# Patient Record
Sex: FEMALE | Race: WHITE | NOT HISPANIC OR LATINO | Employment: OTHER | URBAN - METROPOLITAN AREA
[De-identification: names, ages, dates, MRNs, and addresses within clinical notes are randomized per-mention and may not be internally consistent; named-entity substitution may affect disease eponyms.]

---

## 2017-01-09 ENCOUNTER — ALLSCRIPTS OFFICE VISIT (OUTPATIENT)
Dept: OTHER | Facility: OTHER | Age: 77
End: 2017-01-09

## 2017-01-13 ENCOUNTER — GENERIC CONVERSION - ENCOUNTER (OUTPATIENT)
Dept: OTHER | Facility: OTHER | Age: 77
End: 2017-01-13

## 2017-04-27 ENCOUNTER — ALLSCRIPTS OFFICE VISIT (OUTPATIENT)
Dept: OTHER | Facility: OTHER | Age: 77
End: 2017-04-27

## 2017-05-09 ENCOUNTER — ALLSCRIPTS OFFICE VISIT (OUTPATIENT)
Dept: OTHER | Facility: OTHER | Age: 77
End: 2017-05-09

## 2017-05-09 DIAGNOSIS — R53.83 OTHER FATIGUE: ICD-10-CM

## 2017-05-09 DIAGNOSIS — R60.9 EDEMA: ICD-10-CM

## 2017-05-09 DIAGNOSIS — E78.00 PURE HYPERCHOLESTEROLEMIA: ICD-10-CM

## 2017-05-10 ENCOUNTER — APPOINTMENT (OUTPATIENT)
Dept: LAB | Facility: CLINIC | Age: 77
End: 2017-05-10
Payer: COMMERCIAL

## 2017-05-10 DIAGNOSIS — R60.9 EDEMA: ICD-10-CM

## 2017-05-10 DIAGNOSIS — R53.83 OTHER FATIGUE: ICD-10-CM

## 2017-05-10 DIAGNOSIS — E78.00 PURE HYPERCHOLESTEROLEMIA: ICD-10-CM

## 2017-05-10 LAB
ALBUMIN SERPL BCP-MCNC: 3.4 G/DL (ref 3.5–5)
ALP SERPL-CCNC: 101 U/L (ref 46–116)
ALT SERPL W P-5'-P-CCNC: 20 U/L (ref 12–78)
ANION GAP SERPL CALCULATED.3IONS-SCNC: 7 MMOL/L (ref 4–13)
AST SERPL W P-5'-P-CCNC: 22 U/L (ref 5–45)
BILIRUB SERPL-MCNC: 0.31 MG/DL (ref 0.2–1)
BUN SERPL-MCNC: 10 MG/DL (ref 5–25)
CALCIUM SERPL-MCNC: 8.9 MG/DL (ref 8.3–10.1)
CHLORIDE SERPL-SCNC: 106 MMOL/L (ref 100–108)
CHOLEST SERPL-MCNC: 251 MG/DL (ref 50–200)
CO2 SERPL-SCNC: 27 MMOL/L (ref 21–32)
CREAT SERPL-MCNC: 0.55 MG/DL (ref 0.6–1.3)
GFR SERPL CREATININE-BSD FRML MDRD: >60 ML/MIN/1.73SQ M
GLUCOSE P FAST SERPL-MCNC: 93 MG/DL (ref 65–99)
HDLC SERPL-MCNC: 56 MG/DL (ref 40–60)
LDLC SERPL CALC-MCNC: 168 MG/DL (ref 0–100)
POTASSIUM SERPL-SCNC: 3.9 MMOL/L (ref 3.5–5.3)
PROT SERPL-MCNC: 7 G/DL (ref 6.4–8.2)
SODIUM SERPL-SCNC: 140 MMOL/L (ref 136–145)
TRIGL SERPL-MCNC: 134 MG/DL
TSH SERPL DL<=0.05 MIU/L-ACNC: 0.41 UIU/ML (ref 0.36–3.74)

## 2017-05-10 PROCEDURE — 36415 COLL VENOUS BLD VENIPUNCTURE: CPT

## 2017-05-10 PROCEDURE — 80061 LIPID PANEL: CPT

## 2017-05-10 PROCEDURE — 84443 ASSAY THYROID STIM HORMONE: CPT

## 2017-05-10 PROCEDURE — 80053 COMPREHEN METABOLIC PANEL: CPT

## 2017-09-21 ENCOUNTER — GENERIC CONVERSION - ENCOUNTER (OUTPATIENT)
Dept: OTHER | Facility: OTHER | Age: 77
End: 2017-09-21

## 2017-09-21 DIAGNOSIS — E78.00 PURE HYPERCHOLESTEROLEMIA: ICD-10-CM

## 2017-09-21 DIAGNOSIS — R53.83 OTHER FATIGUE: ICD-10-CM

## 2017-09-21 DIAGNOSIS — R60.9 EDEMA: ICD-10-CM

## 2017-10-25 ENCOUNTER — APPOINTMENT (OUTPATIENT)
Dept: LAB | Facility: CLINIC | Age: 77
End: 2017-10-25
Payer: COMMERCIAL

## 2017-10-25 DIAGNOSIS — R53.83 OTHER FATIGUE: ICD-10-CM

## 2017-10-25 DIAGNOSIS — R60.9 EDEMA: ICD-10-CM

## 2017-10-25 DIAGNOSIS — E78.00 PURE HYPERCHOLESTEROLEMIA: ICD-10-CM

## 2017-10-25 LAB
ALBUMIN SERPL BCP-MCNC: 3.4 G/DL (ref 3.5–5)
ALP SERPL-CCNC: 89 U/L (ref 46–116)
ALT SERPL W P-5'-P-CCNC: 18 U/L (ref 12–78)
ANION GAP SERPL CALCULATED.3IONS-SCNC: 8 MMOL/L (ref 4–13)
AST SERPL W P-5'-P-CCNC: 21 U/L (ref 5–45)
BILIRUB SERPL-MCNC: 0.4 MG/DL (ref 0.2–1)
BUN SERPL-MCNC: 11 MG/DL (ref 5–25)
CALCIUM SERPL-MCNC: 8.7 MG/DL (ref 8.3–10.1)
CHLORIDE SERPL-SCNC: 105 MMOL/L (ref 100–108)
CHOLEST SERPL-MCNC: 224 MG/DL (ref 50–200)
CO2 SERPL-SCNC: 28 MMOL/L (ref 21–32)
CREAT SERPL-MCNC: 0.6 MG/DL (ref 0.6–1.3)
ERYTHROCYTE [DISTWIDTH] IN BLOOD BY AUTOMATED COUNT: 12.6 % (ref 11.6–15.1)
GFR SERPL CREATININE-BSD FRML MDRD: 89 ML/MIN/1.73SQ M
GLUCOSE P FAST SERPL-MCNC: 100 MG/DL (ref 65–99)
HCT VFR BLD AUTO: 41.2 % (ref 34.8–46.1)
HDLC SERPL-MCNC: 47 MG/DL (ref 40–60)
HGB BLD-MCNC: 13.5 G/DL (ref 11.5–15.4)
LDLC SERPL CALC-MCNC: 144 MG/DL (ref 0–100)
MCH RBC QN AUTO: 31.4 PG (ref 26.8–34.3)
MCHC RBC AUTO-ENTMCNC: 32.8 G/DL (ref 31.4–37.4)
MCV RBC AUTO: 96 FL (ref 82–98)
PLATELET # BLD AUTO: 178 THOUSANDS/UL (ref 149–390)
PMV BLD AUTO: 10.1 FL (ref 8.9–12.7)
POTASSIUM SERPL-SCNC: 4.2 MMOL/L (ref 3.5–5.3)
PROT SERPL-MCNC: 7.2 G/DL (ref 6.4–8.2)
RBC # BLD AUTO: 4.3 MILLION/UL (ref 3.81–5.12)
SODIUM SERPL-SCNC: 141 MMOL/L (ref 136–145)
TRIGL SERPL-MCNC: 163 MG/DL
TSH SERPL DL<=0.05 MIU/L-ACNC: 0.32 UIU/ML (ref 0.36–3.74)
WBC # BLD AUTO: 3.89 THOUSAND/UL (ref 4.31–10.16)

## 2017-10-25 PROCEDURE — 80053 COMPREHEN METABOLIC PANEL: CPT

## 2017-10-25 PROCEDURE — 80061 LIPID PANEL: CPT

## 2017-10-25 PROCEDURE — 85027 COMPLETE CBC AUTOMATED: CPT

## 2017-10-25 PROCEDURE — 84443 ASSAY THYROID STIM HORMONE: CPT

## 2017-10-25 PROCEDURE — 36415 COLL VENOUS BLD VENIPUNCTURE: CPT

## 2017-10-27 ENCOUNTER — GENERIC CONVERSION - ENCOUNTER (OUTPATIENT)
Dept: OTHER | Facility: OTHER | Age: 77
End: 2017-10-27

## 2017-11-27 ENCOUNTER — ALLSCRIPTS OFFICE VISIT (OUTPATIENT)
Dept: OTHER | Facility: OTHER | Age: 77
End: 2017-11-27

## 2017-12-05 NOTE — PROGRESS NOTES
Assessment    1  Acute URI (465 9) (J06 9)    Plan  Acute upper respiratory infection    · Promethazine-DM 6 25-15 MG/5ML Oral Syrup; Take 1 teaspoonful every 6 hoursas needed  Acute URI    · Azithromycin 250 MG Oral Tablet; TAKE 2 TABLETS ON DAY 1 THEN TAKE 1TABLET A DAY FOR 4 DAYS    Discussion/Summary    The patient is an feeling better in  Chief Complaint  coughing, sore throat, congestion x 1 day      History of Present Illness  HPI: coughing, sore throat, and headache,      Review of Systems   Constitutional: No fever, no chills, feels well, no tiredness, no recent weight gain or loss  ENT: sore throat, but-- no ear ache, no loss of hearing, no nosebleeds or nasal discharge, no sore throat or hoarseness  Cardiovascular: no complaints of slow or fast heart rate, no chest pain, no palpitations, no leg claudication or lower extremity edema  Respiratory: cough, but-- no complaints of shortness of breath, no wheezing, no dyspnea on exertion, no orthopnea or PND  Breasts: no complaints of breast pain, breast lump or nipple discharge  Gastrointestinal: no complaints of abdominal pain, no constipation, no nausea or diarrhea, no vomiting, no bloody stools  Genitourinary: no complaints of dysuria, no incontinence, no pelvic pain, no dysmenorrhea, no vaginal discharge or abnormal vaginal bleeding  Musculoskeletal: no complaints of arthralgia, no myalgia, no joint swelling or stiffness, no limb pain or swelling  Integumentary: no complaints of skin rash or lesion, no itching or dry skin, no skin wounds  Neurological: headache, but-- no complaints of headache, no confusion, no numbness or tingling, no dizziness or fainting  Active Problems    1  Actinic keratoses (702 0) (L57 0)   2  Acute bronchopneumonia (485) (J18 0)   3  Acute sinusitis (461 9) (J01 90)   4  Acute upper respiratory infection (465 9) (J06 9)   5  Acute URI (465 9) (J06 9)   6   Acute venous embolism and thrombosis of deep vessels of distal lower extremity (453 42) (I82 4Z9)   7  Allergic rhinitis (477 9) (J30 9)   8  Anxiety (300 00) (F41 9)   9  Arthralgia (719 40) (M25 50)   10  Bilateral hip pain (719 45) (M25 551,M25 552)   11  Bulging of lumbar intervertebral disc (722 10) (M51 26)   12  Chronic osteoarthritis (715 90) (M19 90)   13  Edema (782 3) (R60 9)   14  Elevated blood pressure reading without diagnosis of hypertension (796 2) (R03 0)   15  Encounter for screening colonoscopy (V76 51) (Z12 11)   16  Encounter for screening mammogram for malignant neoplasm of breast (V76 12)  (Z12 31)   17  Epilepsy And Recurrent Seizures (345 90)   18  Eustachian tube disorder (381 9) (H69 90)   19  Fatigue (780 79) (R53 83)   20  Flu vaccine need (V04 81) (Z23)   21  Fractures (829 0) (T14 8XXA)   22  Greater trochanteric bursitis of both hips (726 5) (M70 61,M70 62)   23  Hypercholesterolemia (272 0) (E78 00)   24  Ingrowing toenail (703 0) (L60 0)   25  Insect bites (919 4,E906 4) (W57 XXXA)   26  Insomnia (780 52) (G47 00)   27  Lumbar back pain (724 2) (M54 5)   28  Lumbar radiculopathy (724 4) (M54 16)   29  Lumbar spondylosis (721 3) (M47 816)   30  Medicare annual wellness visit, initial (V70 0) (Z00 00)   31  Medicare annual wellness visit, subsequent (V70 0) (Z00 00)   32  Obesity (278 00) (E66 9)   33  Oral ulcer (528 9) (K12 1)   34  Pinched nerve (355 9) (G58 9)   35  Pneumonia (486) (J18 9)   36  Pre-operative cardiovascular examination (V72 81) (Z01 810)   37  Primary osteoarthritis of both hips (715 15) (M16 0)   38  Screening for genitourinary condition (V81 6) (Z13 89)   39  Screening for neurological condition (V80 09) (Z13 89)   40  Sea sickness (994 6) (T75 3XXA)   41  Visit for pre-operative examination (V72 84) (U11 263)    Past Medical History  1  History of Blood Type A+   2  History of Concussion (V15 52)   3  History of Convulsions (780 39) (R56 9)   4  History of hyperlipidemia (V12 29) (Z86 39)   5   History of Osteoarthritis, localized, knee (715 36) (M17 10)   6  History of Spondylosis (721 90)  Active Problems And Past Medical History Reviewed: The active problems and past medical history were reviewed and updated today  Family History  Father    1  Family history of Prostate Cancer (V16 42)  Family History    2  Family history of Back problem  Family History Reviewed: The family history was reviewed and updated today  Social History   · Denied: History of Alcohol Use (History)   · Daily Coffee Consumption (___ Cups/Day)   · Denied: History of Daily Cola Consumption (___ Cans/Day)   · Daily Tea Consumption (___ Cups/Day)   · Denied: History of Drug Use   · Former smoker (V15 82) (Z87 601)   · Marital History - Currently    · Never A Smoker  The social history was reviewed and updated today  The social history was reviewed and is unchanged  Surgical History    1  History of Cataract Surgery   2  History of Diagnostic Esophagogastroduodenoscopy   3  History of Dilation And Curettage   4  History of Hand Surgery   5  History of Hernia Repair   6  History of Hysterectomy   7  History of Nasal Septal Deviation Repair   8  History of Revision Of Total Knee Arthroplasty   9  History of Total Knee Arthroplasty   10  History of Tubal Ligation  Surgical History Reviewed: The surgical history was reviewed and updated today  Current Meds   1  Aspirin 81 MG Oral Tablet Delayed Release; 1 qod Recorded   2  Belsomra 20 MG Oral Tablet; TAKE 1 TABLET Bedtime; Therapy: 18XQZ8154 to (Evaluate:01Oct2017); Last Rx:21Sep2017 Ordered   3  Calcium + D TABS; 2 tabs qd Recorded   4  [facility]-3 CAPS; 2 tabs qd Recorded   5  Dilantin 100 MG Oral Capsule; TAKE 1 CAPSULE 3 times daily; Therapy: 05FYY5772 to (Eliecer Mckenzie)  Requested for: 29CQC2470; Last Rx:09Jan2017 Ordered   6  Glucosamine Chondr Complex 500-400 MG Oral Capsule; 2 tabs qd Recorded   7   HydroCHLOROthiazide 25 MG Oral Tablet; TAKE 1 TABLET BY MOUTH DAILY AS NEEDED FOR SWELLING; Therapy: 60BJC5639 to (Yoni Humble)  Requested for: 84WXG5202; Last Rx:48Vmz8692 Ordered   8  Multivitamins Oral Capsule; TAKE 1 CAPSULE Daily Recorded   9  Simvastatin 20 MG Oral Tablet; Take 1 tablet daily; Therapy: 20FJZ1593 to (Evaluate:76Ffl1111)  Requested for: 41Vjj6331; Last Rx:93Ymv3593 Ordered    The medication list was reviewed and updated today  Allergies  1  Penicillins    Vitals   Recorded: 48XBZ3944 02:01PM   Temperature 97 2 F   Heart Rate 72   Systolic 275   Diastolic 80   Height 5 ft 4 in   Weight 235 lb 2 oz   BMI Calculated 40 36   BSA Calculated 2 1       Physical Exam   Constitutional  General appearance: No acute distress, well appearing and well nourished  Eyes  Conjunctiva and lids: No swelling, erythema or discharge  Pupils and irises: Equal, round and reactive to light  Ears, Nose, Mouth, and Throat  External inspection of ears and nose: Normal    Otoscopic examination: Abnormal  -- Left TM is erythematous and dull with an effusion  Nasal mucosa, septum, and turbinates: Abnormal  -- Positive turbinates injection  Oropharynx: Abnormal  -- Positive erythema the posterior pharynx with injection and postnasal drip and an ulcer along the lower denture line on the left which is about 0 5 cm diameter and white in color  Pulmonary  Respiratory effort: No increased work of breathing or signs of respiratory distress  Auscultation of lungs: Clear to auscultation  Cardiovascular  Auscultation of heart: Normal rate and rhythm, normal S1 and S2, without murmurs  Examination of extremities for edema and/or varicosities: Normal    Abdomen  Abdomen: Non-tender, no masses  Liver and spleen: No hepatomegaly or splenomegaly  Lymphatic  Palpation of lymph nodes in neck: Abnormal  -- Positive anterior cervical lymphadenopathy left greater than right  Musculoskeletal  Digits and nails: Normal without clubbing or cyanosis     Skin  Skin and subcutaneous tissue: Normal without rashes or lesions  Neurologic  Reflexes: 2+ and symmetric     Psychiatric  Orientation to person, place, and time: Normal    Mood and affect: Normal          Future Appointments    Date/Time Provider Specialty Site   01/18/2018 09:45 AM Amaury Escobedo, [address] [place] [address]       Signatures   Electronically signed by : Segundo Vasquez DO; Dec  3 2017  7:36PM EST                       (Author)

## 2017-12-28 ENCOUNTER — ALLSCRIPTS OFFICE VISIT (OUTPATIENT)
Dept: OTHER | Facility: OTHER | Age: 77
End: 2017-12-28

## 2017-12-29 NOTE — PROGRESS NOTES
Assessment   1  Acute upper respiratory infection (465 9) (J06 9)    Plan   Acute upper respiratory infection    · Azithromycin 250 MG Oral Tablet; TAKE 2 TABLETS ON DAY 1 THEN TAKE 1    TABLET A DAY FOR 4 DAYS   · Promethazine-DM 6 25-15 MG/5ML Oral Syrup; Take 1 teaspoonful every 6 hours    as needed    Discussion/Summary      If patient is not feeling better in 72 hours, they are to call  Chief Complaint   dry cough, congestion x 2 days      History of Present Illness   HPI: The patient complains of dry cough with nasal congestion for the past 48 hours  She denies any fever chills or body aches at this time  Review of Systems        Constitutional: No fever, no chills, feels well, no tiredness, no recent weight gain or loss  ENT: Positive nasal congestion, but-- no ear ache, no loss of hearing, no nosebleeds or nasal discharge, no sore throat or hoarseness  Cardiovascular: no complaints of slow or fast heart rate, no chest pain, no palpitations, no leg claudication or lower extremity edema  Respiratory: cough-- and-- Dry nonproductive cough, but-- no complaints of shortness of breath, no wheezing, no dyspnea on exertion, no orthopnea or PND  Breasts: no complaints of breast pain, breast lump or nipple discharge  Gastrointestinal: no complaints of abdominal pain, no constipation, no nausea or diarrhea, no vomiting, no bloody stools  Genitourinary: no complaints of dysuria, no incontinence, no pelvic pain, no dysmenorrhea, no vaginal discharge or abnormal vaginal bleeding  Musculoskeletal: no complaints of arthralgia, no myalgia, no joint swelling or stiffness, no limb pain or swelling  Integumentary: no complaints of skin rash or lesion, no itching or dry skin, no skin wounds  Neurological: no complaints of headache, no confusion, no numbness or tingling, no dizziness or fainting  Active Problems   1  Actinic keratoses (702 0) (L57 0)   2   Acute bronchopneumonia (485) (J18 0)   3  Acute sinusitis (461 9) (J01 90)   4  Acute upper respiratory infection (465 9) (J06 9)   5  Acute URI (465 9) (J06 9)   6  Acute venous embolism and thrombosis of deep vessels of distal lower extremity     (453 42) (I82 4Z9)   7  Allergic rhinitis (477 9) (J30 9)   8  Anxiety (300 00) (F41 9)   9  Arthralgia (719 40) (M25 50)   10  Bilateral hip pain (719 45) (M25 551,M25 552)   11  Bulging of lumbar intervertebral disc (722 10) (M51 26)   12  Chronic osteoarthritis (715 90) (M19 90)   13  Edema (782 3) (R60 9)   14  Elevated blood pressure reading without diagnosis of hypertension (796 2) (R03 0)   15  Encounter for screening colonoscopy (V76 51) (Z12 11)   16  Encounter for screening mammogram for malignant neoplasm of breast (V76 12)      (Z12 31)   17  Epilepsy And Recurrent Seizures (345 90)   18  Eustachian tube disorder (381 9) (H69 90)   19  Fatigue (780 79) (R53 83)   20  Flu vaccine need (V04 81) (Z23)   21  Fractures (829 0) (T14 8XXA)   22  Greater trochanteric bursitis of both hips (726 5) (M70 61,M70 62)   23  Hypercholesterolemia (272 0) (E78 00)   24  Ingrowing toenail (703 0) (L60 0)   25  Insect bites (919 4,E906 4) (W57 XXXA)   26  Insomnia (780 52) (G47 00)   27  Lumbar back pain (724 2) (M54 5)   28  Lumbar radiculopathy (724 4) (M54 16)   29  Lumbar spondylosis (721 3) (M47 816)   30  Medicare annual wellness visit, initial (V70 0) (Z00 00)   31  Medicare annual wellness visit, subsequent (V70 0) (Z00 00)   32  Obesity (278 00) (E66 9)   33  Oral ulcer (528 9) (K12 1)   34  Pinched nerve (355 9) (G58 9)   35  Pneumonia (486) (J18 9)   36  Pre-operative cardiovascular examination (V72 81) (Z01 810)   37  Primary osteoarthritis of both hips (715 15) (M16 0)   38  Screening for genitourinary condition (V81 6) (Z13 89)   39  Screening for neurological condition (V80 09) (Z13 89)   40  Sea sickness (994 6) (T75 3XXA)   41   Visit for pre-operative examination (V72 84) (J79 738)    Past Medical History   1  History of Blood Type A+   2  History of Concussion (V15 52)   3  History of Convulsions (780 39) (R56 9)   4  History of hyperlipidemia (V12 29) (Z86 39)   5  History of Osteoarthritis, localized, knee (715 36) (M17 10)   6  History of Spondylosis (721 90)  Active Problems And Past Medical History Reviewed: The active problems and past medical history were reviewed and updated today  Family History   Father    1  Family history of Prostate Cancer (V16 42)  Family History    2  Family history of Back problem  Family History Reviewed: The family history was reviewed and updated today  Social History    · Denied: History of Alcohol Use (History)   · Daily Coffee Consumption (___ Cups/Day)   · Denied: History of Daily Cola Consumption (___ Cans/Day)   · Daily Tea Consumption (___ Cups/Day)   · Denied: History of Drug Use   · Former smoker (V15 82) (Z87 891)   · Marital History - Currently    · Never A Smoker  The social history was reviewed and updated today  The social history was reviewed and is unchanged  Surgical History   1  History of Cataract Surgery   2  History of Diagnostic Esophagogastroduodenoscopy   3  History of Dilation And Curettage   4  History of Hand Surgery   5  History of Hernia Repair   6  History of Hysterectomy   7  History of Nasal Septal Deviation Repair   8  History of Revision Of Total Knee Arthroplasty   9  History of Total Knee Arthroplasty   10  History of Tubal Ligation  Surgical History Reviewed: The surgical history was reviewed and updated today  Current Meds    1  Aspirin 81 MG Oral Tablet Delayed Release; 1 qod Recorded   2  Belsomra 20 MG Oral Tablet; TAKE 1 TABLET Bedtime; Therapy: 09WLE0009 to (Evaluate:01Oct2017); Last Rx:[id number] Ordered   3  Calcium + D TABS; 2 tabs qd Recorded   4  [facility] CAPS; 2 tabs qd Recorded   5   Dilantin 100 MG Oral Capsule; TAKE 1 CAPSULE 3 times daily; Therapy: 12BGU8917 to (Evaluate:18Jun2018)  Requested for: 56Ouc3206; Last     Rx:76Fcg8152 Ordered   6  Glucosamine Chondr Complex 500-400 MG Oral Capsule; 2 tabs qd Recorded   7  HydroCHLOROthiazide 25 MG Oral Tablet; TAKE 1 TABLET BY MOUTH DAILY AS     NEEDED FOR SWELLING; Therapy: 02KCM7801 to (Chidi Trinidad)  Requested for: 47DVD8700; Last     Rx:26Bnj3250 Ordered   8  Multivitamins Oral Capsule; TAKE 1 CAPSULE Daily Recorded   9  Simvastatin 20 MG Oral Tablet; Take 1 tablet daily; Therapy: 43RET1700 to (Evaluate:41Tzx8915)  Requested for: 12Eub7809; Last     Rx:46Qxo2269 Ordered     The medication list was reviewed and updated today  Allergies   1  Penicillins    Vitals    Recorded: 28Dec2017 02:44PM   Temperature 99 F   Heart Rate 70   Systolic 753   Diastolic 78   Height 5 ft 4 in   Weight 234 lb 4 oz   BMI Calculated 40 21   BSA Calculated 2 09     Physical Exam        Constitutional      General appearance: No acute distress, well appearing and well nourished  Eyes      Conjunctiva and lids: No swelling, erythema or discharge  Pupils and irises: Equal, round and reactive to light  Ears, Nose, Mouth, and Throat      External inspection of ears and nose: Normal        Otoscopic examination: Abnormal  -- TMs are dull bilaterally  Nasal mucosa, septum, and turbinates: Abnormal  -- Positive turbinates injection  Oropharynx: Abnormal  -- Positive erythema the posterior pharynx with injection and postnasal drip and an ulcer along the lower denture line on the left which is about 0 5 cm diameter and white in color  Pulmonary      Respiratory effort: No increased work of breathing or signs of respiratory distress  Auscultation of lungs: Clear to auscultation  Cardiovascular      Auscultation of heart: Normal rate and rhythm, normal S1 and S2, without murmurs         Examination of extremities for edema and/or varicosities: Normal        Abdomen Abdomen: Non-tender, no masses  Liver and spleen: No hepatomegaly or splenomegaly  Lymphatic      Palpation of lymph nodes in neck: Abnormal  -- Positive anterior cervical lymphadenopathy left greater than right  Musculoskeletal      Digits and nails: Normal without clubbing or cyanosis  Skin      Skin and subcutaneous tissue: Normal without rashes or lesions  Neurologic      Reflexes: 2+ and symmetric         Psychiatric      Orientation to person, place, and time: Normal        Mood and affect: Normal           Future Appointments      Date/Time Provider Specialty Site   01/18/2018 09:45 AM Selma Melgar, [address] [place] [address]     Signatures    Electronically signed by : Hunter Gifford DO; Dec 28 2017  4:26PM EST                       (Author)

## 2018-01-02 ENCOUNTER — GENERIC CONVERSION - ENCOUNTER (OUTPATIENT)
Dept: OTHER | Facility: OTHER | Age: 78
End: 2018-01-02

## 2018-01-09 ENCOUNTER — GENERIC CONVERSION - ENCOUNTER (OUTPATIENT)
Dept: OTHER | Facility: OTHER | Age: 78
End: 2018-01-09

## 2018-01-13 VITALS
RESPIRATION RATE: 16 BRPM | HEIGHT: 64 IN | WEIGHT: 233 LBS | TEMPERATURE: 97.8 F | BODY MASS INDEX: 39.78 KG/M2 | SYSTOLIC BLOOD PRESSURE: 134 MMHG | DIASTOLIC BLOOD PRESSURE: 78 MMHG | HEART RATE: 68 BPM

## 2018-01-13 VITALS
RESPIRATION RATE: 18 BRPM | HEART RATE: 60 BPM | TEMPERATURE: 96.7 F | WEIGHT: 232.38 LBS | HEIGHT: 64 IN | DIASTOLIC BLOOD PRESSURE: 74 MMHG | SYSTOLIC BLOOD PRESSURE: 126 MMHG | BODY MASS INDEX: 39.67 KG/M2

## 2018-01-14 VITALS
HEART RATE: 82 BPM | BODY MASS INDEX: 39.99 KG/M2 | TEMPERATURE: 97.1 F | RESPIRATION RATE: 18 BRPM | SYSTOLIC BLOOD PRESSURE: 130 MMHG | DIASTOLIC BLOOD PRESSURE: 74 MMHG | WEIGHT: 233 LBS

## 2018-01-14 VITALS
HEIGHT: 64 IN | BODY MASS INDEX: 40.14 KG/M2 | SYSTOLIC BLOOD PRESSURE: 128 MMHG | TEMPERATURE: 97.2 F | DIASTOLIC BLOOD PRESSURE: 80 MMHG | WEIGHT: 235.13 LBS | HEART RATE: 72 BPM

## 2018-01-14 NOTE — RESULT NOTES
Verified Results  * DXA BONE DENSITY SPINE HIP AND PELVIS [id number] 04:21PM Tisha Hylton     Test Name Result Flag Reference   DXA BONE DENSITY SPINE HIP AND PELVIS (Report)     CENTRAL DXA SCAN     CLINICAL HISTORY:  70year old post-menopausal  female with personal history of degenerative arthritis and status post bilateral total knee replacements  The patient does not exercise regularly and takes calcium and vitamin D supplements  TECHNIQUE: Bone densitometry was performed using a [employer] Horizon A bone densitometer  Regions of interest appear properly placed  There are degenerative changes of the lumbar spine, which artificially elevate bone mineral density results  COMPARISON: None  RESULTS:    LUMBAR SPINE: L1-L4:   BMD 1 033 gm/cm2   T-score -0 point   Z-score 2 2     LEFT TOTAL HIP:   BMD 1 095 gm/cm2   T-score 1 3   Z-score 2 9     LEFT FEMORAL NECK:   BMD 0 929 gm/cm2   T-score 0 7   Z-score 2 7           ASSESSMENT:   1  Based on the WHO classification, this study is normal and the patient is considered at low risk for fracture  2  A daily intake of calcium of at least 1200 mg and vitamin D, 800-1000 IU, as well as weight bearing and muscle strengthening exercise, fall prevention and avoidance of tobacco and excessive alcohol intake as basic preventive measures are recommended  3  Repeat DXA scan in 18-24 months as clinically indicated        WHO CLASSIFICATION:   Normal (a T-score of -1 0 or higher)   Low bone mineral density (a T-score of less than -1 0 but higher than -2 5)   Osteoporosis (a T-score of -2 5 or less)   Severe osteoporosis (a T-score of -2 5 or less with a fragility fracture)             Workstation performed: [id number]

## 2018-01-22 VITALS
SYSTOLIC BLOOD PRESSURE: 120 MMHG | WEIGHT: 234.25 LBS | TEMPERATURE: 99 F | BODY MASS INDEX: 39.99 KG/M2 | DIASTOLIC BLOOD PRESSURE: 78 MMHG | HEIGHT: 64 IN | HEART RATE: 70 BPM

## 2018-01-22 VITALS
HEIGHT: 64 IN | SYSTOLIC BLOOD PRESSURE: 130 MMHG | BODY MASS INDEX: 39.85 KG/M2 | WEIGHT: 233.4 LBS | DIASTOLIC BLOOD PRESSURE: 80 MMHG | RESPIRATION RATE: 18 BRPM | TEMPERATURE: 98.4 F | HEART RATE: 74 BPM

## 2018-01-24 VITALS
DIASTOLIC BLOOD PRESSURE: 78 MMHG | HEART RATE: 70 BPM | HEIGHT: 64 IN | BODY MASS INDEX: 38.84 KG/M2 | SYSTOLIC BLOOD PRESSURE: 122 MMHG | WEIGHT: 227.5 LBS | TEMPERATURE: 98.4 F

## 2018-01-24 VITALS
HEIGHT: 64 IN | WEIGHT: 228.5 LBS | TEMPERATURE: 98.4 F | HEART RATE: 64 BPM | SYSTOLIC BLOOD PRESSURE: 132 MMHG | DIASTOLIC BLOOD PRESSURE: 74 MMHG | BODY MASS INDEX: 39.01 KG/M2

## 2018-01-24 VITALS — OXYGEN SATURATION: 96 %

## 2018-04-19 ENCOUNTER — TELEPHONE (OUTPATIENT)
Dept: FAMILY MEDICINE CLINIC | Facility: CLINIC | Age: 78
End: 2018-04-19

## 2018-04-19 DIAGNOSIS — R53.83 OTHER FATIGUE: ICD-10-CM

## 2018-04-19 DIAGNOSIS — E78.00 HYPERCHOLESTEROLEMIA: Primary | ICD-10-CM

## 2018-04-19 NOTE — TELEPHONE ENCOUNTER
Does she need labs prior to visit with you 4/25 ? If so please put in system, she will go to [facility] but please call to let her know, leave message

## 2018-04-25 ENCOUNTER — OFFICE VISIT (OUTPATIENT)
Dept: FAMILY MEDICINE CLINIC | Facility: CLINIC | Age: 78
End: 2018-04-25
Payer: COMMERCIAL

## 2018-04-25 VITALS
BODY MASS INDEX: 38.89 KG/M2 | TEMPERATURE: 98.4 F | HEIGHT: 65 IN | DIASTOLIC BLOOD PRESSURE: 80 MMHG | HEART RATE: 80 BPM | WEIGHT: 233.4 LBS | SYSTOLIC BLOOD PRESSURE: 124 MMHG

## 2018-04-25 DIAGNOSIS — R56.9 SEIZURE (HCC): Primary | ICD-10-CM

## 2018-04-25 DIAGNOSIS — R60.9 EDEMA, UNSPECIFIED TYPE: ICD-10-CM

## 2018-04-25 DIAGNOSIS — E78.00 HYPERCHOLESTEROLEMIA: ICD-10-CM

## 2018-04-25 PROCEDURE — 1101F PT FALLS ASSESS-DOCD LE1/YR: CPT | Performed by: FAMILY MEDICINE

## 2018-04-25 PROCEDURE — 99214 OFFICE O/P EST MOD 30 MIN: CPT | Performed by: FAMILY MEDICINE

## 2018-04-25 PROCEDURE — 3725F SCREEN DEPRESSION PERFORMED: CPT | Performed by: FAMILY MEDICINE

## 2018-04-25 PROCEDURE — 3008F BODY MASS INDEX DOCD: CPT | Performed by: FAMILY MEDICINE

## 2018-04-25 RX ORDER — PHENYTOIN SODIUM 100 MG/1
1 CAPSULE, EXTENDED RELEASE ORAL 3 TIMES DAILY
COMMUNITY
Start: 2011-03-26 | End: 2018-04-25 | Stop reason: SDUPTHER

## 2018-04-25 RX ORDER — CHOLECALCIFEROL (VITAMIN D3) 125 MCG
2 CAPSULE ORAL DAILY
COMMUNITY
End: 2018-10-08 | Stop reason: ALTCHOICE

## 2018-04-25 RX ORDER — LANOLIN ALCOHOL/MO/W.PET/CERES
2 CREAM (GRAM) TOPICAL DAILY
COMMUNITY
End: 2018-10-08 | Stop reason: ALTCHOICE

## 2018-04-25 RX ORDER — HYDROCHLOROTHIAZIDE 25 MG/1
TABLET ORAL
COMMUNITY
Start: 2015-10-07 | End: 2018-10-08 | Stop reason: ALTCHOICE

## 2018-04-25 RX ORDER — SODIUM PHOSPHATE,MONO-DIBASIC 19G-7G/118
2 ENEMA (ML) RECTAL DAILY
COMMUNITY
End: 2018-10-08 | Stop reason: ALTCHOICE

## 2018-04-25 RX ORDER — SIMVASTATIN 20 MG
1 TABLET ORAL DAILY
COMMUNITY
Start: 2016-03-09 | End: 2018-10-08 | Stop reason: ALTCHOICE

## 2018-04-25 RX ORDER — PHENYTOIN SODIUM 100 MG/1
100 CAPSULE, EXTENDED RELEASE ORAL 3 TIMES DAILY
Qty: 180 CAPSULE | Refills: 0 | Status: SHIPPED | OUTPATIENT
Start: 2018-04-25 | End: 2018-09-21 | Stop reason: SDUPTHER

## 2018-04-25 RX ORDER — ASPIRIN 81 MG/1
TABLET ORAL DAILY
COMMUNITY

## 2018-04-25 NOTE — PROGRESS NOTES
Patient ID: Herrera Denny is a 78 y o  female  HPI: 78 y  o female presents for follow up hypercholesterolemia seizure disorder and chronic edema of lower legs   She had a screening venous study showing slowed blood flow in her left leg  She denies any calf pain at this time  She is on hctz at this time  SUBJECTIVE    Family History   Problem Relation Age of Onset    Prostate cancer Father     Other Family      back problem     Social History     Social History    Marital status: /Civil Union     Spouse name: N/A    Number of children: N/A    Years of education: N/A     Occupational History    Not on file       Social History Main Topics    Smoking status: Former Smoker    Smokeless tobacco: Not on file    Alcohol use No    Drug use: Unknown    Sexual activity: Not on file     Other Topics Concern    Not on file     Social History Narrative    Daily coffee consumption (_cups/day)    Denied hx of daily cola consumption    Daily tea consumption (_cups/day)         Past Medical History:   Diagnosis Date    Blood type A+     Convulsions (HCC)     Hyperlipidemia     Osteoarthritis, localized, knee     Spondylosis      Past Surgical History:   Procedure Laterality Date    CATARACT EXTRACTION      COMBINED HYSTEROSCOPY DIAGNOSTIC / D&C      ESOPHAGOGASTRODUODENOSCOPY      diagnostic    HAND SURGERY      HERNIA REPAIR      HYSTERECTOMY      KNEE ARTHROPLASTY      Revision;total    NASAL SEPTUM SURGERY      Deviation repair    TUBAL LIGATION       Allergies   Allergen Reactions    Adhesive  [Medical Tape]      Other reaction(s): Unknown Reaction    Latex      Other reaction(s): Unknown Reaction    Midazolam     Penicillins     Thiopental        Current Outpatient Prescriptions:     aspirin (ECOTRIN LOW STRENGTH) 81 mg EC tablet, Take by mouth, Disp: , Rfl:     calcium citrate-vitamin D (CITRACAL+D) 315-200 MG-UNIT per tablet, Take 2 tablets by mouth daily, Disp: , Rfl:   [employer] OMEGA-3 KRILL  MG CAPS, Take 2 tablets by mouth daily, Disp: , Rfl:     Glucosamine-Chondroitin 500-400 MG CAPS, Take 2 tablets by mouth daily, Disp: , Rfl:     hydrochlorothiazide (HYDRODIURIL) 25 mg tablet, Take by mouth, Disp: , Rfl:     Multiple Vitamin (MULTIVITAMINS PO), Take 1 capsule by mouth daily, Disp: , Rfl:     Omega-3 Fatty Acids (FISH OIL PO), Take 2 g by mouth, Disp: , Rfl:     phenytoin (DILANTIN) 100 mg ER capsule, Take 1 capsule (100 mg total) by mouth 3 (three) times a day for 90 days, Disp: 180 capsule, Rfl: 0    simvastatin (ZOCOR) 20 mg tablet, Take 1 tablet by mouth daily, Disp: , Rfl:     Review of Systems  Constitutional:     Denies fever, chills ,fatigue ,weakness ,weight loss, weight gain     ENT: Denies earache ,loss of hearing ,nosebleed, nasal discharge,nasal congestion ,sore throat ,hoarseness  Pulmonary: Denies shortness of breath ,cough  ,dyspnea on exertion, orthopnea  ,PND   Cardiovascular:  Denies bradycardia , tachycardia  ,palpations, +lower extremity edema leg, claudication  Breast:  Denies new or changing breast lumps ,nipple discharge ,nipple changes  Abdomen:  Denies abdominal pain , anorexia , indigestion, nausea, vomiting, constipation, diarrhea  Musculoskeletal: Denies myalgias, arthralgias, joint swelling, joint stiffness , limb pain, limb swelling  Gu: denies dysuria, polyuria  Skin: Denies skin rash, skin lesion, skin wound, itching, dry skin  Neuro: Denies headache, numbness, tingling, confusion, loss of consciousness, dizziness, vertigo  Psychiatric: Denies feelings of depression, suicidal ideation, anxiety, sleep disturbances    OBJECTIVE    Constitutional:   NAD, well appearing and well nourished      ENT:   Conjunctiva and lids: no injection, edema, or discharge     Pupils and iris: STEFAN bilaterally    External inspection of ears and nose: normal without deformities or discharge  Otoscopic exam: Canals patent without erythema  Nasal mucosa, septum and turbinates: Normal or edema or discharge         Oropharynx:  Moist mucosa, normal tongue and tonsils without lesions  No erythema        Pulmonary:Respiratory effort normal rate and rhythm, no increased work of breathing  Auscultation of lungs:  Clear bilaterally with no adventitious breath sounds       Cardiovascular: regular rate and rhythm, S1 and S2, no murmur, no  varicosities of Le+1 pitting  edema of lower legs bilateraly left>right      Abdomen: Soft and non-distended     Positive bowel sounds      No heptomegaly or splenomegaly      Gu: no suprapubic tenderness or CVA tenderness, no urethral discharge  Lymphatic:  No anterior or posterior cervical lymphadenopathy      Musculoskeletal:  Gait and station: Normal gait      Digits and nails normal without clubbing or cyanosis       Inspection/palpation of joints, bones, and muscles:  No joint tenderness, swelling, full active and passive range of motion       Skin: Normal skin turgor and no rashes      Neuro:     Normal reflexes      Psych:   alert and oriented to person, place and time     normal mood and affect       Assessment/Plan:Diagnoses and all orders for this visit:    Seizure (HCC)  -     phenytoin (DILANTIN) 100 mg ER capsule; Take 1 capsule (100 mg total) by mouth 3 (three) times a day for 90 days    Edema, unspecified type  -     VAS lower limb venous duplex study, complete bilateral; Future    Hypercholesterolemia    Other orders  -     hydrochlorothiazide (HYDRODIURIL) 25 mg tablet; Take by mouth  -     simvastatin (ZOCOR) 20 mg tablet; Take 1 tablet by mouth daily  -     Glucosamine-Chondroitin 500-400 MG CAPS; Take 2 tablets by mouth daily  -     Discontinue: phenytoin (DILANTIN) 100 mg ER capsule; Take 1 capsule by mouth 3 (three) times a day  -     calcium citrate-vitamin D (CITRACAL+D) 315-200 MG-UNIT per tablet; Take 2 tablets by mouth daily  -     Omega-3 Fatty Acids (FISH OIL PO);  Take 2 g by mouth  - aspirin (ECOTRIN LOW STRENGTH) 81 mg EC tablet; Take by mouth  -     CVS OMEGA-3 KRILL  MG CAPS; Take 2 tablets by mouth daily  -     Multiple Vitamin (MULTIVITAMINS PO); Take 1 capsule by mouth daily        Pt has labs ordered in system  Will await those results as well as resutls of venous duplex  I'll see her back in 4 mos or sonner prn

## 2018-05-01 ENCOUNTER — TRANSCRIBE ORDERS (OUTPATIENT)
Dept: NON INVASIVE DIAGNOSTICS | Facility: CLINIC | Age: 78
End: 2018-05-01

## 2018-05-10 ENCOUNTER — HOSPITAL ENCOUNTER (OUTPATIENT)
Dept: NON INVASIVE DIAGNOSTICS | Facility: CLINIC | Age: 78
Discharge: HOME | End: 2018-05-10
Payer: COMMERCIAL

## 2018-05-10 DIAGNOSIS — R60.9 EDEMA, UNSPECIFIED TYPE: ICD-10-CM

## 2018-05-10 PROCEDURE — 93970 EXTREMITY STUDY: CPT

## 2018-05-11 PROCEDURE — 93970 EXTREMITY STUDY: CPT | Performed by: SURGERY

## 2018-06-19 ENCOUNTER — TELEPHONE (OUTPATIENT)
Dept: FAMILY MEDICINE CLINIC | Facility: CLINIC | Age: 78
End: 2018-06-19

## 2018-06-19 DIAGNOSIS — E78.00 HYPERCHOLESTEROLEMIA: Primary | ICD-10-CM

## 2018-06-19 RX ORDER — SIMVASTATIN 20 MG
20 TABLET ORAL
Qty: 90 TABLET | Refills: 1 | Status: SHIPPED | OUTPATIENT
Start: 2018-06-19 | End: 2019-03-19 | Stop reason: SDUPTHER

## 2018-06-19 NOTE — TELEPHONE ENCOUNTER
YEAST INFECTION   SHE TRIED OTC STUFF AND NOTHING HELPED     CAN YOU CALL IN SOME YELLOW CREAM YOU USED TO GIVE     [facility] [address]

## 2018-06-20 DIAGNOSIS — B37.3 VAGINAL YEAST INFECTION: Primary | ICD-10-CM

## 2018-06-20 DIAGNOSIS — B37.2 CUTANEOUS CANDIDIASIS: Primary | ICD-10-CM

## 2018-06-20 RX ORDER — CLOTRIMAZOLE AND BETAMETHASONE DIPROPIONATE 10; .64 MG/G; MG/G
CREAM TOPICAL 2 TIMES DAILY
Qty: 30 G | Refills: 3 | Status: SHIPPED | OUTPATIENT
Start: 2018-06-20 | End: 2018-10-08 | Stop reason: ALTCHOICE

## 2018-06-20 NOTE — TELEPHONE ENCOUNTER
Patient called stating we called in a topical cream and patient states she needs something for the inside because that is where the infection is coming from  Can we please call something over as soon as possible to help with the burning/itching on the inside?

## 2018-06-20 NOTE — TELEPHONE ENCOUNTER
PATIENT CALLED TODAY   SHES BEEN WAITING FOR A CREAM FOR HER YEAST INFECTION     AND IT LOOKS LIKE YOU CALLED IN SIMVASTATIN   INSTEAD??     PLEASE RE SEND THE CORRECT MED

## 2018-09-21 DIAGNOSIS — R56.9 SEIZURE (HCC): ICD-10-CM

## 2018-09-21 RX ORDER — PHENYTOIN SODIUM 100 MG/1
CAPSULE, EXTENDED RELEASE ORAL
Qty: 180 CAPSULE | Refills: 0 | Status: SHIPPED | OUTPATIENT
Start: 2018-09-21 | End: 2018-11-06 | Stop reason: SDUPTHER

## 2018-09-24 ENCOUNTER — TELEPHONE (OUTPATIENT)
Dept: FAMILY MEDICINE CLINIC | Facility: CLINIC | Age: 78
End: 2018-09-24

## 2018-09-24 DIAGNOSIS — I10 ESSENTIAL HYPERTENSION: Primary | ICD-10-CM

## 2018-09-24 DIAGNOSIS — R53.83 OTHER FATIGUE: ICD-10-CM

## 2018-09-24 DIAGNOSIS — E78.00 HYPERCHOLESTEROLEMIA: ICD-10-CM

## 2018-10-03 ENCOUNTER — APPOINTMENT (OUTPATIENT)
Dept: LAB | Facility: CLINIC | Age: 78
End: 2018-10-03
Payer: COMMERCIAL

## 2018-10-03 DIAGNOSIS — R53.83 OTHER FATIGUE: ICD-10-CM

## 2018-10-03 DIAGNOSIS — I10 ESSENTIAL HYPERTENSION: ICD-10-CM

## 2018-10-03 DIAGNOSIS — E78.00 HYPERCHOLESTEROLEMIA: ICD-10-CM

## 2018-10-03 LAB
ALBUMIN SERPL BCP-MCNC: 3.4 G/DL (ref 3.5–5)
ALP SERPL-CCNC: 105 U/L (ref 46–116)
ALT SERPL W P-5'-P-CCNC: 18 U/L (ref 12–78)
ANION GAP SERPL CALCULATED.3IONS-SCNC: 4 MMOL/L (ref 4–13)
AST SERPL W P-5'-P-CCNC: 17 U/L (ref 5–45)
BASOPHILS # BLD AUTO: 0.02 THOUSANDS/ΜL (ref 0–0.1)
BASOPHILS NFR BLD AUTO: 1 % (ref 0–1)
BILIRUB SERPL-MCNC: 0.59 MG/DL (ref 0.2–1)
BUN SERPL-MCNC: 14 MG/DL (ref 5–25)
CALCIUM SERPL-MCNC: 9.1 MG/DL (ref 8.3–10.1)
CHLORIDE SERPL-SCNC: 106 MMOL/L (ref 100–108)
CHOLEST SERPL-MCNC: 197 MG/DL (ref 50–200)
CO2 SERPL-SCNC: 28 MMOL/L (ref 21–32)
CREAT SERPL-MCNC: 0.49 MG/DL (ref 0.6–1.3)
EOSINOPHIL # BLD AUTO: 0.1 THOUSAND/ΜL (ref 0–0.61)
EOSINOPHIL NFR BLD AUTO: 3 % (ref 0–6)
ERYTHROCYTE [DISTWIDTH] IN BLOOD BY AUTOMATED COUNT: 12.2 % (ref 11.6–15.1)
GFR SERPL CREATININE-BSD FRML MDRD: 95 ML/MIN/1.73SQ M
GLUCOSE P FAST SERPL-MCNC: 97 MG/DL (ref 65–99)
HCT VFR BLD AUTO: 42.5 % (ref 34.8–46.1)
HDLC SERPL-MCNC: 57 MG/DL (ref 40–60)
HGB BLD-MCNC: 13.9 G/DL (ref 11.5–15.4)
IMM GRANULOCYTES # BLD AUTO: 0 THOUSAND/UL (ref 0–0.2)
IMM GRANULOCYTES NFR BLD AUTO: 0 % (ref 0–2)
LDLC SERPL CALC-MCNC: 115 MG/DL (ref 0–100)
LYMPHOCYTES # BLD AUTO: 1.34 THOUSANDS/ΜL (ref 0.6–4.47)
LYMPHOCYTES NFR BLD AUTO: 35 % (ref 14–44)
MCH RBC QN AUTO: 31.5 PG (ref 26.8–34.3)
MCHC RBC AUTO-ENTMCNC: 32.7 G/DL (ref 31.4–37.4)
MCV RBC AUTO: 96 FL (ref 82–98)
MONOCYTES # BLD AUTO: 0.29 THOUSAND/ΜL (ref 0.17–1.22)
MONOCYTES NFR BLD AUTO: 8 % (ref 4–12)
NEUTROPHILS # BLD AUTO: 2.06 THOUSANDS/ΜL (ref 1.85–7.62)
NEUTS SEG NFR BLD AUTO: 53 % (ref 43–75)
NRBC BLD AUTO-RTO: 0 /100 WBCS
PLATELET # BLD AUTO: 177 THOUSANDS/UL (ref 149–390)
PMV BLD AUTO: 9.8 FL (ref 8.9–12.7)
POTASSIUM SERPL-SCNC: 4 MMOL/L (ref 3.5–5.3)
PROT SERPL-MCNC: 7.4 G/DL (ref 6.4–8.2)
RBC # BLD AUTO: 4.41 MILLION/UL (ref 3.81–5.12)
SODIUM SERPL-SCNC: 138 MMOL/L (ref 136–145)
TRIGL SERPL-MCNC: 126 MG/DL
TSH SERPL DL<=0.05 MIU/L-ACNC: 0.39 UIU/ML (ref 0.36–3.74)
WBC # BLD AUTO: 3.81 THOUSAND/UL (ref 4.31–10.16)

## 2018-10-03 PROCEDURE — 80061 LIPID PANEL: CPT

## 2018-10-03 PROCEDURE — 36415 COLL VENOUS BLD VENIPUNCTURE: CPT

## 2018-10-03 PROCEDURE — 84443 ASSAY THYROID STIM HORMONE: CPT

## 2018-10-03 PROCEDURE — 85025 COMPLETE CBC W/AUTO DIFF WBC: CPT

## 2018-10-03 PROCEDURE — 80053 COMPREHEN METABOLIC PANEL: CPT

## 2018-10-05 ENCOUNTER — TELEPHONE (OUTPATIENT)
Dept: FAMILY MEDICINE CLINIC | Facility: CLINIC | Age: 78
End: 2018-10-05

## 2018-10-05 DIAGNOSIS — B37.3 VAGINAL YEAST INFECTION: Primary | ICD-10-CM

## 2018-10-05 RX ORDER — FLUCONAZOLE 150 MG/1
150 TABLET ORAL ONCE
Qty: 1 TABLET | Refills: 0 | Status: SHIPPED | OUTPATIENT
Start: 2018-10-05 | End: 2018-10-05

## 2018-10-05 NOTE — TELEPHONE ENCOUNTER
Thinks she has a yeast infect,  She is very itchy and keeps her up,  otc vagisal not helping, no discharge that she sees    She has had yeast infect yrs ago    enrrique leach  Will ck with pharm if no cb

## 2018-10-05 NOTE — TELEPHONE ENCOUNTER
You called in a diflucan for her today       She said she also needs a cream to help until the pill kicks in     [facility] in [place]

## 2018-10-08 ENCOUNTER — OFFICE VISIT (OUTPATIENT)
Dept: FAMILY MEDICINE CLINIC | Facility: CLINIC | Age: 78
End: 2018-10-08
Payer: COMMERCIAL

## 2018-10-08 VITALS
WEIGHT: 235.2 LBS | HEART RATE: 74 BPM | SYSTOLIC BLOOD PRESSURE: 130 MMHG | BODY MASS INDEX: 39.18 KG/M2 | TEMPERATURE: 98.1 F | DIASTOLIC BLOOD PRESSURE: 80 MMHG | HEIGHT: 65 IN

## 2018-10-08 DIAGNOSIS — Z23 NEED FOR VACCINATION: ICD-10-CM

## 2018-10-08 DIAGNOSIS — E78.00 HYPERCHOLESTEROLEMIA: ICD-10-CM

## 2018-10-08 DIAGNOSIS — Z12.39 BREAST CANCER SCREENING: Primary | ICD-10-CM

## 2018-10-08 DIAGNOSIS — G47.00 INSOMNIA, UNSPECIFIED TYPE: ICD-10-CM

## 2018-10-08 DIAGNOSIS — B37.3 VAGINAL YEAST INFECTION: ICD-10-CM

## 2018-10-08 PROCEDURE — 99214 OFFICE O/P EST MOD 30 MIN: CPT | Performed by: FAMILY MEDICINE

## 2018-10-08 PROCEDURE — 90662 IIV NO PRSV INCREASED AG IM: CPT | Performed by: FAMILY MEDICINE

## 2018-10-08 PROCEDURE — G0008 ADMIN INFLUENZA VIRUS VAC: HCPCS | Performed by: FAMILY MEDICINE

## 2018-10-08 RX ORDER — FLUCONAZOLE 150 MG/1
TABLET ORAL
Qty: 2 TABLET | Refills: 0 | Status: SHIPPED | OUTPATIENT
Start: 2018-10-08 | End: 2018-10-13

## 2018-10-08 RX ORDER — ZOLPIDEM TARTRATE 12.5 MG/1
12.5 TABLET, FILM COATED, EXTENDED RELEASE ORAL
Qty: 30 TABLET | Refills: 3 | Status: SHIPPED | OUTPATIENT
Start: 2018-10-08 | End: 2019-03-05

## 2018-10-09 ENCOUNTER — TELEPHONE (OUTPATIENT)
Dept: FAMILY MEDICINE CLINIC | Facility: CLINIC | Age: 78
End: 2018-10-09

## 2018-10-09 NOTE — TELEPHONE ENCOUNTER
She is insisting on talking to dr Hema Davenport   She was in yesterday   And Dr Hema Davenport told her to call her and she would call her back if she had any question   She has questions about her meds and would not tell me anything else she wants to speak to dr Hema Davenport     Please call on cell

## 2018-10-09 NOTE — PROGRESS NOTES
Patient ID: Roseann Sorensen is a 70 y o  female  HPI: 70 y  o female presents for follow up of hypercholesterolemia, but complains of insomnia after the passing of her   She also has a vaginal yeast infection at the present time  SUBJECTIVE    Family History   Problem Relation Age of Onset    Prostate cancer Father     Other Family         back problem     Social History     Social History    Marital status: /Civil Union     Spouse name: N/A    Number of children: N/A    Years of education: N/A     Occupational History    Not on file       Social History Main Topics    Smoking status: Former Smoker    Smokeless tobacco: Not on file    Alcohol use No    Drug use: Unknown    Sexual activity: Not on file     Other Topics Concern    Not on file     Social History Narrative    Daily coffee consumption (_cups/day)    Denied hx of daily cola consumption    Daily tea consumption (_cups/day)         Past Medical History:   Diagnosis Date    Blood type A+     Convulsions ([address] )     Hyperlipidemia     Osteoarthritis, localized, knee     Spondylosis      Past Surgical History:   Procedure Laterality Date    CATARACT EXTRACTION      COMBINED HYSTEROSCOPY DIAGNOSTIC / D&C      ESOPHAGOGASTRODUODENOSCOPY      diagnostic    HAND SURGERY      HERNIA REPAIR      HYSTERECTOMY      KNEE ARTHROPLASTY      Revision;total    NASAL SEPTUM SURGERY      Deviation repair    TUBAL LIGATION       Allergies   Allergen Reactions    Adhesive  [Medical Tape]      Other reaction(s): Unknown Reaction    Latex      Other reaction(s): Unknown Reaction    Midazolam     Penicillins     Thiopental        Current Outpatient Prescriptions:     aspirin (ECOTRIN LOW STRENGTH) 81 mg EC tablet, Take by mouth, Disp: , Rfl:     Multiple Vitamin (MULTIVITAMINS PO), Take 1 capsule by mouth daily, Disp: , Rfl:     phenytoin (DILANTIN) 100 mg ER capsule, TAKE 1 CAPSULE THREE TIMES A DAY, Disp: 180 capsule, Rfl: 0    simvastatin (ZOCOR) 20 mg tablet, Take 1 tablet (20 mg total) by mouth daily at bedtime, Disp: 90 tablet, Rfl: 1    fluconazole (DIFLUCAN) 150 mg tablet, 1 tab today and repeat in 3 days, Disp: 2 tablet, Rfl: 0    zolpidem (AMBIEN CR) 12 5 MG CR tablet, Take 1 tablet (12 5 mg total) by mouth daily at bedtime as needed for sleep, Disp: 30 tablet, Rfl: 3    Review of Systems  Constitutional:     Denies fever, chills ,fatigue ,weakness ,weight loss, weight gain     ENT: Denies earache ,loss of hearing ,nosebleed, nasal discharge,nasal congestion ,sore throat ,hoarseness  Pulmonary: Denies shortness of breath ,cough  ,dyspnea on exertion, orthopnea  ,PND   Cardiovascular:  Denies bradycardia , tachycardia  ,palpations, lower extremity edema leg, claudication  Breast:  Denies new or changing breast lumps ,nipple discharge ,nipple changes  Abdomen:  Denies abdominal pain , anorexia , indigestion, nausea, vomiting, constipation, diarrhea  Musculoskeletal: Denies myalgias, arthralgias, joint swelling, joint stiffness , limb pain, limb swelling  Gu: denies dysuria, polyuria  Skin: Denies skin rash, skin lesion, skin wound, itching, dry skin  Neuro: Denies headache, numbness, tingling, confusion, loss of consciousness, dizziness, vertigo  Psychiatric: Denies feelings of depression, suicidal ideation, anxiety, +sleep disturbances    OBJECTIVE  /80   Pulse 74   Temp 98 1 °F (36 7 °C)   Ht 5' 5" (1 651 m)   Wt 107 kg (235 lb 3 2 oz)   BMI 39 14 kg/m²   Constitutional:   NAD, well appearing and well nourished      ENT:   Conjunctiva and lids: no injection, edema, or discharge     Pupils and iris: STEFAN bilaterally    External inspection of ears and nose: normal without deformities or discharge  Otoscopic exam: Canals patent without erythema  Nasal mucosa, septum and turbinates: Normal or edema or discharge         Oropharynx:  Moist mucosa, normal tongue and tonsils without lesions   No erythema Pulmonary:Respiratory effort normal rate and rhythm, no increased work of breathing  Auscultation of lungs:  Clear bilaterally with no adventitious breath sounds       Cardiovascular: regular rate and rhythm, S1 and S2, no murmur, no edema and/or varicosities of LE      Abdomen: Soft and non-distended     Positive bowel sounds      No heptomegaly or splenomegaly      Gu: no suprapubic tenderness or CVA tenderness, no urethral discharge  Lymphatic:  No anterior or posterior cervical lymphadenopathy         Musculoskeletal:  Gait and station: Normal gait      Digits and nails normal without clubbing or cyanosis       Inspection/palpation of joints, bones, and muscles:  No joint tenderness, swelling, full active and passive range of motion       Skin: Normal skin turgor and no rashes      Neuro:    Normal reflexes     Psych:   alert and oriented to person, place and time     normal mood and affect       Assessment/Plan:Diagnoses and all orders for this visit:    Breast cancer screening  -     Mammo screening bilateral w cad; Future    Hypercholesterolemia    Insomnia, unspecified type  -     zolpidem (AMBIEN CR) 12 5 MG CR tablet; Take 1 tablet (12 5 mg total) by mouth daily at bedtime as needed for sleep    Vaginal yeast infection  -     fluconazole (DIFLUCAN) 150 mg tablet; 1 tab today and repeat in 3 days    Need for vaccination  -     influenza vaccine, [id number], high-dose, PF 0 5 mL, for patients 65 yr+ (FLUZONE HIGH-DOSE)        Reviewed with patient plan to treat with plan as above       Patient instructed to call in 72 hours if not feeling better or if symptoms worsen

## 2018-10-29 ENCOUNTER — TELEPHONE (OUTPATIENT)
Dept: FAMILY MEDICINE CLINIC | Facility: CLINIC | Age: 78
End: 2018-10-29

## 2018-10-29 DIAGNOSIS — F32.A DEPRESSION, UNSPECIFIED DEPRESSION TYPE: Primary | ICD-10-CM

## 2018-10-29 RX ORDER — ESCITALOPRAM OXALATE 10 MG/1
10 TABLET ORAL DAILY
Qty: 30 TABLET | Refills: 3
Start: 2018-10-29 | End: 2018-11-07 | Stop reason: SDUPTHER

## 2018-10-29 NOTE — TELEPHONE ENCOUNTER
She called here crying saying she had a very bad weekend    And you told her to call you and you would call her back if she needed anything      She would like you to please call her

## 2018-11-06 DIAGNOSIS — R56.9 SEIZURE (HCC): ICD-10-CM

## 2018-11-06 RX ORDER — PHENYTOIN SODIUM 100 MG/1
100 CAPSULE, EXTENDED RELEASE ORAL 3 TIMES DAILY
Qty: 270 CAPSULE | Refills: 0 | Status: SHIPPED | OUTPATIENT
Start: 2018-11-06 | End: 2019-03-19 | Stop reason: SDUPTHER

## 2018-11-07 DIAGNOSIS — F32.A DEPRESSION, UNSPECIFIED DEPRESSION TYPE: ICD-10-CM

## 2018-11-07 RX ORDER — ESCITALOPRAM OXALATE 10 MG/1
10 TABLET ORAL DAILY
Qty: 90 TABLET | Refills: 0 | Status: SHIPPED | OUTPATIENT
Start: 2018-11-07 | End: 2019-07-11 | Stop reason: ALTCHOICE

## 2018-11-20 ENCOUNTER — TELEPHONE (OUTPATIENT)
Dept: FAMILY MEDICINE CLINIC | Facility: CLINIC | Age: 78
End: 2018-11-20

## 2018-11-20 ENCOUNTER — OFFICE VISIT (OUTPATIENT)
Dept: FAMILY MEDICINE CLINIC | Facility: CLINIC | Age: 78
End: 2018-11-20
Payer: COMMERCIAL

## 2018-11-20 VITALS
WEIGHT: 236.2 LBS | DIASTOLIC BLOOD PRESSURE: 82 MMHG | SYSTOLIC BLOOD PRESSURE: 124 MMHG | TEMPERATURE: 97.7 F | BODY MASS INDEX: 39.35 KG/M2 | HEART RATE: 74 BPM | HEIGHT: 65 IN

## 2018-11-20 DIAGNOSIS — D49.2 SKIN NEOPLASM: Primary | ICD-10-CM

## 2018-11-20 DIAGNOSIS — M17.0 PRIMARY OSTEOARTHRITIS OF BOTH KNEES: ICD-10-CM

## 2018-11-20 DIAGNOSIS — R60.0 BILATERAL LEG EDEMA: ICD-10-CM

## 2018-11-20 PROCEDURE — 4040F PNEUMOC VAC/ADMIN/RCVD: CPT | Performed by: NURSE PRACTITIONER

## 2018-11-20 PROCEDURE — 1160F RVW MEDS BY RX/DR IN RCRD: CPT | Performed by: NURSE PRACTITIONER

## 2018-11-20 PROCEDURE — 3008F BODY MASS INDEX DOCD: CPT | Performed by: NURSE PRACTITIONER

## 2018-11-20 PROCEDURE — 1036F TOBACCO NON-USER: CPT | Performed by: NURSE PRACTITIONER

## 2018-11-20 PROCEDURE — 99213 OFFICE O/P EST LOW 20 MIN: CPT | Performed by: NURSE PRACTITIONER

## 2018-11-20 RX ORDER — FUROSEMIDE 20 MG/1
20 TABLET ORAL DAILY
Qty: 30 TABLET | Refills: 0 | Status: SHIPPED | OUTPATIENT
Start: 2018-11-20 | End: 2019-08-20 | Stop reason: SDUPTHER

## 2018-11-20 RX ORDER — MELOXICAM 15 MG/1
15 TABLET ORAL DAILY
Qty: 30 TABLET | Refills: 1 | Status: SHIPPED | OUTPATIENT
Start: 2018-11-20 | End: 2019-04-15 | Stop reason: SDUPTHER

## 2018-11-20 NOTE — PROGRESS NOTES
Assessment/Plan:     Diagnoses and all orders for this visit:    Skin neoplasm  -     Ambulatory referral to Dermatology; Future    Primary osteoarthritis of both knees  -     meloxicam (MOBIC) 15 mg tablet; Take 1 tablet (15 mg total) by mouth daily    Bilateral leg edema  -     furosemide (LASIX) 20 mg tablet; Take 1 tablet (20 mg total) by mouth daily    #1 Skin neoplasm  Discussed with patient that the lesions on her back or benign seborrheic keratosis  Discussed with patient the two lesions on her forehead potential precancerous solar keratosis with the one on the right-side most worrisome  Patient given referral to [facility] at patient's request   #2 Primary osteoarthritis of both knees  Discussed with patient plan to prescribe meloxicam 15 mg daily as needed for knee pains occurring after overuse  #3 Bilateral leg edema  Discussed with patient plan to give furosemide 20 mg daily as needed for increased edema due to inmobility  Discussed with patient conservative measures to use: support/compreession hoses, elevation and increased mobility  Patient instructed to call for problems or concerns in the interim    Subjective:      Patient ID: Preet Mcgraw is a 70 y o  female  70year old female presenting with request to see her dermatologist to evaluate skin spots on her forehead and back  The one spot on the right side of forehead was a small bump but recently it is growing and changing shape and color  Patient is also concerned about an upcoming trip to [place] she is taking with family  She will be doing a lot of walking up and down hills and ibuprofen usually helps with the arthritis pains but was wondering if there was something better to take to manage her knee/leg pains  She reports that usually she gets water retention in her legs during long car rides and she normally takes her 's Torsemide but thinks it is too strong         Family History   Problem Relation Age of Onset    Prostate cancer Father     Other Family         back problem     Social History     Social History    Marital status: /Civil Union     Spouse name: N/A    Number of children: N/A    Years of education: N/A     Occupational History    Not on file       Social History Main Topics    Smoking status: Former Smoker    Smokeless tobacco: Never Used    Alcohol use No    Drug use: No    Sexual activity: Not on file     Other Topics Concern    Not on file     Social History Narrative    Daily coffee consumption (_cups/day)    Denied hx of daily cola consumption    Daily tea consumption (_cups/day)         Past Medical History:   Diagnosis Date    Blood type A+     Convulsions (HCC)     Hyperlipidemia     Osteoarthritis, localized, knee     Spondylosis      Past Surgical History:   Procedure Laterality Date    CATARACT EXTRACTION      COMBINED HYSTEROSCOPY DIAGNOSTIC / D&C      ESOPHAGOGASTRODUODENOSCOPY      diagnostic    HAND SURGERY      HERNIA REPAIR      HYSTERECTOMY      KNEE ARTHROPLASTY      Revision;total    NASAL SEPTUM SURGERY      Deviation repair    TUBAL LIGATION       Allergies   Allergen Reactions    Adhesive  [Medical Tape]      Other reaction(s): Unknown Reaction    Latex      Other reaction(s): Unknown Reaction    Midazolam     Penicillins     Thiopental        Current Outpatient Prescriptions:     aspirin (ECOTRIN LOW STRENGTH) 81 mg EC tablet, Take by mouth, Disp: , Rfl:     escitalopram (LEXAPRO) 10 mg tablet, Take 1 tablet (10 mg total) by mouth daily for 30 days, Disp: 90 tablet, Rfl: 0    Multiple Vitamin (MULTIVITAMINS PO), Take 1 capsule by mouth daily, Disp: , Rfl:     phenytoin (DILANTIN) 100 mg ER capsule, Take 1 capsule (100 mg total) by mouth 3 (three) times a day, Disp: 270 capsule, Rfl: 0    simvastatin (ZOCOR) 20 mg tablet, Take 1 tablet (20 mg total) by mouth daily at bedtime, Disp: 90 tablet, Rfl: 1    zolpidem (AMBIEN CR) 12 5 MG CR tablet, Take 1 tablet (12 5 mg total) by mouth daily at bedtime as needed for sleep, Disp: 30 tablet, Rfl: 3    furosemide (LASIX) 20 mg tablet, Take 1 tablet (20 mg total) by mouth daily, Disp: 30 tablet, Rfl: 0    meloxicam (MOBIC) 15 mg tablet, Take 1 tablet (15 mg total) by mouth daily, Disp: 30 tablet, Rfl: 1      Review of Systems   Constitutional: Negative  HENT: Negative  Eyes: Negative  Respiratory: Negative  Cardiovascular: Negative  Gastrointestinal: Negative  Endocrine: Negative  Genitourinary: Negative  Musculoskeletal: Negative  Skin: Positive for color change  Allergic/Immunologic: Negative  Neurological: Negative  Hematological: Negative  Psychiatric/Behavioral: Negative  Objective:    /82   Pulse 74   Temp 97 7 °F (36 5 °C)   Ht 5' 5" (1 651 m)   Wt 107 kg (236 lb 3 2 oz)   BMI 39 31 kg/m² (Reviewed)     Physical Exam   Constitutional: She is oriented to person, place, and time  Vital signs are normal  She appears well-developed and well-nourished  HENT:   Head: Normocephalic and atraumatic  Eyes: Pupils are equal, round, and reactive to light  Conjunctivae, EOM and lids are normal    Neck: Trachea normal and normal range of motion  Neck supple  Cardiovascular: Normal rate, regular rhythm and normal heart sounds  Pulmonary/Chest: Effort normal and breath sounds normal    Neurological: She is alert and oriented to person, place, and time  No cranial nerve deficit  Skin: Skin is warm and dry  Lesion noted  No cyanosis  Nails show no clubbing  Psychiatric: She has a normal mood and affect   Her behavior is normal

## 2018-11-20 NOTE — TELEPHONE ENCOUNTER
You referred her to [facility], they cannot get her in until May for lump on right forehead  They said if you call and explain if needs to be seen before May, they will accommodate    [phone]

## 2018-11-21 ENCOUNTER — TELEPHONE (OUTPATIENT)
Dept: FAMILY MEDICINE CLINIC | Facility: CLINIC | Age: 78
End: 2018-11-21

## 2018-11-21 NOTE — TELEPHONE ENCOUNTER
Called [facility] and received appointment for December 31, 2018 at 0740 in the [facility]  Called patient to inform her of the appointment and was notified by the patient that she will be out of town from December 26, 2018 until February 1, 2019  Will try to get patient in with another physician if possible

## 2018-12-03 ENCOUNTER — TELEPHONE (OUTPATIENT)
Dept: FAMILY MEDICINE CLINIC | Facility: CLINIC | Age: 78
End: 2018-12-03

## 2018-12-03 NOTE — TELEPHONE ENCOUNTER
The one they did is fine  If the radiologist couldn't get a good enough read, they would have suggested an MRI of breast   They would be first ones to call if they felt that she needed additional testing

## 2018-12-03 NOTE — TELEPHONE ENCOUNTER
Pt rec'd results of mammo, which says dense tissue and may be hard to detect breast CA, does she need further testing?  Pl [facility]

## 2019-01-07 ENCOUNTER — TELEPHONE (OUTPATIENT)
Dept: FAMILY MEDICINE CLINIC | Facility: CLINIC | Age: 79
End: 2019-01-07

## 2019-01-07 DIAGNOSIS — B37.3 VAGINAL YEAST INFECTION: Primary | ICD-10-CM

## 2019-01-07 RX ORDER — FLUCONAZOLE 150 MG/1
150 TABLET ORAL ONCE
Qty: 1 TABLET | Refills: 0 | Status: SHIPPED | OUTPATIENT
Start: 2019-01-07 | End: 2019-01-07

## 2019-01-07 NOTE — TELEPHONE ENCOUNTER
Patient called stating she took an antibiotic and now has a yeast infection  She is currently in [place] and is wondering if something could be called in?      [facility] [phone] store# [id number]  Patient aware to check with pharmacy

## 2019-01-21 DIAGNOSIS — B37.3 VAGINAL YEAST INFECTION: Primary | ICD-10-CM

## 2019-01-21 NOTE — TELEPHONE ENCOUNTER
Patient called stating the medication she was given for her yeast infection is not working  She is asking if a cream could be called in please?      [facility] [phone] store# [id number]  Patient aware to check with pharmacy

## 2019-03-05 ENCOUNTER — APPOINTMENT (EMERGENCY)
Dept: RADIOLOGY | Facility: HOSPITAL | Age: 79
End: 2019-03-05
Payer: COMMERCIAL

## 2019-03-05 ENCOUNTER — HOSPITAL ENCOUNTER (EMERGENCY)
Facility: HOSPITAL | Age: 79
Discharge: HOME | End: 2019-03-05
Attending: EMERGENCY MEDICINE | Admitting: EMERGENCY MEDICINE
Payer: COMMERCIAL

## 2019-03-05 VITALS
RESPIRATION RATE: 18 BRPM | BODY MASS INDEX: 39.34 KG/M2 | SYSTOLIC BLOOD PRESSURE: 166 MMHG | OXYGEN SATURATION: 98 % | WEIGHT: 236.11 LBS | DIASTOLIC BLOOD PRESSURE: 78 MMHG | HEIGHT: 65 IN | HEART RATE: 75 BPM | TEMPERATURE: 98.2 F

## 2019-03-05 DIAGNOSIS — S62.109A WRIST FRACTURE: Primary | ICD-10-CM

## 2019-03-05 PROCEDURE — 73110 X-RAY EXAM OF WRIST: CPT

## 2019-03-05 PROCEDURE — 99283 EMERGENCY DEPT VISIT LOW MDM: CPT

## 2019-03-05 RX ORDER — TRAMADOL HYDROCHLORIDE 50 MG/1
50 TABLET ORAL EVERY 6 HOURS PRN
Qty: 12 TABLET | Refills: 0 | Status: SHIPPED | OUTPATIENT
Start: 2019-03-05 | End: 2019-03-17

## 2019-03-05 RX ORDER — TRAMADOL HYDROCHLORIDE 50 MG/1
50 TABLET ORAL ONCE
Status: COMPLETED | OUTPATIENT
Start: 2019-03-05 | End: 2019-03-05

## 2019-03-05 RX ADMIN — TRAMADOL HYDROCHLORIDE 50 MG: 50 TABLET, COATED ORAL at 13:29

## 2019-03-05 NOTE — ED PROVIDER NOTES
History  Chief Complaint   Patient presents with    Fall     pt states she tripped on the snow and fell onto her R side  pt denies loc or cervical tenderness  pt c o R wrist pain  pt states she takes 81mg of aspirin every other day     Patient presents emergency room after slipping on the ice in injuring her right wrist   She landed on her outstretched right hand  She complains of pain at the site  She denies any head or neck injury  She denies any chest pain or abdominal pain  She was able to ambulate after the injury  She has decreased range of motion of her right wrist secondary to pain and guarding  She denies any numbness, or  Tingling  There is associated weakness secondary to pain  Past medical history is positive for seizure disorders, hyperlipidemia, degenerative joint disease spondylosis  History provided by:  Patient  Fall   Mechanism of injury: fall    Injury location: right wrist   Incident location:  Outdoors  Time since incident:  3 hours  Fall:     Fall occurred:  Standing    Impact surface:  Snow    Entrapped after fall: no    Suspicion of alcohol use: no    Suspicion of drug use: no    Prior to arrival data:     Bystander interventions:  None    Patient ambulatory at scene: yes      Blood loss:  None    Responsiveness at scene:  Alert    Orientation at scene:  Person, place, situation and time  Associated symptoms: no abdominal pain, no back pain, no blindness, no chest pain, no difficulty breathing, no headaches, no hearing loss, no loss of consciousness, no nausea, no neck pain, no seizures and no vomiting    Risk factors: no AICD, no anticoagulation therapy, no asthma, no beta blocker therapy, no CABG, no CAD, no CHF, no COPD, no diabetes, no dialysis, no hemophilia, no kidney disease, no pacemaker, no past MI and no steroid use        Prior to Admission Medications   Prescriptions Last Dose Informant Patient Reported? Taking?    Multiple Vitamin (MULTIVITAMINS PO) 3/5/2019 at Unknown time Self Yes Yes   Sig: Take 1 capsule by mouth daily   aspirin (ECOTRIN LOW STRENGTH) 81 mg EC tablet 3/5/2019 at Unknown time Self Yes Yes   Sig: Take by mouth   escitalopram (LEXAPRO) 10 mg tablet  Self No Yes   Sig: Take 1 tablet (10 mg total) by mouth daily for 30 days   furosemide (LASIX) 20 mg tablet   No No   Sig: Take 1 tablet (20 mg total) by mouth daily   Patient taking differently: Take 20 mg by mouth as needed    meloxicam (MOBIC) 15 mg tablet   No Yes   Sig: Take 1 tablet (15 mg total) by mouth daily   Patient taking differently: Take 15 mg by mouth as needed    phenytoin (DILANTIN) 100 mg ER capsule 3/5/2019 at Unknown time Self No Yes   Sig: Take 1 capsule (100 mg total) by mouth 3 (three) times a day   simvastatin (ZOCOR) 20 mg tablet 3/4/2019 at Unknown time Self No Yes   Sig: Take 1 tablet (20 mg total) by mouth daily at bedtime      Facility-Administered Medications: None       Past Medical History:   Diagnosis Date    Blood type A+     Convulsions (HCC)     Hyperlipidemia     Osteoarthritis, localized, knee     Spondylosis        Past Surgical History:   Procedure Laterality Date    CATARACT EXTRACTION      COMBINED HYSTEROSCOPY DIAGNOSTIC / D&C      ESOPHAGOGASTRODUODENOSCOPY      diagnostic    HAND SURGERY      HERNIA REPAIR      HYSTERECTOMY      KNEE ARTHROPLASTY      Revision;total    NASAL SEPTUM SURGERY      Deviation repair    TUBAL LIGATION         Family History   Problem Relation Age of Onset    Prostate cancer Father     Other Family         back problem     I have reviewed and agree with the history as documented  Social History     Tobacco Use    Smoking status: Former Smoker    Smokeless tobacco: Never Used   Substance Use Topics    Alcohol use: No    Drug use: No        Review of Systems   Constitutional: Positive for activity change  HENT: Negative for hearing loss  Eyes: Negative for blindness  Cardiovascular: Negative for chest pain  Gastrointestinal: Negative for abdominal pain, nausea and vomiting  Musculoskeletal: Positive for arthralgias and joint swelling  Negative for back pain, gait problem and neck pain  Skin: Negative for color change, pallor, rash and wound  Neurological: Negative for seizures, loss of consciousness and headaches  Psychiatric/Behavioral: Negative for confusion  All other systems reviewed and are negative  Physical Exam  Physical Exam   Constitutional: She is oriented to person, place, and time  She appears well-developed and well-nourished  No distress  HENT:   Head: Normocephalic and atraumatic  Right Ear: External ear normal    Left Ear: External ear normal    Nose: Nose normal    Eyes: Conjunctivae are normal  Right eye exhibits no discharge  Left eye exhibits no discharge  Neck: Neck supple  No JVD present  No tracheal deviation present  Cardiovascular: Normal rate, regular rhythm and normal heart sounds  Pulmonary/Chest: Effort normal and breath sounds normal    Lymphadenopathy:     She has no cervical adenopathy  Neurological: She is alert and oriented to person, place, and time  Skin: Skin is warm and dry  Capillary refill takes less than 2 seconds  She is not diaphoretic  Psychiatric: She has a normal mood and affect  Her behavior is normal  Judgment and thought content normal    Nursing note and vitals reviewed        Vital Signs  ED Triage Vitals [03/05/19 1109]   Temperature Pulse Respirations Blood Pressure SpO2   98 2 °F (36 8 °C) 91 18 (!) 197/89 97 %      Temp Source Heart Rate Source Patient Position - Orthostatic VS BP Location FiO2 (%)   Oral Monitor -- -- --      Pain Score       4           Vitals:    03/05/19 1109 03/05/19 1332   BP: (!) 197/89 166/78   Pulse: 91 75       Visual Acuity      ED Medications  Medications   traMADol (ULTRAM) tablet 50 mg (50 mg Oral Given 3/5/19 1329)       Diagnostic Studies  Results Reviewed     None                 XR wrist 3+ views RIGHT   ED Interpretation by Nikhil Solano PA-C (03/05 5562)   Acute intra-articular fracture of the distal radius and ulna  Final Result by Giovany Nunez DO (03/05 3535)      Acute intra-articular fracture of the distal radius  There is an acute intra-articular oblique fracture of the distal ulna as well  Workstation performed: [id number]                    Procedures  Static Splint Application  Date/Time: 3/5/2019 1:58 PM  Performed by: Nikhil Solano PA-C  Authorized by: Nikhil Solano PA-C     Patient location:  Bedside  Procedure performed by emergency physician: No    Other Assisting Provider: Yes (comment)    Consent:     Consent obtained:  Verbal    Consent given by:  Patient    Risks discussed:  Discoloration, numbness, pain and swelling  Universal protocol:     Patient identity confirmed:  Verbally with patient  Indication:     Indications: fracture    Pre-procedure details:     Sensation:  Normal  Procedure details:     Laterality:  Right    Location:  Wrist    Wrist:  R wrist    Strapping: no      Splint type:  Volar short arm    Supplies:  Ortho-Glass  Post-procedure details:     Pain:  Improved    Sensation:  Normal    Neurovascular Exam: skin pink, capillary refill <2 sec, normal pulses and skin intact, warm, and dry      Patient tolerance of procedure: Tolerated well, no immediate complications  Comments: Independently evaluated by me  Neurovascular is intact as stated above             Phone Contacts  ED Phone Contact    ED Course                               MDM  Number of Diagnoses or Management Options  Wrist fracture: new and requires workup     Amount and/or Complexity of Data Reviewed  Tests in the radiology section of CPT®: ordered and reviewed  Tests in the medicine section of CPT®: ordered and reviewed  Independent visualization of images, tracings, or specimens: (Right wrist x-rays )    Risk of Complications, Morbidity, and/or Mortality  Presenting problems: high  Diagnostic procedures: moderate  Management options: moderate  General comments: Patient presents emergency room after slipping on the ice in in injuring her right wrist   She was seen and examined  X-rays were taken which demonstrated a distal radius and ulnar fracture  Patient was placed in a volar splint  She was given a prescription for tramadol, 1 pill every 6 hours as needed for pain  She was given an orthopedic referral for follow-up and treatment  Should her symptoms worsen, she will return to the emergency room for repeat exam     Patient Progress  Patient progress: stable      Disposition  Final diagnoses:   Wrist fracture - Acute nondisplaced intra-articular fracture of the distal radius and ulna     Time reflects when diagnosis was documented in both MDM as applicable and the Disposition within this note     Time User Action Codes Description Comment    3/5/2019  1:31 PM Berna Serna Add [S62 109A] Wrist fracture     3/5/2019  1:31 PM Berna Serna Modify [S62 109A] Wrist fracture Acute nondisplaced intra-articular fracture of the distal radius and ulna      ED Disposition     ED Disposition Condition Date/Time Comment    Discharge Stable Tue Mar 5, 2019  1:31 PM Anusha Booth discharge to home/self care  Follow-up Information     Follow up With Specialties Details Why Contact Info    Thierry Díaz MD Orthopedic Surgery Schedule an appointment as soon as possible for a visit in 1 day  [facility]    [address] [facility]            Discharge Medication List as of 3/5/2019  1:35 PM      START taking these medications    Details   traMADol (ULTRAM) 50 mg tablet Take 1 tablet (50 mg total) by mouth every 6 (six) hours as needed for moderate pain for up to 12 days, Starting Tue 3/5/2019, Until Sun 3/17/2019, Print         CONTINUE these medications which have NOT CHANGED    Details   aspirin (ECOTRIN LOW STRENGTH) 81 mg EC tablet Take by mouth, Historical Med      escitalopram (LEXAPRO) 10 mg tablet Take 1 tablet (10 mg total) by mouth daily for 30 days, Starting Wed 11/7/2018, Until Tue 3/5/2019, Normal      meloxicam (MOBIC) 15 mg tablet Take 1 tablet (15 mg total) by mouth daily, Starting Tue 11/20/2018, Normal      Multiple Vitamin (MULTIVITAMINS PO) Take 1 capsule by mouth daily, Historical Med      phenytoin (DILANTIN) 100 mg ER capsule Take 1 capsule (100 mg total) by mouth 3 (three) times a day, Starting Tue 11/6/2018, Normal      simvastatin (ZOCOR) 20 mg tablet Take 1 tablet (20 mg total) by mouth daily at bedtime, Starting Tue 6/19/2018, Normal      furosemide (LASIX) 20 mg tablet Take 1 tablet (20 mg total) by mouth daily, Starting Tue 11/20/2018, Normal           No discharge procedures on file      ED Provider  Electronically Signed by           Becky Chang PA-C  03/05/19 111 [address]  03/05/19 [address] [place] [address] Keya SHARMA  03/05/19 0595

## 2019-03-06 ENCOUNTER — OFFICE VISIT (OUTPATIENT)
Dept: OBGYN CLINIC | Facility: CLINIC | Age: 79
End: 2019-03-06
Payer: COMMERCIAL

## 2019-03-06 VITALS
SYSTOLIC BLOOD PRESSURE: 177 MMHG | BODY MASS INDEX: 38.32 KG/M2 | HEART RATE: 96 BPM | HEIGHT: 65 IN | DIASTOLIC BLOOD PRESSURE: 78 MMHG | WEIGHT: 230 LBS

## 2019-03-06 DIAGNOSIS — S52.501A CLOSED FRACTURE OF DISTAL ENDS OF RIGHT RADIUS AND ULNA, INITIAL ENCOUNTER: Primary | ICD-10-CM

## 2019-03-06 DIAGNOSIS — S52.601A CLOSED FRACTURE OF DISTAL ENDS OF RIGHT RADIUS AND ULNA, INITIAL ENCOUNTER: Primary | ICD-10-CM

## 2019-03-06 PROCEDURE — 25600 CLTX DST RDL FX/EPHYS SEP WO: CPT | Performed by: ORTHOPAEDIC SURGERY

## 2019-03-06 PROCEDURE — 99203 OFFICE O/P NEW LOW 30 MIN: CPT | Performed by: ORTHOPAEDIC SURGERY

## 2019-03-06 NOTE — PROGRESS NOTES
Patient Name:  Roseann Sorensen  MRN:  [id number]    Assessment & Plan     Right distal radius/ulna fracture 3/5/19  1  Continue nonweightbearing right upper extremity in splint at all times  2  Continue ice as needed  Continue tramadol  She may also take Tylenol and meloxicam which she has from the past   3  Sling provided today to be worn for comfort  4  Follow-up in one week for repeat evaluation with x-rays of the right wrist out of the splint  Discussed possibility interval displacement and angulation requiring surgical fixation  If fracture remains in stable alignment at that time consider cast immobilization  Chief Complaint     Right wrist injury      History of the Present Illness     Anusha Anton is a 70 y o  female, LHD, reports to the office today for evaluation of her right wrist   Patient states she sustained mechanical fall with outstretched right hand after slipping on snow yesterday  She denies hitting her head or loss of consciousness  After the fall she noted significant right wrist pain with swelling and bruising  She reported to the emergency department where x-rays revealed a distal radius and ulna fracture  She was placed in a splint  Today she notes persistent pain worse with movement  She denies numbness and tingling  She does note swelling  She was prescribed tramadol which helps minimally  No fevers or chills  Physical Exam     BP (!) 177/78   Pulse 96   Ht 5' 5" (1 651 m)   Wt 104 kg (230 lb)   BMI 38 27 kg/m²     Right wrist:  Skin intact  No gross deformity  Soft tissue swelling noted about the wrist and digits  Associated ecchymosis noted as well  Tenderness to palpation distal radius and distal ulna  Wrist range of motion is deferred  Digital range of motion intact  Sensation intact median ulnar and radial nerves  2+ radial pulse  Eyes:  Anicteric sclerae  Neck:  Supple  Lungs:  Unlabored breathing    Cardiovascular:  Capillary refill is less than 2 seconds  Skin:  Intact without erythema  Neurologic:  Sensation intact to light touch  Psychiatric:  Mood and affect are appropriate  Data Review     I have personally reviewed pertinent films in PACS, and my interpretation follows:    X-rays performed yesterday of the right wrist reveals a nondisplaced distal ulna fracture  There is also a comminuted nondisplaced minimally angulated intra-articular distal radius fracture      Fracture / Dislocation Treatment  Date/Time: 3/6/2019 1:05 PM  Performed by: Tangela Montelongo MD  Authorized by: Tangela Montelongo MD     Injury location:  Wrist  Location details:  Right wrist  Injury type:  Fracture  Fracture type: distal radius and ulnar styloid    Fracture type: distal radius and ulnar styloid    Immobilization:  Splint      Past Medical History:   Diagnosis Date    Blood type A+     Convulsions ([address] )     Hyperlipidemia     Osteoarthritis, localized, knee     Spondylosis        Past Surgical History:   Procedure Laterality Date    CATARACT EXTRACTION      COMBINED HYSTEROSCOPY DIAGNOSTIC / D&C      ESOPHAGOGASTRODUODENOSCOPY      diagnostic    HAND SURGERY      HERNIA REPAIR      HYSTERECTOMY      KNEE ARTHROPLASTY      Revision;total    NASAL SEPTUM SURGERY      Deviation repair    TUBAL LIGATION         Allergies   Allergen Reactions    Adhesive  [Medical Tape]      Other reaction(s): Unknown Reaction    Latex      Other reaction(s): Unknown Reaction    Midazolam     Penicillins     Thiopental        Current Outpatient Medications on File Prior to Visit   Medication Sig Dispense Refill    aspirin (ECOTRIN LOW STRENGTH) 81 mg EC tablet Take by mouth      escitalopram (LEXAPRO) 10 mg tablet Take 1 tablet (10 mg total) by mouth daily for 30 days 90 tablet 0    furosemide (LASIX) 20 mg tablet Take 1 tablet (20 mg total) by mouth daily (Patient taking differently: Take 20 mg by mouth as needed ) 30 tablet 0    meloxicam (MOBIC) 15 mg tablet Take 1 tablet (15 mg total) by mouth daily (Patient taking differently: Take 15 mg by mouth as needed ) 30 tablet 1    Multiple Vitamin (MULTIVITAMINS PO) Take 1 capsule by mouth daily      phenytoin (DILANTIN) 100 mg ER capsule Take 1 capsule (100 mg total) by mouth 3 (three) times a day 270 capsule 0    simvastatin (ZOCOR) 20 mg tablet Take 1 tablet (20 mg total) by mouth daily at bedtime 90 tablet 1    traMADol (ULTRAM) 50 mg tablet Take 1 tablet (50 mg total) by mouth every 6 (six) hours as needed for moderate pain for up to 12 days 12 tablet 0     Current Facility-Administered Medications on File Prior to Visit   Medication Dose Route Frequency Provider Last Rate Last Dose    [COMPLETED] traMADol (ULTRAM) tablet 50 mg  50 mg Oral Once Ritesh Goldberg PA-C   50 mg at 03/05/19 1329       Social History     Tobacco Use    Smoking status: Former Smoker    Smokeless tobacco: Never Used   Substance Use Topics    Alcohol use: No    Drug use: No       Family History   Problem Relation Age of Onset    Prostate cancer Father     Other Family         back problem       Review of Systems     As stated in the HPI  All other systems were reviewed and are negative        Scribe Attestation    I,:   Ramandeep Izaguirre PA-C am acting as a scribe while in the presence of the attending physician :        I,:   Stef George MD personally performed the services described in this documentation    as scribed in my presence :

## 2019-03-12 ENCOUNTER — APPOINTMENT (OUTPATIENT)
Dept: RADIOLOGY | Facility: CLINIC | Age: 79
End: 2019-03-12
Payer: COMMERCIAL

## 2019-03-12 ENCOUNTER — OFFICE VISIT (OUTPATIENT)
Dept: OBGYN CLINIC | Facility: CLINIC | Age: 79
End: 2019-03-12
Payer: COMMERCIAL

## 2019-03-12 VITALS
DIASTOLIC BLOOD PRESSURE: 82 MMHG | WEIGHT: 230 LBS | HEIGHT: 65 IN | SYSTOLIC BLOOD PRESSURE: 193 MMHG | HEART RATE: 88 BPM | BODY MASS INDEX: 38.32 KG/M2

## 2019-03-12 DIAGNOSIS — S52.501A CLOSED FRACTURE OF DISTAL ENDS OF RIGHT RADIUS AND ULNA, INITIAL ENCOUNTER: ICD-10-CM

## 2019-03-12 DIAGNOSIS — S52.601D CLOSED FRACTURE OF DISTAL ENDS OF RIGHT RADIUS AND ULNA WITH ROUTINE HEALING, SUBSEQUENT ENCOUNTER: Primary | ICD-10-CM

## 2019-03-12 DIAGNOSIS — S52.501D CLOSED FRACTURE OF DISTAL ENDS OF RIGHT RADIUS AND ULNA WITH ROUTINE HEALING, SUBSEQUENT ENCOUNTER: Primary | ICD-10-CM

## 2019-03-12 DIAGNOSIS — S52.601A CLOSED FRACTURE OF DISTAL ENDS OF RIGHT RADIUS AND ULNA, INITIAL ENCOUNTER: ICD-10-CM

## 2019-03-12 PROCEDURE — 99024 POSTOP FOLLOW-UP VISIT: CPT | Performed by: ORTHOPAEDIC SURGERY

## 2019-03-12 PROCEDURE — 73110 X-RAY EXAM OF WRIST: CPT

## 2019-03-12 PROCEDURE — 29075 APPL CST ELBW FNGR SHORT ARM: CPT | Performed by: ORTHOPAEDIC SURGERY

## 2019-03-19 DIAGNOSIS — E78.00 HYPERCHOLESTEROLEMIA: ICD-10-CM

## 2019-03-19 DIAGNOSIS — R56.9 SEIZURE (HCC): ICD-10-CM

## 2019-03-19 RX ORDER — SIMVASTATIN 20 MG
20 TABLET ORAL
Qty: 90 TABLET | Refills: 1 | Status: SHIPPED | OUTPATIENT
Start: 2019-03-19 | End: 2019-08-09 | Stop reason: HOSPADM

## 2019-03-19 RX ORDER — PHENYTOIN SODIUM 100 MG/1
100 CAPSULE, EXTENDED RELEASE ORAL 3 TIMES DAILY
Qty: 270 CAPSULE | Refills: 1 | Status: SHIPPED | OUTPATIENT
Start: 2019-03-19 | End: 2019-12-10 | Stop reason: SDUPTHER

## 2019-04-10 ENCOUNTER — APPOINTMENT (OUTPATIENT)
Dept: RADIOLOGY | Facility: CLINIC | Age: 79
End: 2019-04-10
Payer: COMMERCIAL

## 2019-04-10 ENCOUNTER — OFFICE VISIT (OUTPATIENT)
Dept: OBGYN CLINIC | Facility: CLINIC | Age: 79
End: 2019-04-10

## 2019-04-10 VITALS
WEIGHT: 230 LBS | SYSTOLIC BLOOD PRESSURE: 175 MMHG | DIASTOLIC BLOOD PRESSURE: 76 MMHG | HEART RATE: 87 BPM | HEIGHT: 65 IN | BODY MASS INDEX: 38.32 KG/M2

## 2019-04-10 DIAGNOSIS — S52.601D CLOSED FRACTURE OF DISTAL ENDS OF RIGHT RADIUS AND ULNA WITH ROUTINE HEALING, SUBSEQUENT ENCOUNTER: ICD-10-CM

## 2019-04-10 DIAGNOSIS — S52.501D CLOSED FRACTURE OF DISTAL ENDS OF RIGHT RADIUS AND ULNA WITH ROUTINE HEALING, SUBSEQUENT ENCOUNTER: ICD-10-CM

## 2019-04-10 DIAGNOSIS — M25.531 PAIN IN RIGHT WRIST: Primary | ICD-10-CM

## 2019-04-10 DIAGNOSIS — M25.531 PAIN IN RIGHT WRIST: ICD-10-CM

## 2019-04-10 PROCEDURE — 73110 X-RAY EXAM OF WRIST: CPT

## 2019-04-10 PROCEDURE — 99024 POSTOP FOLLOW-UP VISIT: CPT | Performed by: ORTHOPAEDIC SURGERY

## 2019-04-15 ENCOUNTER — TELEPHONE (OUTPATIENT)
Dept: OBGYN CLINIC | Facility: HOSPITAL | Age: 79
End: 2019-04-15

## 2019-04-15 DIAGNOSIS — M17.0 PRIMARY OSTEOARTHRITIS OF BOTH KNEES: ICD-10-CM

## 2019-04-15 RX ORDER — MELOXICAM 15 MG/1
15 TABLET ORAL AS NEEDED
Qty: 30 TABLET | Refills: 0 | Status: SHIPPED | OUTPATIENT
Start: 2019-04-15 | End: 2019-08-09 | Stop reason: HOSPADM

## 2019-05-22 ENCOUNTER — OFFICE VISIT (OUTPATIENT)
Dept: OBGYN CLINIC | Facility: CLINIC | Age: 79
End: 2019-05-22

## 2019-05-22 VITALS
HEIGHT: 65 IN | SYSTOLIC BLOOD PRESSURE: 154 MMHG | WEIGHT: 225 LBS | HEART RATE: 82 BPM | BODY MASS INDEX: 37.49 KG/M2 | DIASTOLIC BLOOD PRESSURE: 73 MMHG

## 2019-05-22 DIAGNOSIS — S52.601D CLOSED FRACTURE OF DISTAL ENDS OF RIGHT RADIUS AND ULNA WITH ROUTINE HEALING, SUBSEQUENT ENCOUNTER: Primary | ICD-10-CM

## 2019-05-22 DIAGNOSIS — S52.501D CLOSED FRACTURE OF DISTAL ENDS OF RIGHT RADIUS AND ULNA WITH ROUTINE HEALING, SUBSEQUENT ENCOUNTER: Primary | ICD-10-CM

## 2019-05-22 PROCEDURE — 99024 POSTOP FOLLOW-UP VISIT: CPT | Performed by: ORTHOPAEDIC SURGERY

## 2019-06-24 ENCOUNTER — TELEPHONE (OUTPATIENT)
Dept: FAMILY MEDICINE CLINIC | Facility: CLINIC | Age: 79
End: 2019-06-24

## 2019-06-24 DIAGNOSIS — R60.9 EDEMA, UNSPECIFIED TYPE: ICD-10-CM

## 2019-06-24 DIAGNOSIS — E78.00 HYPERCHOLESTEROLEMIA: ICD-10-CM

## 2019-06-24 DIAGNOSIS — R53.83 OTHER FATIGUE: Primary | ICD-10-CM

## 2019-06-24 DIAGNOSIS — E55.9 VITAMIN D DEFICIENCY: ICD-10-CM

## 2019-06-26 ENCOUNTER — TELEPHONE (OUTPATIENT)
Dept: FAMILY MEDICINE CLINIC | Facility: CLINIC | Age: 79
End: 2019-06-26

## 2019-06-26 ENCOUNTER — CLINICAL SUPPORT (OUTPATIENT)
Dept: FAMILY MEDICINE CLINIC | Facility: CLINIC | Age: 79
End: 2019-06-26
Payer: COMMERCIAL

## 2019-06-26 VITALS
DIASTOLIC BLOOD PRESSURE: 82 MMHG | HEIGHT: 65 IN | BODY MASS INDEX: 38.65 KG/M2 | WEIGHT: 232 LBS | SYSTOLIC BLOOD PRESSURE: 156 MMHG | TEMPERATURE: 98.5 F

## 2019-06-26 DIAGNOSIS — I10 HYPERTENSION, UNSPECIFIED TYPE: Primary | ICD-10-CM

## 2019-06-26 DIAGNOSIS — I10 ESSENTIAL HYPERTENSION: Primary | ICD-10-CM

## 2019-06-26 PROCEDURE — 99211 OFF/OP EST MAY X REQ PHY/QHP: CPT | Performed by: FAMILY MEDICINE

## 2019-06-26 RX ORDER — LISINOPRIL 10 MG/1
10 TABLET ORAL DAILY
Qty: 30 TABLET | Refills: 3 | Status: SHIPPED | OUTPATIENT
Start: 2019-06-26 | End: 2019-07-11 | Stop reason: SDUPTHER

## 2019-07-01 ENCOUNTER — APPOINTMENT (OUTPATIENT)
Dept: LAB | Facility: CLINIC | Age: 79
End: 2019-07-01
Payer: COMMERCIAL

## 2019-07-01 DIAGNOSIS — R53.83 OTHER FATIGUE: ICD-10-CM

## 2019-07-01 DIAGNOSIS — R60.9 EDEMA, UNSPECIFIED TYPE: ICD-10-CM

## 2019-07-01 DIAGNOSIS — E78.00 HYPERCHOLESTEROLEMIA: ICD-10-CM

## 2019-07-01 DIAGNOSIS — E55.9 VITAMIN D DEFICIENCY: ICD-10-CM

## 2019-07-01 LAB
25(OH)D3 SERPL-MCNC: 25 NG/ML (ref 30–100)
ALBUMIN SERPL BCP-MCNC: 3.7 G/DL (ref 3.5–5)
ALP SERPL-CCNC: 106 U/L (ref 46–116)
ALT SERPL W P-5'-P-CCNC: 18 U/L (ref 12–78)
ANION GAP SERPL CALCULATED.3IONS-SCNC: 6 MMOL/L (ref 4–13)
AST SERPL W P-5'-P-CCNC: 17 U/L (ref 5–45)
BILIRUB SERPL-MCNC: 0.4 MG/DL (ref 0.2–1)
BUN SERPL-MCNC: 9 MG/DL (ref 5–25)
CALCIUM SERPL-MCNC: 9.2 MG/DL (ref 8.3–10.1)
CHLORIDE SERPL-SCNC: 108 MMOL/L (ref 100–108)
CHOLEST SERPL-MCNC: 229 MG/DL (ref 50–200)
CO2 SERPL-SCNC: 28 MMOL/L (ref 21–32)
CREAT SERPL-MCNC: 0.66 MG/DL (ref 0.6–1.3)
GFR SERPL CREATININE-BSD FRML MDRD: 86 ML/MIN/1.73SQ M
GLUCOSE P FAST SERPL-MCNC: 110 MG/DL (ref 65–99)
HDLC SERPL-MCNC: 58 MG/DL (ref 40–60)
LDLC SERPL CALC-MCNC: 146 MG/DL (ref 0–100)
POTASSIUM SERPL-SCNC: 4.3 MMOL/L (ref 3.5–5.3)
PROT SERPL-MCNC: 7.2 G/DL (ref 6.4–8.2)
SODIUM SERPL-SCNC: 142 MMOL/L (ref 136–145)
TRIGL SERPL-MCNC: 125 MG/DL
TSH SERPL DL<=0.05 MIU/L-ACNC: 0.54 UIU/ML (ref 0.36–3.74)

## 2019-07-01 PROCEDURE — 84443 ASSAY THYROID STIM HORMONE: CPT

## 2019-07-01 PROCEDURE — 82306 VITAMIN D 25 HYDROXY: CPT

## 2019-07-01 PROCEDURE — 36415 COLL VENOUS BLD VENIPUNCTURE: CPT

## 2019-07-01 PROCEDURE — 80061 LIPID PANEL: CPT

## 2019-07-01 PROCEDURE — 80053 COMPREHEN METABOLIC PANEL: CPT

## 2019-07-02 ENCOUNTER — TELEPHONE (OUTPATIENT)
Dept: FAMILY MEDICINE CLINIC | Facility: CLINIC | Age: 79
End: 2019-07-02

## 2019-07-02 NOTE — TELEPHONE ENCOUNTER
----- Message from Hunter Cross DO sent at 7/1/2019  1:25 PM EDT -----  Vitamin D low at 20 ;she should take 1000 IU of vit D daily and if already on this increase it by 1000 IU of otc vit D and recheck vit D in 6 weeks

## 2019-07-11 ENCOUNTER — CLINICAL SUPPORT (OUTPATIENT)
Dept: FAMILY MEDICINE CLINIC | Facility: CLINIC | Age: 79
End: 2019-07-11
Payer: COMMERCIAL

## 2019-07-11 VITALS
BODY MASS INDEX: 39.15 KG/M2 | SYSTOLIC BLOOD PRESSURE: 124 MMHG | TEMPERATURE: 98.2 F | WEIGHT: 235 LBS | HEART RATE: 80 BPM | HEIGHT: 65 IN | DIASTOLIC BLOOD PRESSURE: 76 MMHG

## 2019-07-11 DIAGNOSIS — I10 HYPERTENSION, UNSPECIFIED TYPE: Primary | ICD-10-CM

## 2019-07-11 DIAGNOSIS — I10 ESSENTIAL HYPERTENSION: ICD-10-CM

## 2019-07-11 PROCEDURE — 3725F SCREEN DEPRESSION PERFORMED: CPT | Performed by: FAMILY MEDICINE

## 2019-07-11 PROCEDURE — 99211 OFF/OP EST MAY X REQ PHY/QHP: CPT | Performed by: FAMILY MEDICINE

## 2019-07-11 RX ORDER — LISINOPRIL 10 MG/1
10 TABLET ORAL DAILY
Qty: 90 TABLET | Refills: 3 | Status: SHIPPED | OUTPATIENT
Start: 2019-07-11 | End: 2019-08-09 | Stop reason: HOSPADM

## 2019-07-11 NOTE — PROGRESS NOTES
Pt is here for blood pressure check, she was just recently placed on lisinopril 10mg daily due to high blood pressure reading at last office visit  Pt denies any side effects at this time  Blood pressure in office today is 124/76  Reading is good and pt is to continue with the same dose and we will refill for 90 day [facility] to [facility] per patient request  Pt aware to call with any questions or concerns  BMI Counseling: Body mass index is 39 11 kg/m²  Discussed the patient's BMI with her  The BMI is above average  BMI counseling and education was provided to the patient  Exercise recommendations include exercising 3-5 times per week

## 2019-07-31 ENCOUNTER — OFFICE VISIT (OUTPATIENT)
Dept: FAMILY MEDICINE CLINIC | Facility: CLINIC | Age: 79
End: 2019-07-31
Payer: COMMERCIAL

## 2019-07-31 VITALS
WEIGHT: 233 LBS | BODY MASS INDEX: 38.82 KG/M2 | DIASTOLIC BLOOD PRESSURE: 80 MMHG | HEIGHT: 65 IN | RESPIRATION RATE: 16 BRPM | TEMPERATURE: 98.9 F | HEART RATE: 78 BPM | SYSTOLIC BLOOD PRESSURE: 142 MMHG

## 2019-07-31 DIAGNOSIS — N63.0 BREAST NODULE: Primary | ICD-10-CM

## 2019-07-31 PROCEDURE — 1160F RVW MEDS BY RX/DR IN RCRD: CPT | Performed by: FAMILY MEDICINE

## 2019-07-31 PROCEDURE — 99213 OFFICE O/P EST LOW 20 MIN: CPT | Performed by: FAMILY MEDICINE

## 2019-07-31 NOTE — PROGRESS NOTES
Patient ID: Derian Scott is a 70 y o  female  HPI: 70 y  o female presents for evaluation of right breast nodule at 11:00 position which is slightly tender  She had a mammo done in November  She denies any trauma to the breast       SUBJECTIVE    Family History   Problem Relation Age of Onset    Prostate cancer Father     Other Family         back problem     Social History     Socioeconomic History    Marital status:       Spouse name: Not on file    Number of children: Not on file    Years of education: Not on file    Highest education level: Not on file   Occupational History    Not on file   Social Needs    Financial resource strain: Not on file    Food insecurity:     Worry: Not on file     Inability: Not on file    Transportation needs:     Medical: Not on file     Non-medical: Not on file   Tobacco Use    Smoking status: Former Smoker    Smokeless tobacco: Never Used   Substance and Sexual Activity    Alcohol use: No    Drug use: No    Sexual activity: Not on file   Lifestyle    Physical activity:     Days per week: Not on file     Minutes per session: Not on file    Stress: Not on file   Relationships    Social connections:     Talks on phone: Not on file     Gets together: Not on file     Attends Spiritism service: Not on file     Active member of club or organization: Not on file     Attends meetings of clubs or organizations: Not on file     Relationship status: Not on file    Intimate partner violence:     Fear of current or ex partner: Not on file     Emotionally abused: Not on file     Physically abused: Not on file     Forced sexual activity: Not on file   Other Topics Concern    Not on file   Social History Narrative    Daily coffee consumption (_cups/day)    Denied hx of daily cola consumption    Daily tea consumption (_cups/day)     Past Medical History:   Diagnosis Date    Blood type A+     [facility]ulsions ([facility] [address] [facility]     Hyperlipidemia     Osteoarthritis, localized, knee     Spondylosis      Past Surgical History:   Procedure Laterality Date    CATARACT EXTRACTION      COMBINED HYSTEROSCOPY DIAGNOSTIC / D&C      ESOPHAGOGASTRODUODENOSCOPY      diagnostic    HAND SURGERY      HERNIA REPAIR      HYSTERECTOMY      KNEE ARTHROPLASTY      Revision;total    NASAL SEPTUM SURGERY      Deviation repair    TUBAL LIGATION       Allergies   Allergen Reactions    Adhesive  [Medical Tape]      Other reaction(s): Unknown Reaction    Latex      Other reaction(s): Unknown Reaction    Midazolam     Penicillins     Thiopental        Current Outpatient Medications:     aspirin (ECOTRIN LOW STRENGTH) 81 mg EC tablet, Take by mouth, Disp: , Rfl:     Calcium Carb-Cholecalciferol (CALCIUM 1000 + D PO), Take 1 tablet by mouth daily, Disp: , Rfl:     furosemide (LASIX) 20 mg tablet, Take 1 tablet (20 mg total) by mouth daily (Patient taking differently: Take 20 mg by mouth as needed ), Disp: 30 tablet, Rfl: 0    lisinopril (ZESTRIL) 10 mg tablet, Take 1 tablet (10 mg total) by mouth daily, Disp: 90 tablet, Rfl: 3    meloxicam (MOBIC) 15 mg tablet, Take 1 tablet (15 mg total) by mouth as needed for mild pain, Disp: 30 tablet, Rfl: 0    Multiple Vitamin (MULTIVITAMINS PO), Take 1 capsule by mouth daily, Disp: , Rfl:     phenytoin (DILANTIN) 100 mg ER capsule, Take 1 capsule (100 mg total) by mouth 3 (three) times a day, Disp: 270 capsule, Rfl: 1    simvastatin (ZOCOR) 20 mg tablet, Take 1 tablet (20 mg total) by mouth daily at bedtime, Disp: 90 tablet, Rfl: 1    Review of Systems  Constitutional:     Denies fever, chills ,fatigue ,weakness ,weight loss, weight gain     ENT: Denies earache ,loss of hearing ,nosebleed, nasal discharge,nasal congestion ,sore throat ,hoarseness  Pulmonary: Denies shortness of breath ,cough  ,dyspnea on exertion, orthopnea  ,PND   Cardiovascular:  Denies bradycardia , tachycardia  ,palpations, lower extremity edema leg, claudication  Breast:  Denies n ,nipple discharge nipple changes + right breast nodule  Abdomen:  Denies abdominal pain , anorexia , indigestion, nausea, vomiting, constipation, diarrhea  Musculoskeletal: Denies myalgias, arthralgias, joint swelling, joint stiffness , limb pain, limb swelling  Gu: denies dysuria, polyuria  Skin: Denies skin rash, skin lesion, skin wound, itching, dry skin  Neuro: Denies headache, numbness, tingling, confusion, loss of consciousness, dizziness, vertigo  Psychiatric: Denies feelings of depression, suicidal ideation, anxiety, sleep disturbances    OBJECTIVE  /80   Pulse 78   Temp 98 9 °F (37 2 °C)   Resp 16   Ht 5' 5" (1 651 m)   Wt 106 kg (233 lb)   BMI 38 77 kg/m²   Constitutional:   NAD, well appearing and well nourished      ENT:   Conjunctiva and lids: no injection, edema, or discharge     Pupils and iris: STEFAN bilaterally    External inspection of ears and nose: normal without deformities or discharge  Otoscopic exam: Canals patent without erythema  Nasal mucosa, septum and turbinates: Normal or edema or discharge         Oropharynx:  Moist mucosa, normal tongue and tonsils without lesions  No erythema        Pulmonary:Respiratory effort normal rate and rhythm, no increased work of breathing   Auscultation of lungs:  Clear bilaterally with no adventitious breath sounds       Cardiovascular: regular rate and rhythm, S1 and S2, no murmur, no edema and/or varicosities of LE      Abdomen: Soft and non-distended     Positive bowel sounds      No heptomegaly or splenomegaly      Gu: no suprapubic tenderness or CVA tenderness, no urethral discharge  Lymphatic:  No anterior or posterior cervical lymphadenopathy         Musculoskeletal:  Gait and station: Normal gait      Digits and nails normal without clubbing or cyanosis       Inspection/palpation of joints, bones, and muscles:  No joint tenderness, swelling, full active and passive range of motion       Skin: Normal skin turgor and no rashes+ right breast mass at 11:00 position slightly tender on palpation, no tissue tesxture changes noted  Neuro:     Normal reflexes     Psych:   alert and oriented to person, place and time     normal mood and affect       Assessment/Plan:Diagnoses and all orders for this visit:    Breast nodule  -     Cancel: Mammo diagnostic bilateral w cad; Future  -     Mammo diagnostic right w cad; Future    Other orders  -     Calcium Carb-Cholecalciferol (CALCIUM 1000 + D PO); Take 1 tablet by mouth daily        Reviewed with patient plan to treat with above plan      Patient instructed to call in 72 hours if not feeling better or if symptoms worsen

## 2019-08-07 ENCOUNTER — APPOINTMENT (EMERGENCY)
Dept: RADIOLOGY | Facility: HOSPITAL | Age: 79
End: 2019-08-07
Payer: COMMERCIAL

## 2019-08-07 ENCOUNTER — APPOINTMENT (OUTPATIENT)
Dept: MRI IMAGING | Facility: HOSPITAL | Age: 79
End: 2019-08-07
Payer: COMMERCIAL

## 2019-08-07 ENCOUNTER — TELEPHONE (OUTPATIENT)
Dept: FAMILY MEDICINE CLINIC | Facility: CLINIC | Age: 79
End: 2019-08-07

## 2019-08-07 ENCOUNTER — APPOINTMENT (OUTPATIENT)
Dept: ULTRASOUND IMAGING | Facility: HOSPITAL | Age: 79
End: 2019-08-07
Payer: COMMERCIAL

## 2019-08-07 ENCOUNTER — HOSPITAL ENCOUNTER (OUTPATIENT)
Facility: HOSPITAL | Age: 79
Setting detail: OBSERVATION
Discharge: HOME | End: 2019-08-09
Attending: EMERGENCY MEDICINE | Admitting: INTERNAL MEDICINE
Payer: COMMERCIAL

## 2019-08-07 ENCOUNTER — APPOINTMENT (EMERGENCY)
Dept: CT IMAGING | Facility: HOSPITAL | Age: 79
End: 2019-08-07
Payer: COMMERCIAL

## 2019-08-07 DIAGNOSIS — R42 VERTIGO: ICD-10-CM

## 2019-08-07 DIAGNOSIS — I10 HYPERTENSION, UNSPECIFIED TYPE: Primary | ICD-10-CM

## 2019-08-07 DIAGNOSIS — I10 HTN (HYPERTENSION): ICD-10-CM

## 2019-08-07 DIAGNOSIS — E78.5 HYPERLIPIDEMIA: ICD-10-CM

## 2019-08-07 PROBLEM — E66.9 OBESITY (BMI 30-39.9): Status: ACTIVE | Noted: 2019-08-07

## 2019-08-07 PROBLEM — R26.2 AMBULATORY DYSFUNCTION: Status: ACTIVE | Noted: 2019-08-07

## 2019-08-07 PROBLEM — D72.819 LEUKOPENIA: Status: ACTIVE | Noted: 2019-08-07

## 2019-08-07 PROBLEM — R11.0 NAUSEA: Status: ACTIVE | Noted: 2019-08-07

## 2019-08-07 PROBLEM — R56.9 SEIZURE (HCC): Status: ACTIVE | Noted: 2019-08-07

## 2019-08-07 LAB
ALBUMIN SERPL BCP-MCNC: 3.7 G/DL (ref 3.5–5)
ALP SERPL-CCNC: 109 U/L (ref 46–116)
ALT SERPL W P-5'-P-CCNC: 24 U/L (ref 12–78)
ANION GAP SERPL CALCULATED.3IONS-SCNC: 8 MMOL/L (ref 4–13)
AST SERPL W P-5'-P-CCNC: 21 U/L (ref 5–45)
BASOPHILS # BLD AUTO: 0.02 THOUSANDS/ΜL (ref 0–0.1)
BASOPHILS NFR BLD AUTO: 1 % (ref 0–1)
BILIRUB SERPL-MCNC: 0.3 MG/DL (ref 0.2–1)
BUN SERPL-MCNC: 9 MG/DL (ref 5–25)
CALCIUM SERPL-MCNC: 9.4 MG/DL (ref 8.3–10.1)
CHLORIDE SERPL-SCNC: 106 MMOL/L (ref 100–108)
CO2 SERPL-SCNC: 28 MMOL/L (ref 21–32)
CREAT SERPL-MCNC: 0.66 MG/DL (ref 0.6–1.3)
EOSINOPHIL # BLD AUTO: 0.05 THOUSAND/ΜL (ref 0–0.61)
EOSINOPHIL NFR BLD AUTO: 2 % (ref 0–6)
ERYTHROCYTE [DISTWIDTH] IN BLOOD BY AUTOMATED COUNT: 11.9 % (ref 11.6–15.1)
GFR SERPL CREATININE-BSD FRML MDRD: 86 ML/MIN/1.73SQ M
GLUCOSE SERPL-MCNC: 104 MG/DL (ref 65–140)
HCT VFR BLD AUTO: 42.1 % (ref 34.8–46.1)
HGB BLD-MCNC: 14.3 G/DL (ref 11.5–15.4)
IMM GRANULOCYTES # BLD AUTO: 0 THOUSAND/UL (ref 0–0.2)
IMM GRANULOCYTES NFR BLD AUTO: 0 % (ref 0–2)
LYMPHOCYTES # BLD AUTO: 1.03 THOUSANDS/ΜL (ref 0.6–4.47)
LYMPHOCYTES NFR BLD AUTO: 33 % (ref 14–44)
MCH RBC QN AUTO: 32.1 PG (ref 26.8–34.3)
MCHC RBC AUTO-ENTMCNC: 34 G/DL (ref 31.4–37.4)
MCV RBC AUTO: 95 FL (ref 82–98)
MONOCYTES # BLD AUTO: 0.31 THOUSAND/ΜL (ref 0.17–1.22)
MONOCYTES NFR BLD AUTO: 10 % (ref 4–12)
NEUTROPHILS # BLD AUTO: 1.75 THOUSANDS/ΜL (ref 1.85–7.62)
NEUTS SEG NFR BLD AUTO: 54 % (ref 43–75)
NRBC BLD AUTO-RTO: 0 /100 WBCS
PLATELET # BLD AUTO: 159 THOUSANDS/UL (ref 149–390)
PMV BLD AUTO: 9.1 FL (ref 8.9–12.7)
POTASSIUM SERPL-SCNC: 3.9 MMOL/L (ref 3.5–5.3)
PROT SERPL-MCNC: 7.2 G/DL (ref 6.4–8.2)
RBC # BLD AUTO: 4.45 MILLION/UL (ref 3.81–5.12)
SODIUM SERPL-SCNC: 142 MMOL/L (ref 136–145)
TROPONIN I SERPL-MCNC: <0.02 NG/ML
WBC # BLD AUTO: 3.16 THOUSAND/UL (ref 4.31–10.16)

## 2019-08-07 PROCEDURE — 99284 EMERGENCY DEPT VISIT MOD MDM: CPT | Performed by: EMERGENCY MEDICINE

## 2019-08-07 PROCEDURE — 84484 ASSAY OF TROPONIN QUANT: CPT | Performed by: EMERGENCY MEDICINE

## 2019-08-07 PROCEDURE — 70450 CT HEAD/BRAIN W/O DYE: CPT

## 2019-08-07 PROCEDURE — 71046 X-RAY EXAM CHEST 2 VIEWS: CPT

## 2019-08-07 PROCEDURE — 99285 EMERGENCY DEPT VISIT HI MDM: CPT

## 2019-08-07 PROCEDURE — 36415 COLL VENOUS BLD VENIPUNCTURE: CPT | Performed by: EMERGENCY MEDICINE

## 2019-08-07 PROCEDURE — 85025 COMPLETE CBC W/AUTO DIFF WBC: CPT | Performed by: EMERGENCY MEDICINE

## 2019-08-07 PROCEDURE — 99220 PR INITIAL OBSERVATION CARE/DAY 70 MINUTES: CPT | Performed by: INTERNAL MEDICINE

## 2019-08-07 PROCEDURE — 70551 MRI BRAIN STEM W/O DYE: CPT

## 2019-08-07 PROCEDURE — 93975 VASCULAR STUDY: CPT

## 2019-08-07 PROCEDURE — 80053 COMPREHEN METABOLIC PANEL: CPT | Performed by: EMERGENCY MEDICINE

## 2019-08-07 PROCEDURE — 93005 ELECTROCARDIOGRAM TRACING: CPT

## 2019-08-07 RX ORDER — LISINOPRIL 10 MG/1
10 TABLET ORAL ONCE
Status: COMPLETED | OUTPATIENT
Start: 2019-08-07 | End: 2019-08-07

## 2019-08-07 RX ORDER — ASPIRIN 81 MG/1
81 TABLET ORAL DAILY
Status: DISCONTINUED | OUTPATIENT
Start: 2019-08-08 | End: 2019-08-09 | Stop reason: HOSPADM

## 2019-08-07 RX ORDER — ATORVASTATIN CALCIUM 40 MG/1
40 TABLET, FILM COATED ORAL
Status: DISCONTINUED | OUTPATIENT
Start: 2019-08-08 | End: 2019-08-09 | Stop reason: HOSPADM

## 2019-08-07 RX ORDER — MECLIZINE HYDROCHLORIDE 25 MG/1
25 TABLET ORAL EVERY 8 HOURS PRN
Status: DISCONTINUED | OUTPATIENT
Start: 2019-08-07 | End: 2019-08-09 | Stop reason: HOSPADM

## 2019-08-07 RX ORDER — FUROSEMIDE 20 MG/1
20 TABLET ORAL AS NEEDED
Status: DISCONTINUED | OUTPATIENT
Start: 2019-08-07 | End: 2019-08-09 | Stop reason: HOSPADM

## 2019-08-07 RX ORDER — ASPIRIN 325 MG
325 TABLET ORAL ONCE
Status: DISCONTINUED | OUTPATIENT
Start: 2019-08-07 | End: 2019-08-07

## 2019-08-07 RX ORDER — METOPROLOL TARTRATE 5 MG/5ML
5 INJECTION INTRAVENOUS EVERY 6 HOURS PRN
Status: DISCONTINUED | OUTPATIENT
Start: 2019-08-07 | End: 2019-08-09 | Stop reason: HOSPADM

## 2019-08-07 RX ORDER — ONDANSETRON 2 MG/ML
4 INJECTION INTRAMUSCULAR; INTRAVENOUS EVERY 6 HOURS PRN
Status: DISCONTINUED | OUTPATIENT
Start: 2019-08-07 | End: 2019-08-09 | Stop reason: HOSPADM

## 2019-08-07 RX ORDER — LISINOPRIL 20 MG/1
20 TABLET ORAL DAILY
Status: DISCONTINUED | OUTPATIENT
Start: 2019-08-08 | End: 2019-08-09 | Stop reason: HOSPADM

## 2019-08-07 RX ORDER — ASPIRIN 325 MG
325 TABLET ORAL ONCE
Status: COMPLETED | OUTPATIENT
Start: 2019-08-07 | End: 2019-08-07

## 2019-08-07 RX ORDER — PRAVASTATIN SODIUM 40 MG
40 TABLET ORAL
Status: DISCONTINUED | OUTPATIENT
Start: 2019-08-07 | End: 2019-08-07

## 2019-08-07 RX ORDER — MELATONIN
1000 DAILY
COMMUNITY

## 2019-08-07 RX ORDER — MELATONIN
1000 DAILY
Status: DISCONTINUED | OUTPATIENT
Start: 2019-08-08 | End: 2019-08-09 | Stop reason: HOSPADM

## 2019-08-07 RX ORDER — B-COMPLEX WITH VITAMIN C
1 TABLET ORAL
Status: DISCONTINUED | OUTPATIENT
Start: 2019-08-08 | End: 2019-08-09 | Stop reason: HOSPADM

## 2019-08-07 RX ORDER — MAGNESIUM HYDROXIDE/ALUMINUM HYDROXICE/SIMETHICONE 120; 1200; 1200 MG/30ML; MG/30ML; MG/30ML
30 SUSPENSION ORAL EVERY 6 HOURS PRN
Status: DISCONTINUED | OUTPATIENT
Start: 2019-08-07 | End: 2019-08-09 | Stop reason: HOSPADM

## 2019-08-07 RX ORDER — ACETAMINOPHEN 325 MG/1
650 TABLET ORAL EVERY 6 HOURS PRN
Status: DISCONTINUED | OUTPATIENT
Start: 2019-08-07 | End: 2019-08-09 | Stop reason: HOSPADM

## 2019-08-07 RX ORDER — PHENYTOIN SODIUM 100 MG/1
100 CAPSULE, EXTENDED RELEASE ORAL 3 TIMES DAILY
Status: DISCONTINUED | OUTPATIENT
Start: 2019-08-07 | End: 2019-08-09 | Stop reason: HOSPADM

## 2019-08-07 RX ORDER — METOPROLOL TARTRATE 5 MG/5ML
5 INJECTION INTRAVENOUS EVERY 6 HOURS PRN
Status: DISCONTINUED | OUTPATIENT
Start: 2019-08-07 | End: 2019-08-07

## 2019-08-07 RX ADMIN — LISINOPRIL 10 MG: 10 TABLET ORAL at 18:56

## 2019-08-07 RX ADMIN — ASPIRIN 325 MG ORAL TABLET 325 MG: 325 PILL ORAL at 19:50

## 2019-08-07 RX ADMIN — PRAVASTATIN SODIUM 40 MG: 40 TABLET ORAL at 18:56

## 2019-08-07 RX ADMIN — PHENYTOIN SODIUM 100 MG: 100 CAPSULE, EXTENDED RELEASE ORAL at 22:16

## 2019-08-07 NOTE — TELEPHONE ENCOUNTER
Got up this morning she felt like she was drunk, eyes were flipping, walked sideway,  Lightheaded, up set stomach,  She still feels lightheaded and up set stomach  Pl adv

## 2019-08-07 NOTE — H&P
H&P- Anusha PAZ [place] 1940, 70 y o  female MRN: [id number]    Unit/Bed#: -01 Encounter: [id number]    Primary Care Provider: Hunter Cross DO   Date and time admitted to hospital: 8/7/2019  2:53 PM        * HTN (hypertension) emergency  Assessment & Plan  Patient systolic blood pressure has always been less than 150, but last month her blood pressure was in the 150s, patient was started lisinopril 10 mg per day  Patient was taking all her medications  This morning around 7:00 a m , she noticed that she was having blurring of vision things moving back and forth so it was difficult to see, dizziness, she noticed that she was also shaking, she described her walk as "drunk like"  Patient decided to sleep from 7-12pm, hoping that the symptoms will subside  Since it did not go away , she decided to go to the ER  Pertinent negative:  Denies headache, double vision, fever, nausea, difficulty with urination, denies constipation, paralysis on 1 side, lightheaded, numbness, denies neck pain, numbness, tingling, weakness, speech difficulty     Upon my assessment today, patient no longer have any symptoms, blood pressure in the 180s, denies any headache or visual changes, and no shakiness    Differential diagnosis:  Hypertensive emergency, TIA  -lisinopril 10 mg p o  Once  -lisinopril 20 p o   Starting tomorrow  -Lopressor IV p r n  for blood pressure more than 160  -MRI of the brain  -CMP  -CBC  -PT and OT  -TSH  -renal duplex  -neurology was consulted for possible TIA or posterior circulation abnormality: Dr Luz Ledesma talked to advanced practitioner, will hold stroke pathway for now, most likely hypertensive emergency, her MRI of the brain and reassess     Hyperlipidemia  Assessment & Plan  Patient has a history of hyperlipidemia   -will continue simvastatin 20 per day    Seizure [facility])  Assessment & Plan  Patient had a history of fall concussion and seizure on 03/01/1990  -will continue maintenance Dilantin 100 mg 3 times a day    Nausea  Assessment & Plan  Patient reported being nauseated this morning when she was having vision changes and ambulatory problem  -upon assessment patient no longer have any nausea  -Zofran p r n  Ambulatory dysfunction  Assessment & Plan  Having difficulty with walking this am, with swaying and unsteady  -PT/OT eval          VTE Prophylaxis: Enoxaparin (Lovenox)  / sequential compression device   Code Status:  Full code  POLST: POLST form is not discussed and not completed at this time  Discussion with family:  Talked to brother in law and patient regarding treatment plan    Anticipated Length of Stay:  Patient will be admitted on an Observation basis with an anticipated length of stay of   2 midnights  Justification for Hospital Stay:  Hypertensive emergency versus TIA stroke    Total Time for Visit, including Counseling / Coordination of Care: 1 hour  Greater than 50% of this total time spent on direct patient counseling and coordination of care  Chief Complaint:   Dizziness, vision changes, and difficulty ambulating    History of Present Illness:    Lucille Campos is a 70 y o  female who presents with his vision change, ambulatory problem, and dizziness  Patient newly diagnosed with hypertension was started on lisinopril  a month ago  Patient systolic blood pressure has always been less than 150, but last month her blood pressure was in the 150s, patient was started lisinopril 10 mg per day  Patient was compliant with her medications  This morning around 7:00 a m , she noticed that she was having blurring of vision things moving back and forth, eyes were flipping so it was difficult to see, also had dizziness with nausea, she noticed that she was also shaking, she described her walk as "drunk like, walking sideways"  Patient decided to sleep from 7-12pm, hoping that the symptoms will subside    Since it did not go away , she decided to go to the ER per her physician suggestion  Review of Systems:    Review of Systems   Constitutional: Positive for activity change and appetite change  Negative for chills, diaphoresis, fatigue and fever  HENT: Positive for congestion  Negative for sore throat  Eyes: Positive for visual disturbance  Respiratory: Negative for cough, chest tightness and shortness of breath  Cardiovascular: Negative for chest pain  Endocrine: Negative for cold intolerance and heat intolerance  Genitourinary: Negative for difficulty urinating and dysuria  Musculoskeletal: Positive for gait problem  Negative for neck pain and neck stiffness  Neurological: Positive for dizziness and light-headedness  Negative for seizures, speech difficulty, weakness, numbness and headaches  Past Medical and Surgical History:     Past Medical History:   Diagnosis Date    Blood type A+     Convulsions ([facility] [address] )     Fracture of c2     Hiatal hernia     1976    Hyperlipidemia     Osteoarthritis, localized, knee     Seizure ([facility] [address] )     1990    Spondylosis        Past Surgical History:   Procedure Laterality Date    CATARACT EXTRACTION      COMBINED HYSTEROSCOPY DIAGNOSTIC / D&C      ESOPHAGOGASTRODUODENOSCOPY      diagnostic    HAND SURGERY      HERNIA REPAIR      HYSTERECTOMY      KNEE ARTHROPLASTY      Revision;total    KNEE ARTHROSCOPY Right     1997    NASAL SEPTUM SURGERY      Deviation repair    REPLACEMENT TOTAL KNEE Right     2000    TONSILLECTOMY      1948    TUBAL LIGATION      TUMOR REMOVAL Left 1956    left index finger       Meds/Allergies:    Prior to Admission medications    Medication Sig Start Date End Date Taking?  Authorizing Provider   cholecalciferol (VITAMIN D3) 1,000 units tablet Take 1,000 Units by mouth daily   Yes Historical Provider, MD   aspirin (ECOTRIN LOW STRENGTH) 81 mg EC tablet Take by mouth    Historical Provider, MD   Calcium Carb-Cholecalciferol (CALCIUM 1000 + D PO) Take 1 tablet by mouth daily    Historical Provider, MD   furosemide (LASIX) 20 mg tablet Take 1 tablet (20 mg total) by mouth daily  Patient taking differently: Take 20 mg by mouth as needed  11/20/18   NESTOR Lay   lisinopril (ZESTRIL) 10 mg tablet Take 1 tablet (10 mg total) by mouth daily 7/11/19   Zenon Pichardot,    meloxicam (MOBIC) 15 mg tablet Take 1 tablet (15 mg total) by mouth as needed for mild pain 4/15/19   Gualberto Montaño PA-C   Multiple Vitamin (MULTIVITAMINS PO) Take 1 capsule by mouth daily    Historical Provider, MD   phenytoin (DILANTIN) 100 mg ER capsule Take 1 capsule (100 mg total) by mouth 3 (three) times a day 3/19/19   NESTOR Lay   simvastatin (ZOCOR) 20 mg tablet Take 1 tablet (20 mg total) by mouth daily at bedtime 3/19/19   NESTOR Lay     I have reviewed home medications with patient personally  Allergies: Allergies   Allergen Reactions    Adhesive  [Medical Tape]      Other reaction(s): Unknown Reaction    Latex      Other reaction(s): Unknown Reaction    Midazolam     Penicillins     Thiopental        Social History:     Marital Status:      Social History     Substance and Sexual Activity   Alcohol Use No     Social History     Tobacco Use   Smoking Status Former Smoker   Smokeless Tobacco Never Used     Social History     Substance and Sexual Activity   Drug Use No       Family History:    Family History   Problem Relation Age of Onset    Prostate cancer Father     Other Family         back problem       Physical Exam:     Vitals:   Blood Pressure: (!) 179/70 (08/07/19 1801)  Pulse: 62 (08/07/19 1800)  Temperature: 97 7 °F (36 5 °C) (08/07/19 1800)  Temp Source: Oral (08/07/19 1800)  Respirations: 18 (08/07/19 1800)  Height: 5' 5" (165 1 cm) (08/07/19 1800)  Weight - Scale: 106 kg (233 lb 0 4 oz) (08/07/19 1813)  SpO2: 95 % (08/07/19 1800)    Physical Exam   Constitutional: She is oriented to person, place, and time   She appears well-developed and well-nourished  HENT:   Head: Normocephalic and atraumatic  Eyes: Pupils are equal, round, and reactive to light  EOM are normal    Neck: Normal range of motion  Neck supple  Cardiovascular: Normal rate, regular rhythm, normal heart sounds and intact distal pulses  Pulmonary/Chest: Effort normal and breath sounds normal    Abdominal: Soft  Bowel sounds are normal  She exhibits no distension  There is no tenderness  Musculoskeletal: Normal range of motion  Motor strength 5/5 upper extremity  Motor strength 5/5 lower extremity  Slightly difficulty moving of the right knee due to right knee replacement  Able to do Finger-to-nose test  Able to do  heel to shin test   Neurological: She is alert and oriented to person, place, and time  She has normal strength  No cranial nerve deficit or sensory deficit  She displays no Babinski's sign on the right side  She displays no Babinski's sign on the left side  Psychiatric: She has a normal mood and affect  Vitals reviewed  Additional Data:     Lab Results: I have personally reviewed pertinent reports  Results from last 7 days   Lab Units 08/07/19  1601   WBC Thousand/uL 3 16*   HEMOGLOBIN g/dL 14 3   HEMATOCRIT % 42 1   PLATELETS Thousands/uL 159   NEUTROS PCT % 54   LYMPHS PCT % 33   MONOS PCT % 10   EOS PCT % 2     Results from last 7 days   Lab Units 08/07/19  1601   SODIUM mmol/L 142   POTASSIUM mmol/L 3 9   CHLORIDE mmol/L 106   CO2 mmol/L 28   BUN mg/dL 9   CREATININE mg/dL 0 66   ANION GAP mmol/L 8   CALCIUM mg/dL 9 4   ALBUMIN g/dL 3 7   TOTAL BILIRUBIN mg/dL 0 30   ALK PHOS U/L 109   ALT U/L 24   AST U/L 21   GLUCOSE RANDOM mg/dL 104                       Imaging: I have personally reviewed pertinent reports  XR chest 2 views   ED Interpretation by Renetta Aburto DO (08/07 1606)   No widened mediastinum  No cardiomegaly  No infiltrates        Final Result by Pam Zamora MD (08/07 1608)      No focal airspace consolidation identified  Workstation performed: [id number]         CT head without contrast   Final Result by Ambrocio Pedroza MD (08/07 1559)      No acute intracranial abnormality  Microangiopathic changes  Workstation performed: [id number]         MRI inpatient order    (Results Pending)   US retroperitoneal complete    (Results Pending)       EKG, Pathology, and Other Studies Reviewed on Admission:   EKG:  Normal sinus  Allscripts / Epic Records Reviewed: Yes     ** Please Note: This note has been constructed using a voice recognition system   **

## 2019-08-07 NOTE — ASSESSMENT & PLAN NOTE
Patient systolic blood pressure has always been less than 150, but last month her blood pressure was in the 150s, patient was started lisinopril 10 mg per day  Patient was taking all her medications  This morning around 7:00 a m , she noticed that she was having blurring of vision things moving back and forth so it was difficult to see, dizziness, she noticed that she was also shaking, she described her walk as "drunk like"  Patient decided to sleep from 7-12pm, hoping that the symptoms will subside  Since it did not go away , she decided to go to the ER  Pertinent negative:  Denies headache, double vision, fever, nausea, difficulty with urination, denies constipation, paralysis on 1 side, lightheaded, numbness, denies neck pain, numbness, tingling, weakness, speech difficulty     Upon my assessment today, patient no longer have any symptoms, blood pressure in the 180s, denies any headache or visual changes, and no shakiness    Differential diagnosis:  Hypertensive emergency, TIA  -lisinopril 10 mg p o  Once  -lisinopril 20 p o   Starting tomorrow  -Lopressor IV p r n  for blood pressure more than 160  -MRI of the brain  -CMP  -CBC  -PT and OT  -TSH  -renal duplex  -neurology was consulted for possible TIA or posterior circulation abnormality: Dr Ronel Baeza talked to advanced practitioner, will hold stroke pathway for now, most likely hypertensive emergency, her MRI of the brain and reassess

## 2019-08-07 NOTE — ED PROVIDER NOTES
History  Chief Complaint   Patient presents with    Dizziness     Pt states this morning around 0730 nell got out of bed and became very dizzy  Pt states "it was like my eyes were moving back and forth really fast "      Patient is a 72-year-old female with a history of hypertension and hyperlipidemia who presents with dizziness  Patient states she woke up this morning and felt dizzy after getting out of bed  She describes it as things moving back and forth  She went back to bed to rest   However her symptoms continued  She then called her PCP who recommended she come to the emergency department for evaluation  She denies headache, neck pain, numbness, tingling, weakness, speech difficulty or visual complaints  She states she was started on blood pressure medication about 1 month ago  She was previously not on any blood pressure medication  She is asymptomatic at this time  History provided by:  Patient  Dizziness   Quality:  Imbalance  Severity:  Moderate  Onset quality:  Sudden  Timing:  Constant  Progression:  Resolved  Chronicity:  New  Associated symptoms: no chest pain, no diarrhea, no headaches, no nausea, no palpitations, no shortness of breath, no syncope, no vomiting and no weakness        Prior to Admission Medications   Prescriptions Last Dose Informant Patient Reported? Taking?    Calcium Carb-Cholecalciferol (CALCIUM 1000 + D PO)   Yes No   Sig: Take 1 tablet by mouth daily   Multiple Vitamin (MULTIVITAMINS PO)  Self Yes No   Sig: Take 1 capsule by mouth daily   aspirin (ECOTRIN LOW STRENGTH) 81 mg EC tablet  Self Yes No   Sig: Take by mouth   furosemide (LASIX) 20 mg tablet  Self No No   Sig: Take 1 tablet (20 mg total) by mouth daily   Patient taking differently: Take 20 mg by mouth as needed    lisinopril (ZESTRIL) 10 mg tablet  Self No No   Sig: Take 1 tablet (10 mg total) by mouth daily   meloxicam (MOBIC) 15 mg tablet  Self No No   Sig: Take 1 tablet (15 mg total) by mouth as needed for mild pain   phenytoin (DILANTIN) 100 mg ER capsule  Self No No   Sig: Take 1 capsule (100 mg total) by mouth 3 (three) times a day   simvastatin (ZOCOR) 20 mg tablet  Self No No   Sig: Take 1 tablet (20 mg total) by mouth daily at bedtime      Facility-Administered Medications: None       Past Medical History:   Diagnosis Date    Blood type A+     Convulsions ([facility])     Hyperlipidemia     Osteoarthritis, localized, knee     Spondylosis        Past Surgical History:   Procedure Laterality Date    CATARACT EXTRACTION      COMBINED HYSTEROSCOPY DIAGNOSTIC / D&C      ESOPHAGOGASTRODUODENOSCOPY      diagnostic    HAND SURGERY      HERNIA REPAIR      HYSTERECTOMY      KNEE ARTHROPLASTY      Revision;total    NASAL SEPTUM SURGERY      Deviation repair    TUBAL LIGATION         Family History   Problem Relation Age of Onset    Prostate cancer Father     Other Family         back problem     I have reviewed and agree with the history as documented  Social History     Tobacco Use    Smoking status: Former Smoker    Smokeless tobacco: Never Used   Substance Use Topics    Alcohol use: No    Drug use: No        Review of Systems   Constitutional: Negative for chills, diaphoresis and fever  HENT: Negative for nosebleeds, sore throat and trouble swallowing  Eyes: Negative for photophobia, pain and visual disturbance  Respiratory: Negative for cough, chest tightness and shortness of breath  Cardiovascular: Negative for chest pain, palpitations, leg swelling and syncope  Gastrointestinal: Negative for abdominal pain, constipation, diarrhea, nausea and vomiting  Endocrine: Negative for polydipsia and polyuria  Genitourinary: Negative for difficulty urinating, dysuria, hematuria, pelvic pain, vaginal bleeding and vaginal discharge  Musculoskeletal: Negative for back pain, neck pain and neck stiffness  Skin: Negative for pallor and rash  Neurological: Positive for dizziness   Negative for seizures, weakness, light-headedness and headaches  All other systems reviewed and are negative  Physical Exam  Physical Exam   Constitutional: She is oriented to person, place, and time  She appears well-developed and well-nourished  No distress  HENT:   Head: Normocephalic and atraumatic  Mouth/Throat: Oropharynx is clear and moist and mucous membranes are normal    Eyes: Pupils are equal, round, and reactive to light  EOM are normal  Right eye exhibits no nystagmus  Left eye exhibits no nystagmus  Neck: Normal range of motion  Neck supple  Cardiovascular: Normal rate, regular rhythm, normal heart sounds, intact distal pulses and normal pulses  Pulmonary/Chest: Effort normal and breath sounds normal  No respiratory distress  Abdominal: Soft  She exhibits no distension  There is no tenderness  There is no rigidity, no rebound and no guarding  Musculoskeletal: Normal range of motion  She exhibits no edema or tenderness  Lymphadenopathy:     She has no cervical adenopathy  Neurological: She is alert and oriented to person, place, and time  She has normal strength  No cranial nerve deficit or sensory deficit  Cerebellar exam normal   Speech fluent  Visual fields intact  Skin: Skin is warm and dry  Capillary refill takes less than 2 seconds  Psychiatric: She has a normal mood and affect  Nursing note and vitals reviewed        Vital Signs  ED Triage Vitals [08/07/19 1453]   Temperature Pulse Respirations Blood Pressure SpO2   98 6 °F (37 °C) 75 16 (!) 224/88 96 %      Temp Source Heart Rate Source Patient Position - Orthostatic VS BP Location FiO2 (%)   Oral Monitor Sitting Right arm --      Pain Score       --           Vitals:    08/07/19 1453 08/07/19 1600 08/07/19 1643   BP: (!) 224/88 (!) 181/77 (!) 178/68   Pulse: 75 64    Patient Position - Orthostatic VS: Sitting           Visual Acuity      ED Medications  Medications   aspirin tablet 325 mg (has no administration in time range)       Diagnostic Studies  Results Reviewed     Procedure Component Value Units Date/Time    Comprehensive metabolic panel [[id number]] Collected:  08/07/19 1601    Lab Status:  Final result Specimen:  Blood from Arm, Right Updated:  08/07/19 1640     Sodium 142 mmol/L      Potassium 3 9 mmol/L      Chloride 106 mmol/L      CO2 28 mmol/L      ANION GAP 8 mmol/L      BUN 9 mg/dL      Creatinine 0 66 mg/dL      Glucose 104 mg/dL      Calcium 9 4 mg/dL      AST 21 U/L      ALT 24 U/L      Alkaline Phosphatase 109 U/L      Total Protein 7 2 g/dL      Albumin 3 7 g/dL      Total Bilirubin 0 30 mg/dL      eGFR 86 ml/min/1 73sq m     Narrative:       National Kidney Disease Foundation guidelines for Chronic Kidney Disease (CKD):     Stage 1 with normal or high GFR (GFR > 90 mL/min/1 73 square meters)    Stage 2 Mild CKD (GFR = 60-89 mL/min/1 73 square meters)    Stage 3A Moderate CKD (GFR = 45-59 mL/min/1 73 square meters)    Stage 3B Moderate CKD (GFR = 30-44 mL/min/1 73 square meters)    Stage 4 Severe CKD (GFR = 15-29 mL/min/1 73 square meters)    Stage 5 End Stage CKD (GFR <15 mL/min/1 73 square meters)  Note: GFR calculation is accurate only with a steady state creatinine    Troponin I [[id number]]  (Normal) Collected:  08/07/19 1601    Lab Status:  Final result Specimen:  Blood from Arm, Right Updated:  08/07/19 1626     Troponin I <0 02 ng/mL     CBC and differential [[id number]]  (Abnormal) Collected:  08/07/19 1601    Lab Status:  Final result Specimen:  Blood from Arm, Right Updated:  08/07/19 1607     WBC 3 16 Thousand/uL      RBC 4 45 Million/uL      Hemoglobin 14 3 g/dL      Hematocrit 42 1 %      MCV 95 fL      MCH 32 1 pg      MCHC 34 0 g/dL      RDW 11 9 %      MPV 9 1 fL      Platelets 878 Thousands/uL      nRBC 0 /100 WBCs      Neutrophils Relative 54 %      Immat GRANS % 0 %      Lymphocytes Relative 33 %      Monocytes Relative 10 %      Eosinophils Relative 2 %      Basophils Relative 1 % Neutrophils Absolute 1 75 Thousands/µL      Immature Grans Absolute 0 00 Thousand/uL      Lymphocytes Absolute 1 03 Thousands/µL      Monocytes Absolute 0 31 Thousand/µL      Eosinophils Absolute 0 05 Thousand/µL      Basophils Absolute 0 02 Thousands/µL                  XR chest 2 views   ED Interpretation by Angelica Chamberlain DO (08/07 1606)   No widened mediastinum  No cardiomegaly  No infiltrates  Final Result by Juan Sultana MD (08/07 1608)      No focal airspace consolidation identified  Workstation performed: [id number]         CT head without contrast   Final Result by Rocio Thompson MD (08/07 1559)      No acute intracranial abnormality  Microangiopathic changes  Workstation performed: [id number]                    Procedures  ECG 12 Lead Documentation Only  Date/Time: 8/7/2019 3:14 PM  Performed by: Angelica Chamberlain DO  Authorized by: Angelica Chamberlain DO     ECG reviewed by me, the ED Provider: yes    Patient location:  ED  Previous ECG:     Previous ECG:  Compared to current    Similarity:  Changes noted    Comparison to cardiac monitor: Yes    Quality:     Tracing quality:  Limited by artifact  Comments:      Sinus rhythm at a rate of 82 beats per minute  Leftward axis  Normal QRS  Nonspecific ST T wave abnormalities  Baseline artifact add which limits interpretation and comparison to old EKG from 03/31/1990  ECG 12 Lead Documentation Only  Date/Time: 8/7/2019 3:39 PM  Performed by: Angelica Chamberlain DO  Authorized by: Angelica Chamberlain DO     ECG reviewed by me, the ED Provider: yes    Patient location:  ED  Previous ECG:     Previous ECG:  Compared to current    Similarity:  No change    Comparison to cardiac monitor: Yes    Comments:      Normal sinus rhythm at a rate of 75 beats per minute  Normal intervals  Leftward axis  Normal QRS  No ST T wave abnormalities             ED Course  ED Course as of Aug 07 1727   Wed Aug 07, 2019   1607 Repeat blood pressure 181/77  Patient remains asymptomatic  MDM  Number of Diagnoses or Management Options  Hypertension, unspecified type: new and requires workup  Vertigo: new and requires workup  Diagnosis management comments: Patient with a history of hypertension presents with vertigo  Triage blood pressure significantly elevated  However blood pressure has trended down without intervention  Patient is asymptomatic at this time  Possible posterior circulation TIA versus hypertensive emergency  No evidence of end-organ damage at this time  Will hospitalize for continued observation and workup  No indication for tPA at this time  Will continue to monitor blood pressure  Amount and/or Complexity of Data Reviewed  Clinical lab tests: ordered and reviewed  Tests in the radiology section of CPT®: ordered and reviewed  Tests in the medicine section of CPT®: ordered and reviewed  Review and summarize past medical records: yes  Discuss the patient with other providers: yes  Independent visualization of images, tracings, or specimens: yes    Risk of Complications, Morbidity, and/or Mortality  Presenting problems: high  Diagnostic procedures: high  Management options: moderate    Patient Progress  Patient progress: stable      Disposition  Final diagnoses:   Hypertension, unspecified type   Vertigo     Time reflects when diagnosis was documented in both MDM as applicable and the Disposition within this note     Time User Action Codes Description Comment    8/7/2019  5:03 PM Judi WU Add [I10] Hypertension, unspecified type     8/7/2019  5:03 PM Karen Darnell Add [R42] Vertigo       ED Disposition     ED Disposition Condition Date/Time Comment    Admit Stable Wed Aug 7, 2019  5:03 PM Case was discussed with Dr Ronel Baeza and the patient's admission status was agreed to be Admission Status: observation status to the service of Dr Ronel Baeza           Follow-up Information    None Patient's Medications   Discharge Prescriptions    No medications on file     No discharge procedures on file      ED Provider  Electronically Signed by           Karson Garza DO  08/07/19 5156

## 2019-08-07 NOTE — PLAN OF CARE
Problem: Potential for Falls  Goal: Patient will remain free of falls  Description  INTERVENTIONS:  - Assess patient frequently for physical needs  -  Identify cognitive and physical deficits and behaviors that affect risk of falls  -  [place] fall precautions as indicated by assessment   - Educate patient/family on patient safety including physical limitations  - Instruct patient to call for assistance with activity based on assessment  - Modify environment to reduce risk of injury  - Consider OT/PT consult to assist with strengthening/mobility  Outcome: Progressing     Problem: Prexisting or High Potential for Compromised Skin Integrity  Goal: Skin integrity is maintained or improved  Description  INTERVENTIONS:  - Identify patients at risk for skin breakdown  - Assess and monitor skin integrity  - Assess and monitor nutrition and hydration status  - Monitor labs (i e  albumin)  - Assess for incontinence   - Turn and reposition patient  - Assist with mobility/ambulation  - Relieve pressure over bony prominences  - Avoid friction and shearing  - Provide appropriate hygiene as needed including keeping skin clean and dry  - Evaluate need for skin moisturizer/barrier cream  - Collaborate with interdisciplinary team (i e  Nutrition, Rehabilitation, etc )   - Patient/family teaching  Outcome: Progressing     Problem: CARDIOVASCULAR - ADULT  Goal: Maintains optimal cardiac output and hemodynamic stability  Description  INTERVENTIONS:  - Monitor I/O, vital signs and rhythm  - Monitor for S/S and trends of decreased cardiac output i e  bleeding, hypotension  - Administer and titrate ordered vasoactive medications to optimize hemodynamic stability  - Assess quality of pulses, skin color and temperature  - Assess for signs of decreased coronary artery perfusion - ex   Angina  - Instruct patient to report change in severity of symptoms  Outcome: Progressing  Goal: Absence of cardiac dysrhythmias or at baseline rhythm  Description  INTERVENTIONS:  - Continuous cardiac monitoring, monitor vital signs, obtain 12 lead EKG if indicated  - Administer antiarrhythmic and heart rate control medications as ordered  - Monitor electrolytes and administer replacement therapy as ordered  Outcome: Progressing

## 2019-08-07 NOTE — ASSESSMENT & PLAN NOTE
Patient reported being nauseated this morning when she was having vision changes and ambulatory problem  -upon assessment patient no longer have any nausea  -Zofran p r n

## 2019-08-07 NOTE — ASSESSMENT & PLAN NOTE
Patient had a history of fall concussion and seizure on 03/01/1990  -will continue maintenance Dilantin 100 mg 3 times a day

## 2019-08-08 ENCOUNTER — APPOINTMENT (OUTPATIENT)
Dept: CT IMAGING | Facility: HOSPITAL | Age: 79
End: 2019-08-08
Payer: COMMERCIAL

## 2019-08-08 PROBLEM — D69.6 THROMBOCYTOPENIA (HCC): Status: ACTIVE | Noted: 2019-08-08

## 2019-08-08 LAB
ALBUMIN SERPL BCP-MCNC: 3.2 G/DL (ref 3.5–5)
ALP SERPL-CCNC: 89 U/L (ref 46–116)
ALT SERPL W P-5'-P-CCNC: 17 U/L (ref 12–78)
ANION GAP SERPL CALCULATED.3IONS-SCNC: 9 MMOL/L (ref 4–13)
AST SERPL W P-5'-P-CCNC: 17 U/L (ref 5–45)
ATRIAL RATE: 85 BPM
BASOPHILS # BLD AUTO: 0.02 THOUSANDS/ΜL (ref 0–0.1)
BASOPHILS NFR BLD AUTO: 1 % (ref 0–1)
BILIRUB SERPL-MCNC: 0.4 MG/DL (ref 0.2–1)
BUN SERPL-MCNC: 11 MG/DL (ref 5–25)
CALCIUM SERPL-MCNC: 8.5 MG/DL (ref 8.3–10.1)
CHLORIDE SERPL-SCNC: 108 MMOL/L (ref 100–108)
CO2 SERPL-SCNC: 26 MMOL/L (ref 21–32)
CREAT SERPL-MCNC: 0.58 MG/DL (ref 0.6–1.3)
EOSINOPHIL # BLD AUTO: 0.11 THOUSAND/ΜL (ref 0–0.61)
EOSINOPHIL NFR BLD AUTO: 3 % (ref 0–6)
ERYTHROCYTE [DISTWIDTH] IN BLOOD BY AUTOMATED COUNT: 12.1 % (ref 11.6–15.1)
EST. AVERAGE GLUCOSE BLD GHB EST-MCNC: 100 MG/DL
GFR SERPL CREATININE-BSD FRML MDRD: 90 ML/MIN/1.73SQ M
GLUCOSE P FAST SERPL-MCNC: 99 MG/DL (ref 65–99)
GLUCOSE SERPL-MCNC: 99 MG/DL (ref 65–140)
HBA1C MFR BLD: 5.1 % (ref 4.2–6.3)
HCT VFR BLD AUTO: 40.3 % (ref 34.8–46.1)
HGB BLD-MCNC: 13.1 G/DL (ref 11.5–15.4)
IMM GRANULOCYTES # BLD AUTO: 0.01 THOUSAND/UL (ref 0–0.2)
IMM GRANULOCYTES NFR BLD AUTO: 0 % (ref 0–2)
LYMPHOCYTES # BLD AUTO: 1.42 THOUSANDS/ΜL (ref 0.6–4.47)
LYMPHOCYTES NFR BLD AUTO: 32 % (ref 14–44)
MCH RBC QN AUTO: 31.5 PG (ref 26.8–34.3)
MCHC RBC AUTO-ENTMCNC: 32.5 G/DL (ref 31.4–37.4)
MCV RBC AUTO: 97 FL (ref 82–98)
MONOCYTES # BLD AUTO: 0.4 THOUSAND/ΜL (ref 0.17–1.22)
MONOCYTES NFR BLD AUTO: 9 % (ref 4–12)
NEUTROPHILS # BLD AUTO: 2.42 THOUSANDS/ΜL (ref 1.85–7.62)
NEUTS SEG NFR BLD AUTO: 55 % (ref 43–75)
NRBC BLD AUTO-RTO: 0 /100 WBCS
P AXIS: 59 DEGREES
PLATELET # BLD AUTO: 144 THOUSANDS/UL (ref 149–390)
PMV BLD AUTO: 9.2 FL (ref 8.9–12.7)
POTASSIUM SERPL-SCNC: 3.6 MMOL/L (ref 3.5–5.3)
PR INTERVAL: 150 MS
PROT SERPL-MCNC: 6.5 G/DL (ref 6.4–8.2)
QRS AXIS: -27 DEGREES
QRSD INTERVAL: 90 MS
QT INTERVAL: 372 MS
QTC INTERVAL: 416 MS
RBC # BLD AUTO: 4.16 MILLION/UL (ref 3.81–5.12)
SODIUM SERPL-SCNC: 143 MMOL/L (ref 136–145)
T WAVE AXIS: 46 DEGREES
VENTRICULAR RATE: 75 BPM
WBC # BLD AUTO: 4.38 THOUSAND/UL (ref 4.31–10.16)

## 2019-08-08 PROCEDURE — 80053 COMPREHEN METABOLIC PANEL: CPT | Performed by: INTERNAL MEDICINE

## 2019-08-08 PROCEDURE — G8978 MOBILITY CURRENT STATUS: HCPCS

## 2019-08-08 PROCEDURE — 70496 CT ANGIOGRAPHY HEAD: CPT

## 2019-08-08 PROCEDURE — 83036 HEMOGLOBIN GLYCOSYLATED A1C: CPT | Performed by: NURSE PRACTITIONER

## 2019-08-08 PROCEDURE — G8979 MOBILITY GOAL STATUS: HCPCS

## 2019-08-08 PROCEDURE — 97163 PT EVAL HIGH COMPLEX 45 MIN: CPT

## 2019-08-08 PROCEDURE — 70498 CT ANGIOGRAPHY NECK: CPT

## 2019-08-08 PROCEDURE — 85025 COMPLETE CBC W/AUTO DIFF WBC: CPT | Performed by: INTERNAL MEDICINE

## 2019-08-08 PROCEDURE — 99225 PR SBSQ OBSERVATION CARE/DAY 25 MINUTES: CPT | Performed by: INTERNAL MEDICINE

## 2019-08-08 PROCEDURE — 99204 OFFICE O/P NEW MOD 45 MIN: CPT | Performed by: PSYCHIATRY & NEUROLOGY

## 2019-08-08 PROCEDURE — 93975 VASCULAR STUDY: CPT | Performed by: SURGERY

## 2019-08-08 PROCEDURE — 93010 ELECTROCARDIOGRAM REPORT: CPT | Performed by: INTERNAL MEDICINE

## 2019-08-08 RX ORDER — SODIUM CHLORIDE 9 MG/ML
100 INJECTION, SOLUTION INTRAVENOUS CONTINUOUS
Status: DISCONTINUED | OUTPATIENT
Start: 2019-08-08 | End: 2019-08-09

## 2019-08-08 RX ADMIN — PHENYTOIN SODIUM 100 MG: 100 CAPSULE, EXTENDED RELEASE ORAL at 09:03

## 2019-08-08 RX ADMIN — SODIUM CHLORIDE 100 ML/HR: 0.9 INJECTION, SOLUTION INTRAVENOUS at 13:52

## 2019-08-08 RX ADMIN — PHENYTOIN SODIUM 100 MG: 100 CAPSULE, EXTENDED RELEASE ORAL at 21:14

## 2019-08-08 RX ADMIN — OYSTER SHELL CALCIUM WITH VITAMIN D 1 TABLET: 500; 200 TABLET, FILM COATED ORAL at 09:03

## 2019-08-08 RX ADMIN — IOHEXOL 85 ML: 350 INJECTION, SOLUTION INTRAVENOUS at 18:44

## 2019-08-08 RX ADMIN — PHENYTOIN SODIUM 100 MG: 100 CAPSULE, EXTENDED RELEASE ORAL at 16:38

## 2019-08-08 RX ADMIN — ATORVASTATIN CALCIUM 40 MG: 40 TABLET, FILM COATED ORAL at 16:38

## 2019-08-08 RX ADMIN — VITAMIN D, TAB 1000IU (100/BT) 1000 UNITS: 25 TAB at 09:03

## 2019-08-08 RX ADMIN — LISINOPRIL 20 MG: 20 TABLET ORAL at 09:03

## 2019-08-08 RX ADMIN — ASPIRIN 81 MG: 81 TABLET, COATED ORAL at 09:03

## 2019-08-08 RX ADMIN — ENOXAPARIN SODIUM 40 MG: 40 INJECTION SUBCUTANEOUS at 09:03

## 2019-08-08 RX ADMIN — Medication 1 TABLET: at 09:03

## 2019-08-08 NOTE — ASSESSMENT & PLAN NOTE
Patient is a 72-year-old female who was recently diagnosed with hypertension and started on antihypertensive medication about a month prior to presentation  She now comes in with dizziness and unsteady gait  Blood pressure on presentation was elevated at 224/88  On assessment by the admitting doctor, the patient no longer had any of the symptoms  Differential diagnosis:  Hypertensive emergency  - CT scan the brain without contrast: Significant for chronic lacunar infarctions in the basal ganglia with no acute intracranial abnormality    - MRI of the brain:  Mild chronic ischemic changes  No acute ischemic disease  - Appreciate neurology input:  - Frequent neuro checks    - CTA head and neck pending  - Echo pending   - Telemetry monitoring for arrhythmia  - Patient is currently on 20 mg of lisinopril with a stable blood pressure of 144/70    - Lopressor IV p r n  for blood pressure more than 160  - CMP:  Normal except for decreased albumin at 3 2   - CBC:  Unremarkable  - Awaiting TSH   - Renal duplex:  Awaiting read

## 2019-08-08 NOTE — PLAN OF CARE
Problem: PHYSICAL THERAPY ADULT  Goal: Performs mobility at highest level of function for planned discharge setting  See evaluation for individualized goals  Description  Treatment/Interventions: Functional transfer training, Elevations, Therapeutic exercise, Endurance training, Spoke to nursing, Patient/family training  Equipment Recommended: (none at this time)       See flowsheet documentation for full assessment, interventions and recommendations  Note:   Prognosis: Good  Problem List: Decreased endurance, Impaired balance, Decreased mobility  Assessment: Pt is 70 y o  female seen for PT evaluation on 8/8/2019 s/p admit to [facility] on 8/7/2019 w/ HTN (hypertension); pt presented to ED c vision change, ambulatory dysfunction, and dizziness  PT consulted to assess pt's functional mobility and d/c needs  Order placed for PT eval and tx  Performed at least 2 patient identifiers during session: Name and wristband  Comorbidities affecting pt's physical performance at time of assessment include: HTN, HLD, seizure, nausea, ambulatory dysfunction  PTA, pt was independent w/ all functional mobility w/ no AD or DME, ambulates community distances and elevations, ambulates household distances, has stairglide/FF of DORY, lives alone in 2nd level apartment and retired  Personal factors affecting pt at time of IE include: stairs to enter home, limited home support, positive fall history and decreased initiation and engagement  Please find objective findings from PT assessment regarding body systems outlined above with impairments and limitations including impaired balance, decreased endurance, decreased activity tolerance, decreased functional mobility tolerance and fall risk, as well as mobility assessment (need for cueing for mobility technique)  The following objective measures performed on IE also reveal limitations: Barthel Index: 65/100   Pt's clinical presentation is currently unstable/unpredictable seen in pt's presentation of ongoing medical assessment  Pt to benefit from continued PT tx to address deficits as defined above and maximize level of functional independent mobility and consistency  From PT/mobility standpoint, recommendation at time of d/c would be anticipate no needs pending progress in order to facilitate return to PLOF  Barriers to Discharge: None     Recommendation: Home independently(anticipate no needs)     PT - OK to Discharge: Yes(when medically cleared)    See flowsheet documentation for full assessment

## 2019-08-08 NOTE — PLAN OF CARE
Problem: Potential for Falls  Goal: Patient will remain free of falls  Description  INTERVENTIONS:  - Assess patient frequently for physical needs  -  Identify cognitive and physical deficits and behaviors that affect risk of falls    -  [place] fall precautions as indicated by assessment   - Educate patient/family on patient safety including physical limitations  - Instruct patient to call for assistance with activity based on assessment  - Modify environment to reduce risk of injury  - Consider OT/PT consult to assist with strengthening/mobility  8/8/2019 1911 by Luz Maria Sanabria RN  Outcome: Progressing  8/8/2019 1910 by Luz Maria Sanabria RN  Outcome: Progressing     Problem: Prexisting or High Potential for Compromised Skin Integrity  Goal: Skin integrity is maintained or improved  Description  INTERVENTIONS:  - Identify patients at risk for skin breakdown  - Assess and monitor skin integrity  - Assess and monitor nutrition and hydration status  - Monitor labs (i e  albumin)  - Assess for incontinence   - Turn and reposition patient  - Assist with mobility/ambulation  - Relieve pressure over bony prominences  - Avoid friction and shearing  - Provide appropriate hygiene as needed including keeping skin clean and dry  - Evaluate need for skin moisturizer/barrier cream  - Collaborate with interdisciplinary team (i e  Nutrition, Rehabilitation, etc )   - Patient/family teaching  8/8/2019 1911 by Luz Maria Sanabria RN  Outcome: Progressing  8/8/2019 1910 by Luz Maria Sanabria RN  Outcome: Progressing     Problem: CARDIOVASCULAR - ADULT  Goal: Maintains optimal cardiac output and hemodynamic stability  Description  INTERVENTIONS:  - Monitor I/O, vital signs and rhythm  - Monitor for S/S and trends of decreased cardiac output i e  bleeding, hypotension  - Administer and titrate ordered vasoactive medications to optimize hemodynamic stability  - Assess quality of pulses, skin color and temperature  - Assess for signs of decreased coronary artery perfusion - ex  Angina  - Instruct patient to report change in severity of symptoms  8/8/2019 1911 by Mary Lou Sequeira RN  Outcome: Progressing  8/8/2019 1910 by Mary Lou Sequeira RN  Outcome: Progressing  Goal: Absence of cardiac dysrhythmias or at baseline rhythm  Description  INTERVENTIONS:  - Continuous cardiac monitoring, monitor vital signs, obtain 12 lead EKG if indicated  - Administer antiarrhythmic and heart rate control medications as ordered  - Monitor electrolytes and administer replacement therapy as ordered  8/8/2019 1911 by Mary Lou Sequeira RN  Outcome: Progressing  8/8/2019 1910 by Mary Lou Sequeira RN  Outcome: Progressing     Problem: NEUROSENSORY - ADULT  Goal: Achieves stable or improved neurological status  Description  INTERVENTIONS  - Monitor and report changes in neurological status  - Initiate measures to prevent increased intracranial pressure  - Maintain blood pressure and fluid volume within ordered parameters to optimize cerebral perfusion  - Monitor temperature, glucose, and sodium or any other associated labs   Initiate appropriate interventions as ordered  - Monitor for seizure activity   - Administer anti-seizure medications as ordered  8/8/2019 1911 by Mary Lou Sequeira RN  Outcome: Progressing  8/8/2019 1910 by Mary Lou Sequeira RN  Outcome: Progressing  Goal: Absence of seizures  Description  INTERVENTIONS  - Monitor for seizure activity  - Administer anti-seizure medications as ordered  - Monitor neurological status  8/8/2019 1911 by Mary Lou Sequeira RN  Outcome: Progressing  8/8/2019 1910 by Mary Lou Sequeira RN  Outcome: Progressing  Goal: Remains free of injury related to seizures activity  Description  INTERVENTIONS  - Maintain airway, patient safety  and administer oxygen as ordered  - Monitor patient for seizure activity, document and report duration and description of seizure to physician/advanced practitioner  - If seizure occurs,  ensure patient safety during seizure  - Reorient patient post seizure  - Seizure pads on all 4 side rails  - Instruct patient/family to notify RN of any seizure activity including if an aura is experienced  - Instruct patient/family to call for assistance with activity based on nursing assessment  - Administer anti-seizure medications as ordered  - Monitor fetal well being  8/8/2019 1911 by Matt Shepherd RN  Outcome: Progressing  8/8/2019 1910 by Matt Shepherd RN  Outcome: Progressing  Goal: Achieves maximal functionality and self care  Description  INTERVENTIONS  - Monitor swallowing and airway patency with patient fatigue and changes in neurological status  - Encourage and assist patient to increase activity and self care with guidance from rehab services  - Encourage visually impaired, hearing impaired and aphasic patients to use assistive/communication devices  8/8/2019 1911 by Matt Shepherd RN  Outcome: Progressing  8/8/2019 1910 by Matt Shepherd RN  Outcome: Progressing

## 2019-08-08 NOTE — ASSESSMENT & PLAN NOTE
Patient came in with a white blood cell count of 3 16     White blood cell count noted to be normal today at 4 38

## 2019-08-08 NOTE — PLAN OF CARE
Problem: Potential for Falls  Goal: Patient will remain free of falls  Description  INTERVENTIONS:  - Assess patient frequently for physical needs  -  Identify cognitive and physical deficits and behaviors that affect risk of falls  -  [place] fall precautions as indicated by assessment   - Educate patient/family on patient safety including physical limitations  - Instruct patient to call for assistance with activity based on assessment  - Modify environment to reduce risk of injury  - Consider OT/PT consult to assist with strengthening/mobility  Outcome: Progressing     Problem: Prexisting or High Potential for Compromised Skin Integrity  Goal: Skin integrity is maintained or improved  Description  INTERVENTIONS:  - Identify patients at risk for skin breakdown  - Assess and monitor skin integrity  - Assess and monitor nutrition and hydration status  - Monitor labs (i e  albumin)  - Assess for incontinence   - Turn and reposition patient  - Assist with mobility/ambulation  - Relieve pressure over bony prominences  - Avoid friction and shearing  - Provide appropriate hygiene as needed including keeping skin clean and dry  - Evaluate need for skin moisturizer/barrier cream  - Collaborate with interdisciplinary team (i e  Nutrition, Rehabilitation, etc )   - Patient/family teaching  Outcome: Progressing     Problem: CARDIOVASCULAR - ADULT  Goal: Maintains optimal cardiac output and hemodynamic stability  Description  INTERVENTIONS:  - Monitor I/O, vital signs and rhythm  - Monitor for S/S and trends of decreased cardiac output i e  bleeding, hypotension  - Administer and titrate ordered vasoactive medications to optimize hemodynamic stability  - Assess quality of pulses, skin color and temperature  - Assess for signs of decreased coronary artery perfusion - ex   Angina  - Instruct patient to report change in severity of symptoms  Outcome: Progressing  Goal: Absence of cardiac dysrhythmias or at baseline rhythm  Description  INTERVENTIONS:  - Continuous cardiac monitoring, monitor vital signs, obtain 12 lead EKG if indicated  - Administer antiarrhythmic and heart rate control medications as ordered  - Monitor electrolytes and administer replacement therapy as ordered  Outcome: Progressing     Problem: NEUROSENSORY - ADULT  Goal: Achieves stable or improved neurological status  Description  INTERVENTIONS  - Monitor and report changes in neurological status  - Initiate measures to prevent increased intracranial pressure  - Maintain blood pressure and fluid volume within ordered parameters to optimize cerebral perfusion  - Monitor temperature, glucose, and sodium or any other associated labs   Initiate appropriate interventions as ordered  - Monitor for seizure activity   - Administer anti-seizure medications as ordered  Outcome: Progressing  Goal: Absence of seizures  Description  INTERVENTIONS  - Monitor for seizure activity  - Administer anti-seizure medications as ordered  - Monitor neurological status  Outcome: Progressing  Goal: Remains free of injury related to seizures activity  Description  INTERVENTIONS  - Maintain airway, patient safety  and administer oxygen as ordered  - Monitor patient for seizure activity, document and report duration and description of seizure to physician/advanced practitioner  - If seizure occurs,  ensure patient safety during seizure  - Reorient patient post seizure  - Seizure pads on all 4 side rails  - Instruct patient/family to notify RN of any seizure activity including if an aura is experienced  - Instruct patient/family to call for assistance with activity based on nursing assessment  - Administer anti-seizure medications as ordered  - Monitor fetal well being  Outcome: Progressing  Goal: Achieves maximal functionality and self care  Description  INTERVENTIONS  - Monitor swallowing and airway patency with patient fatigue and changes in neurological status  - Encourage and assist patient to increase activity and self care with guidance from rehab services  - Encourage visually impaired, hearing impaired and aphasic patients to use assistive/communication devices  Outcome: Progressing

## 2019-08-08 NOTE — UTILIZATION REVIEW
Initial Clinical Review    Admission: Date/Time/Statement:     Admit OBS on  8/7  @  1704    Orders Placed This Encounter   Procedures    Place in Observation     Standing Status:   Standing     Number of Occurrences:   1     Order Specific Question:   Admitting Physician     Answer:   Michelle Marte     Order Specific Question:   Level of Care     Answer:   Med Surg [16]     ED Arrival Information     Expected Arrival Acuity Means of Arrival Escorted By Service Admission Type    - 8/7/2019 14:44 Emergent Walk-In Family Member Hospitalist Emergency    Arrival Complaint    Vision Change, Lightheaded        Chief Complaint   Patient presents with    Dizziness     Pt states this morning around 0730 ahe got out of bed and became very dizzy  Pt states "it was like my eyes were moving back and forth really fast "      Assessment/Plan:   Patient was recently diagnosed with hypertension, approximately a month ago  Patient was started on 10 mg of lisinopril once a day, by her primary care physician  Earlier today, upon waking up patient started experiencing dizziness/vertigo, gait problems as well as some blurring of vision  Thus patient went to the emergency room for further evaluation management  In the emergency room, patient was found to have a systolic blood pressure at 224  Thus, patient was given a blood pressure medication in the ER  Patient's blood pressures went down to 182/83  Patient's symptoms had subsided  Differential diagnosis:  Hypertensive emergency, TIA  -lisinopril 10 mg p o  Once  -lisinopril 20 p o   Starting tomorrow  -Lopressor IV p r n  for blood pressure more than 160  -MRI of the brain  -CMP  -CBC  -PT and OT eval  -TSH  -renal duplex  -neurology was consulted for possible TIA or posterior circulation abnormality: Dr Joanna Tavarez talked to advanced practitioner, will hold stroke pathway for now, most likely hypertensive emergency, her MRI of the brain and reassess       ED Triage Vitals   Temperature Pulse Respirations Blood Pressure SpO2   08/07/19 1453 08/07/19 1453 08/07/19 1453 08/07/19 1453 08/07/19 1453   98 6 °F (37 °C) 75 16 (!) 224/88 96 %      Temp Source Heart Rate Source Patient Position - Orthostatic VS BP Location FiO2 (%)   08/07/19 1453 08/07/19 1453 08/07/19 1453 08/07/19 1453 --   Oral Monitor Sitting Right arm       Pain Score       08/08/19 0847       1        Wt Readings from Last 1 Encounters:   08/08/19 106 kg (233 lb 14 5 oz)     Additional Vital Signs:   08/08/19 0700  97 8 °F   58  18  127/72   08/07/19 2203  97 9 °F   70  18  144/70   08/07/19 2012    73    178/90    08/07/19 1800  97 7 °F   62  18  182/83   08/07/19 1643        178/68   08/07/19 1600    64    181/77       Pertinent Labs/Diagnostic Test Results:   Results from last 7 days   Lab Units 08/08/19  0438 08/07/19  1601   WBC Thousand/uL 4 38 3 16*   HEMOGLOBIN g/dL 13 1 14 3   HEMATOCRIT % 40 3 42 1   PLATELETS Thousands/uL 144* 159   NEUTROS ABS Thousands/µL 2 42 1 75*         Results from last 7 days   Lab Units 08/08/19  0438 08/07/19  1601   SODIUM mmol/L 143 142   POTASSIUM mmol/L 3 6 3 9   CHLORIDE mmol/L 108 106   CO2 mmol/L 26 28   ANION GAP mmol/L 9 8   BUN mg/dL 11 9   CREATININE mg/dL 0 58* 0 66   EGFR ml/min/1 73sq m 90 86   CALCIUM mg/dL 8 5 9 4     Results from last 7 days   Lab Units 08/08/19  0438 08/07/19  1601   AST U/L 17 21   ALT U/L 17 24   ALK PHOS U/L 89 109   TOTAL PROTEIN g/dL 6 5 7 2   ALBUMIN g/dL 3 2* 3 7   TOTAL BILIRUBIN mg/dL 0 40 0 30         Results from last 7 days   Lab Units 08/08/19  0438 08/07/19  1601   GLUCOSE RANDOM mg/dL 99 104         Results from last 7 days   Lab Units 08/07/19  1601   TROPONIN I ng/mL <0 02         Past Medical History:   Diagnosis Date    Blood type A+     Convulsions ([facility] [address] )     Fracture of c2     Hiatal hernia     1976    Hyperlipidemia     Osteoarthritis, localized, knee     Seizure ([facility] [address] )     1990    Spondylosis      Present on Admission:   HTN (hypertension) emergency   [facility] [address] [facility]   Hyperlipidemia   Nausea   Obesity (BMI 30-39  9)   Ambulatory dysfunction   Leukopenia      Admitting Diagnosis: Vertigo [R42]  Vision changes [H53 9]  Hypertension, unspecified type [I10]  Age/Sex: 70 y o  female  Admission Orders:  Telemetry,  SCD's,  PT/OT,  Consult neurology, ECHO,  Neuro cks q 4 hr;  IVF NS @  100     Current Facility-Administered Medications:  acetaminophen 650 mg Oral Q6H PRN   aluminum-magnesium hydroxide-simethicone 30 mL Oral Q6H PRN   aspirin 81 mg Oral Daily   atorvastatin 40 mg Oral Daily With Dinner   calcium carbonate-vitamin D 1 tablet Oral Daily With Breakfast   cholecalciferol 1,000 Units Oral Daily   enoxaparin 40 mg Subcutaneous Daily   furosemide 20 mg Oral PRN   lisinopril 20 mg Oral Daily   magnesium hydroxide 30 mL Oral Daily PRN   meclizine 25 mg Oral Q8H PRN   metoprolol 5 mg Intravenous Q6H PRN   multivitamin-minerals 1 tablet Oral Daily   ondansetron 4 mg Intravenous Q6H PRN   phenytoin 100 mg Oral TID   sodium chloride 100 mL/hr Intravenous Continuous     Network Utilization Review Department  Phone: [phone]; Fax [phone]  [email or web address]  ATTENTION: Please call with any questions or concerns to [phone]  and carefully listen to the prompts so that you are directed to the right person  Send all requests for admission clinical reviews, approved or denied determinations and any other requests to fax [phone]   All voicemails are confidential

## 2019-08-08 NOTE — CONSULTS
Consultation - Neurology   Anusha Booth 70 y o  female MRN: [id number]  Unit/Bed#: -01 Encounter: [id number]      Assessment/Plan   Assessment:  69 yo with pmh of seizure disorder and vascular risk factors including HLD, and newly diagnosed HTN presents with an episode of visual disturbance described as "TV moving back and forth",  Dizziness, nausea, and gait imbalance  /88 on arrival  Symptoms improved but did not completely resolve until last night  Today she continues to feel  "off" and not herself  Results:  - reviewed labs: , HDL 58, Triglycerides 125, TSH 0 539  - 8/7/19 MRI Brain wo contrast: per report: Mild chronic microvascular ischemic disease  No acute ischemic disease  Age-related atrophy  Consistent with CT  - 8/7/19 CT head wo contrast: per report: No acute intracranial abnormality  Microangiopathic changes      Plan:  1  Stroke like symptoms: dizziness, gait imbalance  Etiology concern for possible TIA vs MRI negative stroke  - CTA head and neck pending  - Echo pending   - Telemetry monitoring for arrhythmia  - Secondary risk factor modification   - continue  mg daily   - continue Lipitor  40 mg   - normotensive BP goal; avoid hypotension   - maintain euglycemia and normothermia  -PT/OT  - monitor neuro exam and notify with changes  - requested follow up appointment outpatient with [facility]    2  Seizure disorder: Patient reports only 1 event in 29 years and has continued on long term Dilantin since then   She expresses interest in transitioning off medication or to another medication  - requested outpatient follow up appointment to [facility]          History of Present Illness     Reason for Consult / Principal Problem: dizziness, hypertension  Hx and PE limited by: none  HPI: Juan Salas is a 70 y o  right handed female with pmh HLD, newly diagnosed HTN, Seizure disorder on dilantin, osteoarthritis, and cataracts who presented with dizziness and gait instability  Per patient she awoke at 0730 am yesterday  She looked at the TV box to see what time it was and the display appeared to be moving left and right  She got up to ambulated to the bathroom and felt dizzy and off balance  At that time she did not notice any numbness/tingling/weakness of extremities  Her speech was not slurred and she did not have word finding difficulties because she spoke to a friend on the phone at that time  She went and sat down in a chair in her room and fell asleep until 1230pm  When she awoke, she continued with dizziness, some nausea and gait instability  She came to the hospital for further evaluation  They reported her blood pressure was elevated  She recalls taking her Lisinopril just prior to coming to the hospital     Today, she continues to feel unwell, and not herself but can not describe what is off  She has no headache, dizziness, visual disturbance, speech difficulties, numbness/tingling/weakness of face or extremities, chest pain, palpitations, shortness of breath, nausea, or dyspepsia  She is voiding without difficulties with no reports of constipation or diarrhea at this time  She has not been ill lately or with any sick contacts  12 point review of systems conducted and negative except as reported in HPI  Inpatient consult to Neurology  Consult performed by: NESTOR Scott  Consult ordered by:  Yasemin Cedeno MD          Review of Systems    Historical Information   Past Medical History:   Diagnosis Date    Blood type A+     [facility]ulsions ([facility]     Fracture of c2     Hiatal hernia     1976    Hyperlipidemia     Osteoarthritis, localized, knee     Seizure ([facility] [facility] )     1990    Spondylosis      Past Surgical History:   Procedure Laterality Date    CATARACT EXTRACTION      COMBINED HYSTEROSCOPY DIAGNOSTIC / D&C      ESOPHAGOGASTRODUODENOSCOPY      diagnostic    HAND SURGERY      HERNIA REPAIR      HYSTERECTOMY  KNEE ARTHROPLASTY      Revision;total    KNEE ARTHROSCOPY Right     1997    NASAL SEPTUM SURGERY      Deviation repair    REPLACEMENT TOTAL KNEE Right     2000    TONSILLECTOMY      1948    TUBAL LIGATION      TUMOR REMOVAL Left 1956    left index finger     Social History   Social History     Substance and Sexual Activity   Alcohol Use No     Social History     Substance and Sexual Activity   Drug Use No     Social History     Tobacco Use   Smoking Status Former Smoker   Smokeless Tobacco Never Used     Family History:   Family History   Problem Relation Age of Onset    Prostate cancer Father     Other Family         back problem       Review of previous medical records was  completed  Meds/Allergies   all current active meds have been reviewed and PTA meds:   Prior to Admission Medications   Prescriptions Last Dose Informant Patient Reported? Taking?    Calcium Carb-Cholecalciferol (CALCIUM 1000 + D PO)   Yes No   Sig: Take 1 tablet by mouth daily   Multiple Vitamin (MULTIVITAMINS PO)  Self Yes No   Sig: Take 1 capsule by mouth daily   aspirin (ECOTRIN LOW STRENGTH) 81 mg EC tablet  Self Yes No   Sig: Take by mouth   cholecalciferol (VITAMIN D3) 1,000 units tablet   Yes Yes   Sig: Take 1,000 Units by mouth daily   furosemide (LASIX) 20 mg tablet  Self No No   Sig: Take 1 tablet (20 mg total) by mouth daily   Patient taking differently: Take 20 mg by mouth as needed    lisinopril (ZESTRIL) 10 mg tablet  Self No No   Sig: Take 1 tablet (10 mg total) by mouth daily   meloxicam (MOBIC) 15 mg tablet  Self No No   Sig: Take 1 tablet (15 mg total) by mouth as needed for mild pain   phenytoin (DILANTIN) 100 mg ER capsule  Self No No   Sig: Take 1 capsule (100 mg total) by mouth 3 (three) times a day   simvastatin (ZOCOR) 20 mg tablet  Self No No   Sig: Take 1 tablet (20 mg total) by mouth daily at bedtime      Facility-Administered Medications: None       Allergies   Allergen Reactions    Adhesive [Medical Tape]      Other reaction(s): Unknown Reaction    Latex      Other reaction(s): Unknown Reaction    Midazolam     Penicillins     Thiopental        Objective   Vitals:Blood pressure 127/72, pulse 58, temperature 97 8 °F (36 6 °C), temperature source Oral, resp  rate 18, height 5' 5" (1 651 m), weight 106 kg (233 lb 14 5 oz), SpO2 98 %  ,Body mass index is 38 92 kg/m²  Intake/Output Summary (Last 24 hours) at 8/8/2019 1357  Last data filed at 8/8/2019 0929  Gross per 24 hour   Intake 640 ml   Output 1020 ml   Net -380 ml       Invasive Devices: Invasive Devices     Peripheral Intravenous Line            Peripheral IV 08/07/19 Right Antecubital less than 1 day                Physical Exam   Constitutional: She is oriented to person, place, and time  She appears well-developed and well-nourished  HENT:   Head: Normocephalic and atraumatic  Mouth/Throat: Oropharynx is clear and moist    Eyes: Pupils are equal, round, and reactive to light  Conjunctivae and EOM are normal  Right eye exhibits no discharge  Left eye exhibits no discharge  Neck: Normal range of motion  Cardiovascular: Normal rate, regular rhythm, normal heart sounds and intact distal pulses  Exam reveals no gallop and no friction rub  No murmur heard  Pulmonary/Chest: Effort normal and breath sounds normal  No stridor  No respiratory distress  She has no wheezes  She has no rales  Abdominal: Soft  Bowel sounds are normal  She exhibits no distension  Musculoskeletal: Normal range of motion  Neurological: She is alert and oriented to person, place, and time  She has normal strength  She has a normal Finger-Nose-Finger Test    Reflex Scores:       Tricep reflexes are 1+ on the right side and 1+ on the left side  Bicep reflexes are 1+ on the right side and 1+ on the left side  Brachioradialis reflexes are 1+ on the right side and 1+ on the left side         Patellar reflexes are 1+ on the right side and 1+ on the left side  Achilles reflexes are 0 on the right side and 0 on the left side  Skin: Skin is warm and dry  No erythema  Psychiatric: She has a normal mood and affect  Her speech is normal and behavior is normal  Judgment and thought content normal    Nursing note and vitals reviewed  Neurologic Exam     Mental Status   Oriented to person, place, and time  Oriented to city  Oriented to year, month, date and day  Registration: recalls 3 of 3 objects  Recall at 5 minutes: recalls 3 of 3 objects  Follows 3 step commands  Attention: normal  Concentration: normal    Speech: speech is normal   Level of consciousness: alert  Knowledge: good  Able to perform simple calculations  Able to name object  Able to read  Able to repeat  Normal comprehension  Cranial Nerves     CN II   Visual fields full to confrontation  CN III, IV, VI   Pupils are equal, round, and reactive to light  Extraocular motions are normal    Right pupil: Size: 2 mm  Shape: regular  Left pupil: Size: 2 mm  Shape: regular  Nystagmus: none   Diplopia: none  CN V-XII intact     Motor Exam   Muscle bulk: normal  Overall muscle tone: normal  Right arm pronator drift: absent  Left arm pronator drift: absent    Strength   Strength 5/5 throughout  Sensory Exam   Light touch normal    Vibration normal    Proprioception normal      Gait, Coordination, and Reflexes     Coordination   Finger to nose coordination: normal    Tremor   Resting tremor: absent    Reflexes   Right brachioradialis: 1+  Left brachioradialis: 1+  Right biceps: 1+  Left biceps: 1+  Right triceps: 1+  Left triceps: 1+  Right patellar: 1+  Left patellar: 1+  Right achilles: 0  Left achilles: 0  Right plantar: normal  Left plantar: normal      Lab Results:   I have personally reviewed pertinent reports      CBC:   Results from last 7 days   Lab Units 08/08/19  0438 08/07/19  1601   WBC Thousand/uL 4 38 3 16*   RBC Million/uL 4 16 4 45   HEMOGLOBIN g/dL 13 1 14 3 HEMATOCRIT % 40 3 42 1   MCV fL 97 95   PLATELETS Thousands/uL 144* 159   BMP/CMP:   Results from last 7 days   Lab Units 08/08/19  0438 08/07/19  1601   SODIUM mmol/L 143 142   POTASSIUM mmol/L 3 6 3 9   CHLORIDE mmol/L 108 106   CO2 mmol/L 26 28   BUN mg/dL 11 9   CREATININE mg/dL 0 58* 0 66   CALCIUM mg/dL 8 5 9 4   AST U/L 17 21   ALT U/L 17 24   ALK PHOS U/L 89 109   EGFR ml/min/1 73sq m 90 86     Imaging Studies: I have personally reviewed pertinent reports  and I have personally reviewed pertinent films in PACS     EKG, Pathology, and Other Studies: I have personally reviewed pertinent reports     and I have personally reviewed pertinent films in PACS     VTE Prophylaxis: Sequential compression device (Venodyne)  and Enoxaparin (Lovenox)    Code Status: Level 1 - Full Code

## 2019-08-08 NOTE — PHYSICAL THERAPY NOTE
Physical Therapy Evaluation     Patient's Name: Derian Scott    Admitting Diagnosis  Vertigo [R42]  Vision changes [H53 9]  Hypertension, unspecified type [I10]    Problem List  Patient Active Problem List   Diagnosis    Acute venous embolism and thrombosis of deep vessels of distal lower extremity (HCC)    Allergic rhinitis    Anxiety    Arthralgia    Bulging of lumbar intervertebral disc    Chronic osteoarthritis    Edema    Epilepsy ([facility] [facility]    Hyperlipidemia    Lumbar back pain    Closed fracture of right distal radius and ulna    HTN (hypertension) emergency    Seizure ([facility] [facility]    Nausea    Obesity (BMI 30-39  9)    Ambulatory dysfunction    Leukopenia    Thrombocytopenia (HCC)       Past Medical History  Past Medical History:   Diagnosis Date    Blood type A+     Convulsions ([facility] [facility]     Fracture of c2     Hiatal hernia     1976    Hyperlipidemia     Osteoarthritis, localized, knee     Seizure ([facility] [facility] )     1990    Spondylosis        Past Surgical History  Past Surgical History:   Procedure Laterality Date    CATARACT EXTRACTION      COMBINED HYSTEROSCOPY DIAGNOSTIC / D&C      ESOPHAGOGASTRODUODENOSCOPY      diagnostic    HAND SURGERY      HERNIA REPAIR      HYSTERECTOMY      KNEE ARTHROPLASTY      Revision;total    KNEE ARTHROSCOPY Right     1997    NASAL SEPTUM SURGERY      Deviation repair    REPLACEMENT TOTAL KNEE Right     2000    TONSILLECTOMY      1948    TUBAL LIGATION      TUMOR REMOVAL Left 1956    left index finger        08/08/19 0847   Note Type   Note type Eval/Treat   Pain Assessment   Pain Assessment 0-10   Pain Score 1   Pain Location Head   Pain Descriptors Headache  ("annoying")   Home Living   Type of Home Apartment   Home Layout One level;Performs ADLs on one level; Able to live on main level with bedroom/bathroom  (2ND FLOOR; STAIRGLIDE TO 2ND)   Bathroom Shower/Tub Tub/shower unit   Bathroom Toilet Standard   Bathroom Equipment Other (Comment)  (shower chair if needed)   [address] [place];Cane  (no AD at baseline)   Prior Function   Level of [place] Independent with ADLs and functional mobility   Lives With Alone   Receives Help From Neighbor;Family  (ELVER)   ADL Assistance Independent   IADLs Independent   Falls in the last 6 months 1 to 4  ((+) fall history in February - 1 mechanical fall on ice)   Vocational Retired  (worked in dry cleaning)   Comments pt drives   Restrictions/Precautions   Wells Deepa Bearing Precautions Per Order No   Other Precautions Telemetry; Fall Risk;Pain   General   Family/Caregiver Present No   Cognition   Overall Cognitive Status WFL   Arousal/Participation Alert   Orientation Level Oriented X4   Memory Within functional limits   Following Commands Follows all commands and directions without difficulty   Comments pt agreeable to PT session   RUE Assessment   RUE Assessment WFL   LUE Assessment   LUE Assessment WFL   RLE Assessment   RLE Assessment WFL  (grossly 4+/5 throughout)   LLE Assessment   LLE Assessment WFL  (grossly 4+/5 throughout)   Coordination   Sensation X  ((+) tingling in R wrist - premorbid)   Light Touch   RLE Light Touch Grossly intact   LLE Light Touch Grossly intact   Bed Mobility   Supine to Sit Unable to assess  (pt received OOB in recliner)   Transfers   Sit to Stand 5  Supervision   Additional items Assist x 1;Verbal cues   Stand to Sit 5  Supervision   Additional items Assist x 1;Verbal cues   Ambulation/Elevation   Gait pattern Improper Weight shift;Decreased foot clearance;Shuffling   Gait Assistance 5  Supervision   Additional items Assist x 1;Verbal cues   Assistive Device None   Distance 160 ft   Stair Management Assistance Not tested   Balance   Static Sitting Normal   Dynamic Sitting Good   Static Standing Good   Dynamic Standing Fair +   Ambulatory Fair +   Endurance Deficit   Endurance Deficit Yes   Activity Tolerance   Activity Tolerance Patient tolerated treatment well   Nurse Made Aware Yes, RN Lucille Hamilton verbalized pt appropriate for PT session, made aware of outcomes/recs   Assessment   Prognosis Good   Problem List Decreased endurance; Impaired balance;Decreased mobility   Assessment Pt is 70 y o  female seen for PT evaluation on 8/8/2019 s/p admit to [facility] on 8/7/2019 w/ HTN (hypertension); pt presented to ED c vision change, ambulatory dysfunction, and dizziness  PT consulted to assess pt's functional mobility and d/c needs  Order placed for PT eval and tx  Performed at least 2 patient identifiers during session: Name and wristband  Comorbidities affecting pt's physical performance at time of assessment include: HTN, HLD, seizure, nausea, ambulatory dysfunction  PTA, pt was independent w/ all functional mobility w/ no AD or DME, ambulates community distances and elevations, ambulates household distances, has stairglide/FF of DORY, lives alone in 2nd level apartment and retired  Personal factors affecting pt at time of IE include: stairs to enter home, limited home support, positive fall history and decreased initiation and engagement  Please find objective findings from PT assessment regarding body systems outlined above with impairments and limitations including impaired balance, decreased endurance, decreased activity tolerance, decreased functional mobility tolerance and fall risk, as well as mobility assessment (need for cueing for mobility technique)  The following objective measures performed on IE also reveal limitations: Barthel Index: 65/100  Pt's clinical presentation is currently unstable/unpredictable seen in pt's presentation of ongoing medical assessment  Pt to benefit from continued PT tx to address deficits as defined above and maximize level of functional independent mobility and consistency  From PT/mobility standpoint, recommendation at time of d/c would be anticipate no needs pending progress in order to facilitate return to PLOF     Barriers to Discharge None   Goals   Patient Goals "to return home"   [facility] Expiration Date 08/18/19   Short Term Goal #1 In 7-10 days: Increase bilateral LE strength 1/2 grade to facilitate independent mobility, Perform all bed mobility tasks modified independent to decrease caregiver burden, Perform all transfers modified independent to improve independence, Ambulate > 250 ft  with least restrictive assistive device modified independent w/o LOB and w/ normalized gait pattern 100% of the time, Navigate 10 stairs modified independent with unilateral handrail to facilitate return to previous living environment, Increase all balance 1/2 grade to decrease risk for falls, Complete exercise program independently and Tolerate 4 hr OOB to faciliate upright tolerance   Treatment Day 0   Plan   Treatment/Interventions Functional transfer training;Elevations; Therapeutic exercise; Endurance training;Spoke to nursing; Patient/family training   PT Frequency 2-3x/wk   Recommendation   Recommendation Home independently  (anticipate no needs)   Equipment Recommended   (none at this time)   PT - OK to Discharge Yes  (when medically cleared)   Barthel Index   Feeding 10   Bathing 0   Grooming Score 5   Dressing Score 5   Bladder Score 10   Bowels Score 10   Toilet Use Score 5   Transfers (Bed/Chair) Score 10   Mobility (Level Surface) Score 10   Stairs Score 0   Barthel Index Score 65           Margarita Farmer, PT

## 2019-08-08 NOTE — PROGRESS NOTES
Progress Note - Anusha Mccauleytop 1940, 70 y o  female MRN: [id number]    Unit/Bed#: -01 Encounter: [id number]    Primary Care Provider: Dore Angelucci, DO   Date and time admitted to hospital: 8/7/2019  2:53 PM        * HTN (hypertension) emergency  Assessment & Plan  Patient is a 43-year-old female who was recently diagnosed with hypertension and started on antihypertensive medication about a month prior to presentation  She now comes in with dizziness and unsteady gait  Blood pressure on presentation was elevated at 224/88  On assessment by the admitting doctor, the patient no longer had any of the symptoms  Differential diagnosis:  Hypertensive emergency  - CT scan the brain without contrast: Significant for chronic lacunar infarctions in the basal ganglia with no acute intracranial abnormality    - MRI of the brain:  Mild chronic ischemic changes  No acute ischemic disease  - Appreciate neurology input:  - Frequent neuro checks  - CTA head and neck pending  - Echo pending   - Telemetry monitoring for arrhythmia  - Patient is currently on 20 mg of lisinopril with a stable blood pressure of 144/70    - Lopressor IV p r n  for blood pressure more than 160  - CMP:  Normal except for decreased albumin at 3 2   - CBC:  Unremarkable  - Awaiting TSH   - Renal duplex:  Awaiting read    Thrombocytopenia [facility])  Assessment & Plan  Patient has a low platelet count of 419  Continue to monitor  Leukopenia  Assessment & Plan  Patient came in with a white blood cell count of 3 16  White blood cell count noted to be normal today at 4 38  Ambulatory dysfunction  Assessment & Plan  Having difficulty with walking this am, with swaying and unsteady  -PT/OT eval    Nausea  Assessment & Plan  Patient reported being nauseated this morning when she was having vision changes and ambulatory problem  -upon assessment patient no longer have any nausea  -Zofran p r n      Seizure [facility])  Assessment & Plan  Patient had a history of fall concussion and seizure on 1990  -will continue maintenance Dilantin 100 mg 3 times a day    Hyperlipidemia  Assessment & Plan  Patient has a history of hyperlipidemia   -currently on Lipitor 40 mg      VTE Pharmacologic Prophylaxis:   Pharmacologic: Enoxaparin (Lovenox)  Mechanical VTE Prophylaxis in Place: Yes    Patient Centered Rounds: I have performed bedside rounds with nursing staff today  Discussions with Specialists or Other Care Team Provider: Neurology    Education and Discussions with Family / Patient: Discussed with patient    Time Spent for Care: 30 minutes  More than 50% of total time spent on counseling and coordination of care as described above  Current Length of Stay: 0 day(s)    Current Patient Status: Observation   Certification Statement: The patient will continue to require additional inpatient hospital stay due to further evaluation and diagnosis    Discharge Plan: Likely tomorrow     Code Status: Level 1 - Full Code      Subjective:   Patient reports that she is not feeling like herself  She denies any headache, dizziness, shortness of breath, chest pain, palpitation and chest pain  She reports that she walked around the hallway with the physical therapist today and did not have any difficulty  Objective:     Vitals:   Temp (24hrs), Av 9 °F (36 6 °C), Min:97 7 °F (36 5 °C), Max:98 2 °F (36 8 °C)    Temp:  [97 7 °F (36 5 °C)-98 2 °F (36 8 °C)] 98 2 °F (36 8 °C)  HR:  [58-85] 60  Resp:  [18] 18  BP: (123-182)/(58-90) 123/58  SpO2:  [95 %-98 %] 98 %  Body mass index is 38 92 kg/m²  Input and Output Summary (last 24 hours): Intake/Output Summary (Last 24 hours) at 2019 1718  Last data filed at 2019 1640  Gross per 24 hour   Intake 820 ml   Output 1610 ml   Net -790 ml       Physical Exam:     Physical Exam   Constitutional: She is oriented to person, place, and time  No distress     HENT:   Head: Normocephalic and atraumatic  Cardiovascular: Normal rate, regular rhythm and normal heart sounds  Pulmonary/Chest: Effort normal and breath sounds normal    Abdominal: Soft  Bowel sounds are normal  There is no tenderness  Neurological: She is alert and oriented to person, place, and time  No cranial nerve deficit  She exhibits normal muscle tone  Coordination normal    Skin: Skin is warm  Psychiatric: She has a normal mood and affect  Additional Data:     Labs:    Results from last 7 days   Lab Units 08/08/19  0438   WBC Thousand/uL 4 38   HEMOGLOBIN g/dL 13 1   HEMATOCRIT % 40 3   PLATELETS Thousands/uL 144*   NEUTROS PCT % 55   LYMPHS PCT % 32   MONOS PCT % 9   EOS PCT % 3     Results from last 7 days   Lab Units 08/08/19  0438   SODIUM mmol/L 143   POTASSIUM mmol/L 3 6   CHLORIDE mmol/L 108   CO2 mmol/L 26   BUN mg/dL 11   CREATININE mg/dL 0 58*   ANION GAP mmol/L 9   CALCIUM mg/dL 8 5   ALBUMIN g/dL 3 2*   TOTAL BILIRUBIN mg/dL 0 40   ALK PHOS U/L 89   ALT U/L 17   AST U/L 17   GLUCOSE RANDOM mg/dL 99             Results from last 7 days   Lab Units 08/08/19  0438   HEMOGLOBIN A1C % 5 1               * I Have Reviewed All Lab Data Listed Above  * Additional Pertinent Lab Tests Reviewed: All Labs For Current Hospital Admission Reviewed    Imaging:    Imaging Reports Reviewed Today Include:   CT scan, MRI    Imaging Personally Reviewed by Myself Includes: None      Recent Cultures (last 7 days):           Last 24 Hours Medication List:     Current Facility-Administered Medications:  acetaminophen 650 mg Oral Q6H PRN Luba Garcia MD    aluminum-magnesium hydroxide-simethicone 30 mL Oral Q6H PRN Luba Garcia MD    aspirin 81 mg Oral Daily Luba Garcia MD    atorvastatin 40 mg Oral Daily With Dinner Franklyn Walker MD    calcium carbonate-vitamin D 1 tablet Oral Daily With Breakfast Luba Garcia MD    cholecalciferol 1,000 Units Oral Daily Luba Garcia MD enoxaparin 40 mg Subcutaneous Daily Aries Shrestha MD    furosemide 20 mg Oral PRN Aries Shrestha MD    lisinopril 20 mg Oral Daily Aries Shrestha MD    magnesium hydroxide 30 mL Oral Daily PRN Aries Shrestha MD    meclizine 25 mg Oral Q8H PRN Stevo Lou MD    metoprolol 5 mg Intravenous Q6H PRN Aries Shrestha MD    multivitamin-minerals 1 tablet Oral Daily Aries Shrestha MD    ondansetron 4 mg Intravenous Q6H PRN Aries Shrestha MD    phenytoin 100 mg Oral TID Aries Shrestha MD    sodium chloride 100 mL/hr Intravenous Continuous Stevo Lou MD Last Rate: 100 mL/hr (08/08/19 1352)        Today, Patient Was Seen By: Ok Noe MD    ** Please Note: Dictation voice to text software may have been used in the creation of this document   **

## 2019-08-09 ENCOUNTER — APPOINTMENT (OUTPATIENT)
Dept: NON INVASIVE DIAGNOSTICS | Facility: HOSPITAL | Age: 79
End: 2019-08-09
Payer: COMMERCIAL

## 2019-08-09 VITALS
HEIGHT: 65 IN | BODY MASS INDEX: 39.67 KG/M2 | SYSTOLIC BLOOD PRESSURE: 139 MMHG | OXYGEN SATURATION: 97 % | HEART RATE: 66 BPM | DIASTOLIC BLOOD PRESSURE: 65 MMHG | RESPIRATION RATE: 18 BRPM | WEIGHT: 238.1 LBS | TEMPERATURE: 98.3 F

## 2019-08-09 LAB
ANION GAP SERPL CALCULATED.3IONS-SCNC: 10 MMOL/L (ref 4–13)
ANION GAP SERPL CALCULATED.3IONS-SCNC: 10 MMOL/L (ref 4–13)
BACTERIA UR QL AUTO: ABNORMAL /HPF
BILIRUB UR QL STRIP: NEGATIVE
BUN SERPL-MCNC: 12 MG/DL (ref 5–25)
BUN SERPL-MCNC: 13 MG/DL (ref 5–25)
CALCIUM SERPL-MCNC: 8.3 MG/DL (ref 8.3–10.1)
CALCIUM SERPL-MCNC: 8.4 MG/DL (ref 8.3–10.1)
CHLORIDE SERPL-SCNC: 108 MMOL/L (ref 100–108)
CHLORIDE SERPL-SCNC: 110 MMOL/L (ref 100–108)
CLARITY UR: CLEAR
CO2 SERPL-SCNC: 25 MMOL/L (ref 21–32)
CO2 SERPL-SCNC: 25 MMOL/L (ref 21–32)
COLOR UR: YELLOW
CREAT SERPL-MCNC: 0.51 MG/DL (ref 0.6–1.3)
CREAT SERPL-MCNC: 0.68 MG/DL (ref 0.6–1.3)
ERYTHROCYTE [DISTWIDTH] IN BLOOD BY AUTOMATED COUNT: 12.2 % (ref 11.6–15.1)
FOLATE SERPL-MCNC: >20 NG/ML (ref 3.1–17.5)
GFR SERPL CREATININE-BSD FRML MDRD: 85 ML/MIN/1.73SQ M
GFR SERPL CREATININE-BSD FRML MDRD: 94 ML/MIN/1.73SQ M
GLUCOSE P FAST SERPL-MCNC: 94 MG/DL (ref 65–99)
GLUCOSE SERPL-MCNC: 135 MG/DL (ref 65–140)
GLUCOSE SERPL-MCNC: 94 MG/DL (ref 65–140)
GLUCOSE UR STRIP-MCNC: NEGATIVE MG/DL
HCT VFR BLD AUTO: 39.1 % (ref 34.8–46.1)
HCYS SERPL-SCNC: 9.3 UMOL/L (ref 3.7–11.2)
HGB BLD-MCNC: 12.6 G/DL (ref 11.5–15.4)
HGB UR QL STRIP.AUTO: ABNORMAL
KETONES UR STRIP-MCNC: NEGATIVE MG/DL
LEUKOCYTE ESTERASE UR QL STRIP: NEGATIVE
MCH RBC QN AUTO: 31.3 PG (ref 26.8–34.3)
MCHC RBC AUTO-ENTMCNC: 32.2 G/DL (ref 31.4–37.4)
MCV RBC AUTO: 97 FL (ref 82–98)
NITRITE UR QL STRIP: NEGATIVE
NON-SQ EPI CELLS URNS QL MICRO: ABNORMAL /HPF
PH UR STRIP.AUTO: 6.5 [PH]
PLATELET # BLD AUTO: 136 THOUSANDS/UL (ref 149–390)
PLATELET # BLD AUTO: 138 THOUSANDS/UL (ref 149–390)
PMV BLD AUTO: 9.1 FL (ref 8.9–12.7)
PMV BLD AUTO: 9.4 FL (ref 8.9–12.7)
POTASSIUM SERPL-SCNC: 3.6 MMOL/L (ref 3.5–5.3)
POTASSIUM SERPL-SCNC: 3.7 MMOL/L (ref 3.5–5.3)
PROT UR STRIP-MCNC: NEGATIVE MG/DL
RBC # BLD AUTO: 4.03 MILLION/UL (ref 3.81–5.12)
RBC #/AREA URNS AUTO: ABNORMAL /HPF
SODIUM SERPL-SCNC: 143 MMOL/L (ref 136–145)
SODIUM SERPL-SCNC: 145 MMOL/L (ref 136–145)
SP GR UR STRIP.AUTO: <=1.005 (ref 1–1.03)
TSH SERPL DL<=0.05 MIU/L-ACNC: 0.42 UIU/ML (ref 0.36–3.74)
UROBILINOGEN UR QL STRIP.AUTO: 0.2 E.U./DL
VIT B12 SERPL-MCNC: 306 PG/ML (ref 100–900)
WBC # BLD AUTO: 3.68 THOUSAND/UL (ref 4.31–10.16)
WBC #/AREA URNS AUTO: ABNORMAL /HPF

## 2019-08-09 PROCEDURE — 82607 VITAMIN B-12: CPT | Performed by: PSYCHIATRY & NEUROLOGY

## 2019-08-09 PROCEDURE — 83090 ASSAY OF HOMOCYSTEINE: CPT | Performed by: PSYCHIATRY & NEUROLOGY

## 2019-08-09 PROCEDURE — 84443 ASSAY THYROID STIM HORMONE: CPT | Performed by: INTERNAL MEDICINE

## 2019-08-09 PROCEDURE — 97165 OT EVAL LOW COMPLEX 30 MIN: CPT

## 2019-08-09 PROCEDURE — G8987 SELF CARE CURRENT STATUS: HCPCS

## 2019-08-09 PROCEDURE — 82746 ASSAY OF FOLIC ACID SERUM: CPT | Performed by: PSYCHIATRY & NEUROLOGY

## 2019-08-09 PROCEDURE — 93306 TTE W/DOPPLER COMPLETE: CPT

## 2019-08-09 PROCEDURE — 93306 TTE W/DOPPLER COMPLETE: CPT | Performed by: INTERNAL MEDICINE

## 2019-08-09 PROCEDURE — 85049 AUTOMATED PLATELET COUNT: CPT | Performed by: INTERNAL MEDICINE

## 2019-08-09 PROCEDURE — G8988 SELF CARE GOAL STATUS: HCPCS

## 2019-08-09 PROCEDURE — 99217 PR OBSERVATION CARE DISCHARGE MANAGEMENT: CPT | Performed by: INTERNAL MEDICINE

## 2019-08-09 PROCEDURE — 99225 PR SBSQ OBSERVATION CARE/DAY 25 MINUTES: CPT | Performed by: PSYCHIATRY & NEUROLOGY

## 2019-08-09 PROCEDURE — 85027 COMPLETE CBC AUTOMATED: CPT | Performed by: INTERNAL MEDICINE

## 2019-08-09 PROCEDURE — 81001 URINALYSIS AUTO W/SCOPE: CPT | Performed by: PSYCHIATRY & NEUROLOGY

## 2019-08-09 PROCEDURE — 80048 BASIC METABOLIC PNL TOTAL CA: CPT | Performed by: INTERNAL MEDICINE

## 2019-08-09 RX ORDER — LISINOPRIL 20 MG/1
20 TABLET ORAL DAILY
Qty: 30 TABLET | Refills: 0 | Status: SHIPPED | OUTPATIENT
Start: 2019-08-10 | End: 2019-08-20 | Stop reason: SDUPTHER

## 2019-08-09 RX ORDER — ATORVASTATIN CALCIUM 40 MG/1
40 TABLET, FILM COATED ORAL
Qty: 30 TABLET | Refills: 0 | Status: SHIPPED | OUTPATIENT
Start: 2019-08-10 | End: 2019-08-20 | Stop reason: SDUPTHER

## 2019-08-09 RX ADMIN — LISINOPRIL 20 MG: 20 TABLET ORAL at 08:57

## 2019-08-09 RX ADMIN — VITAMIN D, TAB 1000IU (100/BT) 1000 UNITS: 25 TAB at 09:03

## 2019-08-09 RX ADMIN — OYSTER SHELL CALCIUM WITH VITAMIN D 1 TABLET: 500; 200 TABLET, FILM COATED ORAL at 08:57

## 2019-08-09 RX ADMIN — Medication 1 TABLET: at 08:57

## 2019-08-09 RX ADMIN — PHENYTOIN SODIUM 100 MG: 100 CAPSULE, EXTENDED RELEASE ORAL at 16:15

## 2019-08-09 RX ADMIN — ASPIRIN 81 MG: 81 TABLET, COATED ORAL at 08:57

## 2019-08-09 RX ADMIN — SODIUM CHLORIDE 100 ML/HR: 0.9 INJECTION, SOLUTION INTRAVENOUS at 00:34

## 2019-08-09 RX ADMIN — ATORVASTATIN CALCIUM 40 MG: 40 TABLET, FILM COATED ORAL at 16:15

## 2019-08-09 RX ADMIN — ENOXAPARIN SODIUM 40 MG: 40 INJECTION SUBCUTANEOUS at 08:58

## 2019-08-09 RX ADMIN — PHENYTOIN SODIUM 100 MG: 100 CAPSULE, EXTENDED RELEASE ORAL at 08:57

## 2019-08-09 NOTE — PROGRESS NOTES
Patient resting comfortably, offers no complaints at this time  Bed low and locked  Call bell within reach   Will continue to monitor

## 2019-08-09 NOTE — DISCHARGE SUMMARY
Discharge- Domi Lan 1940, 70 y o  female MRN: [id number]    Unit/Bed#: -01 Encounter: [id number]    Primary Care Provider: Sinan Cee DO   Date and time admitted to hospital: 8/7/2019  2:53 PM        * HTN (hypertension) emergency  Assessment & Plan  Patient is a 61-year-old female who was recently diagnosed with hypertension and started on antihypertensive medication about a month prior to presentation  She now comes in with dizziness and unsteady gait  Blood pressure on presentation was elevated at 224/88  On assessment by the admitting doctor, the patient no longer had any of the symptoms     - CT scan the brain without contrast: Significant for chronic lacunar infarctions in the basal ganglia with no acute intracranial abnormality    - MRI of the brain:  Mild chronic ischemic changes  No acute ischemic disease   - CTA head and neck was unremarkable  - Appreciate neurology input: Suspect most likely hypertensive urgency in the setting of a  on arrival  Lower suspicion for TIA vs MRI negative stroke, however, will obtain echocardiogram to further evaluate  - Frequent neuro checks  - Echo pending   - Telemetry monitoring for arrhythmia  - Patient is currently on 20 mg of lisinopril with a stable blood pressure of 144/70    - Lopressor IV p r n  for blood pressure more than 160  - CMP:  Normal except for decreased albumin at 3 2   - CBC:  Unremarkable  - TSH normal    Thrombocytopenia ([address] )  Assessment & Plan  Patient has a low platelet count of 370  Continue to monitor  Leukopenia  Assessment & Plan  WBC count at 3 8  Ambulatory dysfunction  Assessment & Plan  Patient reports improved ambulation today  Cleared by PT and OT for home discharge  Nausea  Assessment & Plan  Patient reported being nauseated this morning when she was having vision changes and ambulatory problem  -upon assessment patient no longer have any nausea  -Zofran p r n      Seizure [facility])  Assessment & Plan  Patient had a history of fall concussion and seizure on 03/01/1990  -will continue maintenance Dilantin 100 mg 3 times a day    Hyperlipidemia  Assessment & Plan  Patient has a history of hyperlipidemia   -currently on Lipitor 40 mg        Discharging Physician / Practitioner: Ting Martinez MD  PCP: Onesimo Ferraro DO  Admission Date:   Admission Orders (From admission, onward)     Ordered        08/07/19 1704  Place in Observation  Once                   Discharge Date: 08/09/19    Resolved Problems  Date Reviewed: 8/9/2019    None          Consultations During Hospital Stay:  · Neurology    Procedures Performed:   · None    Significant Findings / Test Results:   · As noted above    Incidental Findings:   · None    Test Results Pending at Discharge (will require follow up): · None     Outpatient Tests Requested:  · None    Complications:  None    Reason for Admission:  Dizziness and unsteady gait  Hospital Course:     Karl Burger is a 70 y o  female patient who originally presented to the hospital on 8/7/2019 due to dizziness, unsteady gait and hypertension of 753 systolic  Due to the type and transient nature of the symptoms, neurology was consulted and a TIA work up done  CT scan and MRI head were negative for an acute intracranial abnormality  CT of the neck and echo were also negative  Secondary causes of hypertension were worked out a came back negative  Patient is currently on 20 mg of lisinopril with good blood pressure control  Patient reports no dizziness or ambulatory dysfunction  She has been cleared by PT and OT for home discharge  Please see above list of diagnoses and related plan for additional information  Condition at Discharge: stable     Discharge Day Visit / Exam:     Subjective:  Patient reports that she is feeling well today    She reports no episode of dizziness, chest pain, palpitation, shortness of, abdominal pain, weakness, numbness or tingling  Vitals: Blood Pressure: 139/65 (08/09/19 1513)  Pulse: 66 (08/09/19 1513)  Temperature: 98 3 °F (36 8 °C) (08/09/19 1513)  Temp Source: Oral (08/09/19 1513)  Respirations: 18 (08/09/19 1513)  Height: 5' 5" (165 1 cm) (08/07/19 1800)  Weight - Scale: 108 kg (238 lb 1 6 oz) (08/09/19 0600)  SpO2: 97 % (08/09/19 1513)  Exam:   Physical Exam   Constitutional: She is oriented to person, place, and time  No distress  HENT:   Head: Normocephalic and atraumatic  Cardiovascular: Normal rate, regular rhythm and normal heart sounds  Pulmonary/Chest: Effort normal and breath sounds normal    Abdominal: Soft  Bowel sounds are normal  There is no tenderness  Musculoskeletal: She exhibits no edema  Neurological: She is alert and oriented to person, place, and time  Coordination normal    Skin: Skin is warm  Psychiatric: She has a normal mood and affect  Nursing note and vitals reviewed  Discussion with Family:  Discussed with patient    Discharge instructions/Information to patient and family:   See after visit summary for information provided to patient and family  Provisions for Follow-Up Care:  See after visit summary for information related to follow-up care and any pertinent home health orders  Disposition:     Home    Planned Readmission:  No     Discharge Statement:  I spent 45 minutes discharging the patient  This time was spent on the day of discharge  I had direct contact with the patient on the day of discharge  Greater than 50% of the total time was spent examining patient, answering all patient questions, arranging and discussing plan of care with patient as well as directly providing post-discharge instructions  Additional time then spent on discharge activities  Discharge Medications:  See after visit summary for reconciled discharge medications provided to patient and family        ** Please Note: This note has been constructed using a voice recognition system **

## 2019-08-09 NOTE — PLAN OF CARE
Problem: OCCUPATIONAL THERAPY ADULT  Goal: Performs self-care activities at highest level of function for planned discharge setting  See evaluation for individualized goals  Description  Treatment Interventions: Functional transfer training, ADL retraining, Endurance training, Equipment evaluation/education, Compensatory technique education, Continued evaluation, Energy conservation, Activityengagement  Equipment Recommended: Tub seat with back       See flowsheet documentation for full assessment, interventions and recommendations  Note:   Limitation: Decreased endurance, Decreased high-level ADLs     Assessment: Pt is a 70 y o  female seen for OT evaluation ([id number]) s/p [facility] on 8/7/2019 w/ HTN (hypertension)  Comorbidities affecting pt's functional performance at time of assessment include: HLD, seizure hx, nausea, obesity, ambulatory dysfunction, leukopenia, thrombocytopenia, hx of closed fx of R distal radius and ulna  Personal factors affecting pt at time of IE include:steps to enter environment, limited home support, difficulty performing ADLS, difficulty performing IADLS , health management  and environment  Prior to admission, pt was independent with ADLs, IADLs and functional mobility  Upon evaluation: Pt requires supervision for UB/LB bathing and dressing, grooming, functional transfers, and functional mobility without AD 2* the following deficits impacting occupational performance: decreased balance and decreased tolerance  Cognition appears to be [facility]  Vision is [facility], no apparent deficits at this time  Pt reporting visual deficits have resolved  Educated pt on safe technique for showering upon d/c due to living alone  Recommended pt utilize shower chair initially upon return home to decrease fall risk  Pt agreeable   Evaluation required an expanded review of medical and/or therapy records and additional review of physical, cognitive, or psychosocial history related to current functional performance    Through skilled assessment, identified 1-3 performance deficits (i e , relating to physical cognitive or psychosocial skills) that result in activity limitations and/or participating restrictions   Development of pt POC requires clinical decision making of low complexity  Patient presents with no comorbidities that affect occupational performance  Modification of tasks or assistance (e g , physical or verbal) with assessment(s) is not necessary to enable completion of evaluation component    Pt to benefit from continued skilled OT tx while in the hospital to address deficits as defined above and maximize level of functional independence w ADL's and functional mobility  Occupational Performance areas to address include: grooming, bathing/shower, toilet hygiene, dressing, medication management, health maintenance, functional mobility, community mobility, clothing management, cleaning, meal prep and household maintenance  From OT standpoint, recommendation at time of d/c would be home independent       OT Discharge Recommendation: Home independent  OT - OK to Discharge: (home independently once medically cleared)

## 2019-08-09 NOTE — OCCUPATIONAL THERAPY NOTE
[address]  Evaluation     Patient Name: Best PAYNE Date: 8/9/2019  Problem List  Patient Active Problem List   Diagnosis    Acute venous embolism and thrombosis of deep vessels of distal lower extremity (HCC)    Allergic rhinitis    Anxiety    Arthralgia    Bulging of lumbar intervertebral disc    Chronic osteoarthritis    Edema    Epilepsy ([facility] [address] [facility]    Hyperlipidemia    Lumbar back pain    Closed fracture of right distal radius and ulna    HTN (hypertension) emergency    Seizure ([facility] [address] [facility]    Nausea    Obesity (BMI 30-39  9)    Ambulatory dysfunction    Leukopenia    Thrombocytopenia (HCC)     Past Medical History  Past Medical History:   Diagnosis Date    Blood type A+     Convulsions ([facility] [address] [facility]     Fracture of c2     Hiatal hernia     1976    Hyperlipidemia     Osteoarthritis, localized, knee     Seizure ([facility] [address] )     1990    Spondylosis      Past Surgical History  Past Surgical History:   Procedure Laterality Date    CATARACT EXTRACTION      COMBINED HYSTEROSCOPY DIAGNOSTIC / D&C      ESOPHAGOGASTRODUODENOSCOPY      diagnostic    HAND SURGERY      HERNIA REPAIR      HYSTERECTOMY      KNEE ARTHROPLASTY      Revision;total    KNEE ARTHROSCOPY Right     1997    NASAL SEPTUM SURGERY      Deviation repair    REPLACEMENT TOTAL KNEE Right     2000    TONSILLECTOMY      1948    TUBAL LIGATION      TUMOR REMOVAL Left 1956    left index finger         08/09/19 0827   Note Type   Note type Eval only   Restrictions/Precautions   Weight Bearing Precautions Per Order No   Other Precautions Telemetry; Fall Risk   Pain Assessment   Pain Assessment No/denies pain   Pain Score No Pain   Home Living   Type of Home Apartment   Home Layout One level;Performs ADLs on one level; Able to live on main level with bedroom/bathroom  (2nd floor apartment; stair glide)   Bathroom Shower/Tub Tub/shower unit   Bathroom Toilet Standard   Bathroom Equipment Shower chair;Commode;Grab bars in shower  (did not use DME PTA)   [address] Walker;Cane  (did not use AD at baseline)   Prior Function   Level of [place] Independent with ADLs and functional mobility   Lives With Alone   Receives Help From Neighbor;Family   ADL Assistance Independent   IADLs Independent   Falls in the last 6 months 1 to 4  (hx of mechanical falls)   Vocational Retired   Lifestyle   Autonomy Pt is I with ADLs, IADLs, and functional mobility  PTA pt managed her own medications, completed light meal prep, and all cleaning/laundry  Reciprocal Relationships Pt reports she drives her sister in law around sometimes; has supportive neighbor and  if needed   Service to Others Pt is retired from working as seamstress, nurses aid, and for a [employer]   Intrinsic Gratification Pt reports since  passed away 1 yr ago, she does not engage in things as she used to  Pt reports she plays on a coloring stefany on her tablet, completes errands, and watches TV in her daily routine   Psychosocial   Psychosocial (WDL) WDL   ADL   Eating Assistance 7  Independent   Grooming Assistance 5  Supervision/Setup   Grooming Deficit Setup   UB Bathing Assistance 5  Supervision/Setup   UB Bathing Deficit Setup;Supervision/safety   LB Bathing Assistance 5  Supervision/Setup   LB Bathing Deficit Setup;Supervision/safety   UB Dressing Assistance 5  Supervision/Setup   UB Dressing Deficit Setup   LB Dressing Assistance 5  Supervision/Setup   LB Dressing Deficit Setup   Toileting Assistance  5  Supervision/Setup   Toileting Deficit Setup   Additional Comments simulated based on functional assessment of performance skills and deficits   Bed Mobility   Additional Comments Pt seated in b/s chair at start of session and agreeable to OT  Pt seated in b/s chair with all needs met and call bell within reach at end of session  Transfers   Sit to Stand 5  Supervision   Additional items Assist x 1; Increased time required   Stand to Sit 5  Supervision   Additional items Assist x 1; Increased time required   Toilet transfer 5  Supervision   Additional items Assist x 1; Increased time required   Functional Mobility   Functional Mobility 5  Supervision   Additional Comments Ax1; increased time required; no overt LOB noted   Balance   Static Sitting Normal   Dynamic Sitting Good   Static Standing Good   Dynamic Standing Fair +   Ambulatory Fair +   Activity Tolerance   Activity Tolerance Patient tolerated treatment well   Nurse Made Aware FLACO Yanez Natalee   RUE Assessment   RUE Assessment X   RUE Overall AROM   R Shoulder Flexion WFL   R Elbow Flexion WFL   R Wrist Flexion WFL   R Mass Grasp WFL; unable to form complete fist due to hx of wrist fracture in Februrary 2019   RUE Strength   RUE Overall Strength Within Functional Limits - strength 5/5   LUE Assessment   LUE Assessment X   LUE Overall AROM   L Shoulder Flexion WFL   L Elbow Flexion WFL   L Wrist Flexion WFL   L Mass Grasp WFL   LUE Strength   LUE Overall Strength Within Functional Limits - strength 5/5   Hand Function   Gross Motor Coordination Functional   Fine Motor Coordination Functional   Sensation   Light Touch Partial deficits in the RUE  (due to hx of wrist fracture 2/2019)   Vision-Basic Assessment   Current Vision Wears glasses only for reading   Patient Visual Report Other (Comment)  (pt reports visual symptoms resolved)   Vision - Complex Assessment   Tracking Able to track stimulus in all quads without difficulty   Acuity Able to read clock/calendar on wall without difficulty   Cognition   Overall Cognitive Status [facility]   Arousal/Participation Alert; Responsive;Arousable; Cooperative   Attention Within functional limits   Orientation Level Oriented X4   Memory Within functional limits   Following Commands Follows all commands and directions without difficulty   Comments Pt identified by full name and birthdate via ID bracelet   Pt able to engage in social conversation appropriately during session  Assessment   Limitation Decreased endurance;Decreased high-level ADLs   Assessment Pt is a 70 y o  female seen for OT evaluation ([id number]) s/p [facility] 8/7/2019 w/ HTN (hypertension)  Comorbidities affecting pt's functional performance at time of assessment include: HLD, seizure hx, nausea, obesity, ambulatory dysfunction, leukopenia, thrombocytopenia, hx of closed fx of R distal radius and ulna  Personal factors affecting pt at time of IE include:steps to enter environment, limited home support, difficulty performing ADLS, difficulty performing IADLS , health management  and environment  Prior to admission, pt was independent with ADLs, IADLs and functional mobility  Upon evaluation: Pt requires supervision for UB/LB bathing and dressing, grooming, functional transfers, and functional mobility without AD 2* the following deficits impacting occupational performance: decreased balance and decreased tolerance  Cognition appears to be [facility]  Vision is [facility], no apparent deficits at this time  Pt reporting visual deficits have resolved  Educated pt on safe technique for showering upon d/c due to living alone  Recommended pt utilize shower chair initially upon return home to decrease fall risk  Pt agreeable  Evaluation required an expanded review of medical and/or therapy records and additional review of physical, cognitive, or psychosocial history related to current functional performance    Through skilled assessment, identified 1-3 performance deficits (i e , relating to physical cognitive or psychosocial skills) that result in activity limitations and/or participating restrictions   Development of pt POC requires clinical decision making of low complexity  Patient presents with no comorbidities that affect occupational performance  Modification of tasks or assistance (e g , physical or verbal) with assessment(s) is not necessary to enable completion of evaluation component  Fannie Flores  Pt to benefit from continued skilled OT tx while in the hospital to address deficits as defined above and maximize level of functional independence w ADL's and functional mobility  Occupational Performance areas to address include: grooming, bathing/shower, toilet hygiene, dressing, medication management, health maintenance, functional mobility, community mobility, clothing management, cleaning, meal prep and household maintenance  From OT standpoint, recommendation at time of d/c would be home independent  Goals   Patient Goals to return home   Short Term Goal #1 Pt will improve functional activity tolerance while standing at sink for estimated 8+ mins in order to ensure safe return to PLOF   Short Term Goal #2 Pt will complete tub/shower transfer with shower chair and GBs Mod I without LOB or VCs for safety awareness to decrease fall risk and ensure safe return to PLOF  Short Term Goal  Pt will demo 100% accuracy during medication management activity in order to ensure safe return to PLOF  Functional Transfer Goals   Pt Will Perform All Functional Transfers With mod indep   Pt Will Transfer To Toilet With mod indep   ADL Goals   Pt Will Perform Grooming With mod indep   Pt Will Perform Bathing With mod indep   Pt Will Perform UE Dressing With mod indep   Pt Will Perform LE Dressing With mod indep   Pt Will Perform Toileting With mod indep   Plan   Treatment Interventions Functional transfer training;ADL retraining; Endurance training;Equipment evaluation/education; Compensatory technique education;Continued evaluation; Energy conservation; Activityengagement   Goal Expiration Date 08/16/19   OT Frequency 1-2x/wk  (follow up)   Recommendation   OT Discharge Recommendation Home independent   Equipment Recommended Tub seat with back   OT - OK to Discharge   (home independently once medically cleared)   Barthel Index   Feeding 10   Bathing 0   Grooming Score 5   Dressing Score 10   Bladder Score 10   Bowels Score 10 Toilet Use Score 10   Transfers (Bed/Chair) Score 10   Mobility (Level Surface) Score 10   Stairs Score 0   Barthel Index Score 75   Modified Iron Scale   Modified Iron Scale 3     Roya Maddox, OTR/L

## 2019-08-09 NOTE — PROGRESS NOTES
Progress Note - Neurology   Anusha Booth 70 y o  female [id number]  Unit/Bed#: /-01    Assessment:  3 70year-old with a history of HTN, seizure disorder, and HLD who presented with dizziness, visual disturbance, imbalance, and nausea  Suspect most likely hypertensive urgency in the setting of a  on arrival  Lower suspicion for TIA vs MRI negative stroke, however, will obtain echocardiogram to further evaluate  CTA head and neck was unremarkable  If echocardiogram is negative, then no further inpatient neurological recommendations  Plan:  - Echocardiogram pending  - Continue ASA 81mg daily  - Continue Lipitor 40mg daily  - PT/OT  - Neuro checks  - Goal of normotension  - Notify Neurology with any changes in neuro examiantion  -Patient should follow up in outpatient stroke clinic  Communication has been previously sent to the office   -Patient has been on Dilantin for seizure disorder, but is requesting to be transitioned to another AED  Patient will need outpatient follow up with Epileptology  Communication has been previously sent to the office  Results:  1  MRI brain: Mild chronic microvascular ischemic disease  No acute ischemic disease  Age-related atrophy  Consistent with CT  2  TSH: 0 416  3  UA: negative  4  Homocysteine: 9 3  5  Folate: >20 0  6  Vitamin B12: 306  7  HgbA1c: 5 1  8  Lipid panel completed on 7/1/19 reviewed: LDL of 146    Subjective:   Patient reports that she is doing well  She states that her symptoms have completely resolved and she feels back to her baseline  Patient reports that she slept well last night and no longer feels mentally foggy  She denies chest pain, shortness of breath, nausea, changes in vision, weakness, and numbness      Past Medical History:   Diagnosis Date    Blood type A+     [facility]ulsions ([facility] [address] [facility]     Fracture of c2     Hiatal hernia     1976    Hyperlipidemia     Osteoarthritis, localized, knee     Seizure ([facility] [address] )     1990    Spondylosis      Past Surgical History:   Procedure Laterality Date    CATARACT EXTRACTION      COMBINED HYSTEROSCOPY DIAGNOSTIC / D&C      ESOPHAGOGASTRODUODENOSCOPY      diagnostic    HAND SURGERY      HERNIA REPAIR      HYSTERECTOMY      KNEE ARTHROPLASTY      Revision;total    KNEE ARTHROSCOPY Right     1997    NASAL SEPTUM SURGERY      Deviation repair    REPLACEMENT TOTAL KNEE Right     2000    TONSILLECTOMY      1948    TUBAL LIGATION      TUMOR REMOVAL Left 1956    left index finger     Family History   Problem Relation Age of Onset    Prostate cancer Father     Other Family         back problem     Social History     Socioeconomic History    Marital status:      Spouse name: None    Number of children: None    Years of education: None    Highest education level: None   Occupational History    None   Social Needs    Financial resource strain: None    Food insecurity:     Worry: None     Inability: None    Transportation needs:     Medical: None     Non-medical: None   Tobacco Use    Smoking status: Former Smoker    Smokeless tobacco: Never Used   Substance and Sexual Activity    Alcohol use: No    Drug use: No    Sexual activity: None   Lifestyle    Physical activity:     Days per week: None     Minutes per session: None    Stress: None   Relationships    Social connections:     Talks on phone: None     Gets together: None     Attends Caodaism service: None     Active member of club or organization: None     Attends meetings of clubs or organizations: None     Relationship status: None    Intimate partner violence:     Fear of current or ex partner: None     Emotionally abused: None     Physically abused: None     Forced sexual activity: None   Other Topics Concern    None   Social History Narrative    Daily coffee consumption (_cups/day)    Denied hx of daily cola consumption    Daily tea consumption (_cups/day)     Medications:   All current active meds have been reviewed and current meds:  Scheduled Meds:  Current Facility-Administered Medications:  acetaminophen 650 mg Oral Q6H PRN Sneha Carpenter MD    aluminum-magnesium hydroxide-simethicone 30 mL Oral Q6H PRN Sneha Prudent, MD    aspirin 81 mg Oral Daily Sneha Prquangnt, MD    atorvastatin 40 mg Oral Daily With Dinner Keon Haynes MD    calcium carbonate-vitamin D 1 tablet Oral Daily With Breakfast Sneha Carpenter MD    cholecalciferol 1,000 Units Oral Daily Sneha Prudent, MD    enoxaparin 40 mg Subcutaneous Daily Sneha Prudent, MD    furosemide 20 mg Oral PRN Sneha Prudent, MD    lisinopril 20 mg Oral Daily Sneha Prudent, MD    magnesium hydroxide 30 mL Oral Daily PRN Sneha Prudent, MD    meclizine 25 mg Oral Q8H PRN Stevo Lou MD    metoprolol 5 mg Intravenous Q6H PRN Sneha Prudent, MD    multivitamin-minerals 1 tablet Oral Daily Sneha PrudeMD don    ondansetron 4 mg Intravenous Q6H PRN Sneha Prudent, MD    phenytoin 100 mg Oral TID Sneha Carpenter MD    sodium chloride 100 mL/hr Intravenous Continuous Stevo Lou MD Last Rate: 100 mL/hr (08/09/19 0034)     Continuous Infusions:  sodium chloride 100 mL/hr Last Rate: 100 mL/hr (08/09/19 0034)     PRN Meds:   acetaminophen    aluminum-magnesium hydroxide-simethicone    furosemide    magnesium hydroxide    meclizine    metoprolol    ondansetron     ROS:   A 12 point ROS was completed  Other than the above mentioned complaints in the HPI, all remaining systems were negative  Vitals:   /70 (BP Location: Right arm)   Pulse 60   Temp 97 8 °F (36 6 °C) (Oral)   Resp 18   Ht 5' 5" (1 651 m)   Wt 108 kg (238 lb 1 6 oz)   SpO2 96%   BMI 39 62 kg/m²     Physical Exam:   Physical Exam   Constitutional: She is oriented to person, place, and time  No distress     Elderly female resting comfortably in bedside chair HENT:   Head: Normocephalic and atraumatic  Right Ear: External ear normal    Left Ear: External ear normal    Nose: Nose normal    Mouth/Throat: Oropharynx is clear and moist    Eyes: Pupils are equal, round, and reactive to light  EOM are normal    Neck: Neck supple  Cardiovascular: Normal rate and normal heart sounds  Pulmonary/Chest: Effort normal  No respiratory distress  Neurological: She is alert and oriented to person, place, and time  She has normal strength  She has a normal Finger-Nose-Finger Test    Skin: Skin is warm and dry  She is not diaphoretic  Psychiatric: She has a normal mood and affect  Her speech is normal and behavior is normal  Judgment and thought content normal      Neurologic Exam     Mental Status   Oriented to person, place, and time  Attention: normal  Concentration: normal    Speech: speech is normal   Patient is alert and oriented to person, place, and date  Patient follows all commands appropriately     Cranial Nerves     CN II   Visual fields full to confrontation  CN III, IV, VI   Pupils are equal, round, and reactive to light  Extraocular motions are normal    Nystagmus: none   Conjugate gaze: present    CN V   Facial sensation intact  CN VII   Facial expression full, symmetric  CN VIII   CN VIII normal      CN IX, X   CN IX normal      CN XII   CN XII normal      Motor Exam   Muscle bulk: normal  Overall muscle tone: normal  Right arm pronator drift: absent  Left arm pronator drift: absent    Strength   Strength 5/5 throughout  Sensory Exam   Light touch normal      Gait, Coordination, and Reflexes     Coordination   Finger to nose coordination: normal    Tremor   Resting tremor: absent    Labs: I have personally reviewed pertinent reports     Recent Results (from the past 24 hour(s))   CBC    Collection Time: 08/09/19  4:48 AM   Result Value Ref Range    WBC 3 68 (L) 4 31 - 10 16 Thousand/uL    RBC 4 03 3 81 - 5 12 Million/uL    Hemoglobin 12 6 11 5 - 15 4 g/dL    Hematocrit 39 1 34 8 - 46 1 %    MCV 97 82 - 98 fL    MCH 31 3 26 8 - 34 3 pg    MCHC 32 2 31 4 - 37 4 g/dL    RDW 12 2 11 6 - 15 1 %    Platelets 029 (L) 711 - 390 Thousands/uL    MPV 9 4 8 9 - 12 7 fL   Basic metabolic panel    Collection Time: 08/09/19  4:48 AM   Result Value Ref Range    Sodium 145 136 - 145 mmol/L    Potassium 3 7 3 5 - 5 3 mmol/L    Chloride 110 (H) 100 - 108 mmol/L    CO2 25 21 - 32 mmol/L    ANION GAP 10 4 - 13 mmol/L    BUN 13 5 - 25 mg/dL    Creatinine 0 51 (L) 0 60 - 1 30 mg/dL    Glucose 94 65 - 140 mg/dL    Glucose, Fasting 94 65 - 99 mg/dL    Calcium 8 4 8 3 - 10 1 mg/dL    eGFR 94 ml/min/1 73sq m   Urinalysis with reflex to microscopic    Collection Time: 08/09/19  6:30 AM   Result Value Ref Range    Color, UA Yellow     Clarity, UA Clear     Specific Gravity, UA <=1 005 1 003 - 1 030    pH, UA 6 5 4 5, 5 0, 5 5, 6 0, 6 5, 7 0, 7 5, 8 0    Leukocytes, UA Negative Negative    Nitrite, UA Negative Negative    Protein, UA Negative Negative mg/dl    Glucose, UA Negative Negative mg/dl    Ketones, UA Negative Negative mg/dl    Urobilinogen, UA 0 2 0 2, 1 0 E U /dl E U /dl    Bilirubin, UA Negative Negative    Blood, UA Trace-Intact (A) Negative   Urine Microscopic    Collection Time: 08/09/19  6:30 AM   Result Value Ref Range    RBC, UA 0-1 (A) None Seen, 0-5 /hpf    WBC, UA None Seen None Seen, 0-5, 5-55, 5-65 /hpf    Epithelial Cells Occasional None Seen, Occasional /hpf    Bacteria, UA Occasional None Seen, Occasional /hpf     Imaging: I have personally reviewed pertinent imaging and I have personally reviewed PACS reports  EKG, Pathology, and Other Studies: I have personally reviewed pertinent reports  VTE Prophylaxis: Sequential compression device (Venodyne)  and Enoxaparin (Lovenox)    Total time spent today 25 minutes  Greater than 50% of total time was spent with the patient and / or family counseling and / or coordination of care   A description of the counseling / coordination of care: Discussed with patient: medication regimen, medication side effects, CTA head and neck results, upcoming testing, and upcoming appointments

## 2019-08-09 NOTE — ASSESSMENT & PLAN NOTE
Patient is a 68-year-old female who was recently diagnosed with hypertension and started on antihypertensive medication about a month prior to presentation  She now comes in with dizziness and unsteady gait  Blood pressure on presentation was elevated at 224/88  On assessment by the admitting doctor, the patient no longer had any of the symptoms     - CT scan the brain without contrast: Significant for chronic lacunar infarctions in the basal ganglia with no acute intracranial abnormality    - MRI of the brain:  Mild chronic ischemic changes  No acute ischemic disease   - CTA head and neck was unremarkable  - Appreciate neurology input: Suspect most likely hypertensive urgency in the setting of a  on arrival  Lower suspicion for TIA vs MRI negative stroke, however, will obtain echocardiogram to further evaluate  - Frequent neuro checks    - Echo pending   - Telemetry monitoring for arrhythmia  - Patient is currently on 20 mg of lisinopril with a stable blood pressure of 144/70    - Lopressor IV p r n  for blood pressure more than 160  - CMP:  Normal except for decreased albumin at 3 2   - CBC:  Unremarkable  - TSH normal

## 2019-08-09 NOTE — PHYSICAL THERAPY NOTE
PHYSICAL THERAPY NOTE          Patient Name: Best Farmer  SRFEY'N Date: 8/9/2019     Attempt made to see patient for physical therapy session however patient unavailable receiving echo  Will continue to follow as appropriate and schedule allows       Alirio Flores PTA

## 2019-08-09 NOTE — PLAN OF CARE
Problem: Potential for Falls  Goal: Patient will remain free of falls  Description  INTERVENTIONS:  - Assess patient frequently for physical needs  -  Identify cognitive and physical deficits and behaviors that affect risk of falls  -  [place] fall precautions as indicated by assessment   - Educate patient/family on patient safety including physical limitations  - Instruct patient to call for assistance with activity based on assessment  - Modify environment to reduce risk of injury  - Consider OT/PT consult to assist with strengthening/mobility  Outcome: Progressing     Problem: Prexisting or High Potential for Compromised Skin Integrity  Goal: Skin integrity is maintained or improved  Description  INTERVENTIONS:  - Identify patients at risk for skin breakdown  - Assess and monitor skin integrity  - Assess and monitor nutrition and hydration status  - Monitor labs (i e  albumin)  - Assess for incontinence   - Turn and reposition patient  - Assist with mobility/ambulation  - Relieve pressure over bony prominences  - Avoid friction and shearing  - Provide appropriate hygiene as needed including keeping skin clean and dry  - Evaluate need for skin moisturizer/barrier cream  - Collaborate with interdisciplinary team (i e  Nutrition, Rehabilitation, etc )   - Patient/family teaching  Outcome: Progressing     Problem: CARDIOVASCULAR - ADULT  Goal: Maintains optimal cardiac output and hemodynamic stability  Description  INTERVENTIONS:  - Monitor I/O, vital signs and rhythm  - Monitor for S/S and trends of decreased cardiac output i e  bleeding, hypotension  - Administer and titrate ordered vasoactive medications to optimize hemodynamic stability  - Assess quality of pulses, skin color and temperature  - Assess for signs of decreased coronary artery perfusion - ex   Angina  - Instruct patient to report change in severity of symptoms  Outcome: Progressing  Goal: Absence of cardiac dysrhythmias or at baseline rhythm  Description  INTERVENTIONS:  - Continuous cardiac monitoring, monitor vital signs, obtain 12 lead EKG if indicated  - Administer antiarrhythmic and heart rate control medications as ordered  - Monitor electrolytes and administer replacement therapy as ordered  Outcome: Progressing     Problem: NEUROSENSORY - ADULT  Goal: Achieves stable or improved neurological status  Description  INTERVENTIONS  - Monitor and report changes in neurological status  - Initiate measures to prevent increased intracranial pressure  - Maintain blood pressure and fluid volume within ordered parameters to optimize cerebral perfusion  - Monitor temperature, glucose, and sodium or any other associated labs   Initiate appropriate interventions as ordered  - Monitor for seizure activity   - Administer anti-seizure medications as ordered  Outcome: Progressing  Goal: Absence of seizures  Description  INTERVENTIONS  - Monitor for seizure activity  - Administer anti-seizure medications as ordered  - Monitor neurological status  Outcome: Progressing  Goal: Remains free of injury related to seizures activity  Description  INTERVENTIONS  - Maintain airway, patient safety  and administer oxygen as ordered  - Monitor patient for seizure activity, document and report duration and description of seizure to physician/advanced practitioner  - If seizure occurs,  ensure patient safety during seizure  - Reorient patient post seizure  - Seizure pads on all 4 side rails  - Instruct patient/family to notify RN of any seizure activity including if an aura is experienced  - Instruct patient/family to call for assistance with activity based on nursing assessment  - Administer anti-seizure medications as ordered  - Monitor fetal well being  Outcome: Progressing  Goal: Achieves maximal functionality and self care  Description  INTERVENTIONS  - Monitor swallowing and airway patency with patient fatigue and changes in neurological status  - Encourage and assist patient to increase activity and self care with guidance from rehab services  - Encourage visually impaired, hearing impaired and aphasic patients to use assistive/communication devices  Outcome: Progressing

## 2019-08-09 NOTE — UTILIZATION REVIEW
Continued Stay Review    Date: 8/9/2019                         Current Patient Class: observation  Current Level of Care: med surg    HPI:78 y o  female initially admitted on 8/7/2019 Earlier today, upon waking up patient started experiencing dizziness/vertigo, gait problems as well as some blurring of vision   Thus patient went to the emergency room for further evaluation management   In the emergency room, patient was found to have a systolic blood pressure at 224  Thus, patient was given a blood pressure medication in the ER  Assessment/Plan: 8/8/2019 PM attending note:  Main assessments and plans:  1  Stroke-like symptoms with dizziness/vertigo with gait abnormality:  Differential diagnosis includes possible TIA, possibly due to hypertensive emergency:  Neurology on board  MRI did not reveal any ischemic abnormalities  Continue aspirin and Lipitor for now  Continue telemetry  Echo is still pending  Neurology order for a CTA of the head and neck which is pending at this point  Since patient is going for a CT scan with an IV dye, I placed patient on IV fluids to prevent contrast nephropathy  Neuro checks  PT OT evaluation done and no need for further physical therapy  2  History of seizure disorder:  Continue antiepileptic drug  Outpatient follow-up at [address] [place] [address]  3  Hypertensive emergency, resolved:  Presently, patient's blood pressures are normal   Continue with the increased dose of lisinopril at 20 mg once a day (from 10 mg once a day)  Renal artery duplex scan was negative for renal artery stenosis  IV blood pressure medication as needed basis  4  Thrombocytopenia:  Mild  Monitor  5  Leukopenia:  Now resolved  8/9 Neurology note:  Assessment:  3 70year-old with a history of HTN, seizure disorder, and HLD who presented with dizziness, visual disturbance, imbalance, and nausea   Suspect most likely hypertensive urgency in the setting of a  on arrival  Lower suspicion for TIA vs MRI negative stroke, however, will obtain echocardiogram to further evaluate  CTA head and neck was unremarkable  If echocardiogram is negative, then no further inpatient neurological recommendations  Plan:  - Echocardiogram pending  - Continue ASA 81mg daily  - Continue Lipitor 40mg daily  - PT/OT  - Neuro checks  - Goal of normotension  - Notify Neurology with any changes in neuro examiantion  -Patient should follow up in outpatient stroke clinic  Communication has been previously sent to the office   -Patient has been on Dilantin for seizure disorder, but is requesting to be transitioned to another AED  Patient will need outpatient follow up with Epileptology  Communication has been previously sent to the office  Results:  1  MRI brain: Mild chronic microvascular ischemic disease  No acute ischemic disease  Age-related atrophy  Consistent with CT  2  TSH: 0 416  3  UA: negative  4  Homocysteine: 9 3  5  Folate: >20 0  6  Vitamin B12: 306  7  HgbA1c: 5 1  8   Lipid panel completed on 7/1/19 reviewed: LDL of 146  Pertinent Labs/Diagnostic Results:   Results from last 7 days   Lab Units 08/09/19 0448 08/08/19  0438 08/07/19  1601   WBC Thousand/uL 3 68* 4 38 3 16*   HEMOGLOBIN g/dL 12 6 13 1 14 3   HEMATOCRIT % 39 1 40 3 42 1   PLATELETS Thousands/uL 136* 144* 159   NEUTROS ABS Thousands/µL  --  2 42 1 75*         Results from last 7 days   Lab Units 08/09/19 0448 08/08/19  0438 08/07/19  1601   SODIUM mmol/L 145 143 142   POTASSIUM mmol/L 3 7 3 6 3 9   CHLORIDE mmol/L 110* 108 106   CO2 mmol/L 25 26 28   ANION GAP mmol/L 10 9 8   BUN mg/dL 13 11 9   CREATININE mg/dL 0 51* 0 58* 0 66   EGFR ml/min/1 73sq m 94 90 86   CALCIUM mg/dL 8 4 8 5 9 4     Results from last 7 days   Lab Units 08/08/19  0438 08/07/19  1601   AST U/L 17 21   ALT U/L 17 24   ALK PHOS U/L 89 109   TOTAL PROTEIN g/dL 6 5 7 2   ALBUMIN g/dL 3 2* 3 7   TOTAL BILIRUBIN mg/dL 0 40 0 30         Results from last 7 days   Lab Units 08/09/19  0448 08/08/19  0438 08/07/19  1601   GLUCOSE RANDOM mg/dL 94 99 104         Results from last 7 days   Lab Units 08/08/19  0438   HEMOGLOBIN A1C % 5 1   EAG mg/dl 100       Results from last 7 days   Lab Units 08/07/19  1601   TROPONIN I ng/mL <0 02       Results from last 7 days   Lab Units 08/09/19  0630   CLARITY UA  Clear   COLOR UA  Yellow   SPEC GRAV UA  <=1 005   PH UA  6 5   GLUCOSE UA mg/dl Negative   KETONES UA mg/dl Negative   BLOOD UA  Trace-Intact*   PROTEIN UA mg/dl Negative   NITRITE UA  Negative   BILIRUBIN UA  Negative   UROBILINOGEN UA E U /dl 0 2   LEUKOCYTES UA  Negative   WBC UA /hpf None Seen   RBC UA /hpf 0-1*   BACTERIA UA /hpf Occasional   EPITHELIAL CELLS WET PREP /hpf Occasional     Vital Signs:   08/09/19 0710  97 8 °F (36 6 °C)  60  18  143/70    96 %  None      Medications:   Scheduled Meds:   Current Facility-Administered Medications:  acetaminophen 650 mg Oral Q6H PRN   aluminum-magnesium hydroxide-simethicone 30 mL Oral Q6H PRN   aspirin 81 mg Oral Daily   atorvastatin 40 mg Oral Daily With Dinner   calcium carbonate-vitamin D 1 tablet Oral Daily With Breakfast   cholecalciferol 1,000 Units Oral Daily   enoxaparin 40 mg Subcutaneous Daily   furosemide 20 mg Oral PRN   lisinopril 20 mg Oral Daily   magnesium hydroxide 30 mL Oral Daily PRN   meclizine 25 mg Oral Q8H PRN   metoprolol 5 mg Intravenous Q6H PRN   multivitamin-minerals 1 tablet Oral Daily   ondansetron 4 mg Intravenous Q6H PRN   phenytoin 100 mg Oral TID   sodium chloride 100 mL/hr Intravenous Continuous     Discharge Plan: tbd  Network Utilization Review Department  Phone: [phone]; Fax [phone]  [email or web address]  ATTENTION: Please call with any questions or concerns to [phone]  and carefully listen to the prompts so that you are directed to the right person     Send all requests for admission clinical reviews, approved or denied determinations and any other requests to fax [phone]   All voicemails are confidential

## 2019-08-09 NOTE — PLAN OF CARE
Problem: Potential for Falls  Goal: Patient will remain free of falls  Description  INTERVENTIONS:  - Assess patient frequently for physical needs  -  Identify cognitive and physical deficits and behaviors that affect risk of falls  -  [place] fall precautions as indicated by assessment   - Educate patient/family on patient safety including physical limitations  - Instruct patient to call for assistance with activity based on assessment  - Modify environment to reduce risk of injury  - Consider OT/PT consult to assist with strengthening/mobility  Outcome: Progressing     Problem: Prexisting or High Potential for Compromised Skin Integrity  Goal: Skin integrity is maintained or improved  Description  INTERVENTIONS:  - Identify patients at risk for skin breakdown  - Assess and monitor skin integrity  - Assess and monitor nutrition and hydration status  - Monitor labs (i e  albumin)  - Assess for incontinence   - Turn and reposition patient  - Assist with mobility/ambulation  - Relieve pressure over bony prominences  - Avoid friction and shearing  - Provide appropriate hygiene as needed including keeping skin clean and dry  - Evaluate need for skin moisturizer/barrier cream  - Collaborate with interdisciplinary team (i e  Nutrition, Rehabilitation, etc )   - Patient/family teaching  Outcome: Progressing     Problem: CARDIOVASCULAR - ADULT  Goal: Maintains optimal cardiac output and hemodynamic stability  Description  INTERVENTIONS:  - Monitor I/O, vital signs and rhythm  - Monitor for S/S and trends of decreased cardiac output i e  bleeding, hypotension  - Administer and titrate ordered vasoactive medications to optimize hemodynamic stability  - Assess quality of pulses, skin color and temperature  - Assess for signs of decreased coronary artery perfusion - ex   Angina  - Instruct patient to report change in severity of symptoms  Outcome: Progressing  Goal: Absence of cardiac dysrhythmias or at baseline rhythm  Description  INTERVENTIONS:  - Continuous cardiac monitoring, monitor vital signs, obtain 12 lead EKG if indicated  - Administer antiarrhythmic and heart rate control medications as ordered  - Monitor electrolytes and administer replacement therapy as ordered  Outcome: Progressing     Problem: NEUROSENSORY - ADULT  Goal: Achieves stable or improved neurological status  Description  INTERVENTIONS  - Monitor and report changes in neurological status  - Initiate measures to prevent increased intracranial pressure  - Maintain blood pressure and fluid volume within ordered parameters to optimize cerebral perfusion  - Monitor temperature, glucose, and sodium or any other associated labs   Initiate appropriate interventions as ordered  - Monitor for seizure activity   - Administer anti-seizure medications as ordered  Outcome: Progressing  Goal: Absence of seizures  Description  INTERVENTIONS  - Monitor for seizure activity  - Administer anti-seizure medications as ordered  - Monitor neurological status  Outcome: Progressing  Goal: Remains free of injury related to seizures activity  Description  INTERVENTIONS  - Maintain airway, patient safety  and administer oxygen as ordered  - Monitor patient for seizure activity, document and report duration and description of seizure to physician/advanced practitioner  - If seizure occurs,  ensure patient safety during seizure  - Reorient patient post seizure  - Seizure pads on all 4 side rails  - Instruct patient/family to notify RN of any seizure activity including if an aura is experienced  - Instruct patient/family to call for assistance with activity based on nursing assessment  - Administer anti-seizure medications as ordered  - Monitor fetal well being  Outcome: Progressing  Goal: Achieves maximal functionality and self care  Description  INTERVENTIONS  - Monitor swallowing and airway patency with patient fatigue and changes in neurological status  - Encourage and assist patient to increase activity and self care with guidance from rehab services  - Encourage visually impaired, hearing impaired and aphasic patients to use assistive/communication devices  Outcome: Progressing

## 2019-08-12 ENCOUNTER — TELEPHONE (OUTPATIENT)
Dept: NEUROLOGY | Facility: CLINIC | Age: 79
End: 2019-08-12

## 2019-08-12 NOTE — TELEPHONE ENCOUNTER
----- Message from Steve Mariee, [address] sent at 8/8/2019  2:07 PM EDT -----  Regarding: HFU  Diagnosis/Reason for follow-up: TIA or MRI negative stroke, hypertensive urgency  H/o Seizure disorder on long term dilantin x 29 years  Subspecialty for follow-up:  Stroke and Epilepsy  Existing neurologist: none  Recommended timing for follow-up: 4-6 weeks for stroke; first available for Epilepsy   Tests/Labs/Imaging ordered:   AP/Attending: Attending   Additional notes: Patient with seizure disorder with 1 time event 29 years ago  Maintained on Dilantin x 29 years and wants to be transitioned to alternative medication  Thank You so much!

## 2019-08-20 ENCOUNTER — OFFICE VISIT (OUTPATIENT)
Dept: FAMILY MEDICINE CLINIC | Facility: CLINIC | Age: 79
End: 2019-08-20
Payer: COMMERCIAL

## 2019-08-20 VITALS
DIASTOLIC BLOOD PRESSURE: 78 MMHG | HEART RATE: 74 BPM | HEIGHT: 65 IN | WEIGHT: 231.25 LBS | TEMPERATURE: 99.1 F | BODY MASS INDEX: 38.53 KG/M2 | SYSTOLIC BLOOD PRESSURE: 134 MMHG

## 2019-08-20 DIAGNOSIS — R14.0 ABDOMINAL DISTENTION: ICD-10-CM

## 2019-08-20 DIAGNOSIS — I10 HYPERTENSION, UNSPECIFIED TYPE: ICD-10-CM

## 2019-08-20 DIAGNOSIS — E78.5 HYPERLIPIDEMIA: ICD-10-CM

## 2019-08-20 DIAGNOSIS — R60.0 BILATERAL LEG EDEMA: ICD-10-CM

## 2019-08-20 DIAGNOSIS — M54.50 LUMBAR BACK PAIN: ICD-10-CM

## 2019-08-20 DIAGNOSIS — Z00.00 MEDICARE ANNUAL WELLNESS VISIT, SUBSEQUENT: Primary | ICD-10-CM

## 2019-08-20 PROCEDURE — G0439 PPPS, SUBSEQ VISIT: HCPCS | Performed by: FAMILY MEDICINE

## 2019-08-20 PROCEDURE — 1125F AMNT PAIN NOTED PAIN PRSNT: CPT | Performed by: FAMILY MEDICINE

## 2019-08-20 PROCEDURE — 3075F SYST BP GE 130 - 139MM HG: CPT | Performed by: FAMILY MEDICINE

## 2019-08-20 PROCEDURE — 99214 OFFICE O/P EST MOD 30 MIN: CPT | Performed by: FAMILY MEDICINE

## 2019-08-20 PROCEDURE — 1170F FXNL STATUS ASSESSED: CPT | Performed by: FAMILY MEDICINE

## 2019-08-20 PROCEDURE — 1111F DSCHRG MED/CURRENT MED MERGE: CPT | Performed by: FAMILY MEDICINE

## 2019-08-20 RX ORDER — ATORVASTATIN CALCIUM 40 MG/1
40 TABLET, FILM COATED ORAL
Qty: 90 TABLET | Refills: 3 | Status: SHIPPED | OUTPATIENT
Start: 2019-08-20 | End: 2019-10-15 | Stop reason: SDUPTHER

## 2019-08-20 RX ORDER — LISINOPRIL 20 MG/1
20 TABLET ORAL DAILY
Qty: 90 TABLET | Refills: 2 | Status: SHIPPED | OUTPATIENT
Start: 2019-08-20 | End: 2019-10-17 | Stop reason: DRUGHIGH

## 2019-08-20 RX ORDER — FUROSEMIDE 20 MG/1
20 TABLET ORAL DAILY
Qty: 90 TABLET | Refills: 3 | Status: SHIPPED | OUTPATIENT
Start: 2019-08-20 | End: 2020-02-25 | Stop reason: SDUPTHER

## 2019-08-20 NOTE — PATIENT INSTRUCTIONS
Obesity   AMBULATORY CARE:   Obesity  is when your body mass index (BMI) is greater than 30  Your healthcare provider will use your height and weight to measure your BMI  The risks of obesity include  many health problems, such as injuries or physical disability  You may need tests to check for the following:  · Diabetes     · High blood pressure or high cholesterol     · Heart disease     · Gallbladder or liver disease     · Cancer of the colon, breast, prostate, liver, or kidney     · Sleep apnea     · Arthritis or gout  Seek care immediately if:   · You have a severe headache, confusion, or difficulty speaking  · You have weakness on one side of your body  · You have chest pain, sweating, or shortness of breath  Contact your healthcare provider if:   · You have symptoms of gallbladder or liver disease, such as pain in your upper abdomen  · You have knee or hip pain and discomfort while walking  · You have symptoms of diabetes, such as intense hunger and thirst, and frequent urination  · You have symptoms of sleep apnea, such as snoring or daytime sleepiness  · You have questions or concerns about your condition or care  Treatment for obesity  focuses on helping you lose weight to improve your health  Even a small decrease in BMI can reduce the risk for many health problems  Your healthcare provider will help you set a weight-loss goal   · Lifestyle changes  are the first step in treating obesity  These include making healthy food choices and getting regular physical activity  Your healthcare provider may suggest a weight-loss program that involves coaching, education, and therapy  · Medicine  may help you lose weight when it is used with a healthy diet and physical activity  · Surgery  can help you lose weight if you are very obese and have other health problems  There are several types of weight-loss surgery  Ask your healthcare provider for more information    Be successful losing weight:   · Set small, realistic goals  An example of a small goal is to walk for 20 minutes 5 days a week  Anther goal is to lose 5% of your body weight  · Tell friends, family members, and coworkers about your goals  and ask for their support  Ask a friend to lose weight with you, or join a weight-loss support group  · Identify foods or triggers that may cause you to overeat , and find ways to avoid them  Remove tempting high-calorie foods from your home and workplace  Place a bowl of fresh fruit on your kitchen counter  If stress causes you to eat, then find other ways to cope with stress  · Keep a diary to track what you eat and drink  Also write down how many minutes of physical activity you do each day  Weigh yourself once a week and record it in your diary  Eating changes: You will need to eat 500 to 1,000 fewer calories each day than you currently eat to lose 1 to 2 pounds a week  The following changes will help you cut calories:  · Eat smaller portions  Use small plates, no larger than 9 inches in diameter  Fill your plate half full of fruits and vegetables  Measure your food using measuring cups until you know what a serving size looks like  · Eat 3 meals and 1 or 2 snacks each day  Plan your meals in advance  Edward Artis and eat at home most of the time  Eat slowly  · Eat fruits and vegetables at every meal   They are low in calories and high in fiber, which makes you feel full  Do not add butter, margarine, or cream sauce to vegetables  Use herbs to season steamed vegetables  · Eat less fat and fewer fried foods  Eat more baked or grilled chicken and fish  These protein sources are lower in calories and fat than red meat  Limit fast food  Dress your salads with olive oil and vinegar instead of bottled dressing  · Limit the amount of sugar you eat  Do not drink sugary beverages  Limit alcohol  Activity changes:  Physical activity is good for your body in many ways   It helps you burn calories and build strong muscles  It decreases stress and depression, and improves your mood  It can also help you sleep better  Talk to your healthcare provider before you begin an exercise program   · Exercise for at least 30 minutes 5 days a week  Start slowly  Set aside time each day for physical activity that you enjoy and that is convenient for you  It is best to do both weight training and an activity that increases your heart rate, such as walking, bicycling, or swimming  · Find ways to be more active  Do yard work and housecleaning  Walk up the stairs instead of using elevators  Spend your leisure time going to events that require walking, such as outdoor festivals or fairs  This extra physical activity can help you lose weight and keep it off  Follow up with your healthcare provider as directed: You may need to meet with a dietitian  Write down your questions so you remember to ask them during your visits  © 2017 [address] Information is for End User's use only and may not be sold, redistributed or otherwise used for commercial purposes  All illustrations and images included in CareNotes® are the copyrighted property of [employer]  or Awais Robert  The above information is an  only  It is not intended as medical advice for individual conditions or treatments  Talk to your doctor, nurse or pharmacist before following any medical regimen to see if it is safe and effective for you  Urinary Incontinence   WHAT YOU NEED TO KNOW:   What is urinary incontinence? Urinary incontinence (UI) is when you lose control of your bladder  What causes UI? UI occurs because your bladder cannot store or empty urine properly  The following are the most common types of UI:  · Stress incontinence  is when you leak urine due to increased bladder pressure  This may happen when you cough, sneeze, or exercise       · Urge incontinence  is when you feel the need to urinate right away and leak urine accidentally  · Mixed incontinence  is when you have both stress and urge UI  What are the signs and symptoms of UI?   · You feel like your bladder does not empty completely when you urinate  · You urinate often and need to urinate immediately  · You leak urine when you sleep, or you wake up with the urge to urinate  · You leak urine when you cough, sneeze, exercise, or laugh  How is UI diagnosed? Your healthcare provider will ask how often you leak urine and whether you have stress or urge symptoms  Tell him which medicines you take, how often you urinate, and how much liquid you drink each day  You may need any of the following tests:  · Urine tests  may show infection or kidney function  · A pelvic exam  may be done to check for blockages  A pelvic exam will also show if your bladder, uterus, or other organs have moved out of place  · An x-ray, ultrasound, or CT  may show problems with parts of your urinary system  You may be given contrast liquid to help your organs show up better in the pictures  Tell the healthcare provider if you have ever had an allergic reaction to contrast liquid  Do not enter the MRI room with anything metal  Metal can cause serious injury  Tell the healthcare provider if you have any metal in or on your body  · A bladder scan  will show how much urine is left in your bladder after you urinate  You will be asked to urinate and then healthcare providers will use a small ultrasound machine to check the urine left in your bladder  · Cystometry  is used to check the function of your urinary system  Your healthcare provider checks the pressure in your bladder while filling it with fluid  Your bladder pressure may also be tested when your bladder is full and while you urinate  How is UI treated? · Medicines  can help strengthen your bladder control      · Electrical stimulation  is used to send a small amount of electrical energy to your pelvic floor muscles  This helps control your bladder function  Electrodes may be placed outside your body or in your rectum  For women, the electrodes may be placed in the vagina  · A bulking agent  may be injected into the wall of your urethra to make it thicker  This helps keep your urethra closed and decreases urine leakage  · Devices  such as a clamp, pessary, or tampon may help stop urine leaks  Ask your healthcare provider for more information about these and other devices  · Surgery  may be needed if other treatments do not work  Several types of surgery can help improve your bladder control  Ask your healthcare provider for more information about the surgery you may need  How can I manage my symptoms? · Do pelvic muscle exercises often  Your pelvic muscles help you stop urinating  Squeeze these muscles tight for 5 seconds, then relax for 5 seconds  Gradually work up to squeezing for 10 seconds  Do 3 sets of 15 repetitions a day, or as directed  This will help strengthen your pelvic muscles and improve bladder control  · A catheter  may be used to help empty your bladder  A catheter is a tiny, plastic tube that is put into your bladder to drain your urine  Your healthcare provider may tell you to use a catheter to prevent your bladder from getting too full and leaking urine  · Keep a UI record  Write down how often you leak urine and how much you leak  Make a note of what you were doing when you leaked urine  · Train your bladder  Go to the bathroom at set times, such as every 2 hours, even if you do not feel the urge to go  You can also try to hold your urine when you feel the urge to go  For example, hold your urine for 5 minutes when you feel the urge to go  As that becomes easier, hold your urine for 10 minutes  · Drink liquids as directed  Ask your healthcare provider how much liquid to drink each day and which liquids are best for you   You may need to limit the amount of liquid you drink to help control your urine leakage  Limit or do not have drinks that contain caffeine or alcohol  Do not drink any liquid right before you go to bed  · Prevent constipation  Eat a variety of high-fiber foods  Good examples are high-fiber cereals, beans, vegetables, and whole-grain breads  Prune juice may help make your bowel movement softer  Walking is the best way to trigger your intestines to have a bowel movement  · Exercise regularly and maintain a healthy weight  Ask your healthcare provider how much you should weigh and about the best exercise plan for you  Weight loss and exercise will decrease pressure on your bladder and help you control your leakage  Ask him to help you create a weight loss plan if you are overweight  When should I seek immediate care? · You have severe pain  · You are confused or cannot think clearly  When should I contact my healthcare provider? · You have a fever  · You see blood in your urine  · You have pain when you urinate  · You have new or worse pain, even after treatment  · Your mouth feels dry or you have vision changes  · Your urine is cloudy or smells bad  · You have questions or concerns about your condition or care  CARE AGREEMENT:   You have the right to help plan your care  Learn about your health condition and how it may be treated  Discuss treatment options with your caregivers to decide what care you want to receive  You always have the right to refuse treatment  The above information is an  only  It is not intended as medical advice for individual conditions or treatments  Talk to your doctor, nurse or pharmacist before following any medical regimen to see if it is safe and effective for you  © 2017 [address] Information is for End User's use only and may not be sold, redistributed or otherwise used for commercial purposes   All illustrations and images included in CareNotes® are the copyrighted property of [employer]  or Awais Robert  Cigarette Smoking and Your Health   AMBULATORY CARE:   Risks to your health if you smoke:  Nicotine and other chemicals found in tobacco damage every cell in your body  Even if you are a light smoker, you have an increased risk for cancer, heart disease, and lung disease  If you are pregnant or have diabetes, smoking increases your risk for complications  Benefits to your health if you stop smoking:   · You decrease respiratory symptoms such as coughing, wheezing, and shortness of breath  · You reduce your risk for cancers of the lung, mouth, throat, kidney, bladder, pancreas, stomach, and cervix  If you already have cancer, you increase the benefits of chemotherapy  You also reduce your risk for cancer returning or a second cancer from developing  · You reduce your risk for heart disease, blood clots, heart attack, and stroke  · You reduce your risk for lung infections, and diseases such as pneumonia, asthma, chronic bronchitis, and emphysema  · Your circulation improves  More oxygen can be delivered to your body  If you have diabetes, you lower your risk for complications, such as kidney, artery, and eye diseases  You also lower your risk for nerve damage  Nerve damage can lead to amputations, poor vision, and blindness  · You improve your body's ability to heal and to fight infections  Benefits to the health of others if you stop smoking:  Tobacco is harmful to nonsmokers who breathe in your secondhand smoke  The following are ways the health of others around you may improve when you stop smoking:  · You lower the risks for lung cancer and heart disease in nonsmoking adults  · If you are pregnant, you lower the risk for miscarriage, early delivery, low birth weight, and stillbirth  You also lower your baby's risk for SIDS, obesity, developmental delay, and neurobehavioral problems, such as ADHD  · If you have children, you lower their risk for ear infections, colds, pneumonia, bronchitis, and asthma  For more information and support to stop smoking:   · Smokefree  gov  Phone: 4- 253 - 314-7615  Web Address: www smokefree  gov  Follow up with your healthcare provider as directed:  Write down your questions so you remember to ask them during your visits  © 2017 [address] Information is for End User's use only and may not be sold, redistributed or otherwise used for commercial purposes  All illustrations and images included in CareNotes® are the copyrighted property of [employer]  or Awais Robert  The above information is an  only  It is not intended as medical advice for individual conditions or treatments  Talk to your doctor, nurse or pharmacist before following any medical regimen to see if it is safe and effective for you  Fall Prevention   AMBULATORY CARE:   Fall prevention  includes ways to make your home and other areas safer  It also includes ways you can move more carefully to prevent a fall  Health conditions that cause changes in your blood pressure, vision, or muscle strength and coordination may increase your risk for falls  Medicines may also increase your risk for falls if they make you dizzy, weak, or sleepy  Call 911 or have someone else call if:   · You have fallen and are unconscious  · You have fallen and cannot move part of your body  Contact your healthcare provider if:   · You have fallen and have pain or a headache  · You have questions or concerns about your condition or care  Fall prevention tips:   · Stand or sit up slowly  This may help you keep your balance and prevent falls  · Use assistive devices as directed  Your healthcare provider may suggest that you use a cane or walker to help you keep your balance  You may need to have grab bars put in your bathroom near the toilet or in the shower      · Wear shoes that fit well and have soles that   Wear shoes both inside and outside  Use slippers with good   Do not wear shoes with high heels  · Wear a personal alarm  This is a device that allows you to call 911 if you fall and need help  Ask your healthcare provider for more information  · Stay active  Exercise can help strengthen your muscles and improve your balance  Your healthcare provider may recommend water aerobics or walking  He or she may also recommend physical therapy to improve your coordination  Never start an exercise program without talking to your healthcare provider first      · Manage your medical conditions  Keep all appointments with your healthcare providers  Visit your eye doctor as directed  Home safety tips:   · Add items to prevent falls in the bathroom  Put nonslip strips on your bath or shower floor to prevent you from slipping  Use a bath mat if you do not have carpet in the bathroom  This will prevent you from falling when you step out of the bath or shower  Use a shower seat so you do not need to stand while you shower  Sit on the toilet or a chair in your bathroom to dry yourself and put on clothing  This will prevent you from losing your balance from drying or dressing yourself while you are standing  · Keep paths clear  Remove books, shoes, and other objects from walkways and stairs  Place cords for telephones and lamps out of the way so that you do not need to walk over them  Tape them down if you cannot move them  Remove small rugs  If you cannot remove a rug, secure it with double-sided tape  This will prevent you from tripping  · Install bright lights in your home  Use night lights to help light paths to the bathroom or kitchen  Always turn on the light before you start walking  · Keep items you use often on shelves within reach  Do not use a step stool to help you reach an item  · Paint or place reflective tape on the edges of your stairs    This will help you see the stairs better  Follow up with your healthcare provider as directed:  Write down your questions so you remember to ask them during your visits  © 2017 [address] Information is for End User's use only and may not be sold, redistributed or otherwise used for commercial purposes  All illustrations and images included in CareNotes® are the copyrighted property of [employer]  or Awais Robert  The above information is an  only  It is not intended as medical advice for individual conditions or treatments  Talk to your doctor, nurse or pharmacist before following any medical regimen to see if it is safe and effective for you  Advance Directives   WHAT YOU NEED TO KNOW:   What are advance directives? Advance directives are legal documents that state your wishes and plans for medical care  These plans are made ahead of time in case you lose your ability to make decisions for yourself  Advance directives can apply to any medical decision, such as the treatments you want, and if you want to donate organs  What are the types of advance directives? There are many types of advance directives, and each state has rules about how to use them  You may choose a combination of any of the following:  · Living will: This is a written record of the treatment you want  You can also choose which treatments you do not want, which to limit, and which to stop at a certain time  This includes surgery, medicine, IV fluid, and tube feedings  · Durable power of  for healthcare [facility]): This is a written record that states who you want to make healthcare choices for you when you are unable to make them for yourself  This person, called a proxy, is usually a family member or a friend  You may choose more than 1 proxy  · Do not resuscitate (DNR) order:  A DNR order is used in case your heart stops beating or you stop breathing   It is a request not to have certain forms of treatment, such as CPR  A DNR order may be included in other types of advance directives  · Medical directive: This covers the care that you want if you are in a coma, near death, or unable to make decisions for yourself  You can list the treatments you want for each condition  Treatment may include pain medicine, surgery, blood transfusions, dialysis, IV or tube feedings, and a ventilator (breathing machine)  · Values history: This document has questions about your views, beliefs, and how you feel and think about life  This information can help others choose the care that you would choose  Why are advance directives important? An advance directive helps you control your care  Although spoken wishes may be used, it is better to have your wishes written down  Spoken wishes can be misunderstood, or not followed  Treatments may be given even if you do not want them  An advance directive may make it easier for your family to make difficult choices about your care  How do I decide what to put in my advance directives? · Make informed decisions:  Make sure you fully understand treatments or care you may receive  Think about the benefits and problems your decisions could cause for you or your family  Talk to healthcare providers if you have concerns or questions before you write down your wishes  You may also want to talk with your Mandaen or , or a   Check your state laws to make sure that what you put in your advance directive is legal      · Sign all forms:  Sign and date your advance directive when you have finished  You may also need 2 witnesses to sign the forms  Witnesses cannot be your doctor or his staff, your spouse, heirs or beneficiaries, people you owe money to, or your chosen proxy  Talk to your family, proxy, and healthcare providers about your advance directive  Give each person a copy, and keep one for yourself in a place you can get to easily   Do not keep it hidden or locked away  · Review and revise your plans: You can revise your advance directive at any time, as long as you are able to make decisions  Review your plan every year, and when there are changes in your life, or your health  When you make changes, let your family, proxy, and healthcare providers know  Give each a new copy  Where can I find more information? · [employer]  [address] [place] , [address]  Phone: 4- 118 - [phone]  Phone: 5- [phone] - [phone]  Web Address: http://www  aafp org  · [address] [facility])  [address] [address] [facility], [address] [facility] , [address] [place] [address]  Phone: [phone]  Phone: [phone]  Web Address: Clif bourgeois  CARE AGREEMENT:   You have the right to help plan your care  To help with this plan, you must learn about your health condition and treatment options  You must also learn about advance directives and how they are used  Work with your healthcare providers to decide what care will be used to treat you  You always have the right to refuse treatment  The above information is an  only  It is not intended as medical advice for individual conditions or treatments  Talk to your doctor, nurse or pharmacist before following any medical regimen to see if it is safe and effective for you  © 2017 [address] Information is for End User's use only and may not be sold, redistributed or otherwise used for commercial purposes  All illustrations and images included in CareNotes® are the copyrighted property of [employer]  or Awais Robert

## 2019-08-20 NOTE — PROGRESS NOTES
Assessment and Plan:     Problem List Items Addressed This Visit     None         History of Present Illness:     Patient presents for Medicare Annual Wellness visit  Pt is up to date with all preventative care  Patient Care Team:  Karin Mcgee DO as PCP - General  Karin Mcgee DO as PCP - General  Danyell Estrada MD     Problem List:     Patient Active Problem List   Diagnosis    Acute venous embolism and thrombosis of deep vessels of distal lower extremity (HCC)    Allergic rhinitis    Anxiety    Arthralgia    Bulging of lumbar intervertebral disc    Chronic osteoarthritis    Edema    Epile[facility] [address] [facility]    Hyperlipidemia    Lumbar back pain    Closed fracture of right distal radius and ulna    HTN (hypertension) emergency    Seizure ([facility] [address] [facility]    Nausea    Obesity (BMI 30-39  9)    Ambulatory dysfunction    Leukopenia    Thrombocytopenia (HCC)      Past Medical and Surgical History:     Past Medical History:   Diagnosis Date    Blood type A+     Convulsions ([facility] [address] [facility]     Fracture of c2     Hiatal hernia     1976    Hyperlipidemia     Osteoarthritis, localized, knee     Seizure ([facility] [address] [facility]     1990    Spondylosis      Past Surgical History:   Procedure Laterality Date    CATARACT EXTRACTION      COMBINED HYSTEROSCOPY DIAGNOSTIC / D&C      ESOPHAGOGASTRODUODENOSCOPY      diagnostic    HAND SURGERY      HERNIA REPAIR      HYSTERECTOMY      KNEE ARTHROPLASTY      Revision;total    KNEE ARTHROSCOPY Right     1997    NASAL SEPTUM SURGERY      Deviation repair    REPLACEMENT TOTAL KNEE Right     2000    TONSILLECTOMY      1948    TUBAL LIGATION      TUMOR REMOVAL Left 1956    left index finger      Family History:     Family History   Problem Relation Age of Onset    Prostate cancer Father     Other Family         back problem      Social History:     Social History     Tobacco Use   Smoking Status Former Smoker   Smokeless Tobacco Never Used     Social History Substance and Sexual Activity   Alcohol Use No     Social History     Substance and Sexual Activity   Drug Use No      Medications and Allergies:     Current Outpatient Medications   Medication Sig Dispense Refill    aspirin (ECOTRIN LOW STRENGTH) 81 mg EC tablet Take by mouth      atorvastatin (LIPITOR) 40 mg tablet Take 1 tablet (40 mg total) by mouth daily with dinner 30 tablet 0    Blood Pressure Monitoring (SPHYGMOMANOMETER) MISC by Does not apply route 2 (two) times a day 1 each 0    Calcium Carb-Cholecalciferol (CALCIUM 1000 + D PO) Take 1 tablet by mouth daily      cholecalciferol (VITAMIN D3) 1,000 units tablet Take 1,000 Units by mouth daily      furosemide (LASIX) 20 mg tablet Take 1 tablet (20 mg total) by mouth daily (Patient taking differently: Take 20 mg by mouth as needed ) 30 tablet 0    lisinopril (ZESTRIL) 20 mg tablet Take 1 tablet (20 mg total) by mouth daily for 30 days 30 tablet 0    Multiple Vitamin (MULTIVITAMINS PO) Take 1 capsule by mouth daily      phenytoin (DILANTIN) 100 mg ER capsule Take 1 capsule (100 mg total) by mouth 3 (three) times a day 270 capsule 1     No current facility-administered medications for this visit  Allergies   Allergen Reactions    Adhesive  [Medical Tape]      Other reaction(s): Unknown Reaction    Latex      Other reaction(s): Unknown Reaction    Midazolam     Penicillins     Thiopental       Immunizations:     Immunization History   Administered Date(s) Administered    Influenza Split High Dose Preservative Free IM 01/08/2013, 10/21/2014, 10/07/2015, 01/09/2017, 09/21/2017    Influenza, high dose seasonal 0 5 mL 10/08/2018    Pneumococcal Conjugate 13-Valent 10/07/2015, 12/16/2015    Pneumococcal Polysaccharide PPV23 09/21/2017    Td (adult), adsorbed 12/16/2015    Zoster 04/16/2013      Medicare Screening Tests and Risk Assessments:     Syl Abdalla is here for her Subsequent Wellness visit    Last Medicare Wellness visit information reviewed, patient interviewed, no change since last AWV  Health Risk Assessment:  Patient rates overall health as good  Patient feels that their physical health rating is Same  Eyesight was rated as Same  Hearing was rated as Same  Patient feels that their emotional and mental health rating is Slightly worse  Pain experienced by patient in the last 7 days has been None  Patient states that she has experienced no weight loss or gain in last 6 months  Emotional/Mental Health:  Patient has been feeling nervous/anxious  PHQ-9 Depression Screening:    Frequency of the following problems over the past two weeks:      1  Little interest or pleasure in doing things: 1 - several days      2  Feeling down, depressed, or hopeless: 1 - several days  PHQ-2 Score: 2          Broken Bones/Falls: Fall Risk Assessment:    In the past year, patient has experienced: History of falling in past year    Number of falls: 1    Injured during fall: Yes     Patient feels unsteady when standing or walking     Patient is worried about falling     Bladder/Bowel:  Patient has leaked urine accidently in the last six months  Patient reports no loss of bowel control  Immunizations:  Patient has had a flu vaccination within the last year  Patient has received a pneumonia shot  Patient has received a shingles shot  Patient has received tetanus/diphtheria shot  Date of tetanus/diphtheria shot: 12/16/2015    Home Safety:  Patient has trouble with stairs inside or outside of their home  Patient currently reports that there are safety hazards present in home , working smoke alarms, no working carbon monoxide detectors        Preventative Screenings:   Breast cancer screening performed, 11/23/2018  colon cancer screen completed, 9/19/2016  cholesterol screen completed, 7/1/2019  glaucoma eye exam completed,     Nutrition:  Current diet: Regular, No Added Salt and Limited junk food with servings of the following:    Medications:  Patient is currently taking over-the-counter supplements  Patient is able to manage medications  Lifestyle Choices:  Patient reports no tobacco use  Patient has smoked or used tobacco in the past   Patient has stopped her tobacco use  Tobacco use quit date: 1989  Patient reports alcohol use  Alcohol use per week: 1  Patient drives a vehicle  Patient wears seat belt  Current level of exercise of physical activity described by patient as: moderate  Activities of Daily Living:  Can get out of bed by his or her self, able to dress self, able to make own meals, able to do own shopping, able to bathe self, can do own laundry/housekeeping, can manage own money, pay bills and track expenses    Previous Hospitalizations:  No hospitalization or ED visit in past 12 months        Advanced Directives:  Patient has decided on a power of   Patient has spoken to designated power of   Patient has completed advanced directive  Preventative Screening/Counseling:      Cardiovascular:      General: Screening Current          Diabetes:      General: Screening Current          Colorectal Cancer:      General: Screening Current          Breast Cancer:      General: Screening Current          Cervical Cancer:      General: Screening Current          Osteoporosis:      General: Screening Current          AAA:      General: Risks and Benefits Discussed          Glaucoma:      General: Screening Current          HIV:      General: Risks and Benefits Discussed          Hepatitis C:      General: Screening Current        Advanced Directives:   Patient has living will for healthcare, has durable POA for healthcare, patient has an advanced directive  Information on ACP and/or AD provided  5 wishes given  End of life assessment reviewed with patient  Provider agrees with end of life decisions   The patient has a history of falls   I did complete a risk assessment for falls  A plan of care for falls was documented

## 2019-08-21 ENCOUNTER — HOSPITAL ENCOUNTER (OUTPATIENT)
Dept: MAMMOGRAPHY | Facility: CLINIC | Age: 79
Discharge: HOME | End: 2019-08-21
Payer: COMMERCIAL

## 2019-08-21 VITALS — HEIGHT: 65 IN | BODY MASS INDEX: 38.49 KG/M2 | WEIGHT: 231 LBS

## 2019-08-21 DIAGNOSIS — N63.0 BREAST NODULE: ICD-10-CM

## 2019-08-21 PROCEDURE — G0279 TOMOSYNTHESIS, MAMMO: HCPCS

## 2019-08-21 PROCEDURE — 77065 DX MAMMO INCL CAD UNI: CPT

## 2019-08-21 NOTE — PROGRESS NOTES
Patient ID: Cornelia Dickens is a 70 y o  female  HPI: 70 y  o female presents for follow up of hypertension (meds were recently adjusted and bp is stable)  Pt denies any headache, dizziness or palpitations  She complains of lumbar back pain straight across her back when she stands or walks for any period of time  She denies radiation down legs or weakness of legs  SUBJECTIVE    Family History   Problem Relation Age of Onset    Prostate cancer Father     Other Family         back problem     Social History     Socioeconomic History    Marital status:       Spouse name: Not on file    Number of children: Not on file    Years of education: Not on file    Highest education level: Not on file   Occupational History    Not on file   Social Needs    Financial resource strain: Not on file    Food insecurity:     Worry: Not on file     Inability: Not on file    Transportation needs:     Medical: Not on file     Non-medical: Not on file   Tobacco Use    Smoking status: Former Smoker    Smokeless tobacco: Never Used   Substance and Sexual Activity    Alcohol use: No    Drug use: No    Sexual activity: Not on file   Lifestyle    Physical activity:     Days per week: Not on file     Minutes per session: Not on file    Stress: Not on file   Relationships    Social connections:     Talks on phone: Not on file     Gets together: Not on file     Attends Buddhism service: Not on file     Active member of club or organization: Not on file     Attends meetings of clubs or organizations: Not on file     Relationship status: Not on file    Intimate partner violence:     Fear of current or ex partner: Not on file     Emotionally abused: Not on file     Physically abused: Not on file     Forced sexual activity: Not on file   Other Topics Concern    Not on file   Social History Narrative    Daily coffee consumption (_cups/day)    Denied hx of daily cola consumption    Daily tea consumption (_cups/day) Past Medical History:   Diagnosis Date    Blood type A+     [facility]ulsions [facility] [address] [facility]     Fracture of c2     Hiatal hernia     1976    Hyperlipidemia     Osteoarthritis, localized, knee     Seizure ([facility] [address] [facility]     1990    Spondylosis      Past Surgical History:   Procedure Laterality Date    CATARACT EXTRACTION      COMBINED HYSTEROSCOPY DIAGNOSTIC / D&C      ESOPHAGOGASTRODUODENOSCOPY      diagnostic    HAND SURGERY      HERNIA REPAIR      HYSTERECTOMY      KNEE ARTHROPLASTY      Revision;total    KNEE ARTHROSCOPY Right     1997    NASAL SEPTUM SURGERY      Deviation repair    REPLACEMENT TOTAL KNEE Right     2000    TONSILLECTOMY      1948    TUBAL LIGATION      TUMOR REMOVAL Left 1956    left index finger     Allergies   Allergen Reactions    Adhesive  [Medical Tape]      Other reaction(s): Unknown Reaction    Latex      Other reaction(s): Unknown Reaction    Midazolam     Penicillins     Thiopental        Current Outpatient Medications:     aspirin (ECOTRIN LOW STRENGTH) 81 mg EC tablet, Take by mouth, Disp: , Rfl:     atorvastatin (LIPITOR) 40 mg tablet, Take 1 tablet (40 mg total) by mouth daily with dinner for 90 days, Disp: 90 tablet, Rfl: 3    Blood Pressure Monitoring (SPHYGMOMANOMETER) MISC, by Does not apply route 2 (two) times a day, Disp: 1 each, Rfl: 0    Calcium Carb-Cholecalciferol (CALCIUM 1000 + D PO), Take 1 tablet by mouth daily, Disp: , Rfl:     cholecalciferol (VITAMIN D3) 1,000 units tablet, Take 1,000 Units by mouth daily, Disp: , Rfl:     furosemide (LASIX) 20 mg tablet, Take 1 tablet (20 mg total) by mouth daily for 90 days, Disp: 90 tablet, Rfl: 3    lisinopril (ZESTRIL) 20 mg tablet, Take 1 tablet (20 mg total) by mouth daily for 90 days, Disp: 90 tablet, Rfl: 2    Multiple Vitamin (MULTIVITAMINS PO), Take 1 capsule by mouth daily, Disp: , Rfl:     phenytoin (DILANTIN) 100 mg ER capsule, Take 1 capsule (100 mg total) by mouth 3 (three) times a day, Disp: 270 capsule, Rfl: 1    Review of Systems  Constitutional:     Denies fever, chills ,fatigue ,weakness ,weight loss, weight gain     ENT: Denies earache ,loss of hearing ,nosebleed, nasal discharge,nasal congestion ,sore throat ,hoarseness  Pulmonary: Denies shortness of breath ,cough  ,dyspnea on exertion, orthopnea  ,PND   Cardiovascular:  Denies bradycardia , tachycardia  ,palpations, lower extremity edema leg, claudication  Breast:  Denies new or changing breast lumps ,nipple discharge ,nipple changes  Abdomen:  Denies abdominal pain , anorexia , indigestion, nausea, vomiting, constipation, diarrhea+ abdominal distention  Musculoskeletal: Denies myalgias, joint swelling, joint stiffness ,  limb swelling+ lumbar back pain straight across back   Gu: denies dysuria, polyuria  Skin: Denies skin rash, skin lesion, skin wound, itching, dry skin  Neuro: Denies headache, numbness, tingling, confusion, loss of consciousness, dizziness, vertigo  Psychiatric: Denies feelings of depression, suicidal ideation, anxiety, sleep disturbances    OBJECTIVE  /78   Pulse 74   Temp 99 1 °F (37 3 °C)   Ht 5' 5" (1 651 m)   Wt 105 kg (231 lb 4 oz)   BMI 38 48 kg/m²   Constitutional:   NAD, well appearing and well nourished      ENT:   Conjunctiva and lids: no injection, edema, or discharge     Pupils and iris: STEFAN bilaterally    External inspection of ears and nose: normal without deformities or discharge  Otoscopic exam: Canals patent without erythema  Nasal mucosa, septum and turbinates: Normal or edema or discharge         Oropharynx:  Moist mucosa, normal tongue and tonsils without lesions  No erythema        Pulmonary:Respiratory effort normal rate and rhythm, no increased work of breathing   Auscultation of lungs:  Clear bilaterally with no adventitious breath sounds       Cardiovascular: regular rate and rhythm, S1 and S2, no murmur, no edema and/or varicosities of LE      Abdomen: Soft and distended with no fluid shift and     Positive bowel sounds    No heptomegaly or splenomegaly      Gu: no suprapubic tenderness or CVA tenderness, no urethral discharge  Lymphatic:  No anterior or posterior cervical lymphadenopathy         Musculoskeletal:  Gait and station: Normal gait      Digits and nails normal without clubbing or cyanosis       Inspection/palpation of joints, bones, and muscles:  No joint tenderness, swelling, full active and passive range of motion       Skin: Normal skin turgor and no rashes      Neuro:    Normal reflexes     Psych:   alert and oriented to person, place and time     normal mood and affect       Assessment/Plan:Diagnoses and all orders for this visit:    Abdominal distention  -     US abdomen complete; Future    Medicare annual wellness visit, subsequent    Lumbar back pain  -     XR spine lumbar minimum 4 views non injury; Future    Hypertension, unspecified type  -     lisinopril (ZESTRIL) 20 mg tablet; Take 1 tablet (20 mg total) by mouth daily for 90 days    Bilateral leg edema  -     furosemide (LASIX) 20 mg tablet; Take 1 tablet (20 mg total) by mouth daily for 90 days    Hyperlipidemia  -     atorvastatin (LIPITOR) 40 mg tablet; Take 1 tablet (40 mg total) by mouth daily with dinner for 90 days        Reviewed with patient plan to treat with above plan      Patient instructed to call in 72 hours if not feeling better or if symptoms worsen

## 2019-08-22 ENCOUNTER — HOSPITAL ENCOUNTER (OUTPATIENT)
Dept: RADIOLOGY | Facility: HOSPITAL | Age: 79
Discharge: HOME | End: 2019-08-22
Payer: COMMERCIAL

## 2019-08-22 ENCOUNTER — HOSPITAL ENCOUNTER (OUTPATIENT)
Dept: ULTRASOUND IMAGING | Facility: HOSPITAL | Age: 79
Discharge: HOME | End: 2019-08-22
Payer: COMMERCIAL

## 2019-08-22 DIAGNOSIS — R14.0 ABDOMINAL DISTENTION: ICD-10-CM

## 2019-08-22 DIAGNOSIS — M54.50 LUMBAR BACK PAIN: ICD-10-CM

## 2019-08-22 PROCEDURE — 76700 US EXAM ABDOM COMPLETE: CPT

## 2019-08-22 PROCEDURE — 72110 X-RAY EXAM L-2 SPINE 4/>VWS: CPT

## 2019-08-26 ENCOUNTER — TELEPHONE (OUTPATIENT)
Dept: FAMILY MEDICINE CLINIC | Facility: CLINIC | Age: 79
End: 2019-08-26

## 2019-08-26 ENCOUNTER — CLINICAL SUPPORT (OUTPATIENT)
Dept: FAMILY MEDICINE CLINIC | Facility: CLINIC | Age: 79
End: 2019-08-26
Payer: COMMERCIAL

## 2019-08-26 VITALS
RESPIRATION RATE: 16 BRPM | SYSTOLIC BLOOD PRESSURE: 136 MMHG | BODY MASS INDEX: 38.62 KG/M2 | DIASTOLIC BLOOD PRESSURE: 88 MMHG | TEMPERATURE: 99.1 F | WEIGHT: 231.8 LBS | HEIGHT: 65 IN | HEART RATE: 74 BPM

## 2019-08-26 DIAGNOSIS — I10 ESSENTIAL HYPERTENSION: Primary | ICD-10-CM

## 2019-08-26 PROCEDURE — 99211 OFF/OP EST MAY X REQ PHY/QHP: CPT | Performed by: FAMILY MEDICINE

## 2019-08-26 NOTE — TELEPHONE ENCOUNTER
Patient called stating she was looking at monitors and is asking if the wrist monitor would be good since her upper arm is getting all black and blue

## 2019-08-26 NOTE — PROGRESS NOTES
Pt came in today for a BP check after purchasing a new BP machine that was giving her extremely high readings and she was getting worried , I took her BP in both arms and it was the same in both arms Rt arm 138/88 and LT arm 136/88 ,Dr Yolanda Duarte is ok with Pt's readings  She is to continue  same medicationsand would like Pt to return BP machine because of inaccurate readings and return in 1 week for a nurse visit for a BP check again in the AM

## 2019-08-29 ENCOUNTER — TELEPHONE (OUTPATIENT)
Dept: FAMILY MEDICINE CLINIC | Facility: CLINIC | Age: 79
End: 2019-08-29

## 2019-08-29 ENCOUNTER — CLINICAL SUPPORT (OUTPATIENT)
Dept: FAMILY MEDICINE CLINIC | Facility: CLINIC | Age: 79
End: 2019-08-29
Payer: COMMERCIAL

## 2019-08-29 VITALS
WEIGHT: 231 LBS | TEMPERATURE: 98.8 F | HEIGHT: 65 IN | SYSTOLIC BLOOD PRESSURE: 150 MMHG | BODY MASS INDEX: 38.49 KG/M2 | DIASTOLIC BLOOD PRESSURE: 80 MMHG

## 2019-08-29 DIAGNOSIS — M95.5: Primary | ICD-10-CM

## 2019-08-29 DIAGNOSIS — I10 ESSENTIAL HYPERTENSION: ICD-10-CM

## 2019-08-29 PROCEDURE — 99211 OFF/OP EST MAY X REQ PHY/QHP: CPT | Performed by: FAMILY MEDICINE

## 2019-08-29 NOTE — PROGRESS NOTES
Pt was here today for a BP check after getting new BP machine which was giving her high reading and she was getting very upset ,my reading was 164/84 and her machine was 174/103 and second reading of mine was 150/80 and Pt's machine 157/79 ,As Per DR Carl Mcbride Pt is to increase lisinopril 20 mg to Lisinopril 30 mg qd  And return in 2 weeks for a BP check

## 2019-08-29 NOTE — TELEPHONE ENCOUNTER
Patient called stating she got a new BP machine (an arm one) and this morning her BP @ 9am was 134/58  She just took her BP 5 minutes ago and it was 195/98  What should she do?

## 2019-09-05 ENCOUNTER — HOSPITAL ENCOUNTER (OUTPATIENT)
Dept: ULTRASOUND IMAGING | Facility: HOSPITAL | Age: 79
Discharge: HOME | End: 2019-09-05
Payer: COMMERCIAL

## 2019-09-05 DIAGNOSIS — M95.5: ICD-10-CM

## 2019-09-05 PROCEDURE — 76856 US EXAM PELVIC COMPLETE: CPT

## 2019-09-05 PROCEDURE — 76830 TRANSVAGINAL US NON-OB: CPT

## 2019-09-12 ENCOUNTER — CLINICAL SUPPORT (OUTPATIENT)
Dept: FAMILY MEDICINE CLINIC | Facility: CLINIC | Age: 79
End: 2019-09-12
Payer: COMMERCIAL

## 2019-09-12 VITALS
WEIGHT: 231.8 LBS | TEMPERATURE: 98.7 F | HEIGHT: 65 IN | BODY MASS INDEX: 38.62 KG/M2 | DIASTOLIC BLOOD PRESSURE: 86 MMHG | SYSTOLIC BLOOD PRESSURE: 148 MMHG | RESPIRATION RATE: 16 BRPM | HEART RATE: 78 BPM

## 2019-09-12 DIAGNOSIS — I10 ESSENTIAL HYPERTENSION: Primary | ICD-10-CM

## 2019-09-12 PROCEDURE — 99211 OFF/OP EST MAY X REQ PHY/QHP: CPT | Performed by: FAMILY MEDICINE

## 2019-09-12 NOTE — PROGRESS NOTES
Pt here today for BP check after an increase in medication dose Lisinopril from 20 mg to 30 mg , Pt's  BP came down ,will talk with Dr Isabelle Velasco and see if she wants to leave Pt on current dose ,after speaking with Dr Isabelle Velasco she wants to increase Pt's Lisinipril to 40 mg ,Pt aware and will be going out of town for 1 month and will call Pt for appoint when she returns

## 2019-09-15 PROBLEM — K80.20 GALLSTONES: Status: ACTIVE | Noted: 2019-09-15

## 2019-09-18 ENCOUNTER — IMMUNIZATIONS (OUTPATIENT)
Dept: FAMILY MEDICINE CLINIC | Facility: CLINIC | Age: 79
End: 2019-09-18
Payer: COMMERCIAL

## 2019-09-18 DIAGNOSIS — Z23 ENCOUNTER FOR IMMUNIZATION: ICD-10-CM

## 2019-09-18 PROCEDURE — 90662 IIV NO PRSV INCREASED AG IM: CPT | Performed by: FAMILY MEDICINE

## 2019-09-18 PROCEDURE — G0008 ADMIN INFLUENZA VIRUS VAC: HCPCS | Performed by: FAMILY MEDICINE

## 2019-10-15 DIAGNOSIS — E78.5 HYPERLIPIDEMIA: ICD-10-CM

## 2019-10-15 RX ORDER — ATORVASTATIN CALCIUM 40 MG/1
40 TABLET, FILM COATED ORAL
Qty: 15 TABLET | Refills: 0 | Status: SHIPPED | OUTPATIENT
Start: 2019-10-15 | End: 2020-04-14 | Stop reason: SDUPTHER

## 2019-10-17 DIAGNOSIS — I10 ESSENTIAL HYPERTENSION: Primary | ICD-10-CM

## 2019-10-17 RX ORDER — LISINOPRIL 40 MG/1
40 TABLET ORAL DAILY
Qty: 30 TABLET | Refills: 3 | Status: SHIPPED | OUTPATIENT
Start: 2019-10-17 | End: 2020-02-25 | Stop reason: SDUPTHER

## 2019-11-07 ENCOUNTER — TELEPHONE (OUTPATIENT)
Dept: NEUROLOGY | Facility: CLINIC | Age: 79
End: 2019-11-07

## 2019-11-07 NOTE — TELEPHONE ENCOUNTER
Have you ever been seen by outpatient Neurology? No    Do you have a location preferenceNo    Do you prefer morning or afternoon? n/2    Was your hospital stay due to a work or MVA related injuryNo  (if yes, fill out)    If yes name of Insurance company:    Date of Injury:                   Open Claim  No     If yes Claim #     Name     [address] phone #    Type of Insurance?  [address] [facility] [facility]       Appointment Scheduled for: 1/21/20 With Dr Maria Del Carmen Luis

## 2019-11-13 ENCOUNTER — ANESTHESIA EVENT (OUTPATIENT)
Dept: PERIOP | Facility: AMBULARY SURGERY CENTER | Age: 79
End: 2019-11-13

## 2019-11-13 ENCOUNTER — OFFICE VISIT (OUTPATIENT)
Dept: OBGYN CLINIC | Facility: CLINIC | Age: 79
End: 2019-11-13
Payer: COMMERCIAL

## 2019-11-13 VITALS
HEIGHT: 65 IN | SYSTOLIC BLOOD PRESSURE: 166 MMHG | DIASTOLIC BLOOD PRESSURE: 81 MMHG | BODY MASS INDEX: 38.65 KG/M2 | WEIGHT: 232 LBS | HEART RATE: 94 BPM

## 2019-11-13 DIAGNOSIS — M65.341 TRIGGER FINGER, RIGHT RING FINGER: Primary | ICD-10-CM

## 2019-11-13 PROCEDURE — 99214 OFFICE O/P EST MOD 30 MIN: CPT | Performed by: ORTHOPAEDIC SURGERY

## 2019-11-13 NOTE — PROGRESS NOTES
Patient Name:  Evelina Lacy  MRN:  [id number]     Assessment & Plan     Right ring finger trigger finger  1  Patient like to pursue right ring finger trigger finger release  Explained procedure in detail as well as risks and benefits  She has elected to proceed  2  Follow up 10-14 days postoperatively  Subjective     51-year-old female reports to the office today for evaluation of her right ring finger  She denies pain clicking and catching in the digit which has been ongoing for a few months  She denies any injury or trauma  She notes pain at the base of the digit  She notes clicking and catching as well as triggering  She currently takes nothing for pain  She notes mild tingling in all her digits  She denies numbness  No fevers or chills  General ROS:  Negative for fever or chills  Neurological ROS:  Negative for numbness or tingling  Objective     /81   Pulse 94   Ht 5' 5" (1 651 m)   Wt 105 kg (232 lb)   BMI 38 61 kg/m²     Right ring finger:  No gross deformity  No erythema ecchymosis or swelling  Palpable nodule about the A1 pulley with tenderness to palpation  Range of motion of the digit is intact with triggering  Constitutional: Well-developed and well-nourished  Eyes: Anicteric sclerae  Lungs: Unlabored breathing  Cardiovascular: Capillary refill is less than 2 seconds  Skin: Intact without erythema  Neurologic: Sensation intact to light touch  Psychiatric: Mood and affect are appropriate        Social History     Tobacco Use    Smoking status: Former Smoker    Smokeless tobacco: Never Used   Substance Use Topics    Alcohol use: No    Drug use: No       Scribe Attestation    I,:   Yue Lee PA-C am acting as a scribe while in the presence of the attending physician :        I,:   Julieta Nettles MD personally performed the services described in this documentation    as scribed in my presence :

## 2019-11-13 NOTE — H&P (VIEW-ONLY)
Patient Name:  Lawyer Barnett  MRN:  [id number]     Assessment & Plan     Right ring finger trigger finger  1  Patient like to pursue right ring finger trigger finger release  Explained procedure in detail as well as risks and benefits  She has elected to proceed  2  Follow up 10-14 days postoperatively  Subjective     70-year-old female reports to the office today for evaluation of her right ring finger  She denies pain clicking and catching in the digit which has been ongoing for a few months  She denies any injury or trauma  She notes pain at the base of the digit  She notes clicking and catching as well as triggering  She currently takes nothing for pain  She notes mild tingling in all her digits  She denies numbness  No fevers or chills  General ROS:  Negative for fever or chills  Neurological ROS:  Negative for numbness or tingling  Objective     /81   Pulse 94   Ht 5' 5" (1 651 m)   Wt 105 kg (232 lb)   BMI 38 61 kg/m²     Right ring finger:  No gross deformity  No erythema ecchymosis or swelling  Palpable nodule about the A1 pulley with tenderness to palpation  Range of motion of the digit is intact with triggering  Constitutional: Well-developed and well-nourished  Eyes: Anicteric sclerae  Lungs: Unlabored breathing  Cardiovascular: Capillary refill is less than 2 seconds  Skin: Intact without erythema  Neurologic: Sensation intact to light touch  Psychiatric: Mood and affect are appropriate        Social History     Tobacco Use    Smoking status: Former Smoker    Smokeless tobacco: Never Used   Substance Use Topics    Alcohol use: No    Drug use: No       Scribe Attestation    I,:   Marvin Villanueva PA-C am acting as a scribe while in the presence of the attending physician :        I,:   Kennedy Franklin MD personally performed the services described in this documentation    as scribed in my presence :

## 2019-11-20 ENCOUNTER — OFFICE VISIT (OUTPATIENT)
Dept: FAMILY MEDICINE CLINIC | Facility: CLINIC | Age: 79
End: 2019-11-20
Payer: COMMERCIAL

## 2019-11-20 VITALS
HEART RATE: 72 BPM | WEIGHT: 233.8 LBS | HEIGHT: 65 IN | TEMPERATURE: 98.4 F | DIASTOLIC BLOOD PRESSURE: 84 MMHG | BODY MASS INDEX: 38.95 KG/M2 | SYSTOLIC BLOOD PRESSURE: 132 MMHG

## 2019-11-20 DIAGNOSIS — H60.331 ACUTE SWIMMER'S EAR OF RIGHT SIDE: Primary | ICD-10-CM

## 2019-11-20 PROCEDURE — 99213 OFFICE O/P EST LOW 20 MIN: CPT | Performed by: FAMILY MEDICINE

## 2019-11-20 RX ORDER — CIPROFLOXACIN AND DEXAMETHASONE 3; 1 MG/ML; MG/ML
4 SUSPENSION/ DROPS AURICULAR (OTIC) 2 TIMES DAILY
Qty: 7.5 ML | Refills: 0 | Status: SHIPPED | OUTPATIENT
Start: 2019-11-20 | End: 2020-02-25 | Stop reason: ALTCHOICE

## 2019-11-20 NOTE — PROGRESS NOTES
Anusha [facility] 1940 female MRN: [id number]    Acute Visit    Assessment/Plan   Jose Reyes was seen today for earache  Diagnoses and all orders for this visit:    Acute swimmer's ear of right side  -     ciprofloxacin-dexamethasone (CIPRODEX) otic suspension; Administer 4 drops to the right ear 2 (two) times a day for 7 days      Vincent Stein MD  [address]  11/20/2019      Please be aware that this note contains text that was dictated and there may be errors pertaining to "sound-alike "words during the dictation process  SUBJECTIVE    CC: Earache (right ear clicking since yesterday )    HPI:  Betty Spann is a 70 y o  female who presented for an acute visit complaining of right ear soreness x1 day  She has a clicking that happens when she breaths in and out  No other symptoms  Hasn't taken anything for it  Has trigger finger release tomorrow in office  Review of Systems   Constitutional: Negative for chills and fatigue  HENT: Positive for ear pain  Negative for congestion, rhinorrhea and sore throat  Respiratory: Negative for cough  Cardiovascular: Negative for chest pain  Gastrointestinal: Negative for diarrhea  All other systems reviewed and are negative      Medications:   Meds/Allergies   Current Outpatient Medications   Medication Sig Dispense Refill    aspirin (ECOTRIN LOW STRENGTH) 81 mg EC tablet Take by mouth      atorvastatin (LIPITOR) 40 mg tablet Take 1 tablet (40 mg total) by mouth daily with dinner 15 tablet 0    Blood Pressure Monitoring (SPHYGMOMANOMETER) MISC by Does not apply route 2 (two) times a day 1 each 0    Calcium Carb-Cholecalciferol (CALCIUM 1000 + D PO) Take 1 tablet by mouth daily      cholecalciferol (VITAMIN D3) 1,000 units tablet Take 1,000 Units by mouth daily      lisinopril (ZESTRIL) 40 mg tablet Take 1 tablet (40 mg total) by mouth daily 30 tablet 3    Multiple Vitamin (MULTIVITAMINS PO) Take 1 capsule by mouth daily  phenytoin (DILANTIN) 100 mg ER capsule Take 1 capsule (100 mg total) by mouth 3 (three) times a day 270 capsule 1    ciprofloxacin-dexamethasone (CIPRODEX) otic suspension Administer 4 drops to the right ear 2 (two) times a day for 7 days 7 5 mL 0    furosemide (LASIX) 20 mg tablet Take 1 tablet (20 mg total) by mouth daily for 90 days 90 tablet 3     No current facility-administered medications for this visit  Allergies   Allergen Reactions    Adhesive  [Medical Tape]      Other reaction(s): Unknown Reaction    Latex      Other reaction(s): Unknown Reaction    Midazolam     Penicillins     Thiopental        OBJECTIVE    Vitals:   Vitals:    11/20/19 1354   BP: 132/84   Pulse: 72   Temp: 98 4 °F (36 9 °C)   Weight: 106 kg (233 lb 12 8 oz)   Height: 5' 5" (1 651 m)     Physical Exam   Constitutional: She appears well-developed and well-nourished  HENT:   Head: Normocephalic and atraumatic  Right Ear: Tympanic membrane normal    Left Ear: Tympanic membrane and ear canal normal    Mouth/Throat: Oropharynx is clear and moist  No oropharyngeal exudate  Dry, flaking, inflamed external canal, right   Eyes: Conjunctivae are normal    Cardiovascular: Normal rate, regular rhythm, normal heart sounds and intact distal pulses  Pulmonary/Chest: Effort normal  No respiratory distress  Abdominal: Soft  Lymphadenopathy:     She has no cervical adenopathy  Neurological: She is alert  Skin: Capillary refill takes less than 2 seconds  Nursing note and vitals reviewed

## 2019-11-21 ENCOUNTER — ANESTHESIA (OUTPATIENT)
Dept: PERIOP | Facility: AMBULARY SURGERY CENTER | Age: 79
End: 2019-11-21

## 2019-11-21 ENCOUNTER — HOSPITAL ENCOUNTER (OUTPATIENT)
Facility: AMBULARY SURGERY CENTER | Age: 79
Setting detail: OUTPATIENT SURGERY
Discharge: HOME | End: 2019-11-21
Attending: ORTHOPAEDIC SURGERY | Admitting: ORTHOPAEDIC SURGERY
Payer: COMMERCIAL

## 2019-11-21 VITALS
SYSTOLIC BLOOD PRESSURE: 166 MMHG | HEART RATE: 80 BPM | BODY MASS INDEX: 38.61 KG/M2 | RESPIRATION RATE: 18 BRPM | TEMPERATURE: 96.9 F | DIASTOLIC BLOOD PRESSURE: 86 MMHG | OXYGEN SATURATION: 96 % | WEIGHT: 232 LBS

## 2019-11-21 PROCEDURE — 26055 INCISE FINGER TENDON SHEATH: CPT | Performed by: ORTHOPAEDIC SURGERY

## 2019-11-21 RX ORDER — LIDOCAINE HYDROCHLORIDE 10 MG/ML
INJECTION, SOLUTION EPIDURAL; INFILTRATION; INTRACAUDAL; PERINEURAL AS NEEDED
Status: DISCONTINUED | OUTPATIENT
Start: 2019-11-21 | End: 2019-11-21 | Stop reason: HOSPADM

## 2019-11-21 NOTE — OP NOTE
OPERATIVE REPORT  PATIENT NAME: Kirill Gooden    :  1940  MRN: [id number]  Pt Location: [facility]  OR ROOM 06    SURGERY DATE: 2019    Surgeon(s) and Role:     * Sommer Davey MD - Primary    Pre-Op Diagnosis Codes:     * Trigger finger, right ring finger [M65 341]    Post-Op Diagnosis Codes:     * Trigger finger, right ring finger [M65 341]    Procedure(s) (LRB):  RING TRIGGER FINGER RELEASE (Right)    Specimen(s):  * No specimens in log *    Estimated Blood Loss:   Minimal    Drains:  * No LDAs found *    Anesthesia Type:   Local    Complications:   None    Procedure and Technique:  The patient was identified in the pre-operative holding area, and the surgical site was marked by the surgeon  The patient was transported to the operating room and remained in the supine position on the stretcher  A surgical timeout was performed, and after sterile prep with alcohol, a digital block was administered with 7 mL of 1% lidocaine to the right right finger  The right hand was prepped and draped in the usual sterile fashion  Following another surgical timeout, a 1 cm longitudinal incision was made over the A1 pulley of the affected finger with the 15-blade scalpel  The A1 pulley was identified and incised using the 15-blade scalpel  The release was completed proximally and distally with the dissecting scissors  Triggering was confirmed to be absent following release of the A1 pulley  The wound was irrigated with sterile saline solution  The incision was closed with 3-0 nylon suture in a horizontal mattress fashion  The wound was dressed with non-adherent mesh, 4x4 gauze, cotton padding, and an elastic bandage  The patient was transported to the recovery area in stable condition      Patient Disposition:  PACU  and extubated and stable    SIGNATURE: Sommer Davey MD  DATE: 2019  TIME: 7:59 AM

## 2019-11-21 NOTE — INTERVAL H&P NOTE
H&P reviewed  After examining the patient I find no changes in the patients condition since the H&P had been written      Vitals:    11/21/19 0653   BP: 156/98   Pulse: 88   Resp: 18   Temp: (!) 96 9 °F (36 1 °C)   SpO2: 97%

## 2019-11-27 DIAGNOSIS — Z12.39 BREAST CANCER SCREENING: ICD-10-CM

## 2019-12-04 ENCOUNTER — OFFICE VISIT (OUTPATIENT)
Dept: OBGYN CLINIC | Facility: CLINIC | Age: 79
End: 2019-12-04

## 2019-12-04 VITALS
HEIGHT: 65 IN | WEIGHT: 232 LBS | BODY MASS INDEX: 38.65 KG/M2 | DIASTOLIC BLOOD PRESSURE: 50 MMHG | HEART RATE: 120 BPM | SYSTOLIC BLOOD PRESSURE: 94 MMHG

## 2019-12-04 DIAGNOSIS — M65.341 TRIGGER FINGER, RIGHT RING FINGER: Primary | ICD-10-CM

## 2019-12-04 PROCEDURE — 99024 POSTOP FOLLOW-UP VISIT: CPT | Performed by: ORTHOPAEDIC SURGERY

## 2019-12-04 NOTE — PROGRESS NOTES
Patient Name:  Kalin Biggs  MRN:  [id number]    Assessment     1  Trigger finger, right ring finger         Plan     Right ring trigger finger release 11/21/19  1  Gradually return to normal activities as tolerated  No restrictions  2  Follow-up as needed  Subjective     73-year-old female returns to the office today status post Right ring trigger finger release 11/21/19  Today she denies any significant pain  She notes mild discomfort and swelling  She denies any incisional erythema or drainage  She states the locking and triggering has fully resolved  No numbness or tingling  No fevers or chills  She is happy with her result  Objective     BP 94/50   Pulse (!) 120   Ht 5' 5" (1 651 m)   Wt 105 kg (232 lb)   BMI 38 61 kg/m²     Right ring finger:  No gross deformity  Skin intact  Incision is well healed without erythema or drainage  Sutures removed  Steri-Strips applied  Near full digital range of motion without locking or triggering noted  Sensation intact distally  Brisk capillary refill      Scribe Attestation    I,:   Curtis Burt PA-C am acting as a scribe while in the presence of the attending physician :        I,:   Edmundo Wing MD personally performed the services described in this documentation    as scribed in my presence :

## 2019-12-09 DIAGNOSIS — R60.0 BILATERAL LEG EDEMA: ICD-10-CM

## 2019-12-09 RX ORDER — FUROSEMIDE 20 MG/1
TABLET ORAL
Qty: 30 TABLET | Refills: 0 | Status: SHIPPED | OUTPATIENT
Start: 2019-12-09 | End: 2020-01-10 | Stop reason: SDUPTHER

## 2019-12-10 ENCOUNTER — TELEPHONE (OUTPATIENT)
Dept: FAMILY MEDICINE CLINIC | Facility: CLINIC | Age: 79
End: 2019-12-10

## 2019-12-10 DIAGNOSIS — R56.9 SEIZURE (HCC): ICD-10-CM

## 2019-12-10 RX ORDER — PHENYTOIN SODIUM 100 MG/1
100 CAPSULE, EXTENDED RELEASE ORAL 3 TIMES DAILY
Qty: 90 CAPSULE | Refills: 1 | Status: SHIPPED | OUTPATIENT
Start: 2019-12-10 | End: 2020-02-25 | Stop reason: SDUPTHER

## 2019-12-10 NOTE — TELEPHONE ENCOUNTER
Medication Refill:     Dilantin 100mg  Patient only wants a 1 month supply       [facility]  Aware to check with pharmacy

## 2020-01-10 ENCOUNTER — OFFICE VISIT (OUTPATIENT)
Dept: CARDIOLOGY CLINIC | Facility: CLINIC | Age: 80
End: 2020-01-10
Payer: COMMERCIAL

## 2020-01-10 VITALS
BODY MASS INDEX: 39.32 KG/M2 | SYSTOLIC BLOOD PRESSURE: 162 MMHG | WEIGHT: 236 LBS | OXYGEN SATURATION: 96 % | HEART RATE: 86 BPM | HEIGHT: 65 IN | DIASTOLIC BLOOD PRESSURE: 78 MMHG

## 2020-01-10 DIAGNOSIS — E78.5 HYPERLIPIDEMIA, UNSPECIFIED HYPERLIPIDEMIA TYPE: ICD-10-CM

## 2020-01-10 DIAGNOSIS — I10 ESSENTIAL HYPERTENSION: Primary | ICD-10-CM

## 2020-01-10 DIAGNOSIS — R60.0 LOCALIZED EDEMA: ICD-10-CM

## 2020-01-10 DIAGNOSIS — R00.2 PALPITATIONS: ICD-10-CM

## 2020-01-10 PROCEDURE — 1160F RVW MEDS BY RX/DR IN RCRD: CPT | Performed by: INTERNAL MEDICINE

## 2020-01-10 PROCEDURE — 99204 OFFICE O/P NEW MOD 45 MIN: CPT | Performed by: INTERNAL MEDICINE

## 2020-01-10 PROCEDURE — 93000 ELECTROCARDIOGRAM COMPLETE: CPT | Performed by: INTERNAL MEDICINE

## 2020-01-10 RX ORDER — CARVEDILOL 6.25 MG/1
6.25 TABLET ORAL 2 TIMES DAILY WITH MEALS
Qty: 60 TABLET | Refills: 3 | Status: SHIPPED | OUTPATIENT
Start: 2020-01-10 | End: 2020-04-14 | Stop reason: SDUPTHER

## 2020-01-10 NOTE — PROGRESS NOTES
Cardiology Consultation     Jaydon Simon  [id number]  1940  [facility] [place] [address]      1  Essential hypertension  carvedilol (COREG) 6 25 mg tablet   2  Localized edema     3  Hyperlipidemia, unspecified hyperlipidemia type     4  Palpitations  POCT ECG    carvedilol (COREG) 6 25 mg tablet    Holter monitor - 48 hour       Discussion/Summary:    Palpitations, hypertension  Recently with normal echo  Typically with intermittent palpitations but recently a more sustained episode  Normal EKG in the office  BP has been difficult to manage as of late, she's gone up on lisinopril dose  Recommend 48 hour Holter monitor although with only one episode and no recurrence of late, low liklihood that we will capture another similar episode  However, we can exclude other significant arrhythmias  Will start on Coreg for BP and palpitations  Echo done recently, no need to repeat  No other cardiac symptoms, so will avoid other testing  Thyroid panel done within the year, normal     History of Present Illness:    Anusha comes to the office today for a recent episode of palpitations  She has history of intermittent palpitations which are rare, likely PACs or PVCs but about 2 weeks ago, she was at home and had a severe episode of sustained palpitations lasted a few minutes  Per her, she felt as though she was going to die  She was near syncopal, had the phone in her hand to call 911, but then symptoms gradually improved  She has not had recurrence since, and no similar previous episodes like this  Other than difficult to manage BP as of late only, she denies other changes  No changes in diet, salt, caffiene, etc     No chest pain, shortness of breath, edema, PND, or other cardiac symptoms    She was in the hospital in August 2019 for stroke like symptoms and accelerated hypertension, had an echo at that time which didn't have significant abnormalities  Patient Active Problem List   Diagnosis    Acute venous embolism and thrombosis of deep vessels of distal lower extremity (HCC)    Allergic rhinitis    Anxiety    Arthralgia    Bulging of lumbar intervertebral disc    Chronic osteoarthritis    Edema    Epilepsy ([facility]    Hyperlipidemia    Lumbar back pain    Closed fracture of right distal radius and ulna    Essential hypertension    Seizure ([facility]    Nausea    Obesity (BMI 30-39  9)    Ambulatory dysfunction    Leukopenia    Thrombocytopenia (HCC)    Gallstones    Trigger finger, right ring finger     Past Medical History:   Diagnosis Date    Blood type A+     Convulsions ([facility]     Fracture of c2     Hiatal hernia     1976    Hyperlipidemia     Osteoarthritis, localized, knee     Seizure ([facility] )     1990    Spondylosis      Social History     Tobacco Use    Smoking status: Former Smoker    Smokeless tobacco: Never Used   Substance Use Topics    Alcohol use: No    Drug use: No      Family History   Problem Relation Age of Onset    Prostate cancer Father     Other Family         back problem    No Known Problems Mother     No Known Problems Sister     No Known Problems Daughter     No Known Problems Sister     No Known Problems Paternal Aunt     No Known Problems Paternal Aunt     No Known Problems Paternal Aunt     No Known Problems Paternal Aunt      Past Surgical History:   Procedure Laterality Date    BREAST BIOPSY Right over 10 years    benign    CATARACT EXTRACTION      COMBINED HYSTEROSCOPY DIAGNOSTIC / D&C      ESOPHAGOGASTRODUODENOSCOPY      diagnostic    HAND SURGERY      HERNIA REPAIR      HYSTERECTOMY      KNEE ARTHROPLASTY      Revision;total    KNEE ARTHROSCOPY Right     1997    NASAL SEPTUM SURGERY      Deviation repair    MA INCISE FINGER TENDON SHEATH Right 11/21/2019    Procedure: RING TRIGGER FINGER RELEASE;  Surgeon: Markus Hernandez MD;  Location: [facility]  MAIN OR;  Service: Orthopedics    REPLACEMENT TOTAL KNEE Right     2000    TONSILLECTOMY      1948    TUBAL LIGATION      TUMOR REMOVAL Left 1956    left index finger       Current Outpatient Medications:     aspirin (ECOTRIN LOW STRENGTH) 81 mg EC tablet, Take by mouth daily , Disp: , Rfl:     atorvastatin (LIPITOR) 40 mg tablet, Take 1 tablet (40 mg total) by mouth daily with dinner, Disp: 15 tablet, Rfl: 0    Blood Pressure Monitoring (SPHYGMOMANOMETER) MISC, by Does not apply route 2 (two) times a day, Disp: 1 each, Rfl: 0    Calcium Carb-Cholecalciferol (CALCIUM 1000 + D PO), Take 1 tablet by mouth daily, Disp: , Rfl:     cholecalciferol (VITAMIN D3) 1,000 units tablet, Take 1,000 Units by mouth daily, Disp: , Rfl:     furosemide (LASIX) 20 mg tablet, Take 1 tablet (20 mg total) by mouth daily for 90 days, Disp: 90 tablet, Rfl: 3    lisinopril (ZESTRIL) 40 mg tablet, Take 1 tablet (40 mg total) by mouth daily, Disp: 30 tablet, Rfl: 3    Multiple Vitamin (MULTIVITAMINS PO), Take 1 capsule by mouth daily, Disp: , Rfl:     phenytoin (DILANTIN) 100 mg ER capsule, Take 1 capsule (100 mg total) by mouth 3 (three) times a day, Disp: 90 capsule, Rfl: 1    carvedilol (COREG) 6 25 mg tablet, Take 1 tablet (6 25 mg total) by mouth 2 (two) times a day with meals, Disp: 60 tablet, Rfl: 3    ciprofloxacin-dexamethasone (CIPRODEX) otic suspension, Administer 4 drops to the right ear 2 (two) times a day for 7 days, Disp: 7 5 mL, Rfl: 0  Allergies   Allergen Reactions    Adhesive  [Medical Tape]      Other reaction(s): Unknown Reaction    Latex      Other reaction(s): Unknown Reaction    Midazolam     Penicillins     Thiopental        Vitals:    01/10/20 1005   BP: 162/78   BP Location: Right arm   Patient Position: Sitting   Cuff Size: Large   Pulse: 86   SpO2: 96%   Weight: 107 kg (236 lb)   Height: 5' 5" (1 651 m)     Vitals:    01/10/20 1005   Weight: 107 kg (236 lb)      Height: 5' 5" (165 1 cm)   Body mass index is 39 27 kg/m²  Physical Exam:  GENERAL: Alert, well appearing, and in no distress  HEENT:  PERRL, EOMI, no scleral icterus, no conjunctival pallor  NECK:  Supple, No elevated JVP, no thyromegaly, no carotid bruits  HEART:  Regular rate and rhythm, normal S1/S2, no S3/S4, no murmur or rub  LUNGS:  Clear to auscultation bilaterally  ABDOMEN:  Soft, non-tender, positive bowel sounds, no rebound or guarding  EXTREMITIES:  No edema  VASCULAR:  Normal pedal pulses   NEURO: No focal deficits,  SKIN: Normal without suspicious lesions on exposed skin      ROS:  Positive for joint pains, depression, palpitations  Except as noted in HPI, is otherwise reviewed in detail and a 12 point review of systems is negative  Labs:  Lab Results   Component Value Date     11/05/2014    K 3 6 08/09/2019     08/09/2019    CREATININE 0 68 08/09/2019    BUN 12 08/09/2019    CO2 25 08/09/2019    ALT 17 08/08/2019    AST 17 08/08/2019    GLUF 94 08/09/2019    HGBA1C 5 1 08/08/2019    WBC 3 68 (L) 08/09/2019    HGB 12 6 08/09/2019    HCT 39 1 08/09/2019     (L) 08/09/2019       Lab Results   Component Value Date    CHOL 243 (H) 01/09/2017    CHOL 190 11/05/2014     Lab Results   Component Value Date    HDL 58 07/01/2019    HDL 57 10/03/2018    HDL 47 10/25/2017     Lab Results   Component Value Date    LDLCALC 146 (H) 07/01/2019    LDLCALC 115 (H) 10/03/2018    LDLCALC 144 (H) 10/25/2017     Lab Results   Component Value Date    TRIG 125 07/01/2019    TRIG 126 10/03/2018    TRIG 163 (H) 10/25/2017       Testing:  Echo 8/2019:  LEFT VENTRICLE:  Systolic function was normal  Ejection fraction was estimated to be 60 %  There were no regional wall motion abnormalities      RIGHT VENTRICLE:  The size was normal   Systolic function was normal      MITRAL VALVE:  There was trace regurgitation      TRICUSPID VALVE:  There was trace regurgitation    Estimated peak PA pressure was 30 mmHg      PULMONIC VALVE:  There was trace regurgitation      EKG:  Sinus rhythm, 86 beats per minute   Abnormal R wave progression

## 2020-01-17 ENCOUNTER — TELEPHONE (OUTPATIENT)
Dept: NEUROLOGY | Facility: CLINIC | Age: 80
End: 2020-01-17

## 2020-01-21 ENCOUNTER — OFFICE VISIT (OUTPATIENT)
Dept: NEUROLOGY | Facility: CLINIC | Age: 80
End: 2020-01-21
Payer: COMMERCIAL

## 2020-01-21 VITALS
BODY MASS INDEX: 39.47 KG/M2 | HEIGHT: 65 IN | HEART RATE: 69 BPM | WEIGHT: 236.9 LBS | SYSTOLIC BLOOD PRESSURE: 136 MMHG | DIASTOLIC BLOOD PRESSURE: 84 MMHG

## 2020-01-21 DIAGNOSIS — G40.909 NONINTRACTABLE EPILEPSY WITHOUT STATUS EPILEPTICUS, UNSPECIFIED EPILEPSY TYPE (HCC): Primary | ICD-10-CM

## 2020-01-21 PROCEDURE — 99215 OFFICE O/P EST HI 40 MIN: CPT | Performed by: PSYCHIATRY & NEUROLOGY

## 2020-01-21 PROCEDURE — 4040F PNEUMOC VAC/ADMIN/RCVD: CPT | Performed by: PSYCHIATRY & NEUROLOGY

## 2020-01-21 PROCEDURE — 1160F RVW MEDS BY RX/DR IN RCRD: CPT | Performed by: PSYCHIATRY & NEUROLOGY

## 2020-01-21 PROCEDURE — 1036F TOBACCO NON-USER: CPT | Performed by: PSYCHIATRY & NEUROLOGY

## 2020-01-21 PROCEDURE — 3079F DIAST BP 80-89 MM HG: CPT | Performed by: PSYCHIATRY & NEUROLOGY

## 2020-01-21 PROCEDURE — 3075F SYST BP GE 130 - 139MM HG: CPT | Performed by: PSYCHIATRY & NEUROLOGY

## 2020-01-21 NOTE — PATIENT INSTRUCTIONS
1  Schedule EEG  2  Continue phenytoin unchanged  3  Have lab work done  4  Return in about 3 months

## 2020-01-21 NOTE — PROGRESS NOTES
Review of Systems   Constitutional: Negative  Negative for appetite change and fever  HENT: Positive for tinnitus (Left Ear)  Negative for hearing loss, trouble swallowing and voice change  Eyes: Negative  Negative for photophobia and pain  Respiratory: Negative  Negative for shortness of breath  Cardiovascular: Negative  Negative for palpitations  Gastrointestinal: Negative  Negative for nausea and vomiting  Endocrine: Negative  Negative for cold intolerance and heat intolerance  Genitourinary: Negative  Negative for dysuria, frequency and urgency  Musculoskeletal: Positive for arthralgias  Negative for myalgias and neck pain  Skin: Negative  Negative for rash  Neurological: Negative  Negative for dizziness, tremors, seizures, syncope, facial asymmetry, speech difficulty, weakness, light-headedness, numbness and headaches  Hematological: Negative  Does not bruise/bleed easily  Psychiatric/Behavioral: Negative  Negative for confusion, hallucinations and sleep disturbance

## 2020-01-21 NOTE — PROGRESS NOTES
[place] [address] [facility]  Initial Consultation    Impression/Plan    Ms Melquiades Gooden is a 70 y o  female with distant single seizure on phenytoin since  EEG abnormal in the past according to patient report  Wean off AED versus transition to lower risk AED is reasonable  Will get EEG to classify and estimate risk and see her back to plan transition  Discussed risk/benefit of either option  She is at risk for bone health problems and neuropath related to chronic phenytoin as well as the risk of toxicity if unintentionally dosed incorrectly  Seizure risk absent an abnormal EEG is likely low given 30 years of seizure freedom  We discussed the pathophysiology of epilepsy/seizure and seizure safety/precautions  We discussed factors that can lower seizure threshold and the side effects of antiepileptic medications  Her episode of unsteadiness and vision problem in 8/2019 was very likely related to hypertensive urgency  Patient Instructions   1  Schedule EEG  2  Continue phenytoin unchanged  3  Have lab work done  4  Return in about 3 months  Diagnoses and all orders for this visit:    Nonintractable epilepsy without status epilepticus, unspecified epilepsy type ([facility] )  -     EEG Awake and asleep; Future  -     Phenytoin level, free; Future  -     Phenytoin level, total; Future  -     Comprehensive metabolic panel; Future        Subjective    Anusha BRANDI Gooden is a 70 y o  female presenting to the [place] [address] [facility] for evaluation of epilepsy  She was seen by the neurohospitalist team at [facility] in 8/2019      30 years ago had a seizure  She had gone to the mall  Recalls going into the house  Next memory is waking up tin the ER   heard her fall  Was told she fell down the stairs, but doesn't think she did because there weren't enough injuries for falling down 17 steps  Started phenytoin in the hospital and has been on it since   Saw Dr Maciel Sequeira maybe 15 years ago and an EEG was done which had some abnormality and he recommended staying on the phenytoin  Malina passed away over a rear ago  Daughter from is living with her now  She had been interested in considering transition off of phenytoin  She presented to [facility] on 8/7/2019 with dizziness and gait instability  She looked at the TV box to see what time it was after waking up at 07:30 in the morning and it appeared to be moving left and right  She got up to use the bathroom felt dizzy and off balance  Cognition seem to be normal and she was able to speak normally to a friend on the phone at the time  She went and sat down on a chair in her room fell sleep to fall 30 p m     When she woke up she would continued to be dizzy there was nausea and she was unsteady on her feet  She then went to the emergency department for evaluation  Blood pressure on arrival was 224/88  She did not have a prior diagnosis of hypertension  Her symptoms improved overnight, but did not resolve  No acute findings on brain MRI  The next day when evaluated by Neurology she described a nonspecif unwell feeling and that she felt off  Her symptoms were thought due to hypertensive urgency with lower suspicion for TIA versus MRI negative stroke  CTA head and neck were unremarkable  Echocardiogram was negative  She was discharged on daily aspirin and a statin  It does not appear that a phenytoin level was drawn during the admission  Current AEDs:  Phenytoin  mg AM / 200 mg PM  Medication side effects: None  Medication adherence: yes    Event/Seizure semiology:  Single seizure about 30 years ago  Some abnormality discovered on EEG  On phenytoin since  Special Features  Status epilepticus: no  Self Injury Seizures: yes - fell down stairs  Precipitating Factors: none    Epilepsy Risk Factors:  Normal birth and early development  No history of febrile seizures  No history of CNS infection   No family history of epilepsy  Concussion in grade school when she fell on concrete, likely passed out  Prior AEDs:  None    Prior Evaluation:  Some abnormality discovered on EEG near time of diagnosis and then also 15 years ago (per patient report)  Brain MRI without contrast 8/7/2019:  Mild microvascular disease  Age related atrophy  Otherwise normal     History Reviewed: The following were reviewed and updated as appropriate: allergies, current medications, past family history, past medical history, past social history, past surgical history and problem list     Psychiatric History:  None    Social History:   Lives Alone: No  Occupation: retired    ROS:  Constitutional: Negative  Negative for appetite change and fever  HENT: Positive for tinnitus (Left Ear)  Negative for hearing loss, trouble swallowing and voice change  Eyes: Negative  Negative for photophobia and pain  Respiratory: Negative  Negative for shortness of breath  Cardiovascular: Negative  Negative for palpitations  Gastrointestinal: Negative  Negative for nausea and vomiting  Endocrine: Negative  Negative for cold intolerance and heat intolerance  Genitourinary: Negative  Negative for dysuria, frequency and urgency  Musculoskeletal: Positive for arthralgias  Negative for myalgias and neck pain  Skin: Negative  Negative for rash  Neurological: Negative  Negative for dizziness, tremors, seizures, syncope, facial asymmetry, speech difficulty, weakness, light-headedness, numbness and headaches  Hematological: Negative  Does not bruise/bleed easily  Psychiatric/Behavioral: Negative  Negative for confusion, hallucinations and sleep disturbance  ROS reviewed and updated as appropriate          Objective    /84 (BP Location: Right arm, Patient Position: Sitting, Cuff Size: Adult)   Pulse 69   Ht 5' 5" (1 651 m)   Wt 107 kg (236 lb 14 4 oz)   BMI 39 42 kg/m²      General Exam  General: well developed, no acute distress  HEENT: mucous membranes moist, anicteric sclera  Neck:  good ROM  Extremities: no clubbing, cyanosis  Skin: no rash on visible skin  Neurological Exam  Mental Status: awake, alert, and fully oriented to person, place, time, and situation  Attention and memory intact  Fund of knowledge is appropriate for age and education  There is no neglect  Language: fluency, naming, comprehension, and repetition normal        Cranial Nerves: Pupils equal and reactive to light  Visual fields full to confrontation  Extraocular motions intact with full versions, normal pursuits and saccades  Facial strength full and symmetric  Facial sensation intact in V1-V3  Tongue protrudes to midline  Palate elevates symmetrically  Speech clear without notable dysarthria  Shoulder shrug activation full and symmetric  Motor: Normal bulk and tone  No pronator drift  Strength is 5/5 proximally and distally in all 4 extremities  No involuntary movements  Sensory: Sensation intact to light touch distally in all extremities  Coordination: Normal finger-to-nose  Station and gait: Casual gait normal  Normal Romberg  Reflexes: Reflexes normal throughout and symmetric  Ivania Ramos MD   [facility]  [facility] [address] [facility]        Total time with patient today: 50 minutes  Greater than 50% of total time was spent with the patient and / or family counseling and / or coordination of care  A description of the counseling / coordination of care: discussed impression/plan in detail  See above

## 2020-01-22 ENCOUNTER — TELEPHONE (OUTPATIENT)
Dept: FAMILY MEDICINE CLINIC | Facility: CLINIC | Age: 80
End: 2020-01-22

## 2020-01-22 DIAGNOSIS — E78.00 HYPERCHOLESTEROLEMIA: Primary | ICD-10-CM

## 2020-01-22 NOTE — TELEPHONE ENCOUNTER
Neuro ordered bl wk, can you look at the order and if you want to order anything additional she can go at the same time  Put in chart if you do, she will go next week

## 2020-01-27 ENCOUNTER — HOSPITAL ENCOUNTER (OUTPATIENT)
Dept: NEUROLOGY | Facility: CLINIC | Age: 80
Discharge: HOME | End: 2020-01-27
Payer: COMMERCIAL

## 2020-01-27 DIAGNOSIS — G40.909 NONINTRACTABLE EPILEPSY WITHOUT STATUS EPILEPTICUS, UNSPECIFIED EPILEPSY TYPE (HCC): ICD-10-CM

## 2020-01-27 PROCEDURE — 95819 EEG AWAKE AND ASLEEP: CPT

## 2020-01-27 PROCEDURE — 95816 EEG AWAKE AND DROWSY: CPT | Performed by: PSYCHIATRY & NEUROLOGY

## 2020-01-28 ENCOUNTER — HOSPITAL ENCOUNTER (OUTPATIENT)
Dept: NON INVASIVE DIAGNOSTICS | Facility: CLINIC | Age: 80
Discharge: HOME | End: 2020-01-28
Payer: COMMERCIAL

## 2020-01-28 DIAGNOSIS — R00.2 PALPITATIONS: ICD-10-CM

## 2020-01-28 PROCEDURE — 93225 XTRNL ECG REC<48 HRS REC: CPT

## 2020-01-28 PROCEDURE — 93226 XTRNL ECG REC<48 HR SCAN A/R: CPT

## 2020-01-29 ENCOUNTER — LAB (OUTPATIENT)
Dept: LAB | Facility: CLINIC | Age: 80
End: 2020-01-29
Payer: COMMERCIAL

## 2020-01-29 DIAGNOSIS — G40.909 NONINTRACTABLE EPILEPSY WITHOUT STATUS EPILEPTICUS, UNSPECIFIED EPILEPSY TYPE (HCC): ICD-10-CM

## 2020-01-29 DIAGNOSIS — E78.00 HYPERCHOLESTEROLEMIA: ICD-10-CM

## 2020-01-29 LAB
ALBUMIN SERPL BCP-MCNC: 3.6 G/DL (ref 3.5–5)
ALP SERPL-CCNC: 96 U/L (ref 46–116)
ALT SERPL W P-5'-P-CCNC: 23 U/L (ref 12–78)
ANION GAP SERPL CALCULATED.3IONS-SCNC: 5 MMOL/L (ref 4–13)
AST SERPL W P-5'-P-CCNC: 15 U/L (ref 5–45)
BILIRUB SERPL-MCNC: 0.3 MG/DL (ref 0.2–1)
BUN SERPL-MCNC: 15 MG/DL (ref 5–25)
CALCIUM SERPL-MCNC: 9.2 MG/DL (ref 8.3–10.1)
CHLORIDE SERPL-SCNC: 108 MMOL/L (ref 100–108)
CHOLEST SERPL-MCNC: 213 MG/DL (ref 50–200)
CO2 SERPL-SCNC: 29 MMOL/L (ref 21–32)
CREAT SERPL-MCNC: 0.56 MG/DL (ref 0.6–1.3)
GFR SERPL CREATININE-BSD FRML MDRD: 90 ML/MIN/1.73SQ M
GLUCOSE P FAST SERPL-MCNC: 98 MG/DL (ref 65–99)
HDLC SERPL-MCNC: 54 MG/DL
LDLC SERPL CALC-MCNC: 131 MG/DL (ref 0–100)
PHENYTOIN SERPL-MCNC: 7.4 UG/ML (ref 10–20)
POTASSIUM SERPL-SCNC: 4.2 MMOL/L (ref 3.5–5.3)
PROT SERPL-MCNC: 7.3 G/DL (ref 6.4–8.2)
SODIUM SERPL-SCNC: 142 MMOL/L (ref 136–145)
TRIGL SERPL-MCNC: 138 MG/DL

## 2020-01-29 PROCEDURE — 36415 COLL VENOUS BLD VENIPUNCTURE: CPT

## 2020-01-29 PROCEDURE — 80061 LIPID PANEL: CPT

## 2020-01-29 PROCEDURE — 80185 ASSAY OF PHENYTOIN TOTAL: CPT

## 2020-01-29 PROCEDURE — 80186 ASSAY OF PHENYTOIN FREE: CPT

## 2020-01-29 PROCEDURE — 80053 COMPREHEN METABOLIC PANEL: CPT

## 2020-01-31 LAB — PHENYTOIN FREE SERPL-MCNC: NORMAL UG/ML (ref 1–2)

## 2020-02-04 PROCEDURE — 93227 XTRNL ECG REC<48 HR R&I: CPT | Performed by: INTERNAL MEDICINE

## 2020-02-07 ENCOUNTER — TELEPHONE (OUTPATIENT)
Dept: CARDIOLOGY CLINIC | Facility: CLINIC | Age: 80
End: 2020-02-07

## 2020-02-07 NOTE — TELEPHONE ENCOUNTER
Spoke with patient regarding Holter results  She stated her BP has been better in the 130s/ 50s  No palpitations with carvedilol  Advised no changes  ----- Message from Alfa Murphy MD sent at 2/6/2020  9:09 AM EST -----  Please let patient know her monitor did not show any abnormal heart rhythms  See how her blood pressure and palpitations are doing on the carvedilol  If she is feeling well and BP controlled, no changes

## 2020-02-25 ENCOUNTER — OFFICE VISIT (OUTPATIENT)
Dept: FAMILY MEDICINE CLINIC | Facility: CLINIC | Age: 80
End: 2020-02-25
Payer: COMMERCIAL

## 2020-02-25 VITALS
SYSTOLIC BLOOD PRESSURE: 132 MMHG | BODY MASS INDEX: 39.15 KG/M2 | HEART RATE: 72 BPM | TEMPERATURE: 98.2 F | WEIGHT: 235 LBS | HEIGHT: 65 IN | DIASTOLIC BLOOD PRESSURE: 80 MMHG

## 2020-02-25 DIAGNOSIS — R56.9 SEIZURE (HCC): ICD-10-CM

## 2020-02-25 DIAGNOSIS — R60.0 BILATERAL LEG EDEMA: ICD-10-CM

## 2020-02-25 DIAGNOSIS — I10 ESSENTIAL HYPERTENSION: Primary | ICD-10-CM

## 2020-02-25 PROBLEM — S52.601A CLOSED FRACTURE OF RIGHT DISTAL RADIUS AND ULNA: Status: RESOLVED | Noted: 2019-03-06 | Resolved: 2020-02-25

## 2020-02-25 PROBLEM — S52.501A CLOSED FRACTURE OF RIGHT DISTAL RADIUS AND ULNA: Status: RESOLVED | Noted: 2019-03-06 | Resolved: 2020-02-25

## 2020-02-25 PROBLEM — K80.20 GALLSTONES: Status: RESOLVED | Noted: 2019-09-15 | Resolved: 2020-02-25

## 2020-02-25 PROCEDURE — 1160F RVW MEDS BY RX/DR IN RCRD: CPT | Performed by: NURSE PRACTITIONER

## 2020-02-25 PROCEDURE — 3008F BODY MASS INDEX DOCD: CPT | Performed by: NURSE PRACTITIONER

## 2020-02-25 PROCEDURE — 1036F TOBACCO NON-USER: CPT | Performed by: NURSE PRACTITIONER

## 2020-02-25 PROCEDURE — 3079F DIAST BP 80-89 MM HG: CPT | Performed by: NURSE PRACTITIONER

## 2020-02-25 PROCEDURE — 3075F SYST BP GE 130 - 139MM HG: CPT | Performed by: NURSE PRACTITIONER

## 2020-02-25 PROCEDURE — 4040F PNEUMOC VAC/ADMIN/RCVD: CPT | Performed by: NURSE PRACTITIONER

## 2020-02-25 PROCEDURE — 99213 OFFICE O/P EST LOW 20 MIN: CPT | Performed by: NURSE PRACTITIONER

## 2020-02-25 RX ORDER — FUROSEMIDE 20 MG/1
20 TABLET ORAL DAILY
Qty: 60 TABLET | Refills: 0 | Status: SHIPPED | OUTPATIENT
Start: 2020-02-25 | End: 2020-04-14 | Stop reason: SDUPTHER

## 2020-02-25 RX ORDER — LISINOPRIL 40 MG/1
40 TABLET ORAL DAILY
Qty: 60 TABLET | Refills: 0 | Status: SHIPPED | OUTPATIENT
Start: 2020-02-25 | End: 2020-04-14 | Stop reason: SDUPTHER

## 2020-02-25 RX ORDER — PHENYTOIN SODIUM 100 MG/1
CAPSULE, EXTENDED RELEASE ORAL
Qty: 180 CAPSULE | Refills: 0 | Status: SHIPPED | OUTPATIENT
Start: 2020-02-25 | End: 2020-04-14 | Stop reason: SDUPTHER

## 2020-02-25 NOTE — PROGRESS NOTES
Assessment/Plan:     Diagnoses and all orders for this visit:    Essential hypertension  -     lisinopril (ZESTRIL) 40 mg tablet; Take 1 tablet (40 mg total) by mouth daily    Seizure (HCC)  -     phenytoin (DILANTIN) 100 mg ER capsule; TAKE 1 TABLET IN THE AM AND 2 TABLETS IN THE PM    Bilateral leg edema  -     furosemide (LASIX) 20 mg tablet; Take 1 tablet (20 mg total) by mouth daily    #1 Essential hypertension  Discussed with patient plan to renew lisinopril 40 mg daily to cover her for the next 60 days while she is in [place] visiting family  #2 Seizure  Discussed with patient plan to renew phenytoin 100 mg 1 tab in morning and 2 tabs in evening to cover her for the next 60 days while she is in [place] visiting family  #3 Bilateral leg edema  Discussed with patient plan to renew her furosemide 20 mg daily to cover her for the next 60 days while she is in [place] visiting family  Patient reports that she does not need renewal of her other medications at this time  Patient instructed to return in 6 months for routine follow up and AWV  Patient instructed to call for problems or concern sin the interim    Subjective:      Patient ID: Trinh Bauer is a 70 y o  female  70 y  o female presenting with need for new prescription that will hold her for the next 60 days until she is back in the area  She is going to [place] to visit family and requests a 60 day supply of three medications so she does not have to obtain refills while there  She states that her neurologist would like to wean her off the dilantin because a gait disturbance  She as not had a seizure in over 30 years but does not want to go off medication to find out she needs it  She recently had a holter monitor preformed and her palpitations are controlled well on the carvedilol  She as appointment already scheduled with cardiology and neurology upon her return to the area in April 20202       Family History   Problem Relation Age of Onset    Prostate cancer Father     Other Family         back problem    Arthritis Family     Arthritis Mother     No Known Problems Sister     No Known Problems Daughter     No Known Problems Sister     No Known Problems Paternal Aunt     No Known Problems Paternal Aunt     No Known Problems Paternal Aunt     No Known Problems Paternal Aunt     Prostate cancer Brother      Social History     Socioeconomic History    Marital status:       Spouse name: Not on file    Number of children: Not on file    Years of education: Not on file    Highest education level: Not on file   Occupational History    Not on file   Social Needs    Financial resource strain: Not on file    Food insecurity:     Worry: Not on file     Inability: Not on file    Transportation needs:     Medical: Not on file     Non-medical: Not on file   Tobacco Use    Smoking status: Former Smoker    Smokeless tobacco: Never Used   Substance and Sexual Activity    Alcohol use: Yes     Comment: social use    Drug use: No    Sexual activity: Not on file   Lifestyle    Physical activity:     Days per week: Not on file     Minutes per session: Not on file    Stress: Not on file   Relationships    Social connections:     Talks on phone: Not on file     Gets together: Not on file     Attends Baptism service: Not on file     Active member of club or organization: Not on file     Attends meetings of clubs or organizations: Not on file     Relationship status: Not on file    Intimate partner violence:     Fear of current or ex partner: Not on file     Emotionally abused: Not on file     Physically abused: Not on file     Forced sexual activity: Not on file   Other Topics Concern    Not on file   Social History Narrative    Daily coffee consumption (_cups/day)    Denied hx of daily cola consumption    Daily tea consumption (_cups/day)     E-Cigarette/Vaping    E-Cigarette Use Never User      E-Cigarette/Vaping Substances     Past Medical History:   Diagnosis Date    Acute venous embolism and thrombosis of deep vessels of distal lower extremity ([facility] [address] ) 1/27/2014    Blood type A+     Closed fracture of right distal radius and ulna 3/6/2019    Convulsions ([address] )     Deviated septum 05/2000    Fracture of c2     Gallstones 9/15/2019    Hiatal hernia     1976    Hyperlipidemia     Osteoarthritis, localized, knee     Seizure ([facility] [address] )     1990    Spondylosis      Past Surgical History:   Procedure Laterality Date    BREAST BIOPSY Right over 10 years    benign    CATARACT EXTRACTION      COMBINED HYSTEROSCOPY DIAGNOSTIC / D&C      ESOPHAGOGASTRODUODENOSCOPY      diagnostic    HAND SURGERY      HERNIA REPAIR      HYSTERECTOMY      KNEE ARTHROPLASTY      Revision;total    KNEE ARTHROSCOPY Right     1997    NASAL SEPTUM SURGERY      Deviation repair    SC INCISE FINGER TENDON SHEATH Right 11/21/2019    Procedure: RING TRIGGER FINGER RELEASE;  Surgeon: Krystle Ordonez MD;  [facility];  Service: Orthopedics    REPLACEMENT TOTAL KNEE Right     2000    TONSILLECTOMY      1948    TUBAL LIGATION      TUMOR REMOVAL Left 1956    left index finger     Allergies   Allergen Reactions    Adhesive  [Medical Tape]      Other reaction(s): Unknown Reaction    Latex      Other reaction(s): Unknown Reaction    Midazolam     Penicillins     Thiopental        Current Outpatient Medications:     aspirin (ECOTRIN LOW STRENGTH) 81 mg EC tablet, Take by mouth daily , Disp: , Rfl:     atorvastatin (LIPITOR) 40 mg tablet, Take 1 tablet (40 mg total) by mouth daily with dinner, Disp: 15 tablet, Rfl: 0    Calcium Carb-Cholecalciferol (CALCIUM 1000 + D PO), Take 1 tablet by mouth daily, Disp: , Rfl:     carvedilol (COREG) 6 25 mg tablet, Take 1 tablet (6 25 mg total) by mouth 2 (two) times a day with meals, Disp: 60 tablet, Rfl: 3    cholecalciferol (VITAMIN D3) 1,000 units tablet, Take 1,000 Units by mouth daily, Disp: , Rfl:     furosemide (LASIX) 20 mg tablet, Take 1 tablet (20 mg total) by mouth daily, Disp: 60 tablet, Rfl: 0    lisinopril (ZESTRIL) 40 mg tablet, Take 1 tablet (40 mg total) by mouth daily, Disp: 60 tablet, Rfl: 0    Multiple Vitamin (MULTIVITAMINS PO), Take 1 capsule by mouth daily, Disp: , Rfl:     phenytoin (DILANTIN) 100 mg ER capsule, TAKE 1 TABLET IN THE AM AND 2 TABLETS IN THE PM, Disp: 180 capsule, Rfl: 0    Blood Pressure Monitoring (SPHYGMOMANOMETER) MISC, by Does not apply route 2 (two) times a day (Patient not taking: Reported on 1/21/2020), Disp: 1 each, Rfl: 0    Review of Systems   Constitutional: Negative for activity change, appetite change, chills and fever  HENT: Positive for congestion ( intermittent -mostly at dusk and dmitry) and tinnitus  Negative for dental problem, ear pain, postnasal drip, sinus pressure, sore throat and trouble swallowing  Eyes: Negative  Respiratory: Negative for cough, chest tightness, shortness of breath and wheezing  Cardiovascular: Negative for chest pain, palpitations and leg swelling  Gastrointestinal: Negative for abdominal distention, abdominal pain, diarrhea and nausea  Endocrine: Negative for polydipsia and polyuria  Genitourinary: Negative  Musculoskeletal: Negative for arthralgias, gait problem and myalgias  Skin: Negative  Allergic/Immunologic: Negative  Neurological: Negative for dizziness, light-headedness and headaches  Hematological: Negative  Psychiatric/Behavioral: Negative  Objective:    /80   Pulse 72   Temp 98 2 °F (36 8 °C)   Ht 5' 5" (1 651 m)   Wt 107 kg (235 lb)   BMI 39 11 kg/m² (Reviewed)     Physical Exam   Constitutional: She is oriented to person, place, and time  Vital signs are normal  She appears well-developed and well-nourished  No distress  HENT:   Head: Normocephalic and atraumatic     Right Ear: External ear normal    Left Ear: External ear normal    Mouth/Throat: Uvula is midline, oropharynx is clear and moist and mucous membranes are normal    Patient as mild eczema in bilateral ear canals with mild cerumen build-up   Eyes: Pupils are equal, round, and reactive to light  Conjunctivae, EOM and lids are normal    Neck: Trachea normal and phonation normal  Neck supple  No muscular tenderness present  No neck rigidity  Normal range of motion present  Cardiovascular: Normal rate, regular rhythm, normal heart sounds and intact distal pulses  Pulses:       Radial pulses are 2+ on the right side, and 2+ on the left side  Posterior tibial pulses are 2+ on the right side, and 2+ on the left side  Mild dependent edema in B/L LE   Pulmonary/Chest: Effort normal and breath sounds normal    Abdominal: Soft  Bowel sounds are normal  She exhibits no distension  There is no tenderness  Lymphadenopathy:     She has no cervical adenopathy  Neurological: She is alert and oriented to person, place, and time  She has normal strength  No cranial nerve deficit or sensory deficit  Skin: Skin is warm and dry  Capillary refill takes less than 2 seconds  Psychiatric: She has a normal mood and affect  Her speech is normal and behavior is normal    Vitals reviewed  BMI Counseling: There is no height or weight on file to calculate BMI  The BMI is above normal  Nutrition recommendations include decreasing portion sizes, encouraging healthy choices of fruits and vegetables, consuming healthier snacks, moderation in carbohydrate intake and increasing intake of lean protein  Exercise recommendations include exercising 3-5 times per week and strength training exercises  Falls Plan of Care: balance, strength, and gait training instructions were provided  Recommended assistive device to help with gait and balance  Home safety education provided

## 2020-04-14 DIAGNOSIS — R60.0 BILATERAL LEG EDEMA: ICD-10-CM

## 2020-04-14 DIAGNOSIS — R00.2 PALPITATIONS: ICD-10-CM

## 2020-04-14 DIAGNOSIS — E78.5 HYPERLIPIDEMIA: ICD-10-CM

## 2020-04-14 DIAGNOSIS — R56.9 SEIZURE (HCC): ICD-10-CM

## 2020-04-14 DIAGNOSIS — I10 ESSENTIAL HYPERTENSION: ICD-10-CM

## 2020-04-14 RX ORDER — LISINOPRIL 40 MG/1
40 TABLET ORAL DAILY
Qty: 90 TABLET | Refills: 3 | Status: SHIPPED | OUTPATIENT
Start: 2020-04-14 | End: 2021-03-19 | Stop reason: SDUPTHER

## 2020-04-14 RX ORDER — CARVEDILOL 6.25 MG/1
6.25 TABLET ORAL 2 TIMES DAILY WITH MEALS
Qty: 180 TABLET | Refills: 3 | Status: SHIPPED | OUTPATIENT
Start: 2020-04-14 | End: 2021-03-19 | Stop reason: SDUPTHER

## 2020-04-14 RX ORDER — FUROSEMIDE 20 MG/1
20 TABLET ORAL DAILY
Qty: 90 TABLET | Refills: 3 | Status: SHIPPED | OUTPATIENT
Start: 2020-04-14 | End: 2021-09-13

## 2020-04-14 RX ORDER — PHENYTOIN SODIUM 100 MG/1
CAPSULE, EXTENDED RELEASE ORAL
Qty: 180 CAPSULE | Refills: 0 | Status: SHIPPED | OUTPATIENT
Start: 2020-04-14 | End: 2020-07-08 | Stop reason: SDUPTHER

## 2020-04-14 RX ORDER — ATORVASTATIN CALCIUM 40 MG/1
40 TABLET, FILM COATED ORAL
Qty: 90 TABLET | Refills: 3 | Status: SHIPPED | OUTPATIENT
Start: 2020-04-14 | End: 2021-03-19 | Stop reason: SDUPTHER

## 2020-04-21 ENCOUNTER — TELEMEDICINE (OUTPATIENT)
Dept: NEUROLOGY | Facility: CLINIC | Age: 80
End: 2020-04-21
Payer: COMMERCIAL

## 2020-04-21 VITALS — HEART RATE: 68 BPM | WEIGHT: 228 LBS | HEIGHT: 65 IN | BODY MASS INDEX: 37.99 KG/M2

## 2020-04-21 DIAGNOSIS — G40.909 NONINTRACTABLE EPILEPSY WITHOUT STATUS EPILEPTICUS, UNSPECIFIED EPILEPSY TYPE (HCC): Primary | ICD-10-CM

## 2020-04-21 PROCEDURE — 99441 PR PHYS/QHP TELEPHONE EVALUATION 5-10 MIN: CPT | Performed by: PSYCHIATRY & NEUROLOGY

## 2020-07-08 DIAGNOSIS — R56.9 SEIZURE (HCC): ICD-10-CM

## 2020-07-08 RX ORDER — PHENYTOIN SODIUM 100 MG/1
CAPSULE, EXTENDED RELEASE ORAL
Qty: 90 CAPSULE | Refills: 0 | Status: SHIPPED | OUTPATIENT
Start: 2020-07-08 | End: 2020-07-09 | Stop reason: SDUPTHER

## 2020-07-09 RX ORDER — PHENYTOIN SODIUM 100 MG/1
CAPSULE, EXTENDED RELEASE ORAL
Qty: 270 CAPSULE | Refills: 1 | Status: SHIPPED | OUTPATIENT
Start: 2020-07-09 | End: 2021-03-29 | Stop reason: ALTCHOICE

## 2020-08-10 ENCOUNTER — TELEPHONE (OUTPATIENT)
Dept: FAMILY MEDICINE CLINIC | Facility: CLINIC | Age: 80
End: 2020-08-10

## 2020-08-10 DIAGNOSIS — G40.909 SEIZURE DISORDER (HCC): ICD-10-CM

## 2020-08-10 DIAGNOSIS — E55.9 VITAMIN D DEFICIENCY: Primary | ICD-10-CM

## 2020-08-10 DIAGNOSIS — E78.00 HYPERCHOLESTEROLEMIA: ICD-10-CM

## 2020-08-10 DIAGNOSIS — I10 ESSENTIAL HYPERTENSION: ICD-10-CM

## 2020-08-10 NOTE — TELEPHONE ENCOUNTER
Patient called  She has an upcoming appt  She wants to know if you want labs prior  If so please order, she uses [facility]   Please let her know

## 2020-08-18 ENCOUNTER — APPOINTMENT (OUTPATIENT)
Dept: LAB | Facility: CLINIC | Age: 80
End: 2020-08-18
Payer: COMMERCIAL

## 2020-08-18 DIAGNOSIS — E78.00 HYPERCHOLESTEROLEMIA: ICD-10-CM

## 2020-08-18 DIAGNOSIS — I10 ESSENTIAL HYPERTENSION: ICD-10-CM

## 2020-08-18 DIAGNOSIS — E55.9 VITAMIN D DEFICIENCY: ICD-10-CM

## 2020-08-18 DIAGNOSIS — G40.909 SEIZURE DISORDER (HCC): ICD-10-CM

## 2020-08-18 LAB
25(OH)D3 SERPL-MCNC: 33.6 NG/ML (ref 30–100)
ALBUMIN SERPL BCP-MCNC: 3.6 G/DL (ref 3.5–5)
ALP SERPL-CCNC: 101 U/L (ref 46–116)
ALT SERPL W P-5'-P-CCNC: 28 U/L (ref 12–78)
ANION GAP SERPL CALCULATED.3IONS-SCNC: 3 MMOL/L (ref 4–13)
AST SERPL W P-5'-P-CCNC: 26 U/L (ref 5–45)
BILIRUB SERPL-MCNC: 0.36 MG/DL (ref 0.2–1)
BUN SERPL-MCNC: 9 MG/DL (ref 5–25)
CALCIUM SERPL-MCNC: 9.2 MG/DL (ref 8.3–10.1)
CHLORIDE SERPL-SCNC: 109 MMOL/L (ref 100–108)
CHOLEST SERPL-MCNC: 201 MG/DL (ref 50–200)
CO2 SERPL-SCNC: 30 MMOL/L (ref 21–32)
CREAT SERPL-MCNC: 0.6 MG/DL (ref 0.6–1.3)
GFR SERPL CREATININE-BSD FRML MDRD: 88 ML/MIN/1.73SQ M
GLUCOSE P FAST SERPL-MCNC: 105 MG/DL (ref 65–99)
HDLC SERPL-MCNC: 60 MG/DL
LDLC SERPL CALC-MCNC: 118 MG/DL (ref 0–100)
POTASSIUM SERPL-SCNC: 4.3 MMOL/L (ref 3.5–5.3)
PROT SERPL-MCNC: 7.4 G/DL (ref 6.4–8.2)
SODIUM SERPL-SCNC: 142 MMOL/L (ref 136–145)
TRIGL SERPL-MCNC: 117 MG/DL

## 2020-08-18 PROCEDURE — 82306 VITAMIN D 25 HYDROXY: CPT

## 2020-08-18 PROCEDURE — 80053 COMPREHEN METABOLIC PANEL: CPT

## 2020-08-18 PROCEDURE — 80186 ASSAY OF PHENYTOIN FREE: CPT

## 2020-08-18 PROCEDURE — 36415 COLL VENOUS BLD VENIPUNCTURE: CPT

## 2020-08-18 PROCEDURE — 80061 LIPID PANEL: CPT

## 2020-08-20 LAB — PHENYTOIN FREE SERPL-MCNC: NORMAL UG/ML (ref 1–2)

## 2020-08-25 ENCOUNTER — OFFICE VISIT (OUTPATIENT)
Dept: FAMILY MEDICINE CLINIC | Facility: CLINIC | Age: 80
End: 2020-08-25
Payer: COMMERCIAL

## 2020-08-25 VITALS
BODY MASS INDEX: 39.15 KG/M2 | DIASTOLIC BLOOD PRESSURE: 82 MMHG | HEIGHT: 65 IN | SYSTOLIC BLOOD PRESSURE: 132 MMHG | HEART RATE: 74 BPM | WEIGHT: 235 LBS | TEMPERATURE: 98.7 F

## 2020-08-25 DIAGNOSIS — R56.9 SEIZURE (HCC): ICD-10-CM

## 2020-08-25 DIAGNOSIS — E78.5 HYPERLIPIDEMIA, UNSPECIFIED HYPERLIPIDEMIA TYPE: ICD-10-CM

## 2020-08-25 DIAGNOSIS — I10 ESSENTIAL HYPERTENSION: ICD-10-CM

## 2020-08-25 DIAGNOSIS — Z00.00 MEDICARE ANNUAL WELLNESS VISIT, SUBSEQUENT: Primary | ICD-10-CM

## 2020-08-25 DIAGNOSIS — M16.0 PRIMARY OSTEOARTHRITIS OF BOTH HIPS: ICD-10-CM

## 2020-08-25 PROCEDURE — 1125F AMNT PAIN NOTED PAIN PRSNT: CPT | Performed by: FAMILY MEDICINE

## 2020-08-25 PROCEDURE — 3288F FALL RISK ASSESSMENT DOCD: CPT | Performed by: FAMILY MEDICINE

## 2020-08-25 PROCEDURE — 4040F PNEUMOC VAC/ADMIN/RCVD: CPT | Performed by: FAMILY MEDICINE

## 2020-08-25 PROCEDURE — 3725F SCREEN DEPRESSION PERFORMED: CPT | Performed by: FAMILY MEDICINE

## 2020-08-25 PROCEDURE — 3075F SYST BP GE 130 - 139MM HG: CPT | Performed by: FAMILY MEDICINE

## 2020-08-25 PROCEDURE — 1170F FXNL STATUS ASSESSED: CPT | Performed by: FAMILY MEDICINE

## 2020-08-25 PROCEDURE — 99213 OFFICE O/P EST LOW 20 MIN: CPT | Performed by: FAMILY MEDICINE

## 2020-08-25 PROCEDURE — 3079F DIAST BP 80-89 MM HG: CPT | Performed by: FAMILY MEDICINE

## 2020-08-25 PROCEDURE — 1036F TOBACCO NON-USER: CPT | Performed by: FAMILY MEDICINE

## 2020-08-25 PROCEDURE — 1160F RVW MEDS BY RX/DR IN RCRD: CPT | Performed by: FAMILY MEDICINE

## 2020-08-25 PROCEDURE — G0439 PPPS, SUBSEQ VISIT: HCPCS | Performed by: FAMILY MEDICINE

## 2020-08-25 PROCEDURE — 1101F PT FALLS ASSESS-DOCD LE1/YR: CPT | Performed by: FAMILY MEDICINE

## 2020-08-25 NOTE — PATIENT INSTRUCTIONS
Medicare Preventive Visit Patient Instructions  Thank you for completing your Welcome to Medicare Visit or Medicare Annual Wellness Visit today  Your next wellness visit will be due in one year (8/25/2021)  The screening/preventive services that you may require over the next 5-10 years are detailed below  Some tests may not apply to you based off risk factors and/or age  Screening tests ordered at today's visit but not completed yet may show as past due  Also, please note that scanned in results may not display below  Preventive Screenings:  Service Recommendations Previous Testing/Comments   Colorectal Cancer Screening  * Colonoscopy    * Fecal Occult Blood Test (FOBT)/Fecal Immunochemical Test (FIT)  * Fecal DNA/Cologuard Test  * Flexible Sigmoidoscopy Age: 54-65 years old   Colonoscopy: every 10 years (may be performed more frequently if at higher risk)  OR  FOBT/FIT: every 1 year  OR  Cologuard: every 3 years  OR  Sigmoidoscopy: every 5 years  Screening may be recommended earlier than age 48 if at higher risk for colorectal cancer  Also, an individualized decision between you and your healthcare provider will decide whether screening between the ages of 74-80 would be appropriate  Colonoscopy: 09/19/2016  FOBT/FIT: Not on file  Cologuard: Not on file  Sigmoidoscopy: Not on file         Breast Cancer Screening Age: 36 years old  Frequency: every 1-2 years  Not required if history of left and right mastectomy Mammogram: 11/25/2019    Screening Current   Cervical Cancer Screening Between the ages of 21-29, pap smear recommended once every 3 years  Between the ages of 33-67, can perform pap smear with HPV co-testing every 5 years     Recommendations may differ for women with a history of total hysterectomy, cervical cancer, or abnormal pap smears in past  Pap Smear: Not on file    Screening Not Indicated   Hepatitis C Screening Once for adults born between 1945 and 1965  More frequently in patients at high risk for Hepatitis C Hep C Antibody: Not on file       Diabetes Screening 1-2 times per year if you're at risk for diabetes or have pre-diabetes Fasting glucose: 105 mg/dL   A1C: 5 1 %    Screening Current   Cholesterol Screening Once every 5 years if you don't have a lipid disorder  May order more often based on risk factors  Lipid panel: 08/18/2020    Screening Not Indicated  History Lipid Disorder     Other Preventive Screenings Covered by Medicare:  1  Abdominal Aortic Aneurysm (AAA) Screening: covered once if your at risk  You're considered to be at risk if you have a family history of AAA  2  Lung Cancer Screening: covers low dose CT scan once per year if you meet all of the following conditions: (1) Age 50-69; (2) No signs or symptoms of lung cancer; (3) Current smoker or have quit smoking within the last 15 years; (4) You have a tobacco smoking history of at least 30 pack years (packs per day multiplied by number of years you smoked); (5) You get a written order from a healthcare provider  3  Glaucoma Screening: covered annually if you're considered high risk: (1) You have diabetes OR (2) Family history of glaucoma OR (3)  aged 48 and older OR (3)  American aged 72 and older  3  Osteoporosis Screening: covered every 2 years if you meet one of the following conditions: (1) You're estrogen deficient and at risk for osteoporosis based off medical history and other findings; (2) Have a vertebral abnormality; (3) On glucocorticoid therapy for more than 3 months; (4) Have primary hyperparathyroidism; (5) On osteoporosis medications and need to assess response to drug therapy  · Last bone density test (DXA Scan): 04/09/2019   5  HIV Screening: covered annually if you're between the age of 15-65  Also covered annually if you are younger than 13 and older than 72 with risk factors for HIV infection   For pregnant patients, it is covered up to 3 times per pregnancy  Immunizations:  Immunization Recommendations   Influenza Vaccine Annual influenza vaccination during flu season is recommended for all persons aged >= 6 months who do not have contraindications   Pneumococcal Vaccine (Prevnar and Pneumovax)  * Prevnar = PCV13  * Pneumovax = PPSV23   Adults 25-60 years old: 1-3 doses may be recommended based on certain risk factors  Adults 72 years old: Prevnar (PCV13) vaccine recommended followed by Pneumovax (PPSV23) vaccine  If already received PPSV23 since turning 65, then PCV13 recommended at least one year after PPSV23 dose  Hepatitis B Vaccine 3 dose series if at intermediate or high risk (ex: diabetes, end stage renal disease, liver disease)   Tetanus (Td) Vaccine - COST NOT COVERED BY MEDICARE PART B Following completion of primary series, a booster dose should be given every 10 years to maintain immunity against tetanus  Td may also be given as tetanus wound prophylaxis  Tdap Vaccine - COST NOT COVERED BY MEDICARE PART B Recommended at least once for all adults  For pregnant patients, recommended with each pregnancy  Shingles Vaccine (Shingrix) - COST NOT COVERED BY MEDICARE PART B  2 shot series recommended in those aged 48 and above     Health Maintenance Due:      Topic Date Due    DXA SCAN  04/09/2022     Immunizations Due:      Topic Date Due    DTaP,Tdap,and Td Vaccines (1 - Tdap) 09/04/1961    Influenza Vaccine  07/01/2020     Advance Directives   What are advance directives? Advance directives are legal documents that state your wishes and plans for medical care  These plans are made ahead of time in case you lose your ability to make decisions for yourself  Advance directives can apply to any medical decision, such as the treatments you want, and if you want to donate organs  What are the types of advance directives? There are many types of advance directives, and each state has rules about how to use them   You may choose a combination of any of the following:  · Living will: This is a written record of the treatment you want  You can also choose which treatments you do not want, which to limit, and which to stop at a certain time  This includes surgery, medicine, IV fluid, and tube feedings  · Durable power of  for healthcare [facility]): This is a written record that states who you want to make healthcare choices for you when you are unable to make them for yourself  This person, called a proxy, is usually a family member or a friend  You may choose more than 1 proxy  · Do not resuscitate (DNR) order:  A DNR order is used in case your heart stops beating or you stop breathing  It is a request not to have certain forms of treatment, such as CPR  A DNR order may be included in other types of advance directives  · Medical directive: This covers the care that you want if you are in a coma, near death, or unable to make decisions for yourself  You can list the treatments you want for each condition  Treatment may include pain medicine, surgery, blood transfusions, dialysis, IV or tube feedings, and a ventilator (breathing machine)  · Values history: This document has questions about your views, beliefs, and how you feel and think about life  This information can help others choose the care that you would choose  Why are advance directives important? An advance directive helps you control your care  Although spoken wishes may be used, it is better to have your wishes written down  Spoken wishes can be misunderstood, or not followed  Treatments may be given even if you do not want them  An advance directive may make it easier for your family to make difficult choices about your care  Urinary Incontinence   Urinary incontinence (UI)  is when you lose control of your bladder  UI develops because your bladder cannot store or empty urine properly   The 3 most common types of UI are stress incontinence, urge incontinence, or both   Medicines:   · May be given to help strengthen your bladder control  Report any side effects of medication to your healthcare provider  Do pelvic muscle exercises often:  Your pelvic muscles help you stop urinating  Squeeze these muscles tight for 5 seconds, then relax for 5 seconds  Gradually work up to squeezing for 10 seconds  Do 3 sets of 15 repetitions a day, or as directed  This will help strengthen your pelvic muscles and improve bladder control  Train your bladder:  Go to the bathroom at set times, such as every 2 hours, even if you do not feel the urge to go  You can also try to hold your urine when you feel the urge to go  For example, hold your urine for 5 minutes when you feel the urge to go  As that becomes easier, hold your urine for 10 minutes  Self-care:   · Keep a UI record  Write down how often you leak urine and how much you leak  Make a note of what you were doing when you leaked urine  · Drink liquids as directed  You may need to limit the amount of liquid you drink to help control your urine leakage  Do not drink any liquid right before you go to bed  Limit or do not have drinks that contain caffeine or alcohol  · Prevent constipation  Eat a variety of high-fiber foods  Good examples are high-fiber cereals, beans, vegetables, and whole-grain breads  Walking is the best way to trigger your intestines to have a bowel movement  · Exercise regularly and maintain a healthy weight  Weight loss and exercise will decrease pressure on your bladder and help you control your leakage  · Use a catheter as directed  to help empty your bladder  A catheter is a tiny, plastic tube that is put into your bladder to drain your urine  · Go to behavior therapy as directed  Behavior therapy may be used to help you learn to control your urge to urinate      Weight Management   Why it is important to manage your weight:  Being overweight increases your risk of health conditions such as heart disease, high blood pressure, type 2 diabetes, and certain types of cancer  It can also increase your risk for osteoarthritis, sleep apnea, and other respiratory problems  Aim for a slow, steady weight loss  Even a small amount of weight loss can lower your risk of health problems  How to lose weight safely:  A safe and healthy way to lose weight is to eat fewer calories and get regular exercise  You can lose up about 1 pound a week by decreasing the number of calories you eat by 500 calories each day  Healthy meal plan for weight management:  A healthy meal plan includes a variety of foods, contains fewer calories, and helps you stay healthy  A healthy meal plan includes the following:  · Eat whole-grain foods more often  A healthy meal plan should contain fiber  Fiber is the part of grains, fruits, and vegetables that is not broken down by your body  Whole-grain foods are healthy and provide extra fiber in your diet  Some examples of whole-grain foods are whole-wheat breads and pastas, oatmeal, brown rice, and bulgur  · Eat a variety of vegetables every day  Include dark, leafy greens such as spinach, kale, donte greens, and mustard greens  Eat yellow and orange vegetables such as carrots, sweet potatoes, and winter squash  · Eat a variety of fruits every day  Choose fresh or canned fruit (canned in its own juice or light syrup) instead of juice  Fruit juice has very little or no fiber  · Eat low-fat dairy foods  Drink fat-free (skim) milk or 1% milk  Eat fat-free yogurt and low-fat cottage cheese  Try low-fat cheeses such as mozzarella and other reduced-fat cheeses  · Choose meat and other protein foods that are low in fat  Choose beans or other legumes such as split peas or lentils  Choose fish, skinless poultry (chicken or turkey), or lean cuts of red meat (beef or pork)  Before you cook meat or poultry, cut off any visible fat  · Use less fat and oil  Try baking foods instead of frying them  Add less fat, such as margarine, sour cream, regular salad dressing and mayonnaise to foods  Eat fewer high-fat foods  Some examples of high-fat foods include french fries, doughnuts, ice cream, and cakes  · Eat fewer sweets  Limit foods and drinks that are high in sugar  This includes candy, cookies, regular soda, and sweetened drinks  Exercise:  Exercise at least 30 minutes per day on most days of the week  Some examples of exercise include walking, biking, dancing, and swimming  You can also fit in more physical activity by taking the stairs instead of the elevator or parking farther away from stores  Ask your healthcare provider about the best exercise plan for you  © Copyright [employer] 2018 Information is for End User's use only and may not be sold, redistributed or otherwise used for commercial purposes   All illustrations and images included in CareNotes® are the copyrighted property of [employer]  or [address] [place] [address]

## 2020-08-25 NOTE — PROGRESS NOTES
Assessment and Plan:     Problem List Items Addressed This Visit     None           Preventive health issues were discussed with patient, and age appropriate screening tests were ordered as noted in patient's After Visit Summary  Personalized health advice and appropriate referrals for health education or preventive services given if needed, as noted in patient's After Visit Summary  History of Present Illness:     Patient presents for Medicare Annual Wellness visit  She is up to date with all preventative care  Patient Care Team:  Cammy Dueñas DO as PCP - General  Cammy Dueñas DO as PCP - General  Thompson Brady MD     Problem List:     Patient Active Problem List   Diagnosis    Allergic rhinitis    Anxiety    Bulging of lumbar intervertebral disc    Chronic osteoarthritis    Edema    Epilepsy ([address] )    Hyperlipidemia    Essential hypertension    Seizure ([address] )    Obesity (BMI 30-39  9)    Ambulatory dysfunction    Leukopenia    Thrombocytopenia (HCC)    Trigger finger, right ring finger    Primary osteoarthritis of both hips      Past Medical and Surgical History:     Past Medical History:   Diagnosis Date    Acute venous embolism and thrombosis of deep vessels of distal lower extremity ([address] ) 1/27/2014    Blood type A+     Closed fracture of right distal radius and ulna 3/6/2019    Convulsions ([address] )     Deviated septum 05/2000    Fracture of c2     Gallstones 9/15/2019    Hiatal hernia     1976    Hyperlipidemia     Osteoarthritis, localized, knee     Seizure ([address] )     1990    Spondylosis      Past Surgical History:   Procedure Laterality Date    BREAST BIOPSY Right over 10 years    benign    CATARACT EXTRACTION      COMBINED HYSTEROSCOPY DIAGNOSTIC / D&C      ESOPHAGOGASTRODUODENOSCOPY      diagnostic    HAND SURGERY      HERNIA REPAIR      HYSTERECTOMY      KNEE ARTHROPLASTY      Revision;total    KNEE ARTHROSCOPY Right     1997    NASAL SEPTUM SURGERY      Deviation repair    AL INCISE FINGER TENDON SHEATH Right 11/21/2019    Procedure: RING TRIGGER FINGER RELEASE;  Surgeon: Lloyd Butterfield MD;  [facility];  Service: Orthopedics    REPLACEMENT TOTAL KNEE Right     2000    TONSILLECTOMY      1948    TUBAL LIGATION      TUMOR REMOVAL Left 1956    left index finger      Family History:     Family History   Problem Relation Age of Onset    Prostate cancer Father     Other Family         back problem    Arthritis Family     Arthritis Mother     No Known Problems Sister     No Known Problems Daughter     No Known Problems Sister     No Known Problems Paternal Aunt     No Known Problems Paternal Aunt     No Known Problems Paternal Aunt     No Known Problems Paternal Aunt     Prostate cancer Brother       Social History:     E-Cigarette/Vaping    E-Cigarette Use Never User      E-Cigarette/Vaping Substances    Nicotine No     THC No     CBD No      Social History     Socioeconomic History    Marital status:       Spouse name: None    Number of children: None    Years of education: None    Highest education level: None   Occupational History    None   Social Needs    Financial resource strain: None    Food insecurity     Worry: None     Inability: None    Transportation needs     Medical: None     Non-medical: None   Tobacco Use    Smoking status: Former Smoker    Smokeless tobacco: Never Used   Substance and Sexual Activity    Alcohol use: Yes     Comment: social use    Drug use: No    Sexual activity: None   Lifestyle    Physical activity     Days per week: None     Minutes per session: None    Stress: None   Relationships    Social connections     Talks on phone: None     Gets together: None     Attends Gnosticism service: None     Active member of club or organization: None     Attends meetings of clubs or organizations: None     Relationship status: None    Intimate partner violence     Fear of current or ex partner: None     Emotionally abused: None     Physically abused: None     Forced sexual activity: None   Other Topics Concern    None   Social History Narrative    Daily coffee consumption (_cups/day)    Denied hx of daily cola consumption    Daily tea consumption (_cups/day)      Medications and Allergies:     Current Outpatient Medications   Medication Sig Dispense Refill    aspirin (ECOTRIN LOW STRENGTH) 81 mg EC tablet Take by mouth daily       atorvastatin (LIPITOR) 40 mg tablet Take 1 tablet (40 mg total) by mouth daily with dinner 90 tablet 3    Calcium Carb-Cholecalciferol (CALCIUM 1000 + D PO) Take 1 tablet by mouth daily      carvedilol (COREG) 6 25 mg tablet Take 1 tablet (6 25 mg total) by mouth 2 (two) times a day with meals 180 tablet 3    cholecalciferol (VITAMIN D3) 1,000 units tablet Take 1,000 Units by mouth daily      furosemide (LASIX) 20 mg tablet Take 1 tablet (20 mg total) by mouth daily 90 tablet 3    lisinopril (ZESTRIL) 40 mg tablet Take 1 tablet (40 mg total) by mouth daily 90 tablet 3    Multiple Vitamin (MULTIVITAMINS PO) Take 1 capsule by mouth daily      phenytoin (DILANTIN) 100 mg ER capsule TAKE 1 TABLET IN THE AM AND 2 TABLETS IN THE  capsule 1     No current facility-administered medications for this visit        Allergies   Allergen Reactions    Adhesive  [Medical Tape]      Other reaction(s): Unknown Reaction    Latex      Other reaction(s): Unknown Reaction    Midazolam     Penicillins     Thiopental       Immunizations:     Immunization History   Administered Date(s) Administered    Influenza Split High Dose Preservative Free IM 01/08/2013, 10/21/2014, 10/07/2015, 01/09/2017, 09/21/2017    Influenza, high dose seasonal 0 7 mL 10/08/2018, 09/18/2019    Pneumococcal Conjugate 13-Valent 10/07/2015, 12/16/2015    Pneumococcal Polysaccharide PPV23 09/21/2017    Td (adult), adsorbed 12/16/2015    Zoster 04/16/2013      Health Maintenance: Topic Date Due    DXA SCAN  04/09/2022         Topic Date Due    DTaP,Tdap,and Td Vaccines (1 - Tdap) 09[birth year]04/1963    Influenza Vaccine  07/01/2020      Medicare Health Risk Assessment:     /82   Pulse 74   Temp 98 7 °F (37 1 °C)   Ht 5' 5" (1 651 m)   Wt 107 kg (235 lb)   BMI 39 11 kg/m²      Anusha is here for her Subsequent Wellness visit  Last Medicare Wellness visit information reviewed, patient interviewed, no change since last AWV  Health Risk Assessment:   Patient rates overall health as fair  Patient feels that their physical health rating is same  Eyesight was rated as same  Hearing was rated as same  Patient feels that their emotional and mental health rating is same  Pain experienced in the last 7 days has been none  Patient states that she has experienced no weight loss or gain in last 6 months  Depression Screening:   PHQ-2 Score: 0      Fall Risk Screening: In the past year, patient has experienced: no history of falling in past year      Urinary Incontinence Screening:   Patient has leaked urine accidently in the last six months  Home Safety:  Patient has trouble with stairs inside or outside of their home  Patient has working smoke alarms and has no working carbon monoxide detector  Home safety hazards include: none  Nutrition:   Current diet is Regular and Limited junk food  Medications:   Patient is currently taking over-the-counter supplements  OTC medications include: see medication list  Patient is able to manage medications  Activities of Daily Living (ADLs)/Instrumental Activities of Daily Living (IADLs):   Walk and transfer into and out of bed and chair?: Yes  Dress and groom yourself?: Yes    Bathe or shower yourself?: Yes    Feed yourself?  Yes  Do your laundry/housekeeping?: Yes  Manage your money, pay your bills and track your expenses?: Yes  Make your own meals?: Yes    Do your own shopping?: Yes    Previous Hospitalizations:   Any hospitalizations or ED visits within the last 12 months?: No      Advance Care Planning:   Living will: Yes    Durable POA for healthcare: Yes    Advanced directive: Yes    Advanced directive counseling given: Yes    Five wishes given: Yes    Patient declined ACP directive: No    End of Life Decisions reviewed with patient: Yes    Provider agrees with end of life decisions: Yes      Cognitive Screening:   Provider or family/friend/caregiver concerned regarding cognition?: No    PREVENTIVE SCREENINGS      Cardiovascular Screening:    General: Screening Not Indicated and History Lipid Disorder      Diabetes Screening:     General: Screening Current      Colorectal Cancer Screening:     General: Screening Current      Breast Cancer Screening:     General: Screening Current      Cervical Cancer Screening:    General: Screening Not Indicated      Osteoporosis Screening:    General: Screening Current      Abdominal Aortic Aneurysm (AAA) Screening:        General: Screening Current and Screening Not Indicated      Lung Cancer Screening:     General: Screening Not Indicated      Hepatitis C Screening:    General: Screening Current    BMI Counseling: Body mass index is 39 11 kg/m²  The BMI is above normal  Nutrition recommendations include reducing portion sizes and decreasing overall calorie intake       Aissatou Young,

## 2020-08-27 PROBLEM — R26.2 AMBULATORY DYSFUNCTION: Status: RESOLVED | Noted: 2019-08-07 | Resolved: 2020-08-27

## 2020-08-27 NOTE — PROGRESS NOTES
Patient ID: Kenneth Coulter is a 70 y o  female  HPI: 70 y  o female presents for follow up of hypertension, seizure disorder, arthritis of hips, and hyperlipidemia  All are well controlled  She is following up with neurology and has had no seizure activity despite a subtherapeutic dilantin level  SUBJECTIVE    Family History   Problem Relation Age of Onset    Prostate cancer Father     Other Family         back problem    Arthritis Family     Arthritis Mother     No Known Problems Sister     No Known Problems Daughter     No Known Problems Sister     No Known Problems Paternal Aunt     No Known Problems Paternal Aunt     No Known Problems Paternal Aunt     No Known Problems Paternal Aunt     Prostate cancer Brother      Social History     Socioeconomic History    Marital status:       Spouse name: Not on file    Number of children: Not on file    Years of education: Not on file    Highest education level: Not on file   Occupational History    Not on file   Social Needs    Financial resource strain: Not on file    Food insecurity     Worry: Not on file     Inability: Not on file    Transportation needs     Medical: Not on file     Non-medical: Not on file   Tobacco Use    Smoking status: Former Smoker    Smokeless tobacco: Never Used   Substance and Sexual Activity    Alcohol use: Yes     Comment: social use    Drug use: No    Sexual activity: Not on file   Lifestyle    Physical activity     Days per week: Not on file     Minutes per session: Not on file    Stress: Not on file   Relationships    Social connections     Talks on phone: Not on file     Gets together: Not on file     Attends Mosque service: Not on file     Active member of club or organization: Not on file     Attends meetings of clubs or organizations: Not on file     Relationship status: Not on file    Intimate partner violence     Fear of current or ex partner: Not on file     Emotionally abused: Not on file     Physically abused: Not on file     Forced sexual activity: Not on file   Other Topics Concern    Not on file   Social History Narrative    Daily coffee consumption (_cups/day)    Denied hx of daily cola consumption    Daily tea consumption (_cups/day)     Past Medical History:   Diagnosis Date    Acute venous embolism and thrombosis of deep vessels of distal lower extremity ([facility] ) 1/27/2014    Blood type A+     Closed fracture of right distal radius and ulna 3/6/2019    Convulsions ([facility] )     Deviated septum 05/2000    Fracture of c2     Gallstones 9/15/2019    Hiatal hernia     1976    Hyperlipidemia     Osteoarthritis, localized, knee     Seizure ([facility] [facility] )     1990    Spondylosis      Past Surgical History:   Procedure Laterality Date    BREAST BIOPSY Right over 10 years    benign    CATARACT EXTRACTION      COMBINED HYSTEROSCOPY DIAGNOSTIC / D&C      ESOPHAGOGASTRODUODENOSCOPY      diagnostic    HAND SURGERY      HERNIA REPAIR      HYSTERECTOMY      KNEE ARTHROPLASTY      Revision;total    KNEE ARTHROSCOPY Right     1997    NASAL SEPTUM SURGERY      Deviation repair    [place] INCISE FINGER TENDON SHEATH Right 11/21/2019    Procedure: RING TRIGGER FINGER RELEASE;  Surgeon: Charleen Wheat MD;  [facility];  Service: Orthopedics    REPLACEMENT TOTAL KNEE Right     2000    TONSILLECTOMY      1948    TUBAL LIGATION      TUMOR REMOVAL Left 1956    left index finger     Allergies   Allergen Reactions    Adhesive  [Medical Tape]      Other reaction(s): Unknown Reaction    Latex      Other reaction(s): Unknown Reaction    Midazolam     Penicillins     Thiopental        Current Outpatient Medications:     aspirin (ECOTRIN LOW STRENGTH) 81 mg EC tablet, Take by mouth daily , Disp: , Rfl:     atorvastatin (LIPITOR) 40 mg tablet, Take 1 tablet (40 mg total) by mouth daily with dinner, Disp: 90 tablet, Rfl: 3    Calcium Carb-Cholecalciferol (CALCIUM 1000 + D PO), Take 1 tablet by mouth daily, Disp: , Rfl:     carvedilol (COREG) 6 25 mg tablet, Take 1 tablet (6 25 mg total) by mouth 2 (two) times a day with meals, Disp: 180 tablet, Rfl: 3    cholecalciferol (VITAMIN D3) 1,000 units tablet, Take 1,000 Units by mouth daily, Disp: , Rfl:     furosemide (LASIX) 20 mg tablet, Take 1 tablet (20 mg total) by mouth daily, Disp: 90 tablet, Rfl: 3    lisinopril (ZESTRIL) 40 mg tablet, Take 1 tablet (40 mg total) by mouth daily, Disp: 90 tablet, Rfl: 3    Multiple Vitamin (MULTIVITAMINS PO), Take 1 capsule by mouth daily, Disp: , Rfl:     phenytoin (DILANTIN) 100 mg ER capsule, TAKE 1 TABLET IN THE AM AND 2 TABLETS IN THE PM, Disp: 270 capsule, Rfl: 1    Review of Systems  Constitutional:     Denies fever, chills ,fatigue ,weakness ,weight loss, weight gain     ENT: Denies earache ,loss of hearing ,nosebleed, nasal discharge,nasal congestion ,sore throat ,hoarseness  Pulmonary: Denies shortness of breath ,cough  ,dyspnea on exertion, orthopnea  ,PND   Cardiovascular:  Denies bradycardia , tachycardia  ,palpations, lower extremity edema leg, claudication  Breast:  Denies new or changing breast lumps ,nipple discharge ,nipple changes  Abdomen:  Denies abdominal pain , anorexia , indigestion, nausea, vomiting, constipation, diarrhea  Musculoskeletal: Denies myalgias, arthralgias, joint swelling, joint stiffness , limb pain, limb swelling  Gu: denies dysuria, polyuria  Skin: Denies skin rash, skin lesion, skin wound, itching, dry skin  Neuro: Denies headache, numbness, tingling, confusion, loss of consciousness, dizziness, vertigo  Psychiatric: Denies feelings of depression, suicidal ideation, anxiety, sleep disturbances    OBJECTIVE  /82   Pulse 74   Temp 98 7 °F (37 1 °C)   Ht 5' 5" (1 651 m)   Wt 107 kg (235 lb)   BMI 39 11 kg/m²   Constitutional:   NAD, well appearing and well nourished      ENT:   Conjunctiva and lids: no injection, edema, or discharge     Pupils and iris: STEFAN bilaterally    External inspection of ears and nose: normal without deformities or discharge  Otoscopic exam: Canals patent without erythema  Nasal mucosa, septum and turbinates: Normal or edema or discharge         Oropharynx:  Moist mucosa, normal tongue and tonsils without lesions  No erythema        Pulmonary:Respiratory effort normal rate and rhythm, no increased work of breathing  Auscultation of lungs:  Clear bilaterally with no adventitious breath sounds       Cardiovascular: regular rate and rhythm, S1 and S2, no murmur, no edema and/or varicosities of LE      Abdomen: Soft and non-distended     Positive bowel sounds      No heptomegaly or splenomegaly      Gu: no suprapubic tenderness or CVA tenderness, no urethral discharge  Lymphatic:  No anterior or posterior cervical lymphadenopathy         Musculoskeletal:  Gait and station: Normal gait      Digits and nails normal without clubbing or cyanosis       Inspection/palpation of joints, bones, and muscles:  No joint tenderness, swelling, full active and passive range of motion       Skin: Normal skin turgor and no rashes      Neuro:     Normal reflexes     Psych:   alert and oriented to person, place and time     normal mood and affect       Assessment/Plan:Diagnoses and all orders for this visit:    Medicare annual wellness visit, subsequent    Essential hypertension  Comments:  Continue with current antihypertensive therapy  Primary osteoarthritis of both hips  Comments:  Continue with prn meds  Seizure ([address] )  Comments:  Pt following up with neuro; she has had subtherapeutic dilantin level and will discuss with neuro; no seizure activity in years  Hyperlipidemia, unspecified hyperlipidemia type  Comments:  Cotninue atorvastatin thearpy  I will see patient back in 4 mos or sooner prn

## 2020-09-01 ENCOUNTER — OFFICE VISIT (OUTPATIENT)
Dept: NEUROLOGY | Facility: CLINIC | Age: 80
End: 2020-09-01
Payer: COMMERCIAL

## 2020-09-01 VITALS
WEIGHT: 233.6 LBS | HEART RATE: 65 BPM | HEIGHT: 65 IN | DIASTOLIC BLOOD PRESSURE: 78 MMHG | BODY MASS INDEX: 38.92 KG/M2 | SYSTOLIC BLOOD PRESSURE: 128 MMHG | TEMPERATURE: 98.2 F

## 2020-09-01 DIAGNOSIS — G40.909 NONINTRACTABLE EPILEPSY WITHOUT STATUS EPILEPTICUS, UNSPECIFIED EPILEPSY TYPE (HCC): Primary | ICD-10-CM

## 2020-09-01 PROCEDURE — 3078F DIAST BP <80 MM HG: CPT | Performed by: PSYCHIATRY & NEUROLOGY

## 2020-09-01 PROCEDURE — 1036F TOBACCO NON-USER: CPT | Performed by: PSYCHIATRY & NEUROLOGY

## 2020-09-01 PROCEDURE — 1160F RVW MEDS BY RX/DR IN RCRD: CPT | Performed by: PSYCHIATRY & NEUROLOGY

## 2020-09-01 PROCEDURE — 99213 OFFICE O/P EST LOW 20 MIN: CPT | Performed by: PSYCHIATRY & NEUROLOGY

## 2020-09-01 RX ORDER — LEVETIRACETAM 500 MG/1
500 TABLET ORAL EVERY 12 HOURS SCHEDULED
Qty: 60 TABLET | Refills: 11 | Status: SHIPPED | OUTPATIENT
Start: 2020-09-01 | End: 2020-09-23 | Stop reason: SDUPTHER

## 2020-09-01 NOTE — PROGRESS NOTES
[facility] [address] [facility]  Follow Up Visit    Impression/Plan    Ms Kike Burgess is a 70 y o  female with distant single seizure on phenytoin since  EEG abnormal in the past according to patient report and 1/2020 EEG shows left temporal delta slowing  She is at risk for bone health problems and neuropath related to chronic phenytoin as well as the risk of toxicity if unintentionally dosed incorrectly  She is interested in transitioning off phenytoin, but does want to remain on an AED to reduce seizure risk  Focal slowing on her EEG is not specific for epileptogenic potential, but raises the possibility that seizures may arise from the temporal region  Will transition to levetiracetam  We discussed risk/benefit and potential side effects  Patient Instructions   1  Start levetiracetam 500 mg pills  Take half pill twice daily for one week and then take 1 pill twice daily  2  In one week decrease phenytoin to 1 pill twice daily and take this for 2 weeks  Then decrease phenytoin to 1 pill nightly  Take one pill nightly for 2 weeks and then stop phenytoin  3  Return in about 6 months  Diagnoses and all orders for this visit:    Nonintractable epilepsy without status epilepticus, unspecified epilepsy type ([facility] )  -     levETIRAcetam (KEPPRA) 500 mg tablet; Take 1 tablet (500 mg total) by mouth every 12 (twelve) hours        Subjective    Anusha Burgess is returning to the [facility] for follow up  Interval Events:   Seizures since last visit: None  Hospitalizations: no     No events concerning for seizure  Last 2 phenytoin free levels undetectable  Last total level was near 7 when free level was undetectable       Current AEDs:  Phenytoin  mg, take 1 tab AM / 2 tabs PM  Medication side effects: None  Medication adherence: Yes     Prior Evaluation:  REEG 1/27/2020: intermittent delta activity in the left temporal region    History Reviewed:    The following were reviewed and updated as appropriate: allergies, current medications, past medical history, past social history and problem list    Social History:   Driving: Yes      Objective    /78 (BP Location: Left arm, Patient Position: Sitting, Cuff Size: Standard)   Pulse 65   Temp 98 2 °F (36 8 °C)   Ht 5' 5" (1 651 m)   Wt 106 kg (233 lb 9 6 oz)   BMI 38 87 kg/m²      General Exam  No acute distress  Neurologic Exam  Mental Status:  Alert and oriented x 3  Language: normal fluency and comprehension  Cranial Nerves:  Pupils equal  VFFTC  EOMI, no nystagums  No dysarthria  Motor:  No drift  Strength 5/5 throughout  Normal orbiting  Coordination: Finger to nose intact  Gait: steady casual gait  Review of Systems   Constitutional: Negative  Negative for appetite change and fever  HENT: Negative  Negative for hearing loss, tinnitus, trouble swallowing and voice change  Eyes: Negative  Negative for photophobia and pain  Respiratory: Negative  Negative for shortness of breath  Cardiovascular: Negative  Negative for palpitations  Gastrointestinal: Negative  Negative for nausea and vomiting  Endocrine: Negative  Negative for cold intolerance  Genitourinary: Negative  Negative for dysuria, frequency and urgency  Musculoskeletal: Negative  Negative for myalgias and neck pain  Skin: Negative  Negative for rash  Neurological: Negative  Negative for dizziness, tremors, seizures, syncope, facial asymmetry, speech difficulty, weakness, light-headedness, numbness and headaches  Hematological: Negative  Does not bruise/bleed easily  Psychiatric/Behavioral: Negative  Negative for confusion, hallucinations and sleep disturbance

## 2020-09-01 NOTE — PATIENT INSTRUCTIONS
1  Start levetiracetam 500 mg pills  Take half pill twice daily for one week and then take 1 pill twice daily  2  In one week decrease phenytoin to 1 pill twice daily and take this for 2 weeks  Then decrease phenytoin to 1 pill nightly  Take one pill nightly for 2 weeks and then stop phenytoin  3  Return in about 6 months

## 2020-09-04 ENCOUNTER — TELEPHONE (OUTPATIENT)
Dept: NEUROLOGY | Facility: CLINIC | Age: 80
End: 2020-09-04

## 2020-09-04 NOTE — TELEPHONE ENCOUNTER
Spoke to patient  Informed of previous  Verbalized understanding and will let office know should anything further be needed

## 2020-09-04 NOTE — TELEPHONE ENCOUNTER
She can decrease phenytoin to 100 mg twice daily now and continue to follow the remainder of the titration schedule as instructed at her visit (will take 100 mg twice daily for a few more days than she would have otherwise)

## 2020-09-04 NOTE — TELEPHONE ENCOUNTER
Patient calling to report medication concerns  States she has started medication changes suggested at last visit (initiating levetiracetam and weaning phenytoin)  Currently taking:  Phenytoin: 100mg capsule - 1 capsule in the morning and 2 in the evening  Levetiracetam: 500mg tablet - 1/2 tablet BID    Patient reports unable to function since the adding of levetiracetam  She feels lightheaded and sleepy and off  States she needs to get things done, but just cant  Doesn't feel safe to drive  Patient is asking if phenytoin can just be stopped or decreased sooner, she feels symptoms are caused by taking both medications  Informed patient it sometimes takes time for the body to get used to new medications, she states she knows this, but this is worse than anything she has even experienced  Please advise  [phone] cell or [phone]  [facility] to leave message on phone   Patient also requesting med changes be sent to  [facility]

## 2020-09-23 DIAGNOSIS — G40.909 NONINTRACTABLE EPILEPSY WITHOUT STATUS EPILEPTICUS, UNSPECIFIED EPILEPSY TYPE (HCC): ICD-10-CM

## 2020-09-23 RX ORDER — LEVETIRACETAM 500 MG/1
500 TABLET ORAL EVERY 12 HOURS SCHEDULED
Qty: 180 TABLET | Refills: 6 | Status: SHIPPED | OUTPATIENT
Start: 2020-09-23 | End: 2020-11-17 | Stop reason: SDUPTHER

## 2020-09-23 NOTE — TELEPHONE ENCOUNTER
Pt called and states that she was started on keprpra 500 mg bid  She has been taking this for about a month now and tolerating  Denies side effects  Last script was sent to [facility], 30 day supply  Pt states that she [facility]  [facility]ing rx, [facility] day [facility]  Rx entered   Pls review and sign off    thanks

## 2020-10-02 ENCOUNTER — TELEPHONE (OUTPATIENT)
Dept: FAMILY MEDICINE CLINIC | Facility: CLINIC | Age: 80
End: 2020-10-02

## 2020-10-08 ENCOUNTER — IMMUNIZATIONS (OUTPATIENT)
Dept: FAMILY MEDICINE CLINIC | Facility: CLINIC | Age: 80
End: 2020-10-08
Payer: COMMERCIAL

## 2020-10-08 DIAGNOSIS — Z23 ENCOUNTER FOR IMMUNIZATION: ICD-10-CM

## 2020-10-08 PROCEDURE — 90662 IIV NO PRSV INCREASED AG IM: CPT

## 2020-10-08 PROCEDURE — G0008 ADMIN INFLUENZA VIRUS VAC: HCPCS

## 2020-11-16 ENCOUNTER — TELEPHONE (OUTPATIENT)
Dept: NEUROLOGY | Facility: CLINIC | Age: 80
End: 2020-11-16

## 2020-11-16 DIAGNOSIS — G40.909 NONINTRACTABLE EPILEPSY WITHOUT STATUS EPILEPTICUS, UNSPECIFIED EPILEPSY TYPE (HCC): ICD-10-CM

## 2020-11-17 RX ORDER — LEVETIRACETAM 250 MG/1
375 TABLET ORAL EVERY 12 HOURS SCHEDULED
Qty: 135 TABLET | Refills: 3 | Status: SHIPPED | OUTPATIENT
Start: 2020-11-17 | End: 2021-03-29 | Stop reason: SDUPTHER

## 2021-01-11 ENCOUNTER — OFFICE VISIT (OUTPATIENT)
Dept: FAMILY MEDICINE CLINIC | Facility: CLINIC | Age: 81
End: 2021-01-11
Payer: COMMERCIAL

## 2021-01-11 VITALS
TEMPERATURE: 97.8 F | WEIGHT: 237 LBS | HEART RATE: 70 BPM | SYSTOLIC BLOOD PRESSURE: 138 MMHG | HEIGHT: 65 IN | OXYGEN SATURATION: 95 % | DIASTOLIC BLOOD PRESSURE: 78 MMHG | BODY MASS INDEX: 39.49 KG/M2

## 2021-01-11 DIAGNOSIS — M16.0 PRIMARY OSTEOARTHRITIS OF BOTH HIPS: ICD-10-CM

## 2021-01-11 DIAGNOSIS — I10 ESSENTIAL HYPERTENSION: Primary | ICD-10-CM

## 2021-01-11 DIAGNOSIS — G40.909 NONINTRACTABLE EPILEPSY WITHOUT STATUS EPILEPTICUS, UNSPECIFIED EPILEPSY TYPE (HCC): ICD-10-CM

## 2021-01-11 DIAGNOSIS — E78.5 HYPERLIPIDEMIA, UNSPECIFIED HYPERLIPIDEMIA TYPE: ICD-10-CM

## 2021-01-11 PROCEDURE — 3075F SYST BP GE 130 - 139MM HG: CPT | Performed by: FAMILY MEDICINE

## 2021-01-11 PROCEDURE — 3078F DIAST BP <80 MM HG: CPT | Performed by: FAMILY MEDICINE

## 2021-01-11 PROCEDURE — 1036F TOBACCO NON-USER: CPT | Performed by: FAMILY MEDICINE

## 2021-01-11 PROCEDURE — 1160F RVW MEDS BY RX/DR IN RCRD: CPT | Performed by: FAMILY MEDICINE

## 2021-01-11 PROCEDURE — 99214 OFFICE O/P EST MOD 30 MIN: CPT | Performed by: FAMILY MEDICINE

## 2021-01-12 ENCOUNTER — VBI (OUTPATIENT)
Dept: ADMINISTRATIVE | Facility: OTHER | Age: 81
End: 2021-01-12

## 2021-01-21 NOTE — PROGRESS NOTES
Patient ID: Odilia Aguilar is a 68 y o  female  HPI: 68 y  o female presents for follow up hypertension and hypercholesterolemia  Pt denies any dizziness,  chest pain, palpitations, or dypsnea with exertion  Her seizure disorder is inactive for >15 years  Arthritis in her hips remains stable  SUBJECTIVE    Family History   Problem Relation Age of Onset    Prostate cancer Father     Other Family         back problem    Arthritis Family     Arthritis Mother     No Known Problems Sister     No Known Problems Daughter     No Known Problems Sister     No Known Problems Paternal Aunt     No Known Problems Paternal Aunt     No Known Problems Paternal Aunt     No Known Problems Paternal Aunt     Prostate cancer Brother      Social History     Socioeconomic History    Marital status:       Spouse name: Not on file    Number of children: Not on file    Years of education: Not on file    Highest education level: Not on file   Occupational History    Not on file   Social Needs    Financial resource strain: Not on file    Food insecurity     Worry: Not on file     Inability: Not on file    Transportation needs     Medical: Not on file     Non-medical: Not on file   Tobacco Use    Smoking status: Former Smoker    Smokeless tobacco: Never Used   Substance and Sexual Activity    Alcohol use: Yes     Comment: social use    Drug use: No    Sexual activity: Not on file   Lifestyle    Physical activity     Days per week: Not on file     Minutes per session: Not on file    Stress: Not on file   Relationships    Social connections     Talks on phone: Not on file     Gets together: Not on file     Attends Scientology service: Not on file     Active member of club or organization: Not on file     Attends meetings of clubs or organizations: Not on file     Relationship status: Not on file    Intimate partner violence     Fear of current or ex partner: Not on file     Emotionally abused: Not on file     Physically abused: Not on file     Forced sexual activity: Not on file   Other Topics Concern    Not on file   Social History Narrative    Daily coffee consumption (_cups/day)    Denied hx of daily cola consumption    Daily tea consumption (_cups/day)     Past Medical History:   Diagnosis Date    Acute venous embolism and thrombosis of deep vessels of distal lower extremity ([facility] ) 1/27/2014    Blood type A+     Closed fracture of right distal radius and ulna 3/6/2019    Convulsions ([facility] )     Deviated septum 05/2000    Fracture of c2     Gallstones 9/15/2019    Hiatal hernia     1976    Hyperlipidemia     Osteoarthritis, localized, knee     Seizure ([facility] )     1990    Spondylosis      Past Surgical History:   Procedure Laterality Date    BREAST BIOPSY Right over 10 years    benign    CATARACT EXTRACTION      COMBINED HYSTEROSCOPY DIAGNOSTIC / D&C      ESOPHAGOGASTRODUODENOSCOPY      diagnostic    HAND SURGERY      HERNIA REPAIR      HYSTERECTOMY      KNEE ARTHROPLASTY      Revision;total    KNEE ARTHROSCOPY Right     1997    NASAL SEPTUM SURGERY      Deviation repair    [place] INCISE FINGER TENDON SHEATH Right 11/21/2019    Procedure: RING TRIGGER FINGER RELEASE;  Surgeon: Edmundo Wing MD;  [facility];  Service: Orthopedics    REPLACEMENT TOTAL KNEE Right     2000    TONSILLECTOMY      1948    TUBAL LIGATION      TUMOR REMOVAL Left 1956    left index finger     Allergies   Allergen Reactions    Adhesive  [Medical Tape]      Other reaction(s): Unknown Reaction    Latex      Other reaction(s): Unknown Reaction    Midazolam     Penicillins     Thiopental        Current Outpatient Medications:     aspirin (ECOTRIN LOW STRENGTH) 81 mg EC tablet, Take by mouth daily , Disp: , Rfl:     atorvastatin (LIPITOR) 40 mg tablet, Take 1 tablet (40 mg total) by mouth daily with dinner, Disp: 90 tablet, Rfl: 3    Calcium Carb-Cholecalciferol (CALCIUM 1000 + D PO), Take 1 tablet by mouth daily, Disp: , Rfl:     carvedilol (COREG) 6 25 mg tablet, Take 1 tablet (6 25 mg total) by mouth 2 (two) times a day with meals, Disp: 180 tablet, Rfl: 3    cholecalciferol (VITAMIN D3) 1,000 units tablet, Take 1,000 Units by mouth daily, Disp: , Rfl:     furosemide (LASIX) 20 mg tablet, Take 1 tablet (20 mg total) by mouth daily, Disp: 90 tablet, Rfl: 3    levETIRAcetam (KEPPRA) 250 mg tablet, Take 1 5 tablets (375 mg total) by mouth every 12 (twelve) hours, Disp: 135 tablet, Rfl: 3    lisinopril (ZESTRIL) 40 mg tablet, Take 1 tablet (40 mg total) by mouth daily, Disp: 90 tablet, Rfl: 3    Multiple Vitamin (MULTIVITAMINS PO), Take 1 capsule by mouth daily, Disp: , Rfl:     phenytoin (DILANTIN) 100 mg ER capsule, TAKE 1 TABLET IN THE AM AND 2 TABLETS IN THE PM, Disp: 270 capsule, Rfl: 1    Review of Systems  Constitutional:     Denies fever, chills ,fatigue ,weakness ,weight loss, weight gain     ENT: Denies earache ,loss of hearing ,nosebleed, nasal discharge,nasal congestion ,sore throat ,hoarseness  Pulmonary: Denies shortness of breath ,cough  ,dyspnea on exertion, orthopnea  ,PND   Cardiovascular:  Denies bradycardia , tachycardia  ,palpations, lower extremity edema leg, claudication  Breast:  Denies new or changing breast lumps ,nipple discharge ,nipple changes  Abdomen:  Denies abdominal pain , anorexia , indigestion, nausea, vomiting, constipation, diarrhea  Musculoskeletal: Denies myalgias, arthralgias, joint swelling, joint stiffness , limb pain, limb swelling  Gu: denies dysuria, polyuria  Skin: Denies skin rash, skin lesion, skin wound, itching, dry skin  Neuro: Denies headache, numbness, tingling, confusion, loss of consciousness, dizziness, vertigo  Psychiatric: Denies feelings of depression, suicidal ideation, anxiety, sleep disturbances    OBJECTIVE  /78   Pulse 70   Temp 97 8 °F (36 6 °C)   Ht 5' 5" (1 651 m)   Wt 108 kg (237 lb)   SpO2 95%   BMI 39 44 kg/m² Constitutional:   NAD, well appearing and well nourished      ENT:   Conjunctiva and lids: no injection, edema, or discharge     Pupils and iris: STEFAN bilaterally    External inspection of ears and nose: normal without deformities or discharge  Otoscopic exam: Canals patent without erythema  Nasal mucosa, septum and turbinates: Normal or edema or discharge         Oropharynx:  Moist mucosa, normal tongue and tonsils without lesions  No erythema        Pulmonary:Respiratory effort normal rate and rhythm, no increased work of breathing  Auscultation of lungs:  Clear bilaterally with no adventitious breath sounds       Cardiovascular: regular rate and rhythm, S1 and S2, no murmur, no edema and/or varicosities of LE      Abdomen: Soft and non-distended     Positive bowel sounds      No heptomegaly or splenomegaly      Gu: no suprapubic tenderness or CVA tenderness, no urethral discharge  Lymphatic:  No anterior or posterior cervical lymphadenopathy         Musculoskeletal:  Gait and station: Normal gait      Digits and nails normal without clubbing or cyanosis       Inspection/palpation of joints, bones, and muscles:  No joint tenderness, swelling, full active and passive range of motion of hips bilaterally        Skin: Normal skin turgor and no rashes      Neuro:    Normal reflexes       Psych:   alert and oriented to person, place and time     normal mood and affect       Assessment/Plan:Diagnoses and all orders for this visit:    Essential hypertension  Comments:  Stable on current medications    Hyperlipidemia, unspecified hyperlipidemia type  Comments:  Stable on present meds  Primary osteoarthritis of both hips    Nonintractable epilepsy without status epilepticus, unspecified epilepsy type ([facility] )  Comments:  Continue dilantin therpay  Reviewed with patient plan to treat with above therapy     Patient instructed to call in 72 hours if not feeling better or if symptoms worse

## 2021-01-27 ENCOUNTER — VBI (OUTPATIENT)
Dept: ADMINISTRATIVE | Facility: OTHER | Age: 81
End: 2021-01-27

## 2021-02-12 DIAGNOSIS — Z23 ENCOUNTER FOR IMMUNIZATION: ICD-10-CM

## 2021-02-13 NOTE — PROGRESS NOTES
Patient Name:  Bre Bryant  MRN:  [id number]    Assessment & Plan     Right distal radius and ulna fracture 3/5/19  1  Short-arm cast applied  2  Avoid painful activity with the right upper extremity  3  Continue meloxicam as needed  May restart aspirin when she no longer needs the meloxicam   4  Follow-up in 4 weeks with x-rays right wrist out of cast   Plan to transition to a cock-up wrist splint with possible referral to occupational therapy at that time as indicated  Subjective     Patient returns today for her right wrist   She reports a mild dull ache, which is improved since her last visit on 3/6/19  She has no numbness or tingling  The swelling is improving  She does have some bruising that she attributes to taking aspirin prior to the injury  She stop the aspirin because she is currently taking meloxicam and tramadol  General ROS:  Negative for fever or chills  Neurological ROS:  Negative for numbness or tingling  Objective     BP (!) 193/82   Pulse 88   Ht 5' 5" (1 651 m)   Wt 104 kg (230 lb)   BMI 38 27 kg/m²     Right wrist:  Moderate ecchymosis in the elbow, forearm, and wrist   Mild soft tissue swelling in the fingers  Tenderness to palpation over the distal radius and ulna  Capillary refill is brisk distally  Sensation is intact to light touch in the right hand  Data Review     I have personally reviewed pertinent films in PACS, and my interpretation follows  X-rays right wrist 3/12/19: Healing fracture of the distal radius and ulna in stable alignment        Cast application  Date/Time: 3/12/2019 12:17 PM  Performed by: Jonathan Jones MD  Authorized by: Jonathan Jones MD     Consent:     Consent obtained:  Verbal    Consent given by:  Patient  Procedure details:     Location:  Wrist    Wrist:  R wristCast type:  Short arm    Supplies:  Cotton padding and fiberglass Maternal Positive GBS

## 2021-03-16 ENCOUNTER — TELEPHONE (OUTPATIENT)
Dept: NEUROLOGY | Facility: CLINIC | Age: 81
End: 2021-03-16

## 2021-03-19 DIAGNOSIS — R00.2 PALPITATIONS: ICD-10-CM

## 2021-03-19 DIAGNOSIS — E78.5 HYPERLIPIDEMIA: ICD-10-CM

## 2021-03-19 DIAGNOSIS — I10 ESSENTIAL HYPERTENSION: ICD-10-CM

## 2021-03-19 RX ORDER — LISINOPRIL 40 MG/1
40 TABLET ORAL DAILY
Qty: 90 TABLET | Refills: 3 | Status: SHIPPED | OUTPATIENT
Start: 2021-03-19 | End: 2022-02-25

## 2021-03-19 RX ORDER — ATORVASTATIN CALCIUM 40 MG/1
40 TABLET, FILM COATED ORAL
Qty: 90 TABLET | Refills: 3 | Status: SHIPPED | OUTPATIENT
Start: 2021-03-19 | End: 2022-02-25

## 2021-03-19 RX ORDER — CARVEDILOL 6.25 MG/1
6.25 TABLET ORAL 2 TIMES DAILY WITH MEALS
Qty: 180 TABLET | Refills: 3 | Status: SHIPPED | OUTPATIENT
Start: 2021-03-19 | End: 2022-02-25

## 2021-03-29 ENCOUNTER — OFFICE VISIT (OUTPATIENT)
Dept: NEUROLOGY | Facility: CLINIC | Age: 81
End: 2021-03-29
Payer: COMMERCIAL

## 2021-03-29 VITALS
BODY MASS INDEX: 39.82 KG/M2 | WEIGHT: 239 LBS | SYSTOLIC BLOOD PRESSURE: 130 MMHG | DIASTOLIC BLOOD PRESSURE: 82 MMHG | HEART RATE: 56 BPM | HEIGHT: 65 IN

## 2021-03-29 DIAGNOSIS — G40.909 NONINTRACTABLE EPILEPSY WITHOUT STATUS EPILEPTICUS, UNSPECIFIED EPILEPSY TYPE (HCC): Primary | ICD-10-CM

## 2021-03-29 PROCEDURE — 99213 OFFICE O/P EST LOW 20 MIN: CPT | Performed by: PSYCHIATRY & NEUROLOGY

## 2021-03-29 PROCEDURE — 3075F SYST BP GE 130 - 139MM HG: CPT | Performed by: PSYCHIATRY & NEUROLOGY

## 2021-03-29 PROCEDURE — 3079F DIAST BP 80-89 MM HG: CPT | Performed by: PSYCHIATRY & NEUROLOGY

## 2021-03-29 PROCEDURE — 1036F TOBACCO NON-USER: CPT | Performed by: PSYCHIATRY & NEUROLOGY

## 2021-03-29 PROCEDURE — 1160F RVW MEDS BY RX/DR IN RCRD: CPT | Performed by: PSYCHIATRY & NEUROLOGY

## 2021-03-29 RX ORDER — LEVETIRACETAM 250 MG/1
375 TABLET ORAL EVERY 12 HOURS SCHEDULED
Qty: 135 TABLET | Refills: 3 | Status: SHIPPED | OUTPATIENT
Start: 2021-03-29 | End: 2021-06-29 | Stop reason: SDUPTHER

## 2021-03-29 NOTE — PATIENT INSTRUCTIONS
1  Continue levetiracetam 375 mg twice daily  2  Return in about one year to see Carmen Troy, NESTOR

## 2021-03-29 NOTE — PROGRESS NOTES
[place] [address] [facility] [address] [facility]  Follow Up Visit    Impression/Plan    Ms Aydin Roth is a 68 y o  female with single seizure more than 30 years ago  EEG abnormal in the past according to patient report and 1/2020 EEG shows left temporal delta slowing   Transitioned from phenytoin to levetiracetam in 2020 to avoid long term side effects  She prefers to remain on AED therapy to reduce seizure risk  Discussed COVID-19 vaccine today which I recommend in her case  Patient Instructions   1  Continue levetiracetam 375 mg twice daily  2  Return in about one year to see Eli Aschoff, CRNP  Diagnoses and all orders for this visit:    Nonintractable epilepsy without status epilepticus, unspecified epilepsy type ([facility] )  -     levETIRAcetam (KEPPRA) 250 mg tablet; Take 1 5 tablets (375 mg total) by mouth every 12 (twelve) hours        Subjective    Anusharika Roth is returning to the [place] [address] [facility] for follow up  Interval Events:   Seizures since last visit: None    Transitioned from phenytoin to levetiracetam at last visit  She call in 11/2020 to report lightheadedness 1-2 hours after taking levetiracetam 500 mg bid  Dose lowered to 375 mg bid  Current AEDs:  Levetiracetam 375 mg bid  Medication side effects: None  Medication adherence: Yes    Prior Evaluation:  MARIA ALEJANDRA 1/27/2020: intermittent delta activity in the left temporal region    History Reviewed: The following were reviewed and updated as appropriate: allergies, current medications, past medical history, past social history and problem list        Objective    /82 (BP Location: Left arm, Patient Position: Sitting, Cuff Size: Large)   Pulse 56   Ht 5' 5" (1 651 m)   Wt 108 kg (239 lb)   BMI 39 77 kg/m²      General Exam  No acute distress  Neurologic Exam  Mental Status:  Alert and oriented x 3  Language: normal fluency and comprehension  Cranial Nerves:  VFFTC  EOMI, no nystagums   No dysarthria  Motor:  No drift  Strength 5/5 in the uppers  Coordination: Finger to nose intact  Gait: Steady casual gait  ROS:    Review of Systems   Constitutional: Negative  Negative for appetite change and fever  HENT: Negative  Negative for hearing loss, tinnitus, trouble swallowing and voice change  Eyes: Negative  Negative for photophobia and pain  Respiratory: Negative  Negative for shortness of breath  Cardiovascular: Negative  Negative for palpitations  Gastrointestinal: Negative  Negative for nausea and vomiting  Endocrine: Negative  Negative for cold intolerance  Genitourinary: Negative  Negative for dysuria, frequency and urgency  Musculoskeletal: Negative  Negative for myalgias and neck pain  Skin: Negative  Negative for rash  Neurological: Negative  Negative for dizziness, tremors, seizures, syncope, facial asymmetry, speech difficulty, weakness, light-headedness, numbness and headaches  Hematological: Negative  Does not bruise/bleed easily  Psychiatric/Behavioral: Negative  Negative for confusion, hallucinations and sleep disturbance  ROS reviewed and updated as appropriate

## 2021-04-12 ENCOUNTER — TELEPHONE (OUTPATIENT)
Dept: FAMILY MEDICINE CLINIC | Facility: CLINIC | Age: 81
End: 2021-04-12

## 2021-04-12 DIAGNOSIS — E55.9 VITAMIN D DEFICIENCY: Primary | ICD-10-CM

## 2021-04-12 DIAGNOSIS — E78.00 HYPERCHOLESTEROLEMIA: ICD-10-CM

## 2021-04-12 DIAGNOSIS — I10 ESSENTIAL HYPERTENSION: ICD-10-CM

## 2021-05-03 ENCOUNTER — APPOINTMENT (OUTPATIENT)
Dept: LAB | Facility: CLINIC | Age: 81
End: 2021-05-03
Payer: COMMERCIAL

## 2021-05-03 DIAGNOSIS — E78.00 HYPERCHOLESTEROLEMIA: ICD-10-CM

## 2021-05-03 DIAGNOSIS — I10 ESSENTIAL HYPERTENSION: ICD-10-CM

## 2021-05-03 DIAGNOSIS — E55.9 VITAMIN D DEFICIENCY: ICD-10-CM

## 2021-05-03 LAB
25(OH)D3 SERPL-MCNC: 40.6 NG/ML (ref 30–100)
ALBUMIN SERPL BCP-MCNC: 3.3 G/DL (ref 3.5–5)
ALP SERPL-CCNC: 72 U/L (ref 46–116)
ALT SERPL W P-5'-P-CCNC: 21 U/L (ref 12–78)
ANION GAP SERPL CALCULATED.3IONS-SCNC: 3 MMOL/L (ref 4–13)
AST SERPL W P-5'-P-CCNC: 19 U/L (ref 5–45)
BILIRUB SERPL-MCNC: 0.52 MG/DL (ref 0.2–1)
BUN SERPL-MCNC: 13 MG/DL (ref 5–25)
CALCIUM ALBUM COR SERPL-MCNC: 10.5 MG/DL (ref 8.3–10.1)
CALCIUM SERPL-MCNC: 9.9 MG/DL (ref 8.3–10.1)
CHLORIDE SERPL-SCNC: 110 MMOL/L (ref 100–108)
CHOLEST SERPL-MCNC: 149 MG/DL (ref 50–200)
CO2 SERPL-SCNC: 29 MMOL/L (ref 21–32)
CREAT SERPL-MCNC: 0.63 MG/DL (ref 0.6–1.3)
GFR SERPL CREATININE-BSD FRML MDRD: 86 ML/MIN/1.73SQ M
GLUCOSE P FAST SERPL-MCNC: 97 MG/DL (ref 65–99)
HDLC SERPL-MCNC: 51 MG/DL
LDLC SERPL CALC-MCNC: 76 MG/DL (ref 0–100)
POTASSIUM SERPL-SCNC: 4.6 MMOL/L (ref 3.5–5.3)
PROT SERPL-MCNC: 7.3 G/DL (ref 6.4–8.2)
SODIUM SERPL-SCNC: 142 MMOL/L (ref 136–145)
TRIGL SERPL-MCNC: 109 MG/DL

## 2021-05-03 PROCEDURE — 80053 COMPREHEN METABOLIC PANEL: CPT

## 2021-05-03 PROCEDURE — 82306 VITAMIN D 25 HYDROXY: CPT

## 2021-05-03 PROCEDURE — 36415 COLL VENOUS BLD VENIPUNCTURE: CPT

## 2021-05-03 PROCEDURE — 80061 LIPID PANEL: CPT

## 2021-05-19 ENCOUNTER — RA CDI HCC (OUTPATIENT)
Dept: OTHER | Facility: HOSPITAL | Age: 81
End: 2021-05-19

## 2021-05-19 NOTE — PROGRESS NOTES
Based on clinical documentation indicated in your record, it appears that the patient may have the following conditions:    I73 9 Peripheral vascular disease ([facility])    E66 01 Morbid obesity (BMI 39 77 with htn, hyperlipidemia)     If this is correct, please document and assess at your next visit May 26th  [place] [address]  coding opportunities             Chart reviewed, (number of) suggestions sent to provider: 2           Patients insurance company: [employer] (Medicare Advantage and Commercial)             [place] [address]  coding opportunities             Chart reviewed, (number of) suggestions sent to provider: 2     Problem listed updated   Provider Accepted, (number of) suggestions accepted: 2        Patients insurance company: [employer] (Medicare Advantage and Commercial)     Visit status: Patient arrived for their scheduled appointment        Note not finished Provider pre-printed instructions given

## 2021-05-21 PROBLEM — E66.01 SEVERE OBESITY (BMI 35.0-39.9) WITH COMORBIDITY (HCC): Status: ACTIVE | Noted: 2019-08-07

## 2021-05-21 PROBLEM — I73.9 PERIPHERAL VASCULAR DISEASE, UNSPECIFIED (HCC): Status: ACTIVE | Noted: 2021-05-21

## 2021-05-26 ENCOUNTER — OFFICE VISIT (OUTPATIENT)
Dept: FAMILY MEDICINE CLINIC | Facility: CLINIC | Age: 81
End: 2021-05-26
Payer: COMMERCIAL

## 2021-05-26 ENCOUNTER — TELEPHONE (OUTPATIENT)
Dept: FAMILY MEDICINE CLINIC | Facility: CLINIC | Age: 81
End: 2021-05-26

## 2021-05-26 VITALS
DIASTOLIC BLOOD PRESSURE: 76 MMHG | HEART RATE: 53 BPM | WEIGHT: 235 LBS | TEMPERATURE: 97 F | HEIGHT: 65 IN | OXYGEN SATURATION: 95 % | SYSTOLIC BLOOD PRESSURE: 118 MMHG | BODY MASS INDEX: 39.15 KG/M2

## 2021-05-26 DIAGNOSIS — R60.0 LOCALIZED EDEMA: ICD-10-CM

## 2021-05-26 DIAGNOSIS — I10 ESSENTIAL HYPERTENSION: Primary | ICD-10-CM

## 2021-05-26 DIAGNOSIS — K12.1 ORAL ULCER: Primary | ICD-10-CM

## 2021-05-26 DIAGNOSIS — E78.5 HYPERLIPIDEMIA, UNSPECIFIED HYPERLIPIDEMIA TYPE: ICD-10-CM

## 2021-05-26 PROCEDURE — 99214 OFFICE O/P EST MOD 30 MIN: CPT | Performed by: FAMILY MEDICINE

## 2021-05-26 PROCEDURE — 3078F DIAST BP <80 MM HG: CPT | Performed by: FAMILY MEDICINE

## 2021-05-26 PROCEDURE — 1036F TOBACCO NON-USER: CPT | Performed by: FAMILY MEDICINE

## 2021-05-26 PROCEDURE — 3074F SYST BP LT 130 MM HG: CPT | Performed by: FAMILY MEDICINE

## 2021-05-26 PROCEDURE — 1160F RVW MEDS BY RX/DR IN RCRD: CPT | Performed by: FAMILY MEDICINE

## 2021-05-26 RX ORDER — TRIAMCINOLONE ACETONIDE 0.1 %
1 PASTE (GRAM) DENTAL 2 TIMES DAILY
Qty: 5 G | Refills: 2 | Status: SHIPPED | OUTPATIENT
Start: 2021-05-26 | End: 2021-11-29 | Stop reason: ALTCHOICE

## 2021-05-26 NOTE — TELEPHONE ENCOUNTER
Patient states never received today prescription for a cream for mouth   Please send script to [facility] on [address]

## 2021-05-28 NOTE — PROGRESS NOTES
Patient ID: Arnie Sanchez is a 68 y o  female  HPI: 68 y  o female presents for follow up hypertension, edema and hypercholesterolemia  Pt denies any dizziness,  chest pain, palpitations, or dypsnea with exertion  SUBJECTIVE    Family History   Problem Relation Age of Onset    Prostate cancer Father     Other Family         back problem    Arthritis Family     Arthritis Mother     No Known Problems Sister     No Known Problems Daughter     No Known Problems Sister     No Known Problems Paternal Aunt     No Known Problems Paternal Aunt     No Known Problems Paternal Aunt     No Known Problems Paternal Aunt     Prostate cancer Brother      Social History     Socioeconomic History    Marital status:       Spouse name: Not on file    Number of children: Not on file    Years of education: Not on file    Highest education level: Not on file   Occupational History    Not on file   Social Needs    Financial resource strain: Not on file    Food insecurity     Worry: Not on file     Inability: Not on file    Transportation needs     Medical: Not on file     Non-medical: Not on file   Tobacco Use    Smoking status: Former Smoker    Smokeless tobacco: Never Used   Substance and Sexual Activity    Alcohol use: Yes     Comment: social use    Drug use: No    Sexual activity: Not on file   Lifestyle    Physical activity     Days per week: Not on file     Minutes per session: Not on file    Stress: Not on file   Relationships    Social connections     Talks on phone: Not on file     Gets together: Not on file     Attends Sikh service: Not on file     Active member of club or organization: Not on file     Attends meetings of clubs or organizations: Not on file     Relationship status: Not on file    Intimate partner violence     Fear of current or ex partner: Not on file     Emotionally abused: Not on file     Physically abused: Not on file     Forced sexual activity: Not on file Other Topics Concern    Not on file   Social History Narrative    Daily coffee consumption (_cups/day)    Denied hx of daily cola consumption    Daily tea consumption (_cups/day)     Past Medical History:   Diagnosis Date    Acute venous embolism and thrombosis of deep vessels of distal lower extremity ([facility] [address] ) 1/27/2014    Blood type A+     Closed fracture of right distal radius and ulna 3/6/2019    Convulsions ([facility] [address] [facility] )     Deviated septum 05/2000    Fracture of c2     Gallstones 9/15/2019    Hiatal hernia     1976    Hyperlipidemia     Osteoarthritis, localized, knee     Seizure ([facility] [address] )     1990    Seizures ([facility] [address] )     Spondylosis      Past Surgical History:   Procedure Laterality Date    BREAST BIOPSY Right over 10 years    benign    CATARACT EXTRACTION      COMBINED HYSTEROSCOPY DIAGNOSTIC / D&C      ESOPHAGOGASTRODUODENOSCOPY      diagnostic    HAND SURGERY      HERNIA REPAIR      HYSTERECTOMY      KNEE ARTHROPLASTY      Revision;total    KNEE ARTHROSCOPY Right     1997    NASAL SEPTUM SURGERY      Deviation repair    [place] INCISE FINGER TENDON SHEATH Right 11/21/2019    Procedure: RING TRIGGER FINGER RELEASE;  Surgeon: Lon Farmer MD;  [facility];  Service: Orthopedics    REPLACEMENT TOTAL KNEE Right     2000   [address]    TUBAL LIGATION      TUMOR REMOVAL Left 1956    left index finger     Allergies   Allergen Reactions    Adhesive  [Medical Tape]      Other reaction(s): Unknown Reaction    Latex      Other reaction(s): Unknown Reaction    Midazolam     Penicillins     Thiopental        Current Outpatient Medications:     aspirin (ECOTRIN LOW STRENGTH) 81 mg EC tablet, Take by mouth daily , Disp: , Rfl:     atorvastatin (LIPITOR) 40 mg tablet, Take 1 tablet (40 mg total) by mouth daily with dinner, Disp: 90 tablet, Rfl: 3    Calcium Carb-Cholecalciferol (CALCIUM 1000 + D PO), Take 1 tablet by mouth daily, Disp: , Rfl:     carvedilol (COREG) 6 25 mg tablet, Take 1 tablet (6 25 mg total) by mouth 2 (two) times a day with meals, Disp: 180 tablet, Rfl: 3    cholecalciferol (VITAMIN D3) 1,000 units tablet, Take 1,000 Units by mouth daily, Disp: , Rfl:     furosemide (LASIX) 20 mg tablet, Take 1 tablet (20 mg total) by mouth daily, Disp: 90 tablet, Rfl: 3    levETIRAcetam (KEPPRA) 250 mg tablet, Take 1 5 tablets (375 mg total) by mouth every 12 (twelve) hours, Disp: 135 tablet, Rfl: 3    lisinopril (ZESTRIL) 40 mg tablet, Take 1 tablet (40 mg total) by mouth daily, Disp: 90 tablet, Rfl: 3    Multiple Vitamin (MULTIVITAMINS PO), Take 1 capsule by mouth daily, Disp: , Rfl:     triamcinolone (KENALOG) 0 1 % oral topical paste, Apply 1 application topically 2 (two) times a day for 7 days, Disp: 5 g, Rfl: 2     Review of Systems  Constitutional:     Denies fever, chills ,fatigue ,weakness ,weight loss, weight gain     ENT: Denies earache ,loss of hearing ,nosebleed, nasal discharge,nasal congestion ,sore throat ,hoarseness  Pulmonary: Denies shortness of breath ,cough  ,dyspnea on exertion, orthopnea  ,PND   Cardiovascular:  Denies bradycardia , tachycardia  ,palpations, lower extremity edema leg, claudication  Breast:  Denies new or changing breast lumps ,nipple discharge ,nipple changes  Abdomen:  Denies abdominal pain , anorexia , indigestion, nausea, vomiting, constipation, diarrhea  Musculoskeletal: Denies myalgias, arthralgias, joint swelling, joint stiffness , limb pain, limb swelling  Gu: denies dysuria, polyuria  Skin: Denies skin rash, skin lesion, skin wound, itching, dry skin  Neuro: Denies headache, numbness, tingling, confusion, loss of consciousness, dizziness, vertigo  Psychiatric: Denies feelings of depression, suicidal ideation, anxiety, sleep disturbances    OBJECTIVE  /76   Pulse (!) 53   Temp (!) 97 °F (36 1 °C)   Ht 5' 5" (1 651 m)   Wt 107 kg (235 lb)   SpO2 95%   BMI 39 11 kg/m²   Constitutional:   NAD, well appearing and well nourished      ENT:   Conjunctiva and lids: no injection, edema, or discharge     Pupils and iris: STEFAN bilaterally    External inspection of ears and nose: normal without deformities or discharge  Otoscopic exam: Canals patent without erythema  Nasal mucosa, septum and turbinates: Normal or edema or discharge         Oropharynx:  Moist mucosa, normal tongue and tonsils without lesions  No erythema        Pulmonary:Respiratory effort normal rate and rhythm, no increased work of breathing  Auscultation of lungs:  Clear bilaterally with no adventitious breath sounds       Cardiovascular: regular rate and rhythm, S1 and S2, no murmur, no edema and/or varicosities of LE      Abdomen: Soft and non-distended     Positive bowel sounds      No heptomegaly or splenomegaly      Gu: no suprapubic tenderness or CVA tenderness, no urethral discharge  Lymphatic:  No anterior or posterior cervical lymphadenopathy         Musculoskeletal:  Gait and station: Normal gait      Digits and nails normal without clubbing or cyanosis       Inspection/palpation of joints, bones, and muscles:  No joint tenderness, swelling, full active and passive range of motion       Skin: Normal skin turgor and no rashes      Neuro:    Normal reflexes     Psych:   alert and oriented to person, place and time     normal mood and affect       Assessment/Plan:Diagnoses and all orders for this visit:    Essential hypertension  Comments:  Stable on current therapy     Hyperlipidemia, unspecified hyperlipidemia type  Comments:  Continue statin therapy    Localized edema  Comments:  Cotnineu with diuretic therapy     I will see patient back in 4 mos or sooner prn

## 2021-06-29 DIAGNOSIS — G40.909 NONINTRACTABLE EPILEPSY WITHOUT STATUS EPILEPTICUS, UNSPECIFIED EPILEPSY TYPE (HCC): ICD-10-CM

## 2021-06-29 RX ORDER — LEVETIRACETAM 250 MG/1
375 TABLET ORAL EVERY 12 HOURS SCHEDULED
Qty: 135 TABLET | Refills: 3 | Status: SHIPPED | OUTPATIENT
Start: 2021-06-29 | End: 2021-07-29 | Stop reason: SDUPTHER

## 2021-07-29 DIAGNOSIS — G40.909 NONINTRACTABLE EPILEPSY WITHOUT STATUS EPILEPTICUS, UNSPECIFIED EPILEPSY TYPE (HCC): ICD-10-CM

## 2021-07-29 RX ORDER — LEVETIRACETAM 250 MG/1
375 TABLET ORAL EVERY 12 HOURS SCHEDULED
Qty: 270 TABLET | Refills: 0 | Status: SHIPPED | OUTPATIENT
Start: 2021-07-29 | End: 2021-09-20 | Stop reason: SDUPTHER

## 2021-08-13 ENCOUNTER — TELEPHONE (OUTPATIENT)
Dept: FAMILY MEDICINE CLINIC | Facility: CLINIC | Age: 81
End: 2021-08-13

## 2021-08-13 NOTE — TELEPHONE ENCOUNTER
Patient does not want to have a colonoscopy   She is requesting order for Cologuard   Order please call when ordered

## 2021-08-16 DIAGNOSIS — Z12.11 COLON CANCER SCREENING: Primary | ICD-10-CM

## 2021-08-16 NOTE — TELEPHONE ENCOUNTER
I placed order, she should receive a kit in 1 week   When she does it, its pre-paid    She just has to call [employer] to pick it up

## 2021-09-13 DIAGNOSIS — R60.0 BILATERAL LEG EDEMA: ICD-10-CM

## 2021-09-13 RX ORDER — FUROSEMIDE 20 MG/1
TABLET ORAL
Qty: 90 TABLET | Refills: 3 | Status: SHIPPED | OUTPATIENT
Start: 2021-09-13

## 2021-09-20 DIAGNOSIS — G40.909 NONINTRACTABLE EPILEPSY WITHOUT STATUS EPILEPTICUS, UNSPECIFIED EPILEPSY TYPE (HCC): ICD-10-CM

## 2021-09-20 RX ORDER — LEVETIRACETAM 250 MG/1
375 TABLET ORAL EVERY 12 HOURS SCHEDULED
Qty: 270 TABLET | Refills: 3 | Status: SHIPPED | OUTPATIENT
Start: 2021-09-20 | End: 2022-05-06 | Stop reason: SDUPTHER

## 2021-09-20 NOTE — TELEPHONE ENCOUNTER
Patient called for updated script to [facility]  1 time fill given by PCP  But further scripts to come from our office  Please sign off

## 2021-10-02 NOTE — TELEPHONE ENCOUNTER
Orders are in and she should go fasting 
Patient has an appt scheduled w/ you on 05/26  Do you want her to have any labs done prior? If so, please order and I will let her know when orders are in 
Patient notified
Attending Attestation (For Attendings USE Only)...

## 2021-10-05 ENCOUNTER — VBI (OUTPATIENT)
Dept: ADMINISTRATIVE | Facility: OTHER | Age: 81
End: 2021-10-05

## 2021-11-11 ENCOUNTER — TELEPHONE (OUTPATIENT)
Dept: FAMILY MEDICINE CLINIC | Facility: CLINIC | Age: 81
End: 2021-11-11

## 2021-11-11 DIAGNOSIS — E78.00 HYPERCHOLESTEROLEMIA: Primary | ICD-10-CM

## 2021-11-11 DIAGNOSIS — I10 ESSENTIAL HYPERTENSION: ICD-10-CM

## 2021-11-23 ENCOUNTER — APPOINTMENT (OUTPATIENT)
Dept: LAB | Facility: CLINIC | Age: 81
End: 2021-11-23
Payer: COMMERCIAL

## 2021-11-23 DIAGNOSIS — I10 ESSENTIAL HYPERTENSION: ICD-10-CM

## 2021-11-23 DIAGNOSIS — E78.00 HYPERCHOLESTEROLEMIA: ICD-10-CM

## 2021-11-23 LAB
ALBUMIN SERPL BCP-MCNC: 3.2 G/DL (ref 3.5–5)
ALP SERPL-CCNC: 82 U/L (ref 46–116)
ALT SERPL W P-5'-P-CCNC: 25 U/L (ref 12–78)
ANION GAP SERPL CALCULATED.3IONS-SCNC: 4 MMOL/L (ref 4–13)
AST SERPL W P-5'-P-CCNC: 25 U/L (ref 5–45)
BILIRUB SERPL-MCNC: 0.52 MG/DL (ref 0.2–1)
BUN SERPL-MCNC: 13 MG/DL (ref 5–25)
CALCIUM ALBUM COR SERPL-MCNC: 10.6 MG/DL (ref 8.3–10.1)
CALCIUM SERPL-MCNC: 10 MG/DL (ref 8.3–10.1)
CHLORIDE SERPL-SCNC: 105 MMOL/L (ref 100–108)
CHOLEST SERPL-MCNC: 160 MG/DL
CO2 SERPL-SCNC: 30 MMOL/L (ref 21–32)
CREAT SERPL-MCNC: 0.74 MG/DL (ref 0.6–1.3)
GFR SERPL CREATININE-BSD FRML MDRD: 77 ML/MIN/1.73SQ M
GLUCOSE P FAST SERPL-MCNC: 106 MG/DL (ref 65–99)
HDLC SERPL-MCNC: 55 MG/DL
LDLC SERPL CALC-MCNC: 82 MG/DL (ref 0–100)
POTASSIUM SERPL-SCNC: 4 MMOL/L (ref 3.5–5.3)
PROT SERPL-MCNC: 7.6 G/DL (ref 6.4–8.2)
SODIUM SERPL-SCNC: 139 MMOL/L (ref 136–145)
TRIGL SERPL-MCNC: 113 MG/DL

## 2021-11-23 PROCEDURE — 36415 COLL VENOUS BLD VENIPUNCTURE: CPT

## 2021-11-23 PROCEDURE — 80061 LIPID PANEL: CPT

## 2021-11-23 PROCEDURE — 80053 COMPREHEN METABOLIC PANEL: CPT

## 2021-11-29 ENCOUNTER — OFFICE VISIT (OUTPATIENT)
Dept: FAMILY MEDICINE CLINIC | Facility: CLINIC | Age: 81
End: 2021-11-29
Payer: COMMERCIAL

## 2021-11-29 VITALS
BODY MASS INDEX: 39.49 KG/M2 | OXYGEN SATURATION: 95 % | HEIGHT: 65 IN | SYSTOLIC BLOOD PRESSURE: 124 MMHG | DIASTOLIC BLOOD PRESSURE: 74 MMHG | WEIGHT: 237 LBS | HEART RATE: 59 BPM | TEMPERATURE: 98.5 F

## 2021-11-29 DIAGNOSIS — G40.909 NONINTRACTABLE EPILEPSY WITHOUT STATUS EPILEPTICUS, UNSPECIFIED EPILEPSY TYPE (HCC): ICD-10-CM

## 2021-11-29 DIAGNOSIS — Z23 NEED FOR VACCINATION: ICD-10-CM

## 2021-11-29 DIAGNOSIS — E78.5 HYPERLIPIDEMIA, UNSPECIFIED HYPERLIPIDEMIA TYPE: ICD-10-CM

## 2021-11-29 DIAGNOSIS — R60.0 LOCALIZED EDEMA: ICD-10-CM

## 2021-11-29 DIAGNOSIS — Z00.00 MEDICARE ANNUAL WELLNESS VISIT, SUBSEQUENT: Primary | ICD-10-CM

## 2021-11-29 DIAGNOSIS — I10 ESSENTIAL HYPERTENSION: ICD-10-CM

## 2021-11-29 PROCEDURE — 1125F AMNT PAIN NOTED PAIN PRSNT: CPT | Performed by: FAMILY MEDICINE

## 2021-11-29 PROCEDURE — 1160F RVW MEDS BY RX/DR IN RCRD: CPT | Performed by: FAMILY MEDICINE

## 2021-11-29 PROCEDURE — 3288F FALL RISK ASSESSMENT DOCD: CPT | Performed by: FAMILY MEDICINE

## 2021-11-29 PROCEDURE — 99214 OFFICE O/P EST MOD 30 MIN: CPT | Performed by: FAMILY MEDICINE

## 2021-11-29 PROCEDURE — 1036F TOBACCO NON-USER: CPT | Performed by: FAMILY MEDICINE

## 2021-11-29 PROCEDURE — 3725F SCREEN DEPRESSION PERFORMED: CPT | Performed by: FAMILY MEDICINE

## 2021-11-29 PROCEDURE — G0439 PPPS, SUBSEQ VISIT: HCPCS | Performed by: FAMILY MEDICINE

## 2021-11-29 PROCEDURE — 1170F FXNL STATUS ASSESSED: CPT | Performed by: FAMILY MEDICINE

## 2021-11-29 PROCEDURE — 90662 IIV NO PRSV INCREASED AG IM: CPT | Performed by: FAMILY MEDICINE

## 2021-11-29 PROCEDURE — 3074F SYST BP LT 130 MM HG: CPT | Performed by: FAMILY MEDICINE

## 2021-11-29 PROCEDURE — G0008 ADMIN INFLUENZA VIRUS VAC: HCPCS | Performed by: FAMILY MEDICINE

## 2021-11-29 RX ORDER — MULTIVIT WITH MINERALS/LUTEIN
1000 TABLET ORAL DAILY
COMMUNITY

## 2021-11-30 ENCOUNTER — TELEPHONE (OUTPATIENT)
Dept: FAMILY MEDICINE CLINIC | Facility: CLINIC | Age: 81
End: 2021-11-30

## 2021-11-30 DIAGNOSIS — H66.90 ACUTE OTITIS MEDIA, UNSPECIFIED OTITIS MEDIA TYPE: Primary | ICD-10-CM

## 2021-11-30 RX ORDER — AZITHROMYCIN 250 MG/1
TABLET, FILM COATED ORAL
Qty: 6 TABLET | Refills: 0 | Status: SHIPPED | OUTPATIENT
Start: 2021-11-30 | End: 2021-12-04

## 2021-12-01 ENCOUNTER — TELEPHONE (OUTPATIENT)
Dept: ADMINISTRATIVE | Facility: OTHER | Age: 81
End: 2021-12-01

## 2021-12-08 ENCOUNTER — TELEPHONE (OUTPATIENT)
Dept: FAMILY MEDICINE CLINIC | Facility: CLINIC | Age: 81
End: 2021-12-08

## 2022-02-09 ENCOUNTER — TELEMEDICINE (OUTPATIENT)
Dept: FAMILY MEDICINE CLINIC | Facility: CLINIC | Age: 82
End: 2022-02-09
Payer: COMMERCIAL

## 2022-02-09 DIAGNOSIS — J01.90 ACUTE SINUSITIS, RECURRENCE NOT SPECIFIED, UNSPECIFIED LOCATION: Primary | ICD-10-CM

## 2022-02-09 PROCEDURE — 1160F RVW MEDS BY RX/DR IN RCRD: CPT | Performed by: FAMILY MEDICINE

## 2022-02-09 PROCEDURE — 99213 OFFICE O/P EST LOW 20 MIN: CPT | Performed by: FAMILY MEDICINE

## 2022-02-09 PROCEDURE — 1036F TOBACCO NON-USER: CPT | Performed by: FAMILY MEDICINE

## 2022-02-09 RX ORDER — BROMPHENIRAMINE MALEATE, PSEUDOEPHEDRINE HYDROCHLORIDE, AND DEXTROMETHORPHAN HYDROBROMIDE 2; 30; 10 MG/5ML; MG/5ML; MG/5ML
10 SYRUP ORAL 4 TIMES DAILY PRN
Qty: 180 ML | Refills: 0 | Status: SHIPPED | OUTPATIENT
Start: 2022-02-09 | End: 2022-03-31 | Stop reason: ALTCHOICE

## 2022-02-09 RX ORDER — AZITHROMYCIN 250 MG/1
TABLET, FILM COATED ORAL
Qty: 6 TABLET | Refills: 0 | Status: SHIPPED | OUTPATIENT
Start: 2022-02-09 | End: 2022-02-13

## 2022-02-10 NOTE — PROGRESS NOTES
Virtual Regular Visit    Verification of patient location:    Patient is located in the following state in which I hold an active license [place]      Assessment/Plan:    Problem List Items Addressed This Visit     None      Visit Diagnoses     Acute sinusitis, recurrence not specified, unspecified location    -  Primary    Relevant Medications    brompheniramine-pseudoephedrine-DM 30-2-10 MG/5ML syrup    azithromycin (ZITHROMAX) 250 mg tablet               Reason for visit is No chief complaint on file  Encounter provider Mohan Brower DO    Provider located at [address] [id number]      Recent Visits  Date Type Provider Dept   02/09/22 1906 [facility], [address] [place] [address] recent visits within past 7 days and meeting all other requirements  Future Appointments  No visits were found meeting these conditions  Showing future appointments within next 150 days and meeting all other requirements       The patient was identified by name and date of birth  Binu Rojas was informed that this is a telemedicine visit and that the visit is being conducted through [facility] and patient was informed this is a secure, HIPAA-complaint platform  She agrees to proceed     My office door was closed  No one else was in the room  She acknowledged consent and understanding of privacy and security of the video platform  The patient has agreed to participate and understands they can discontinue the visit at any time  Patient is aware this is a billable service  Subjective  Anusha Friedman is a 80 y o  female presents with 24 hrs of sinus pain, pressure, nasal congestoin, no cough, no fever, no body aches + headache  Eryn SCOTT     Past Medical History:   Diagnosis Date    Acute venous embolism and thrombosis of deep vessels of distal lower extremity ([facility] ) 1/27/2014    Blood type A+     Closed fracture of right distal radius and ulna 3/6/2019    Convulsions ([address] )     Deviated septum 05/2000    Fracture of c2     Gallstones 9/15/2019    Hiatal hernia     1976    Hyperlipidemia     Osteoarthritis, localized, knee     Seizure ([address] )     1990    Seizures ([address] )     Spondylosis        Past Surgical History:   Procedure Laterality Date    BREAST BIOPSY Right over 10 years    benign    CATARACT EXTRACTION      COMBINED HYSTEROSCOPY DIAGNOSTIC / D&C      ESOPHAGOGASTRODUODENOSCOPY      diagnostic    HAND SURGERY      HERNIA REPAIR      HYSTERECTOMY      KNEE ARTHROPLASTY      Revision;total    KNEE ARTHROSCOPY Right     1997    NASAL SEPTUM SURGERY      Deviation repair    NE INCISE FINGER TENDON SHEATH Right 11/21/2019    Procedure: RING TRIGGER FINGER RELEASE;  Surgeon: Annika De León MD;  [facility]: [facility];  Service: Orthopedics    REPLACEMENT TOTAL KNEE Right     2000    TONSILLECTOMY      1948    TUBAL LIGATION      TUMOR REMOVAL Left 1956    left index finger       Current Outpatient Medications   Medication Sig Dispense Refill    Ascorbic Acid (vitamin C) 1000 MG tablet Take 1,000 mg by mouth daily      aspirin (ECOTRIN LOW STRENGTH) 81 mg EC tablet Take by mouth daily       atorvastatin (LIPITOR) 40 mg tablet Take 1 tablet (40 mg total) by mouth daily with dinner 90 tablet 3    azithromycin (ZITHROMAX) 250 mg tablet Take 2 tablets today then 1 tablet daily x 4 days 6 tablet 0    brompheniramine-pseudoephedrine-DM 30-2-10 MG/5ML syrup Take 10 mL by mouth 4 (four) times a day as needed (congestion adn pnd) 180 mL 0    Calcium Carb-Cholecalciferol (CALCIUM 1000 + D PO) Take 1 tablet by mouth daily      carvedilol (COREG) 6 25 mg tablet Take 1 tablet (6 25 mg total) by mouth 2 (two) times a day with meals 180 tablet 3    cholecalciferol (VITAMIN D3) 1,000 units tablet Take 1,000 Units by mouth daily      Docusate Sodium (STOOL SOFTENER LAXATIVE PO) Take by mouth 2 (two) times a day  furosemide (LASIX) 20 mg tablet TAKE 1 TABLET DAILY 90 tablet 3    levETIRAcetam (KEPPRA) 250 mg tablet Take 1 5 tablets (375 mg total) by mouth every 12 (twelve) hours 270 tablet 3    lisinopril (ZESTRIL) 40 mg tablet Take 1 tablet (40 mg total) by mouth daily 90 tablet 3    Multiple Vitamin (MULTIVITAMINS PO) Take 1 capsule by mouth daily       No current facility-administered medications for this visit  Allergies   Allergen Reactions    Adhesive  [Medical Tape]      Other reaction(s): Unknown Reaction    Latex      Other reaction(s): Unknown Reaction    Midazolam     Penicillins     Thiopental        Review of Systems   Constitutional: Negative for chills and fever  HENT: Positive for congestion, sinus pressure, sinus pain and sore throat  Negative for postnasal drip  Respiratory: Negative for cough and wheezing  Gastrointestinal: Negative for diarrhea, nausea and vomiting  Neurological: Positive for headaches  Negative for dizziness  All other systems reviewed and are negative  Video Exam    There were no vitals filed for this visit  Physical Exam  Vitals reviewed  Constitutional:       Appearance: Normal appearance  She is not ill-appearing  Eyes:      General:         Right eye: No discharge  Left eye: No discharge  Pulmonary:      Effort: No respiratory distress  Skin:     Findings: No rash  Neurological:      Mental Status: She is alert and oriented to person, place, and time  Mental status is at baseline  Psychiatric:         Mood and Affect: Mood normal          Behavior: Behavior normal          Thought Content: Thought content normal          Judgment: Judgment normal           I spent 15 minutes directly with the patient during this visit    [facility] verbally agrees to participate in [employer]   Pt is aware that [employer] could be limited without vital signs or the ability to perform a full hands-on physical exam  Anusha Monzon understands she or the provider may request at any time to terminate the video visit and request the patient to seek care or treatment in person

## 2022-02-25 DIAGNOSIS — I10 ESSENTIAL HYPERTENSION: ICD-10-CM

## 2022-02-25 DIAGNOSIS — R00.2 PALPITATIONS: ICD-10-CM

## 2022-02-25 DIAGNOSIS — E78.5 HYPERLIPIDEMIA: ICD-10-CM

## 2022-02-25 RX ORDER — ATORVASTATIN CALCIUM 40 MG/1
TABLET, FILM COATED ORAL
Qty: 90 TABLET | Refills: 3 | Status: SHIPPED | OUTPATIENT
Start: 2022-02-25

## 2022-02-25 RX ORDER — CARVEDILOL 6.25 MG/1
TABLET ORAL
Qty: 180 TABLET | Refills: 3 | Status: SHIPPED | OUTPATIENT
Start: 2022-02-25

## 2022-02-25 RX ORDER — LISINOPRIL 40 MG/1
TABLET ORAL
Qty: 90 TABLET | Refills: 3 | Status: SHIPPED | OUTPATIENT
Start: 2022-02-25

## 2022-03-17 ENCOUNTER — RA CDI HCC (OUTPATIENT)
Dept: OTHER | Facility: HOSPITAL | Age: 82
End: 2022-03-17

## 2022-03-17 ENCOUNTER — TELEPHONE (OUTPATIENT)
Dept: FAMILY MEDICINE CLINIC | Facility: CLINIC | Age: 82
End: 2022-03-17

## 2022-03-17 DIAGNOSIS — I10 ESSENTIAL HYPERTENSION: Primary | ICD-10-CM

## 2022-03-17 DIAGNOSIS — E78.2 MIXED HYPERLIPIDEMIA: ICD-10-CM

## 2022-03-17 NOTE — PROGRESS NOTES
[facility]  coding opportunities       Chart reviewed, no opportunity found:   [address]        Patients Insurance     Medicare Insurance: [employer]

## 2022-03-17 NOTE — TELEPHONE ENCOUNTER
Pt called stating she has appt coming up on 3/31 and is asking if you need her to get blood work before the appt  Pl adv

## 2022-03-28 ENCOUNTER — APPOINTMENT (OUTPATIENT)
Dept: LAB | Facility: CLINIC | Age: 82
End: 2022-03-28
Payer: COMMERCIAL

## 2022-03-28 DIAGNOSIS — I10 ESSENTIAL HYPERTENSION: ICD-10-CM

## 2022-03-28 DIAGNOSIS — E78.2 MIXED HYPERLIPIDEMIA: ICD-10-CM

## 2022-03-28 LAB
ALBUMIN SERPL BCP-MCNC: 3.6 G/DL (ref 3.5–5)
ALP SERPL-CCNC: 66 U/L (ref 46–116)
ALT SERPL W P-5'-P-CCNC: 22 U/L (ref 12–78)
ANION GAP SERPL CALCULATED.3IONS-SCNC: 5 MMOL/L (ref 4–13)
AST SERPL W P-5'-P-CCNC: 27 U/L (ref 5–45)
BILIRUB SERPL-MCNC: 0.38 MG/DL (ref 0.2–1)
BUN SERPL-MCNC: 17 MG/DL (ref 5–25)
CALCIUM SERPL-MCNC: 9.6 MG/DL (ref 8.3–10.1)
CHLORIDE SERPL-SCNC: 107 MMOL/L (ref 100–108)
CHOLEST SERPL-MCNC: 154 MG/DL
CO2 SERPL-SCNC: 26 MMOL/L (ref 21–32)
CREAT SERPL-MCNC: 0.72 MG/DL (ref 0.6–1.3)
GFR SERPL CREATININE-BSD FRML MDRD: 79 ML/MIN/1.73SQ M
GLUCOSE P FAST SERPL-MCNC: 116 MG/DL (ref 65–99)
HDLC SERPL-MCNC: 52 MG/DL
LDLC SERPL CALC-MCNC: 86 MG/DL (ref 0–100)
POTASSIUM SERPL-SCNC: 4.4 MMOL/L (ref 3.5–5.3)
PROT SERPL-MCNC: 7.3 G/DL (ref 6.4–8.2)
SODIUM SERPL-SCNC: 138 MMOL/L (ref 136–145)
TRIGL SERPL-MCNC: 80 MG/DL

## 2022-03-28 PROCEDURE — 36415 COLL VENOUS BLD VENIPUNCTURE: CPT

## 2022-03-28 PROCEDURE — 80061 LIPID PANEL: CPT

## 2022-03-28 PROCEDURE — 80053 COMPREHEN METABOLIC PANEL: CPT

## 2022-03-31 ENCOUNTER — OFFICE VISIT (OUTPATIENT)
Dept: FAMILY MEDICINE CLINIC | Facility: CLINIC | Age: 82
End: 2022-03-31
Payer: COMMERCIAL

## 2022-03-31 VITALS
TEMPERATURE: 97.6 F | DIASTOLIC BLOOD PRESSURE: 80 MMHG | HEART RATE: 74 BPM | BODY MASS INDEX: 39.25 KG/M2 | SYSTOLIC BLOOD PRESSURE: 124 MMHG | OXYGEN SATURATION: 96 % | HEIGHT: 65 IN | WEIGHT: 235.6 LBS

## 2022-03-31 DIAGNOSIS — Z78.0 ASYMPTOMATIC POSTMENOPAUSAL ESTROGEN DEFICIENCY: ICD-10-CM

## 2022-03-31 DIAGNOSIS — I10 ESSENTIAL HYPERTENSION: Primary | ICD-10-CM

## 2022-03-31 DIAGNOSIS — I73.9 PAD (PERIPHERAL ARTERY DISEASE) (HCC): ICD-10-CM

## 2022-03-31 DIAGNOSIS — E66.01 SEVERE OBESITY (BMI 35.0-39.9) WITH COMORBIDITY (HCC): ICD-10-CM

## 2022-03-31 DIAGNOSIS — E78.2 MIXED HYPERLIPIDEMIA: ICD-10-CM

## 2022-03-31 DIAGNOSIS — D69.6 THROMBOCYTOPENIA, UNSPECIFIED (HCC): ICD-10-CM

## 2022-03-31 DIAGNOSIS — E55.9 VITAMIN D DEFICIENCY: ICD-10-CM

## 2022-03-31 PROCEDURE — 99214 OFFICE O/P EST MOD 30 MIN: CPT | Performed by: FAMILY MEDICINE

## 2022-03-31 RX ORDER — B-COMPLEX WITH VITAMIN C
TABLET ORAL
COMMUNITY

## 2022-04-07 NOTE — PROGRESS NOTES
Patient ID: Kiana Gee is a 80 y o  female  HPI: 80 y  o female presents for follow up hypertension ,PAD, vit d deficiency and thrombocytopenia  and hypercholesterolemia  All are well controlled at this time  Pt denies any dizziness,  chest pain, palpitations, or dypsnea with exertion  SUBJECTIVE    Family History   Problem Relation Age of Onset    Prostate cancer Father     Other Family         back problem    Arthritis Family     Arthritis Mother     No Known Problems Sister     No Known Problems Daughter     No Known Problems Sister     No Known Problems Paternal Aunt     No Known Problems Paternal Aunt     No Known Problems Paternal Aunt     No Known Problems Paternal Aunt     Prostate cancer Brother      Social History     Socioeconomic History    Marital status:       Spouse name: Not on file    Number of children: Not on file    Years of education: Not on file    Highest education level: Not on file   Occupational History    Not on file   Tobacco Use    Smoking status: Former Smoker    Smokeless tobacco: Never Used   Vaping Use    Vaping Use: Never used   Substance and Sexual Activity    Alcohol use: Yes     Comment: social use    Drug use: No    Sexual activity: Not on file   Other Topics Concern    Not on file   Social History Narrative    Daily coffee consumption (_cups/day)    Denied hx of daily cola consumption    Daily tea consumption (_cups/day)     Social Determinants of Health     Financial Resource Strain: Not on file   Food Insecurity: Not on file   Transportation Needs: Not on file   Physical Activity: Not on file   Stress: Not on file   Social Connections: Not on file   Intimate Partner Violence: Not on file   Housing Stability: Not on file     Past Medical History:   Diagnosis Date    [facility] venous embolism [facility] thrombosis [facility] deep vessels of distal lower [facility] ) 1/27/2014    Blood type A+     Closed fracture of right distal radius and ulna 3/6/2019    Convulsions ([facility]     Deviated septum 05/2000    Fracture of c2     Gallstones 9/15/2019    Hiatal hernia     1976    Hyperlipidemia     Osteoarthritis, localized, knee     Seizure ([facility] )     1990    Seizures ([facility]     Spondylosis      Past Surgical History:   Procedure Laterality Date    BREAST BIOPSY Right over 10 years    benign    CATARACT EXTRACTION      COMBINED HYSTEROSCOPY DIAGNOSTIC / D&C      ESOPHAGOGASTRODUODENOSCOPY      diagnostic    HAND SURGERY      HERNIA REPAIR      HYSTERECTOMY      KNEE ARTHROPLASTY      Revision;total    KNEE ARTHROSCOPY Right     1997    NASAL SEPTUM SURGERY      Deviation repair    MA INCISE FINGER TENDON SHEATH Right 11/21/2019    Procedure: RING TRIGGER FINGER RELEASE;  Surgeon: Evelyne Kebede MD;  [facility];  Service: Orthopedics    REPLACEMENT TOTAL KNEE Right     2000    TONSILLECTOMY      1948    TUBAL LIGATION      TUMOR REMOVAL Left 1956    left index finger     Allergies   Allergen Reactions    Adhesive  [Medical Tape]      Other reaction(s): Unknown Reaction    Latex      Other reaction(s): Unknown Reaction    Midazolam     Penicillins     Thiopental        Current Outpatient Medications:     Ascorbic Acid (vitamin C) 1000 MG tablet, Take 1,000 mg by mouth daily, Disp: , Rfl:     aspirin (ECOTRIN LOW STRENGTH) 81 mg EC tablet, Take by mouth daily , Disp: , Rfl:     atorvastatin (LIPITOR) 40 mg tablet, TAKE 1 TABLET DAILY WITH DINNER, Disp: 90 tablet, Rfl: 3    Calcium Carb-Cholecalciferol (CALCIUM 1000 + D PO), Take 1 tablet by mouth daily, Disp: , Rfl:     carvedilol (COREG) 6 25 mg tablet, TAKE 1 TABLET TWICE A DAY WITH MEALS, Disp: 180 tablet, Rfl: 3    cholecalciferol (VITAMIN D3) 1,000 units tablet, Take 1,000 Units by mouth daily, Disp: , Rfl:     Docusate Sodium (STOOL SOFTENER LAXATIVE PO), Take by mouth 2 (two) times a day, Disp: , Rfl:     furosemide (LASIX) 20 mg tablet, TAKE 1 TABLET DAILY, Disp: 90 tablet, Rfl: 3    levETIRAcetam (KEPPRA) 250 mg tablet, Take 1 5 tablets (375 mg total) by mouth every 12 (twelve) hours, Disp: 270 tablet, Rfl: 3    lisinopril (ZESTRIL) 40 mg tablet, TAKE 1 TABLET DAILY, Disp: 90 tablet, Rfl: 3    Multiple Vitamin (MULTIVITAMINS PO), Take 1 capsule by mouth daily, Disp: , Rfl:     Zinc 100 MG TABS, Take by mouth, Disp: , Rfl:     Review of Systems  Constitutional:     Denies fever, chills ,fatigue ,weakness ,weight loss, weight gain     ENT: Denies earache ,loss of hearing ,nosebleed, nasal discharge,nasal congestion ,sore throat ,hoarseness  Pulmonary: Denies shortness of breath ,cough  ,dyspnea on exertion, orthopnea  ,PND   Cardiovascular:  Denies bradycardia , tachycardia  ,palpations, lower extremity edema leg, claudication  Breast:  Denies new or changing breast lumps ,nipple discharge ,nipple changes  Abdomen:  Denies abdominal pain , anorexia , indigestion, nausea, vomiting, constipation, diarrhea  Musculoskeletal: Denies myalgias, arthralgias, joint swelling, joint stiffness , limb pain, limb swelling  Gu: denies dysuria, polyuria  Skin: Denies skin rash, skin lesion, skin wound, itching, dry skin  Neuro: Denies headache, numbness, tingling, confusion, loss of consciousness, dizziness, vertigo  Psychiatric: Denies feelings of depression, suicidal ideation, anxiety, sleep disturbances    OBJECTIVE  /80   Pulse 74   Temp 97 6 °F (36 4 °C)   Ht 5' 5" (1 651 m)   Wt 107 kg (235 lb 9 6 oz)   SpO2 96%   BMI 39 21 kg/m²   Constitutional:   NAD, well appearing and well nourished      ENT:   Conjunctiva and lids: no injection, edema, or discharge     Pupils and iris: STEFAN bilaterally    External inspection of ears and nose: normal without deformities or discharge  Otoscopic exam: Canals patent without erythema         Nasal mucosa, septum and turbinates: Normal or edema or discharge         Oropharynx:  Moist mucosa, normal tongue and tonsils without lesions  No erythema        Pulmonary:Respiratory effort normal rate and rhythm, no increased work of breathing  Auscultation of lungs:  Clear bilaterally with no adventitious breath sounds       Cardiovascular: regular rate and rhythm, S1 and S2, no murmur, no edema and/or varicosities of LE      Abdomen: Soft and non-distended     Positive bowel sounds      No heptomegaly or splenomegaly      Gu: no suprapubic tenderness or CVA tenderness, no urethral discharge  Lymphatic:  No anterior or posterior cervical lymphadenopathy         Musculoskeletal:  Gait and station: Normal gait      Digits and nails normal without clubbing or cyanosis       Inspection/palpation of joints, bones, and muscles:  No joint tenderness, swelling, full active and passive range of motion       Skin: Normal skin turgor and no rashes      Neuro:     Normal reflexes       Psych:   alert and oriented to person, place and time     normal mood and affect       Assessment/Plan:Diagnoses and all orders for this visit:    Essential hypertension  Comments:  Continue with ACE tx  Orders:  -     Comprehensive metabolic panel; Future    Mixed hyperlipidemia  Comments:  Continue with statin tx  Orders:  -     Lipid Panel with Direct LDL reflex; Future    PAD (peripheral artery disease) ([facility])  Comments:  Conitnue current tx  Asymptomatic postmenopausal estrogen deficiency  -     DXA bone density spine hip and pelvis; Future    Thrombocytopenia, unspecified ([facility])    Vitamin D deficiency  Comments:  Continue vit D supplementation  Orders:  -     Vitamin D 25 hydroxy; Future    Severe obesity (BMI 35 0-39  9) with comorbidity ([address] )    Other orders  -     Zinc 100 MG TABS; Take by mouth    I will see patient back in 4 mos or sooner prn

## 2022-04-29 ENCOUNTER — TELEPHONE (OUTPATIENT)
Dept: NEUROLOGY | Facility: CLINIC | Age: 82
End: 2022-04-29

## 2022-04-29 NOTE — TELEPHONE ENCOUNTER
Called pt discussed apt with Estela in [facility], told they will get a call couple days prior to confirm apt

## 2022-05-06 ENCOUNTER — OFFICE VISIT (OUTPATIENT)
Dept: NEUROLOGY | Facility: CLINIC | Age: 82
End: 2022-05-06
Payer: COMMERCIAL

## 2022-05-06 VITALS
DIASTOLIC BLOOD PRESSURE: 86 MMHG | HEART RATE: 58 BPM | OXYGEN SATURATION: 96 % | HEIGHT: 65 IN | BODY MASS INDEX: 38.99 KG/M2 | WEIGHT: 234 LBS | SYSTOLIC BLOOD PRESSURE: 136 MMHG | TEMPERATURE: 97 F

## 2022-05-06 DIAGNOSIS — G40.909 NONINTRACTABLE EPILEPSY WITHOUT STATUS EPILEPTICUS, UNSPECIFIED EPILEPSY TYPE (HCC): ICD-10-CM

## 2022-05-06 PROCEDURE — 99215 OFFICE O/P EST HI 40 MIN: CPT | Performed by: NURSE PRACTITIONER

## 2022-05-06 PROCEDURE — 1160F RVW MEDS BY RX/DR IN RCRD: CPT | Performed by: NURSE PRACTITIONER

## 2022-05-06 RX ORDER — LEVETIRACETAM 250 MG/1
375 TABLET ORAL EVERY 12 HOURS SCHEDULED
Qty: 270 TABLET | Refills: 3 | Status: SHIPPED | OUTPATIENT
Start: 2022-05-06

## 2022-05-06 NOTE — PROGRESS NOTES
Patient ID: Vicky Larsen is a 80 y o  female with single seizure more than 30 years ago , who is returning to Neurology office for follow up of her seizure  Assessment/Plan:    Epilepsy [facility])  Patient has been seizure-free for over 30 years  Recently transitioned in 2020 from phenytoin to levetiracetam monotherapy  She is currently on levetiracetam 375 mg twice per day  Tolerating well without issues or side effects  Most recent EEG in 2020 with intermittent delta activity in the left temporal region  MRI brain in 2019 with mild chronic microvascular ischemic disease and age-related atrophy  No focal deficits on exam       Patient will continue on levetiracetam 375 mg twice per day  No desire to come off of AED therapy  Patient will call the office with any breakthrough seizures, issues or concerns  Patient will follow-up in 1 year  Or sooner if needed  She will Return for 1 year follow up with Dr Stella Rene or [place]  Subjective:  Anusha Kuo is a 80 y o  female with single seizure more than 30 years ago , who is returning to Neurology office for follow up of her seizure  She was last seen in the office by Dr Stella Rene on 3/29/2021  At that time, it was noted that her prior EGG was abnormal, most recenly in 1/2020 with left temporal delta slowing  She was transitioned from phenytoin to levetiracetam in 2020 to avoid long ter side effects  The patient reportedly preferred to continue on AED therapy to reduce seizure risk  Recommended 1 year follow up  Since her last visit, she has continued to be seizure free  No seizures in 30 years  She is currently on levetiracetam 375 mg BID  She denies any side effects or concerns related to her medications  No new medical issues or concerns since her last visit  She does have a DXA scan coming up in the near future  Most recent DEXA in 2016 was without findings concerning for osteoporosis    Did discuss long-term phenytoin can lead to osteoporosis and cerebellar degeneration which she is no longer taking  On calcium and vitamin-D supplementation  She is adentulous but has been since she was 23years old and pregnant with 1 of her children  She is a retired nurse's aide  Current seizure medications:  - levetiracetam 375 mg BID  Other medications as per Epic  Prior Evaluation:  REEG 1/27/2020: intermittent delta activity in the left temporal region     MRI brain wo contrast 8/7/2019:  IMPRESSION:  Mild chronic microvascular ischemic disease  No acute ischemic disease  Age-related atrophy  Consistent with CT    Prior Seizure Medications: phenytoin     I reviewed   Prior Neurology notes, most recent labs, as documented in Epic/[employer], and summarized above  Objective:    Blood pressure 136/86, pulse 58, temperature (!) 97 °F (36 1 °C), temperature source Temporal, height 5' 5" (1 651 m), weight 106 kg (234 lb), SpO2 96 %  Physical Exam  No apparent distress  Appears well nourished  Mood appropriate for situation     Neurologic Exam  Mental status- alert and oriented to person, place, and time  Speech appropriate for conversation  Good attention and knowledge  Cranial Nerves- PERRL, EOMS normal, facial sensation and muscles symmetric, hearing intact bilaterally to finger rubs, tongue midline, palate rise symmetrical, shoulder shrug symmetrical     Motor- No pronator drift  Appropriate strength  Moves all extremities freely  No tremor  Sensory-  Intact distally in all extremities to light touch  DTRs-  2+ and symmetric in bilateral upper extremities, absent at bilateral knees due to knee replacements, 1+ at bilateral ankles  Gait- normal casual gait  Coordination- FNF intact  ROS:  Review of Systems   Constitutional: Negative  Negative for appetite change and fever  HENT: Negative  Negative for hearing loss, tinnitus, trouble swallowing and voice change  Eyes: Negative  Negative for photophobia and pain  Respiratory: Negative  Negative for shortness of breath  Cardiovascular: Negative  Negative for palpitations  Gastrointestinal: Negative  Negative for nausea and vomiting  Endocrine: Negative  Negative for cold intolerance  Genitourinary: Negative  Negative for dysuria, frequency and urgency  Musculoskeletal: Negative  Negative for myalgias and neck pain  Skin: Negative  Negative for rash  Neurological: Negative  Negative for dizziness, tremors, seizures, syncope, facial asymmetry, speech difficulty, weakness, light-headedness, numbness and headaches  Hematological: Negative  Does not bruise/bleed easily  Psychiatric/Behavioral: Negative  Negative for confusion, hallucinations and sleep disturbance  All other systems reviewed and are negative  ROS obtained by MA and reviewed by myself  This note may have been created using voice recognition software  There may be unintentional errors such as grammatical errors, spelling errors, or pronoun errors

## 2022-05-06 NOTE — PATIENT INSTRUCTIONS
- Continue with levetiracetam 375 mg twice per day  - Call the office with any breakthrough seizures or concerns  - Follow up in 1 year or sooner if needed

## 2022-05-06 NOTE — ASSESSMENT & PLAN NOTE
Patient has been seizure-free for over 30 years  Recently transitioned in 2020 from phenytoin to levetiracetam monotherapy  She is currently on levetiracetam 375 mg twice per day  Tolerating well without issues or side effects  Most recent EEG in 2020 with intermittent delta activity in the left temporal region  MRI brain in 2019 with mild chronic microvascular ischemic disease and age-related atrophy  No focal deficits on exam       Patient will continue on levetiracetam 375 mg twice per day  No desire to come off of AED therapy  Patient will call the office with any breakthrough seizures, issues or concerns  Patient will follow-up in 1 year  Or sooner if needed

## 2022-05-19 ENCOUNTER — TELEPHONE (OUTPATIENT)
Dept: ADMINISTRATIVE | Facility: OTHER | Age: 82
End: 2022-05-19

## 2022-05-19 NOTE — TELEPHONE ENCOUNTER
----- Message from Tanvir Poon sent at 5/19/2022 12:50 PM EDT -----  Regarding: Mammo  05/19/22 12:50 PM    Hello, our patient Anastacia Loving has had Mammogram completed/performed  Please assist in updating the patient chart by pulling the Care Everywhere (CE) document  The date of service is 12/06/2021       Thank you,  [address]

## 2022-05-23 NOTE — TELEPHONE ENCOUNTER
Upon review of the In Basket request we were able to locate, review, and update the patient chart as requested for Mammogram     Any additional questions or concerns should be emailed to the Practice Liaisons via [email or web address] email, please do not reply via In Basket      Thank you  Kimberli Wiseman

## 2022-07-11 ENCOUNTER — OFFICE VISIT (OUTPATIENT)
Dept: FAMILY MEDICINE CLINIC | Facility: CLINIC | Age: 82
End: 2022-07-11
Payer: COMMERCIAL

## 2022-07-11 VITALS
HEIGHT: 65 IN | OXYGEN SATURATION: 95 % | WEIGHT: 228.6 LBS | HEART RATE: 63 BPM | SYSTOLIC BLOOD PRESSURE: 152 MMHG | TEMPERATURE: 97.2 F | DIASTOLIC BLOOD PRESSURE: 86 MMHG | BODY MASS INDEX: 38.09 KG/M2

## 2022-07-11 DIAGNOSIS — S39.012A STRAIN OF LUMBAR REGION, INITIAL ENCOUNTER: Primary | ICD-10-CM

## 2022-07-11 PROCEDURE — 1160F RVW MEDS BY RX/DR IN RCRD: CPT | Performed by: FAMILY MEDICINE

## 2022-07-11 PROCEDURE — 3079F DIAST BP 80-89 MM HG: CPT | Performed by: FAMILY MEDICINE

## 2022-07-11 PROCEDURE — 99213 OFFICE O/P EST LOW 20 MIN: CPT | Performed by: FAMILY MEDICINE

## 2022-07-11 PROCEDURE — 3077F SYST BP >= 140 MM HG: CPT | Performed by: FAMILY MEDICINE

## 2022-07-11 RX ORDER — METHOCARBAMOL 500 MG/1
500 TABLET, FILM COATED ORAL
Qty: 10 TABLET | Refills: 0 | Status: SHIPPED | OUTPATIENT
Start: 2022-07-11

## 2022-07-11 RX ORDER — PREDNISONE 10 MG/1
TABLET ORAL
Qty: 26 TABLET | Refills: 0 | Status: SHIPPED | OUTPATIENT
Start: 2022-07-11

## 2022-07-12 NOTE — PROGRESS NOTES
Patient ID: Maday Eddy is a 80 y o  female  HPI: 80 y  o female presents for evaluaiton of low back pain   Patient states pain radiates across her low back and does not have weakness or paresthesia of legs  SUBJECTIVE    Family History   Problem Relation Age of Onset    Prostate cancer Father     Other Family         back problem    Arthritis Family     Arthritis Mother     No Known Problems Sister     No Known Problems Daughter     No Known Problems Sister     No Known Problems Paternal Aunt     No Known Problems Paternal Aunt     No Known Problems Paternal Aunt     No Known Problems Paternal Aunt     Prostate cancer Brother      Social History     Socioeconomic History    Marital status:       Spouse name: Not on file    Number of children: Not on file    Years of education: Not on file    Highest education level: Not on file   Occupational History    Not on file   Tobacco Use    Smoking status: Former Smoker    Smokeless tobacco: Never Used   Vaping Use    Vaping Use: Never used   Substance and Sexual Activity    Alcohol use: Yes     Comment: social use    Drug use: No    Sexual activity: Not on file   Other Topics Concern    Not on file   Social History Narrative    Daily coffee consumption (_cups/day)    Denied hx of daily cola consumption    Daily tea consumption (_cups/day)     Social Determinants of Health     Financial Resource Strain: Not on file   Food Insecurity: Not on file   Transportation Needs: Not on file   Physical Activity: Not on file   Stress: Not on file   Social Connections: Not on file   Intimate Partner Violence: Not on file   Housing Stability: Not on file     Past Medical History:   Diagnosis Date    Acute venous embolism and thrombosis of deep vessels of distal lower extremity ([facility] [address] ) 01/27/2014    Blood type A+     Closed fracture of right distal radius and ulna 03/06/2019    Convulsions ([facility] [address] )     Deviated septum 05/2000    Fracture of c2  Gallstones 09/15/2019    Hiatal hernia     1976    HL (hearing loss)     Hyperlipidemia     Osteoarthritis, localized, knee     Seizure ([address] )     1990    Seizures ([facility] [address] )     Spondylosis     Tinnitus      Past Surgical History:   Procedure Laterality Date    BREAST BIOPSY Right over 10 years    benign    CATARACT EXTRACTION      COMBINED HYSTEROSCOPY DIAGNOSTIC / D&C      ESOPHAGOGASTRODUODENOSCOPY      diagnostic    HAND SURGERY      HERNIA REPAIR      HYSTERECTOMY      KNEE ARTHROPLASTY      Revision;total    KNEE ARTHROSCOPY Right     1997    NASAL SEPTUM SURGERY      Deviation repair    IN INCISE FINGER TENDON SHEATH Right 11/21/2019    Procedure: RING TRIGGER FINGER RELEASE;  Surgeon: Mortimer Mathieu, MD;  [facility];  Service: Orthopedics    REPLACEMENT TOTAL KNEE Right     2000    TONSILLECTOMY      1948    TUBAL LIGATION      TUMOR REMOVAL Left 1956    left index finger     Allergies   Allergen Reactions    Adhesive  [Medical Tape]      Other reaction(s): Unknown Reaction    Latex      Other reaction(s): Unknown Reaction    Midazolam     Penicillins     Thiopental        Current Outpatient Medications:     Ascorbic Acid (vitamin C) 1000 MG tablet, Take 1,000 mg by mouth daily, Disp: , Rfl:     aspirin (ECOTRIN LOW STRENGTH) 81 mg EC tablet, Take by mouth daily , Disp: , Rfl:     atorvastatin (LIPITOR) 40 mg tablet, TAKE 1 TABLET DAILY WITH DINNER, Disp: 90 tablet, Rfl: 3    Calcium Carb-Cholecalciferol (CALCIUM 1000 + D PO), Take 1 tablet by mouth daily, Disp: , Rfl:     carvedilol (COREG) 6 25 mg tablet, TAKE 1 TABLET TWICE A DAY WITH MEALS, Disp: 180 tablet, Rfl: 3    cholecalciferol (VITAMIN D3) 1,000 units tablet, Take 1,000 Units by mouth daily, Disp: , Rfl:     Docusate Sodium (STOOL SOFTENER LAXATIVE PO), Take by mouth 2 (two) times a day, Disp: , Rfl:     furosemide (LASIX) 20 mg tablet, TAKE 1 TABLET DAILY, Disp: 90 tablet, Rfl: 3    levETIRAcetam (KEPPRA) 250 mg tablet, Take 1 5 tablets (375 mg total) by mouth every 12 (twelve) hours, Disp: 270 tablet, Rfl: 3    lisinopril (ZESTRIL) 40 mg tablet, TAKE 1 TABLET DAILY, Disp: 90 tablet, Rfl: 3    methocarbamol (ROBAXIN) 500 mg tablet, Take 1 tablet (500 mg total) by mouth daily at bedtime, Disp: 10 tablet, Rfl: 0    Multiple Vitamin (MULTIVITAMINS PO), Take 1 capsule by mouth daily, Disp: , Rfl:     predniSONE 10 mg tablet, 3 tabs po bid x2 days, then 2 tabs po bid x2 days, then 1 tab bid x2 days, then 1 daily until done , Disp: 26 tablet, Rfl: 0    Zinc 100 MG TABS, Take by mouth, Disp: , Rfl:     Review of Systems  Constitutional:     Denies fever, chills ,fatigue ,weakness ,weight loss, weight gain     ENT: Denies earache ,loss of hearing ,nosebleed, nasal discharge,nasal congestion ,sore throat ,hoarseness  Pulmonary: Denies shortness of breath ,cough  ,dyspnea on exertion, orthopnea  ,PND   Cardiovascular:  Denies bradycardia , tachycardia  ,palpations, lower extremity edema leg, claudication  Breast:  Denies new or changing breast lumps ,nipple discharge ,nipple changes  Abdomen:  Denies abdominal pain , anorexia , indigestion, nausea, vomiting, constipation, diarrhea  Musculoskeletal: Denies myalgias, arthralgias, joint swelling, joint stiffness , limb pain, limb swelling+ pain across lumbar spine and muscle spasms  Gu: denies dysuria, polyuria  Skin: Denies skin rash, skin lesion, skin wound, itching, dry skin  Neuro: Denies headache, numbness, tingling, confusion, loss of consciousness, dizziness, vertigo  Psychiatric: Denies feelings of depression, suicidal ideation, anxiety, sleep disturbances    OBJECTIVE  /86   Pulse 63   Temp (!) 97 2 °F (36 2 °C)   Ht 5' 5" (1 651 m)   Wt 104 kg (228 lb 9 6 oz)   SpO2 95%   BMI 38 04 kg/m²   Constitutional:   NAD, well appearing and well nourished      ENT:   Conjunctiva and lids: no injection, edema, or discharge     Pupils and iris: STEFAN bilaterally    External inspection of ears and nose: normal without deformities or discharge  Otoscopic exam: Canals patent without erythema  Nasal mucosa, septum and turbinates: Normal or edema or discharge         Oropharynx:  Moist mucosa, normal tongue and tonsils without lesions  No erythema        Pulmonary:Respiratory effort normal rate and rhythm, no increased work of breathing  Auscultation of lungs:  Clear bilaterally with no adventitious breath sounds       Cardiovascular: regular rate and rhythm, S1 and S2, no murmur, no edema and/or varicosities of LE      Abdomen: Soft and non-distended     Positive bowel sounds      No heptomegaly or splenomegaly      Gu: no suprapubic tenderness or CVA tenderness, no urethral discharge  Lymphatic:  No anterior or posterior cervical lymphadenopathy         Musculoskeletal:  Gait and station: Normal gait      Digits and nails normal without clubbing or cyanosis       Inspection/palpation of joints, bones, and muscles:  + paraspinal musculature  tenderness, +pain with  full active and passive range of motion of lumbar spine, negative SLR bilat       Skin: Normal skin turgor and no rashes      Neuro:       Normal reflexes      Psych:   alert and oriented to person, place and time     normal mood and affect       Assessment/Plan:Diagnoses and all orders for this visit:    Strain of lumbar region, initial encounter  -     methocarbamol (ROBAXIN) 500 mg tablet; Take 1 tablet (500 mg total) by mouth daily at bedtime  -     predniSONE 10 mg tablet; 3 tabs po bid x2 days, then 2 tabs po bid x2 days, then 1 tab bid x2 days, then 1 daily until done          Reviewed with patient plan to treat with above plan    Patient instructed to call in 72 hours if not feeling better or if symptoms worsen

## 2022-09-01 ENCOUNTER — APPOINTMENT (OUTPATIENT)
Dept: LAB | Facility: CLINIC | Age: 82
End: 2022-09-01
Payer: COMMERCIAL

## 2022-09-01 DIAGNOSIS — E55.9 VITAMIN D DEFICIENCY: ICD-10-CM

## 2022-09-01 DIAGNOSIS — I10 ESSENTIAL HYPERTENSION: ICD-10-CM

## 2022-09-01 DIAGNOSIS — E78.2 MIXED HYPERLIPIDEMIA: ICD-10-CM

## 2022-09-01 LAB
25(OH)D3 SERPL-MCNC: 60.9 NG/ML (ref 30–100)
ALBUMIN SERPL BCP-MCNC: 3.4 G/DL (ref 3.5–5)
ALP SERPL-CCNC: 72 U/L (ref 46–116)
ALT SERPL W P-5'-P-CCNC: 22 U/L (ref 12–78)
ANION GAP SERPL CALCULATED.3IONS-SCNC: 3 MMOL/L (ref 4–13)
AST SERPL W P-5'-P-CCNC: 19 U/L (ref 5–45)
BILIRUB SERPL-MCNC: 0.49 MG/DL (ref 0.2–1)
BUN SERPL-MCNC: 11 MG/DL (ref 5–25)
CALCIUM ALBUM COR SERPL-MCNC: 10.2 MG/DL (ref 8.3–10.1)
CALCIUM SERPL-MCNC: 9.7 MG/DL (ref 8.3–10.1)
CHLORIDE SERPL-SCNC: 110 MMOL/L (ref 96–108)
CHOLEST SERPL-MCNC: 151 MG/DL
CO2 SERPL-SCNC: 28 MMOL/L (ref 21–32)
CREAT SERPL-MCNC: 0.68 MG/DL (ref 0.6–1.3)
GFR SERPL CREATININE-BSD FRML MDRD: 83 ML/MIN/1.73SQ M
GLUCOSE P FAST SERPL-MCNC: 105 MG/DL (ref 65–99)
HDLC SERPL-MCNC: 49 MG/DL
LDLC SERPL CALC-MCNC: 80 MG/DL (ref 0–100)
POTASSIUM SERPL-SCNC: 4.3 MMOL/L (ref 3.5–5.3)
PROT SERPL-MCNC: 7.1 G/DL (ref 6.4–8.4)
SODIUM SERPL-SCNC: 141 MMOL/L (ref 135–147)
TRIGL SERPL-MCNC: 110 MG/DL

## 2022-09-01 PROCEDURE — 36415 COLL VENOUS BLD VENIPUNCTURE: CPT

## 2022-09-01 PROCEDURE — 80053 COMPREHEN METABOLIC PANEL: CPT

## 2022-09-01 PROCEDURE — 82306 VITAMIN D 25 HYDROXY: CPT

## 2022-09-01 PROCEDURE — 80061 LIPID PANEL: CPT

## 2022-09-09 ENCOUNTER — OFFICE VISIT (OUTPATIENT)
Dept: FAMILY MEDICINE CLINIC | Facility: CLINIC | Age: 82
End: 2022-09-09
Payer: COMMERCIAL

## 2022-09-09 VITALS
RESPIRATION RATE: 16 BRPM | DIASTOLIC BLOOD PRESSURE: 72 MMHG | HEIGHT: 65 IN | OXYGEN SATURATION: 97 % | HEART RATE: 62 BPM | WEIGHT: 231 LBS | SYSTOLIC BLOOD PRESSURE: 124 MMHG | TEMPERATURE: 98.3 F | BODY MASS INDEX: 38.49 KG/M2

## 2022-09-09 DIAGNOSIS — M25.552 BILATERAL HIP PAIN: Primary | ICD-10-CM

## 2022-09-09 DIAGNOSIS — M25.551 BILATERAL HIP PAIN: Primary | ICD-10-CM

## 2022-09-09 DIAGNOSIS — I10 ESSENTIAL HYPERTENSION: ICD-10-CM

## 2022-09-09 DIAGNOSIS — R56.9 SEIZURE (HCC): ICD-10-CM

## 2022-09-09 DIAGNOSIS — E78.5 HYPERLIPIDEMIA, UNSPECIFIED HYPERLIPIDEMIA TYPE: ICD-10-CM

## 2022-09-09 PROCEDURE — 1160F RVW MEDS BY RX/DR IN RCRD: CPT | Performed by: FAMILY MEDICINE

## 2022-09-09 PROCEDURE — 99214 OFFICE O/P EST MOD 30 MIN: CPT | Performed by: FAMILY MEDICINE

## 2022-09-09 PROCEDURE — 3074F SYST BP LT 130 MM HG: CPT | Performed by: FAMILY MEDICINE

## 2022-09-09 PROCEDURE — 3078F DIAST BP <80 MM HG: CPT | Performed by: FAMILY MEDICINE

## 2022-09-09 RX ORDER — PREDNISONE 10 MG/1
TABLET ORAL
Qty: 26 TABLET | Refills: 0 | Status: SHIPPED | OUTPATIENT
Start: 2022-09-09

## 2022-09-09 RX ORDER — CELECOXIB 200 MG/1
200 CAPSULE ORAL DAILY
Qty: 30 CAPSULE | Refills: 2 | Status: SHIPPED | OUTPATIENT
Start: 2022-09-09 | End: 2022-10-09

## 2022-09-12 NOTE — PROGRESS NOTES
Patient ID: Dez Loza is a [de-identified] y o  female  HPI: [de-identified] y  o female presents for follow up hypertension and hypercholesterolemia  She denies any seizure activity  Pt denies any dizziness,  chest pain, palpitations, or dypsnea with exertion  She complains of bilateral hip pain and has not been able to lie on either side to sleep for 4 years  She has been sleeping in a recliner  She denies any instability of either hip  SUBJECTIVE    Family History   Problem Relation Age of Onset    Prostate cancer Father     Other Family         back problem    Arthritis Family     Arthritis Mother     No Known Problems Sister     No Known Problems Daughter     No Known Problems Sister     No Known Problems Paternal Aunt     No Known Problems Paternal Aunt     No Known Problems Paternal Aunt     No Known Problems Paternal Aunt     Prostate cancer Brother      Social History     Socioeconomic History    Marital status:       Spouse name: Not on file    Number of children: Not on file    Years of education: Not on file    Highest education level: Not on file   Occupational History    Not on file   Tobacco Use    Smoking status: Former Smoker    Smokeless tobacco: Never Used   Vaping Use    Vaping Use: Never used   Substance and Sexual Activity    Alcohol use: Yes     Comment: social use    Drug use: No    Sexual activity: Not on file   Other Topics Concern    Not on file   Social History Narrative    Daily coffee consumption (_cups/day)    Denied hx of daily cola consumption    Daily tea consumption (_cups/day)     Social Determinants of Health     Financial Resource Strain: Not on file   Food Insecurity: Not on file   Transportation Needs: Not on file   Physical Activity: Not on file   Stress: Not on file   Social Connections: Not on file   Intimate Partner Violence: Not on file   Housing Stability: Not on file     Past Medical History:   Diagnosis Date    Acute venous embolism and thrombosis of deep vessels of distal lower extremity ([facility] [address] ) 01/27/2014    Blood type A+     Closed fracture of right distal radius and ulna 03/06/2019    Convulsions ([facility] [address] )     Deviated septum 05/2000    Fracture of c2     Gallstones 09/15/2019    Hiatal hernia     1976    HL (hearing loss)     Hyperlipidemia     Osteoarthritis, localized, knee     Seizure ([facility] [address] )     1990    Seizures ([facility] [address] )     Spondylosis     Tinnitus      Past Surgical History:   Procedure Laterality Date    BREAST BIOPSY Right over 10 years    benign    CATARACT EXTRACTION      COMBINED HYSTEROSCOPY DIAGNOSTIC / D&C      ESOPHAGOGASTRODUODENOSCOPY      diagnostic    HAND SURGERY      HERNIA REPAIR      HYSTERECTOMY      KNEE ARTHROPLASTY      Revision;total    KNEE ARTHROSCOPY Right     1997    NASAL SEPTUM SURGERY      Deviation repair    MI INCISE FINGER TENDON SHEATH Right 11/21/2019    Procedure: RING TRIGGER FINGER RELEASE;  Surgeon: Curly William MD;  [facility];  Service: Orthopedics    REPLACEMENT TOTAL KNEE Right     2000    TONSILLECTOMY      1948    TUBAL LIGATION      TUMOR REMOVAL Left 1956    left index finger     Allergies   Allergen Reactions    Adhesive  [Medical Tape]      Other reaction(s): Unknown Reaction    Latex      Other reaction(s): Unknown Reaction    Midazolam     Penicillins     Thiopental        Current Outpatient Medications:     Ascorbic Acid (vitamin C) 1000 MG tablet, Take 1,000 mg by mouth daily, Disp: , Rfl:     aspirin (ECOTRIN LOW STRENGTH) 81 mg EC tablet, Take by mouth daily , Disp: , Rfl:     atorvastatin (LIPITOR) 40 mg tablet, TAKE 1 TABLET DAILY WITH DINNER, Disp: 90 tablet, Rfl: 3    Calcium Carb-Cholecalciferol (CALCIUM 1000 + D PO), Take 1 tablet by mouth daily, Disp: , Rfl:     carvedilol (COREG) 6 25 mg tablet, TAKE 1 TABLET TWICE A DAY WITH MEALS, Disp: 180 tablet, Rfl: 3    celecoxib (CeleBREX) 200 mg capsule, Take 1 capsule (200 mg total) by mouth daily, Disp: 30 capsule, Rfl: 2    cholecalciferol (VITAMIN D3) 1,000 units tablet, Take 1,000 Units by mouth daily, Disp: , Rfl:     Docusate Sodium (STOOL SOFTENER LAXATIVE PO), Take by mouth 2 (two) times a day, Disp: , Rfl:     furosemide (LASIX) 20 mg tablet, TAKE 1 TABLET DAILY (Patient taking differently: if needed), Disp: 90 tablet, Rfl: 3    levETIRAcetam (KEPPRA) 250 mg tablet, Take 1 5 tablets (375 mg total) by mouth every 12 (twelve) hours, Disp: 270 tablet, Rfl: 3    lisinopril (ZESTRIL) 40 mg tablet, TAKE 1 TABLET DAILY, Disp: 90 tablet, Rfl: 3    methocarbamol (ROBAXIN) 500 mg tablet, Take 1 tablet (500 mg total) by mouth daily at bedtime, Disp: 10 tablet, Rfl: 0    Multiple Vitamin (MULTIVITAMINS PO), Take 1 capsule by mouth daily, Disp: , Rfl:     predniSONE 10 mg tablet, 3 tabs po bid x2 days, then 2 tabs po bid x2 days, then 1 tab bid x2 days, then 1 daily until done , Disp: 26 tablet, Rfl: 0    predniSONE 10 mg tablet, 3 tabs po bid x2 days, then 2 tabs po bid x2 days, then 1 tab bid x2 days, then 1 daily until done , Disp: 26 tablet, Rfl: 0    Zinc 100 MG TABS, Take by mouth, Disp: , Rfl:     Review of Systems  Constitutional:     Denies fever, chills ,fatigue ,weakness ,weight loss, weight gain     ENT: Denies earache ,loss of hearing ,nosebleed, nasal discharge,nasal congestion ,sore throat ,hoarseness  Pulmonary: Denies shortness of breath ,cough  ,dyspnea on exertion, orthopnea  ,PND   Cardiovascular:  Denies bradycardia , tachycardia  ,palpations, lower extremity edema leg, claudication  Breast:  Denies new or changing breast lumps ,nipple discharge ,nipple changes  Abdomen:  Denies abdominal pain , anorexia , indigestion, nausea, vomiting, constipation, diarrhea  Musculoskeletal: Denies myalgias,  joint swelling,, limb pain, limb swelling + bilateral hip pain and stiffness  Gu: denies dysuria, polyuria  Skin: Denies skin rash, skin lesion, skin wound, itching, dry skin  Neuro: Denies headache, numbness, tingling, confusion, loss of consciousness, dizziness, vertigo  Psychiatric: Denies feelings of depression, suicidal ideation, anxiety, sleep disturbances    OBJECTIVE  /72   Pulse 62   Temp 98 3 °F (36 8 °C)   Resp 16   Ht 5' 5" (1 651 m)   Wt 105 kg (231 lb)   SpO2 97%   BMI 38 44 kg/m²   Constitutional:   NAD, well appearing and well nourished      ENT:   Conjunctiva and lids: no injection, edema, or discharge     Pupils and iris: STEFAN bilaterally    External inspection of ears and nose: normal without deformities or discharge  Otoscopic exam: Canals patent without erythema  Nasal mucosa, septum and turbinates: Normal or edema or discharge         Oropharynx:  Moist mucosa, normal tongue and tonsils without lesions  No erythema        Pulmonary:Respiratory effort normal rate and rhythm, no increased work of breathing  Auscultation of lungs:  Clear bilaterally with no adventitious breath sounds       Cardiovascular: regular rate and rhythm, S1 and S2, no murmur, no edema and/or varicosities of LE      Abdomen: Soft and non-distended     Positive bowel sounds      No heptomegaly or splenomegaly      Gu: no suprapubic tenderness or CVA tenderness, no urethral discharge  Lymphatic:  No anterior or posterior cervical lymphadenopathy         Musculoskeletal:  Gait and station: Normal gait      Digits and nails normal without clubbing or cyanosis       Inspection/palpation of joints, bones, and muscles:+ lateral hip  Tenderness bilaterally ,full active and passive range of motion of hips with pain       Skin: Normal skin turgor and no rashes      Neuro:      Normal reflexes     Psych:   alert and oriented to person, place and time     normal mood and affect       Assessment/Plan:Diagnoses and all orders for this visit:    Bilateral hip pain  -     XR hip/pelv 2-3 vws left if performed; Future  -     XR hip/pelv 2-3 vws right if performed;  Future  -     predniSONE 10 mg tablet; 3 tabs po bid x2 days, then 2 tabs po bid x2 days, then 1 tab bid x2 days, then 1 daily until done  -     celecoxib (CeleBREX) 200 mg capsule; Take 1 capsule (200 mg total) by mouth daily    Essential hypertension  Comments:  Continue ACE tx  Hyperlipidemia, unspecified hyperlipidemia type  Comments:  Continue statin tx  Seizure ([facility] [address] )  Comments:  Stable on Keppra tx  Reviewed with patient plan to treat with above plan    Patient instructed to call in 72 hours if not feeling better or if symptoms worse

## 2022-09-14 ENCOUNTER — VBI (OUTPATIENT)
Dept: ADMINISTRATIVE | Facility: OTHER | Age: 82
End: 2022-09-14

## 2022-09-14 NOTE — TELEPHONE ENCOUNTER
09/14/22 4:24 PM     [facility] the [facility]       Melina Vickie

## 2022-12-14 ENCOUNTER — HOSPITAL ENCOUNTER (OUTPATIENT)
Dept: RADIOLOGY | Age: 82
Discharge: HOME | End: 2022-12-14

## 2022-12-14 ENCOUNTER — APPOINTMENT (OUTPATIENT)
Dept: RADIOLOGY | Age: 82
End: 2022-12-14

## 2022-12-14 ENCOUNTER — TELEPHONE (OUTPATIENT)
Dept: FAMILY MEDICINE CLINIC | Facility: CLINIC | Age: 82
End: 2022-12-14

## 2022-12-14 DIAGNOSIS — M25.552 BILATERAL HIP PAIN: ICD-10-CM

## 2022-12-14 DIAGNOSIS — M25.551 BILATERAL HIP PAIN: ICD-10-CM

## 2022-12-14 DIAGNOSIS — Z78.0 ASYMPTOMATIC POSTMENOPAUSAL ESTROGEN DEFICIENCY: ICD-10-CM

## 2022-12-14 NOTE — TELEPHONE ENCOUNTER
Patient is asking if you could refer her to a podiatrist in the [facility]    She states that she has gone to Dr Curly Payne for a long time, but he is no longer doing appointments for things like ingrown toenails, which is what she needs

## 2022-12-14 NOTE — TELEPHONE ENCOUNTER
Called patient, she was at an appointment  She is going to call back when she gets home   The phone number for Frank Kang is [phone]

## 2022-12-20 ENCOUNTER — TELEPHONE (OUTPATIENT)
Dept: FAMILY MEDICINE CLINIC | Facility: CLINIC | Age: 82
End: 2022-12-20

## 2022-12-20 DIAGNOSIS — M25.559 HIP PAIN: Primary | ICD-10-CM

## 2022-12-20 RX ORDER — PREDNISONE 10 MG/1
TABLET ORAL
Qty: 26 TABLET | Refills: 0 | Status: SHIPPED | OUTPATIENT
Start: 2022-12-20

## 2022-12-21 DIAGNOSIS — M25.559 HIP PAIN: Primary | ICD-10-CM

## 2022-12-22 ENCOUNTER — HOSPITAL ENCOUNTER (EMERGENCY)
Facility: HOSPITAL | Age: 82
Discharge: HOME | End: 2022-12-22
Attending: EMERGENCY MEDICINE

## 2022-12-22 ENCOUNTER — TELEPHONE (OUTPATIENT)
Dept: FAMILY MEDICINE CLINIC | Facility: CLINIC | Age: 82
End: 2022-12-22

## 2022-12-22 ENCOUNTER — OFFICE VISIT (OUTPATIENT)
Dept: OBGYN CLINIC | Facility: CLINIC | Age: 82
End: 2022-12-22

## 2022-12-22 VITALS — BODY MASS INDEX: 38.49 KG/M2 | WEIGHT: 231 LBS | HEIGHT: 65 IN

## 2022-12-22 VITALS
TEMPERATURE: 98 F | RESPIRATION RATE: 18 BRPM | OXYGEN SATURATION: 98 % | DIASTOLIC BLOOD PRESSURE: 74 MMHG | BODY MASS INDEX: 39.25 KG/M2 | HEART RATE: 81 BPM | WEIGHT: 235.89 LBS | SYSTOLIC BLOOD PRESSURE: 175 MMHG

## 2022-12-22 DIAGNOSIS — M84.352A STRESS FRACTURE OF LEFT HIP: Primary | ICD-10-CM

## 2022-12-22 DIAGNOSIS — M25.552 PAIN IN LEFT HIP: ICD-10-CM

## 2022-12-22 DIAGNOSIS — T78.40XA ALLERGIC REACTION, INITIAL ENCOUNTER: Primary | ICD-10-CM

## 2022-12-22 DIAGNOSIS — M25.559 HIP PAIN: ICD-10-CM

## 2022-12-22 RX ORDER — METHYLPREDNISOLONE SODIUM SUCCINATE 125 MG/2ML
125 INJECTION, POWDER, LYOPHILIZED, FOR SOLUTION INTRAMUSCULAR; INTRAVENOUS ONCE
Status: COMPLETED | OUTPATIENT
Start: 2022-12-22 | End: 2022-12-22

## 2022-12-22 RX ORDER — EPINEPHRINE 0.3 MG/.3ML
0.3 INJECTION SUBCUTANEOUS ONCE
Qty: 0.6 ML | Refills: 0 | Status: SHIPPED | OUTPATIENT
Start: 2022-12-22 | End: 2023-01-12

## 2022-12-22 RX ADMIN — METHYLPREDNISOLONE SODIUM SUCCINATE 125 MG: 125 INJECTION, POWDER, FOR SOLUTION INTRAMUSCULAR; INTRAVENOUS at 16:58

## 2022-12-22 NOTE — PROGRESS NOTES
Assessment:  1  Stress fracture of left hip  MRI hip left wo contrast      2  Hip pain  Ambulatory Referral to Orthopedic Surgery      3  Pain in left hip          Patient Active Problem List   Diagnosis   • Allergic rhinitis   • Bulging of lumbar intervertebral disc   • Chronic osteoarthritis   • Edema   • Epilepsy ([address] )   • Hyperlipidemia   • Essential hypertension   • Seizure (HCC)   • Severe obesity (BMI 35 0-39  9) with comorbidity ([facility])   • Leukopenia   • Thrombocytopenia (HCC)   • Trigger finger, right ring finger   • Primary osteoarthritis of both hips   • Peripheral vascular disease, unspecified (HCC)   • Pain in left hip   • Stress fracture of left hip       Plan:    [de-identified] y o  female with left hip pain suspicion for stress reaction/fracture, versus possible hip flexor tendinitis with mild to moderate arthritis  Also has greater trochanteric bursitis    • Offered the patient cortisone injection into the left greater trochanteric bursa today she reports that this only bothers her when she lies on her side but she never does any more so she does not wish to have an injection in the bursa at this time  • Her Stinchfield test was positive, Nancy Lies was negative, compression test was negative  There is still some concern for stress reaction bone so hip MRI will be obtained of the left hip to rule out stress reaction  • No stress reaction or stress fracture is identified, we will refer her back likely to Dr Barry Hassan for hip flexor versus intra-articular cortisone injections  • If MRI is positive for stress fracture, will contact her to make her nonweightbearing and bring her back in to discuss treatment options    The patient was seen and examined by Dr Zunilda Young and myself  The assessment and plan were formulated by Dr Zunilda Young and I assisted in carrying it out  Subjective:   Patient ID: Ludivina Payton is a [de-identified] y o  female   HPI    Patient comes in today with regards to left hip pain    Patient is referred to us by Carrie Forrest* for further evaluation  The patient reports that the pain been going on for 8 days  Injury or trauma prior to onset of pain: none  Pain is located in the anterior hip  The pain is described as stabbing  They report the pain is not constant  The pain does not radiate   It is worsened with pivoting and walking, and is made better with rest   Treatments tried: oral prednisone and nsaids with relief, no PT or injection   Old injuries or prior surgeries: none  The following portions of the patient's history were reviewed and updated as appropriate: allergies, current medications, past family history, past social history, past surgical history and problem list     Social History     Socioeconomic History   • Marital status:       Spouse name: Not on file   • Number of children: Not on file   • Years of education: Not on file   • Highest education level: Not on file   Occupational History   • Not on file   Tobacco Use   • Smoking status: Former   • Smokeless tobacco: Never   Vaping Use   • Vaping Use: Never used   Substance and Sexual Activity   • Alcohol use: Yes     Comment: social use   • Drug use: No   • Sexual activity: Not on file   Other Topics Concern   • Not on file   Social History Narrative    Daily coffee consumption (_cups/day)    Denied hx of daily cola consumption    Daily tea consumption (_cups/day)     Social Determinants of Health     Financial Resource Strain: Not on file   Food Insecurity: Not on file   Transportation Needs: Not on file   Physical Activity: Not on file   Stress: Not on file   Social Connections: Not on file   Intimate Partner Violence: Not on file   Housing Stability: Not on file     Past Medical History:   Diagnosis Date   • Acute venous embolism and thrombosis of deep vessels of distal lower extremity ([facility] ) 01/27/2014   • Blood type A+    • Closed fracture of right distal radius and ulna 03/06/2019   • Convulsions ([facility] )    • Deviated septum 05/2000   • Fracture of c2    • Gallstones 09/15/2019   • Hiatal hernia     1976   • HL (hearing loss)    • Hyperlipidemia    • Osteoarthritis, localized, knee    • Seizure ([facility] )     1990   • Seizures ([facility] )    • Spondylosis    • Tinnitus      Past Surgical History:   Procedure Laterality Date   • BREAST BIOPSY Right over 10 years    benign   • CATARACT EXTRACTION     • COMBINED HYSTEROSCOPY DIAGNOSTIC / D&C     • ESOPHAGOGASTRODUODENOSCOPY      diagnostic   • HAND SURGERY     • HERNIA REPAIR     • HYSTERECTOMY     • KNEE ARTHROPLASTY      Revision;total   • KNEE ARTHROSCOPY Right     1997   • NASAL SEPTUM SURGERY      Deviation repair   • RI INCISE FINGER TENDON SHEATH Right 11/21/2019    Procedure: RING TRIGGER FINGER RELEASE;  Surgeon: Jv Goetz MD;  Location: [facility];  Service: Orthopedics   • REPLACEMENT TOTAL KNEE Right     2000   • TONSILLECTOMY      1948   • TUBAL LIGATION     • TUMOR REMOVAL Left 1956    left index finger     Allergies   Allergen Reactions   • Adhesive  [Medical Tape]      Other reaction(s): Unknown Reaction   • Latex      Other reaction(s): Unknown Reaction   • Midazolam    • Penicillins    • Thiopental      Current Outpatient Medications on File Prior to Visit   Medication Sig Dispense Refill   • Ascorbic Acid (vitamin C) 1000 MG tablet Take 1,000 mg by mouth daily     • aspirin (ECOTRIN LOW STRENGTH) 81 mg EC tablet Take by mouth daily      • atorvastatin (LIPITOR) 40 mg tablet TAKE 1 TABLET DAILY WITH DINNER 90 tablet 3   • Calcium Carb-Cholecalciferol (CALCIUM 1000 + D PO) Take 1 tablet by mouth daily     • carvedilol (COREG) 6 25 mg tablet TAKE 1 TABLET TWICE A DAY WITH MEALS 180 tablet 3   • celecoxib (CeleBREX) 200 mg capsule Take 1 capsule (200 mg total) by mouth daily 30 capsule 2   • cholecalciferol (VITAMIN D3) 1,000 units tablet Take 1,000 Units by mouth daily     • Docusate Sodium (STOOL SOFTENER LAXATIVE PO) Take by mouth 2 (two) times a day     • furosemide (LASIX) 20 mg tablet TAKE 1 TABLET DAILY (Patient taking differently: if needed) 90 tablet 3   • levETIRAcetam (KEPPRA) 250 mg tablet Take 1 5 tablets (375 mg total) by mouth every 12 (twelve) hours 270 tablet 3   • lisinopril (ZESTRIL) 40 mg tablet TAKE 1 TABLET DAILY 90 tablet 3   • methocarbamol (ROBAXIN) 500 mg tablet Take 1 tablet (500 mg total) by mouth daily at bedtime 10 tablet 0   • Multiple Vitamin (MULTIVITAMINS PO) Take 1 capsule by mouth daily     • predniSONE 10 mg tablet 3 tabs po bid x2 days, then 2 tabs po bid x2 days, then 1 tab bid x2 days, then 1 daily until done  26 tablet 0   • predniSONE 10 mg tablet 3 tabs po bid x2 days, then 2 tabs po bid x2 days, then 1 tab bid x2 days, then 1 daily until done  26 tablet 0   • predniSONE 10 mg tablet 3 tabs po bid x2 days, then 2 tabs po bid x2 days, then 1 tab bid x2 days, then 1 daily until done  26 tablet 0   • Zinc 100 MG TABS Take by mouth       No current facility-administered medications on file prior to visit  Review of Systems   Constitutional: Negative for chills, fever and unexpected weight change  HENT: Negative for hearing loss, nosebleeds and sore throat  Eyes: Negative for pain, redness and visual disturbance  Respiratory: Negative for cough, shortness of breath and wheezing  Cardiovascular: Negative for chest pain, palpitations and leg swelling  Gastrointestinal: Negative for abdominal pain, nausea and vomiting  Endocrine: Negative for polydipsia and polyuria  Genitourinary: Negative for dysuria and hematuria  Musculoskeletal:         As noted in HPI   Skin: Negative for rash and wound  Neurological: Negative for dizziness, numbness and headaches  Psychiatric/Behavioral: Negative for decreased concentration, dysphoric mood and suicidal ideas  The patient is not nervous/anxious  Objective:    Vitals:       Physical Exam  Constitutional:       Appearance: She is well-developed     HENT:      Head: Normocephalic and atraumatic  Eyes:      General: No scleral icterus  Conjunctiva/sclera: Conjunctivae normal    Cardiovascular:      Comments: No discernible arrhthymias  Pulmonary:      Effort: Pulmonary effort is normal  No respiratory distress  Breath sounds: No stridor  Abdominal:      General: There is no distension  Palpations: Abdomen is soft  Musculoskeletal:      Cervical back: Neck supple  Skin:     General: Skin is warm and dry  Findings: No erythema  Neurological:      Mental Status: She is alert and oriented to person, place, and time  Psychiatric:         Behavior: Behavior normal          Left Hip Exam     Tenderness   The patient is experiencing tenderness in the greater trochanter  Range of Motion   Flexion: abnormal   External rotation:  60 abnormal   Internal rotation: 30 abnormal     Muscle Strength   The patient has normal left hip strength  Tests   DOUG: negative    Other   Sensation: normal  Pulse: present    Comments:  Positive stinchfield  Compression test negative             I have personally reviewed pertinent films in PACS and my interpretation is XR of bilat hips from 12/14 shows no acute fractures, mild to moderate DJD  Procedures  No Procedures performed today    Scribe Attestation    I,:  Tommie Rodgers PA-C am acting as a scribe while in the presence of the attending physician :       I,:  Susana Martin DO personally performed the services described in this documentation    as scribed in my presence :             Portions of the record may have been created with voice recognition software  Occasional wrong word or "sound a like" substitutions may have occurred due to the inherent limitations of voice recognition software  Read the chart carefully and recognize, using context, where substitutions have occurred

## 2022-12-22 NOTE — ED PROVIDER NOTES
History  Chief Complaint   Patient presents with   • Allergic Reaction     Pt is allergic to Celebrex and she took Celebrex around 3pm  Pt experienced itchy hands and a lump in her throat  Given 50 of benadryl IV by EMS  Pt reports the benadyrl took the itching away  Yesenia Braun is an [de-identified] yof with history of arthritis who presents after allergic reaction  Had itching sensation from celebrex the first time she took it a few months ago, resolved by PO benadryl  Had not taken it since until 3pm today, developed itching of the hands and face, nausea, and sensation of throat swelling within 45 minutes  Denied lightheadedness, facial swelling, tongue swelling, chest pain, shortness of breath, or hives  Called EMS, given 50mg IV benadryl with relief of itching and nausea, however has persistent tight sensation with subjective voice change  Has no other known allergies, takes NSAID's regularly without reaction  Prior to Admission Medications   Prescriptions Last Dose Informant Patient Reported? Taking?    Ascorbic Acid (vitamin C) 1000 MG tablet   Yes No   Sig: Take 1,000 mg by mouth daily   Calcium Carb-Cholecalciferol (CALCIUM 1000 + D PO)   Yes No   Sig: Take 1 tablet by mouth daily   Docusate Sodium (STOOL SOFTENER LAXATIVE PO)   Yes No   Sig: Take by mouth 2 (two) times a day   Multiple Vitamin (MULTIVITAMINS PO)   Yes No   Sig: Take 1 capsule by mouth daily   Zinc 100 MG TABS   Yes No   Sig: Take by mouth   aspirin (ECOTRIN LOW STRENGTH) 81 mg EC tablet   Yes No   Sig: Take by mouth daily    atorvastatin (LIPITOR) 40 mg tablet   No No   Sig: TAKE 1 TABLET DAILY WITH DINNER   carvedilol (COREG) 6 25 mg tablet   No No   Sig: TAKE 1 TABLET TWICE A DAY WITH MEALS   celecoxib (CeleBREX) 200 mg capsule   No No   Sig: Take 1 capsule (200 mg total) by mouth daily   cholecalciferol (VITAMIN D3) 1,000 units tablet   Yes No   Sig: Take 1,000 Units by mouth daily   furosemide (LASIX) 20 mg tablet   No No   Sig: TAKE 1 TABLET DAILY   Patient taking differently: if needed   levETIRAcetam (KEPPRA) 250 mg tablet   No No   Sig: Take 1 5 tablets (375 mg total) by mouth every 12 (twelve) hours   lisinopril (ZESTRIL) 40 mg tablet   No No   Sig: TAKE 1 TABLET DAILY   methocarbamol (ROBAXIN) 500 mg tablet   No No   Sig: Take 1 tablet (500 mg total) by mouth daily at bedtime   predniSONE 10 mg tablet   No No   Sig: 3 tabs po bid x2 days, then 2 tabs po bid x2 days, then 1 tab bid x2 days, then 1 daily until done  predniSONE 10 mg tablet   No No   Sig: 3 tabs po bid x2 days, then 2 tabs po bid x2 days, then 1 tab bid x2 days, then 1 daily until done  predniSONE 10 mg tablet   No No   Sig: 3 tabs po bid x2 days, then 2 tabs po bid x2 days, then 1 tab bid x2 days, then 1 daily until done        Facility-Administered Medications: None       Past Medical History:   Diagnosis Date   • Acute venous embolism and thrombosis of deep vessels of distal lower extremity ([facility] [address] ) 01/27/2014   • Blood type A+    • Closed fracture of right distal radius and ulna 03/06/2019   • Convulsions ([facility] [address] )    • Deviated septum 05/2000   • Fracture of c2    • Gallstones 09/15/2019   • Hiatal hernia     1976   • HL (hearing loss)    • Hyperlipidemia    • Osteoarthritis, localized, knee    • Seizure ([address] )     1990   • Seizures ([facility] )    • Spondylosis    • Tinnitus        Past Surgical History:   Procedure Laterality Date   • BREAST BIOPSY Right over 10 years    benign   • CATARACT EXTRACTION     • COMBINED HYSTEROSCOPY DIAGNOSTIC / D&C     • ESOPHAGOGASTRODUODENOSCOPY      diagnostic   • HAND SURGERY     • HERNIA REPAIR     • HYSTERECTOMY     • KNEE ARTHROPLASTY      Revision;total   • KNEE ARTHROSCOPY Right     1997   • NASAL SEPTUM SURGERY      Deviation repair   • WI TENDON SHEATH INCISION Right 11/21/2019    Procedure: RING TRIGGER FINGER RELEASE;  Surgeon: Miguel Angel Ji MD;  Location: [facility]  MAIN [facility];  Service: Orthopedics   • REPLACEMENT TOTAL KNEE Right     2000   • TONSILLECTOMY      1948   • TUBAL LIGATION     • TUMOR REMOVAL Left 1956    left index finger       Family History   Problem Relation Age of Onset   • Prostate cancer Father    • Other Family         back problem   • Arthritis Family    • Arthritis Mother    • No Known Problems Sister    • No Known Problems Daughter    • No Known Problems Sister    • No Known Problems Paternal Aunt    • No Known Problems Paternal Aunt    • No Known Problems Paternal Aunt    • No Known Problems Paternal Aunt    • Prostate cancer Brother      I have reviewed and agree with the history as documented  E-Cigarette/Vaping   • E-Cigarette Use Never User      E-Cigarette/Vaping Substances   • Nicotine No    • THC No    • CBD No      Social History     Tobacco Use   • Smoking status: Former   • Smokeless tobacco: Never   Vaping Use   • Vaping Use: Never used   Substance Use Topics   • Alcohol use: Yes     Comment: social use   • Drug use: No        Review of Systems   Constitutional: Negative for diaphoresis  HENT: Positive for voice change  Negative for facial swelling  Eyes: Negative for discharge and itching  Respiratory: Negative  Negative for cough and shortness of breath  Cardiovascular: Negative  Negative for chest pain  Gastrointestinal: Positive for nausea  Negative for abdominal pain and vomiting  Skin: Negative  Negative for color change and rash  Allergic/Immunologic: Negative  Negative for environmental allergies, food allergies and immunocompromised state  Neurological: Negative  Negative for dizziness, syncope, weakness and light-headedness  All other systems reviewed and are negative        Physical Exam  ED Triage Vitals [12/22/22 1648]   Temperature Pulse Respirations Blood Pressure SpO2   98 °F (36 7 °C) 99 20 (!) 171/83 93 %      Temp src Heart Rate Source Patient Position - Orthostatic VS BP Location FiO2 (%)   -- Monitor Sitting Right arm --      Pain Score       No Pain Orthostatic Vital Signs  Vitals:    12/22/22 1648 12/22/22 1730   BP: (!) 171/83 (!) 175/74   Pulse: 99 81   Patient Position - Orthostatic VS: Sitting Lying       Physical Exam  Vitals and nursing note reviewed  Exam conducted with a chaperone present  Constitutional:       General: She is not in acute distress  Appearance: Normal appearance  She is not ill-appearing  HENT:      Head: Normocephalic and atraumatic  Right Ear: External ear normal       Left Ear: External ear normal       Nose: Nose normal       Mouth/Throat:      Mouth: Mucous membranes are moist  No angioedema  Tongue: No lesions  Tongue does not deviate from midline  Pharynx: Oropharynx is clear  Uvula midline  No pharyngeal swelling, oropharyngeal exudate, posterior oropharyngeal erythema or uvula swelling  Eyes:      General:         Right eye: No discharge  Left eye: No discharge  Extraocular Movements: Extraocular movements intact  Conjunctiva/sclera: Conjunctivae normal       Pupils: Pupils are equal, round, and reactive to light  Cardiovascular:      Rate and Rhythm: Normal rate and regular rhythm  Pulses: Normal pulses  Heart sounds: Normal heart sounds  No murmur heard  No friction rub  No gallop  Pulmonary:      Effort: Pulmonary effort is normal  No respiratory distress  Breath sounds: Normal breath sounds  No stridor  No wheezing, rhonchi or rales  Abdominal:      General: Abdomen is flat  Bowel sounds are normal  There is no distension  Palpations: Abdomen is soft  Tenderness: There is no abdominal tenderness  There is no guarding or rebound  Musculoskeletal:         General: No swelling or tenderness  Normal range of motion  Cervical back: Normal range of motion and neck supple  Right lower leg: No edema  Lymphadenopathy:      Cervical: No cervical adenopathy  Skin:     General: Skin is warm and dry        Capillary Refill: Capillary refill takes less than 2 seconds  Findings: No erythema or rash  Neurological:      General: No focal deficit present  Mental Status: She is alert and oriented to person, place, and time  Motor: No weakness  Psychiatric:         Mood and Affect: Mood normal          Behavior: Behavior normal          ED Medications  Medications   diphenhydrAMINE (FOR EMS ONLY) (BENADRYL) injection 50 mg (0 mg Does not apply Given to EMS 12/22/22 1656)   methylPREDNISolone sodium succinate (Solu-MEDROL) injection 125 mg (125 mg Intravenous Given 12/22/22 1658)       Diagnostic Studies  Results Reviewed     None                 No orders to display         Procedures  Procedures      ED Course                                       MDM  Number of Diagnoses or Management Options  Allergic reaction, initial encounter  Diagnosis management comments: Pt presents in no apparent distress after allergic reaction to celebrex, given IV benadryl prior to arrival   VS stable, physical exam as above, airway grossly patent, LS CTA bilaterally  Given dose of steroids with improvement in subjective throat tightness  Monitored for 2+ hours without return of symptoms  To avoid celebrex, not take NSAIDS until evaluated by PCP  Epi pen prescribed, discussed indications for use  Strict return precautions discussed  Asymptomatic at time of discharge  Amount and/or Complexity of Data Reviewed  Review and summarize past medical records: yes  Discuss the patient with other providers: yes        Disposition  Final diagnoses:    Allergic reaction, initial encounter     Time reflects when diagnosis was documented in both MDM as applicable and the Disposition within this note     Time User Action Codes Description Comment    12/22/2022  7:18 PM Seema Melvin Add [[id number] [id number]] Allergic reaction, initial encounter       ED Disposition     ED Disposition   Discharge    Condition   Stable    Date/Time   Thu Dec 22, 2022  7:18 PM Comment   Anusha Booth discharge to home/self care  Follow-up Information     Follow up With Specialties Details Why Jono  Family Medicine   [id number] [facility]    [address]  [phone]            Discharge Medication List as of 12/22/2022  7:20 PM      CONTINUE these medications which have NOT CHANGED    Details   Ascorbic Acid (vitamin C) 1000 MG tablet Take 1,000 mg by mouth daily, Historical Med      aspirin (ECOTRIN LOW STRENGTH) 81 mg EC tablet Take by mouth daily , Historical Med      atorvastatin (LIPITOR) 40 mg tablet TAKE 1 TABLET DAILY WITH DINNER, Normal      Calcium Carb-Cholecalciferol (CALCIUM 1000 + D PO) Take 1 tablet by mouth daily, Historical Med      carvedilol (COREG) 6 25 mg tablet TAKE 1 TABLET TWICE A DAY WITH MEALS, Normal      celecoxib (CeleBREX) 200 mg capsule Take 1 capsule (200 mg total) by mouth daily, Starting Fri 9/9/2022, Until Sun 10/9/2022, Normal      cholecalciferol (VITAMIN D3) 1,000 units tablet Take 1,000 Units by mouth daily, Historical Med      Docusate Sodium (STOOL SOFTENER LAXATIVE PO) Take by mouth 2 (two) times a day, Historical Med      furosemide (LASIX) 20 mg tablet TAKE 1 TABLET DAILY, Normal      levETIRAcetam (KEPPRA) 250 mg tablet Take 1 5 tablets (375 mg total) by mouth every 12 (twelve) hours, Starting Fri 5/6/2022, Normal      lisinopril (ZESTRIL) 40 mg tablet TAKE 1 TABLET DAILY, Normal      methocarbamol (ROBAXIN) 500 mg tablet Take 1 tablet (500 mg total) by mouth daily at bedtime, Starting Mon 7/11/2022, Normal      Multiple Vitamin (MULTIVITAMINS PO) Take 1 capsule by mouth daily, Historical Med      !! predniSONE 10 mg tablet 3 tabs po bid x2 days, then 2 tabs po bid x2 days, then 1 tab bid x2 days, then 1 daily until done , Normal      !! predniSONE 10 mg tablet 3 tabs po bid x2 days, then 2 tabs po bid x2 days, then 1 tab bid x2 days, then 1 daily until done , Normal      !! predniSONE 10 mg tablet 3 tabs po bid x2 days, then 2 tabs po bid x2 days, then 1 tab bid x2 days, then 1 daily until done , Normal      Zinc 100 MG TABS Take by mouth, Historical Med       !! - Potential duplicate medications found  Please discuss with provider  No discharge procedures on file  PDMP Review     None           ED Provider  Attending physically available and evaluated Malva Kussmaul  DERIC managed the patient along with the ED Attending      Electronically Signed by         Renan Contreras DO  12/23/22 7068

## 2022-12-22 NOTE — ED ATTENDING ATTESTATION
12/22/2022  IJennifer MD, saw and evaluated the patient  I have discussed the patient with the resident/non-physician practitioner and agree with the resident's/non-physician practitioner's findings, Plan of Care, and MDM as documented in the resident's/non-physician practitioner's note, except where noted  All available labs and Radiology studies were reviewed  I was present for key portions of any procedure(s) performed by the resident/non-physician practitioner and I was immediately available to provide assistance  At this point I agree with the current assessment done in the Emergency Department  I have conducted an independent evaluation of this patient a history and physical is as follows:    73-year-old female presenting for evaluation of itching and sensation of swelling in her throat that started about 1 hour after taking a dose of Celebrex  She has had itching with Celebrex in the past a few months ago  Has not taken it in the interim until today  She received 50 mg of IV Benadryl by EMS prior to arrival   Denies hives, wheezing, nausea, vomiting, or diarrhea  No prior history of allergic reactions  Physical Exam  Vitals and nursing note reviewed  Constitutional:       General: She is not in acute distress  Appearance: She is well-developed  She is not diaphoretic  HENT:      Head: Normocephalic and atraumatic  Right Ear: External ear normal       Left Ear: External ear normal       Nose: Nose normal       Mouth/Throat:      Comments: No swelling to the lips, tongue, or visualized portions of the oropharynx  Eyes:      Conjunctiva/sclera: Conjunctivae normal    Cardiovascular:      Rate and Rhythm: Normal rate and regular rhythm  Pulses: Normal pulses  Heart sounds: Normal heart sounds  No murmur heard  No friction rub  No gallop  Pulmonary:      Effort: Pulmonary effort is normal  No respiratory distress  Breath sounds: Normal breath sounds   No wheezing or rales  Abdominal:      General: Bowel sounds are normal  There is no distension  Palpations: Abdomen is soft  Tenderness: There is no abdominal tenderness  There is no guarding  Musculoskeletal:         General: No deformity  Normal range of motion  Cervical back: Normal range of motion and neck supple  Right lower leg: No edema  Left lower leg: No edema  Skin:     General: Skin is warm and dry  Neurological:      Mental Status: She is alert and oriented to person, place, and time  Motor: No abnormal muscle tone  Psychiatric:         Mood and Affect: Mood normal            ED Course  ED Course as of 12/22/22 2001   Thu Dec 22, 2022   1820 Patient reports feeling markedly improved after receiving IV steroids as well as Benadryl  Reports resolution of the feeling of tightness in her throat  No hives, wheezing, vomiting, or diarrhea  No indication for epinephrine at this time but will observe in the emergency department for another hour and 1/2 to 2 hours to ensure that symptoms do not escalate  Patient counseled to not take Celebrex in the future  Patient observed in the emergency department for nearly 3-1/2 hours  Reports resolution of her symptoms  She was counseled to take Benadryl every 8 hours as needed for itching  Return precautions discussed      Critical Care Time  Procedures

## 2022-12-22 NOTE — TELEPHONE ENCOUNTER
Patient called and has been complaining of itching in both hands from Celebrex  Patient is also complaining of hoarseness and lump in throat  Wanted to know what else she can take to eliminate itchiness  Patient would like to know if there is something else you can prescribe other than Celebrex  Pharmacy [id number] [facility]     Phone # [phone]

## 2022-12-22 NOTE — ED NOTES
Pt's skin is no longer reddened, voice is still slightly hoarse, pt states her throat no longer feels like there is a lump in it  Pt states she still feels "shakiness' every once and awhile        Carmen Keen RN  12/22/22 [id number]

## 2022-12-23 NOTE — ED NOTES
Called [employer] for [employer] ride; arriving in 10 minutes  Pt escorted to wait in waiting room       Veronica Almeida RN  12/22/22 2006

## 2022-12-23 NOTE — DISCHARGE INSTRUCTIONS
Please take benadryl every 8 hours as needed for itching  Do not take celebrex  Please come back to the ER if you have trouble breathing, facial swelling, or feel like your throat is closing

## 2022-12-27 ENCOUNTER — HOSPITAL ENCOUNTER (OUTPATIENT)
Dept: RADIOLOGY | Facility: HOSPITAL | Age: 82
Discharge: HOME | End: 2022-12-27

## 2022-12-27 DIAGNOSIS — M84.352A STRESS FRACTURE OF LEFT HIP: ICD-10-CM

## 2022-12-29 ENCOUNTER — TELEPHONE (OUTPATIENT)
Dept: FAMILY MEDICINE CLINIC | Facility: CLINIC | Age: 82
End: 2022-12-29

## 2022-12-29 NOTE — TELEPHONE ENCOUNTER
Pt  Was called to rescheduled  However she still wants to come in for flu shot- ok to sched  The flu shot and reschedule ER f/u ? Please advise ?

## 2023-01-05 ENCOUNTER — OFFICE VISIT (OUTPATIENT)
Dept: OBGYN CLINIC | Facility: CLINIC | Age: 83
End: 2023-01-05

## 2023-01-05 VITALS
BODY MASS INDEX: 39.15 KG/M2 | SYSTOLIC BLOOD PRESSURE: 187 MMHG | HEART RATE: 70 BPM | WEIGHT: 235 LBS | DIASTOLIC BLOOD PRESSURE: 84 MMHG | HEIGHT: 65 IN

## 2023-01-05 DIAGNOSIS — M16.12 PRIMARY OSTEOARTHRITIS OF ONE HIP, LEFT: Primary | ICD-10-CM

## 2023-01-05 DIAGNOSIS — T14.8XXA MUSCLE TEAR: ICD-10-CM

## 2023-01-05 NOTE — PROGRESS NOTES
Assessment:  1  Primary osteoarthritis of one hip, left  Ambulatory Referral to Sports Medicine    CANCELED: Ambulatory Referral to Sports Medicine      2  Muscle tear          Patient Active Problem List   Diagnosis   • Allergic rhinitis   • Bulging of lumbar intervertebral disc   • Chronic osteoarthritis   • Edema   • Epilepsy ([facility] [address] )   • Hyperlipidemia   • Essential hypertension   • Seiz[facility] ([facility] )   • Severe obesity (BMI 35 0-39  9) with comorbidity ([facility])   • Leukopenia   • Thrombocytopenia ([facility])   • Trigger finger, right ring finger   • Primary osteoarthritis of both hips   • Peripheral vascular disease, unspecified ([facility] )   • Pain in left hip   • Stress fracture of left hip           Plan      The patient is afflicted with a near-full thickness left hip abductor tear  This is similar to a rotator cuff tear in the shoulder  She also has arthritis  It is presently difficult to tell whether her pain is coming from the arthritis or the tear  We discussed that while we could try a CSI, if most of her pain is from the tear, the injection would not help her muscle tear at all  The only way to permanently fix the muscle tear would be through an operation to fix this muscle  If she does not have it fixed, she will continue to have pain every time she walks  Dr Melva Echols does not do this operation  We can send her to Dr Candace Andujar for discussion of this surgery and see if he will do it for her  The biggest obstacle to her having a future surgery would be her age  This would impact her healing potential   The only alternative to surgery would be PRP injections under US guidance  This would have to be paid out of pocket  We gave her the option to do a hip injection vs  Discussion of surgical intervention  She would like to try an injection first and see how well it works for her  We will refer her to see Dr Nasra Love for this injection and see if it improves her symptoms            Subjective:     Patient ID:    Chief Complaint:Anusha Booth [de-identified] y o  female with left hip pain      HPI    Patient comes in today with regards to left hip pain  She recently had an MRI and is here to review the results  3 weeks ago, she had X-rays of the hip and her foot was twisted at an unnatural angle  She has had pain every since  The pain was sharp and severe but has now resolved into more of a dull "tootchache" pain  She never had any pain prior to 3 weeks ago  She had a previous allergic reaction to Celebrex  The following portions of the patient's history were reviewed and updated as appropriate: allergies, current medications, past family history, past social history, past surgical history and problem list         Social History     Socioeconomic History   • Marital status:       Spouse name: Not on file   • Number of children: Not on file   • Years of education: Not on file   • Highest education level: Not on file   Occupational History   • Not on file   Tobacco Use   • Smoking status: Former   • Smokeless tobacco: Never   Vaping Use   • Vaping Use: Never used   Substance and Sexual Activity   • Alcohol use: Yes     Comment: social use   • Drug use: No   • Sexual activity: Not on file   Other Topics Concern   • Not on file   Social History Narrative    Daily coffee consumption (_cups/day)    Denied hx of daily cola consumption    Daily tea consumption (_cups/day)     Social Determinants of Health     Financial Resource Strain: Not on file   Food Insecurity: Not on file   Transportation Needs: Not on file   Physical Activity: Not on file   Stress: Not on file   Social Connections: Not on file   Intimate Partner Violence: Not on file   Housing Stability: Not on file     Past Medical History:   Diagnosis Date   • Acute venous embolism and thrombosis of deep vessels of distal lower [facility] ) 01/27/2014   • Blood type A+    • Closed fracture of right distal radius and ulna 03/06/2019   • Convulsions ([facility] )    • Deviated septum 05/2000   • Fracture of c2    • Gallstones 09/15/2019   • Hiatal hernia     1976   • HL (hearing loss)    • Hyperlipidemia    • Osteoarthritis, localized, knee    • Seizure ([facility] [address] )     1990   • Seizures ([facility] )    • Spondylosis    • Tinnitus      Past Surgical History:   Procedure Laterality Date   • BREAST BIOPSY Right over 10 years    benign   • CATARACT EXTRACTION     • COMBINED HYSTEROSCOPY DIAGNOSTIC / D&C     • ESOPHAGOGASTRODUODENOSCOPY      diagnostic   • HAND SURGERY     • HERNIA REPAIR     • HYSTERECTOMY     • KNEE ARTHROPLASTY      Revision;total   • KNEE ARTHROSCOPY Right     1997   • NASAL SEPTUM SURGERY      Deviation repair   • [place] TENDON SHEATH INCISION Right 11/21/2019    Procedure: RING TRIGGER FINGER RELEASE;  Surgeon: Tommi Boas, MD;  Location: [facility];  Service: Orthopedics   • REPLACEMENT TOTAL KNEE Right     2000   • TONSILLECTOMY      1948   • TUBAL LIGATION     • TUMOR REMOVAL Left 1956    left index finger     Allergies   Allergen Reactions   • Adhesive  [Medical Tape]      Other reaction(s): Unknown Reaction   • Celebrex [Celecoxib] Itching and GI Intolerance     Voice change   • Latex      Other reaction(s): Unknown Reaction   • Midazolam    • Penicillins    • Thiopental      Current Outpatient Medications on File Prior to Visit   Medication Sig Dispense Refill   • Ascorbic Acid (vitamin C) 1000 MG tablet Take 1,000 mg by mouth daily     • aspirin (ECOTRIN LOW STRENGTH) 81 mg EC tablet Take by mouth daily      • atorvastatin (LIPITOR) 40 mg tablet TAKE 1 TABLET DAILY WITH DINNER 90 tablet 3   • Calcium Carb-Cholecalciferol (CALCIUM 1000 + D PO) Take 1 tablet by mouth daily     • carvedilol (COREG) 6 25 mg tablet TAKE 1 TABLET TWICE A DAY WITH MEALS 180 tablet 3   • celecoxib (CeleBREX) 200 mg capsule Take 1 capsule (200 mg total) by mouth daily 30 capsule 2   • cholecalciferol (VITAMIN D3) 1,000 units tablet Take 1,000 Units by mouth daily     • Docusate Sodium (STOOL SOFTENER LAXATIVE PO) Take by mouth 2 (two) times a day     • EPINEPHrine (EPIPEN) 0 3 mg/0 3 mL SOAJ Inject 0 3 mL (0 3 mg total) into a muscle once for 1 dose 0 6 mL 0   • furosemide (LASIX) 20 mg tablet TAKE 1 TABLET DAILY (Patient taking differently: if needed) 90 tablet 3   • levETIRAcetam (KEPPRA) 250 mg tablet Take 1 5 tablets (375 mg total) by mouth every 12 (twelve) hours 270 tablet 3   • lisinopril (ZESTRIL) 40 mg tablet TAKE 1 TABLET DAILY 90 tablet 3   • methocarbamol (ROBAXIN) 500 mg tablet Take 1 tablet (500 mg total) by mouth daily at bedtime 10 tablet 0   • Multiple Vitamin (MULTIVITAMINS PO) Take 1 capsule by mouth daily     • predniSONE 10 mg tablet 3 tabs po bid x2 days, then 2 tabs po bid x2 days, then 1 tab bid x2 days, then 1 daily until done  26 tablet 0   • predniSONE 10 mg tablet 3 tabs po bid x2 days, then 2 tabs po bid x2 days, then 1 tab bid x2 days, then 1 daily until done  26 tablet 0   • predniSONE 10 mg tablet 3 tabs po bid x2 days, then 2 tabs po bid x2 days, then 1 tab bid x2 days, then 1 daily until done  26 tablet 0   • Zinc 100 MG TABS Take by mouth       No current facility-administered medications on file prior to visit  Objective:    Review of Systems   Constitutional: Negative for chills and fever  HENT: Negative for ear pain and sore throat  Eyes: Negative for pain and visual disturbance  Respiratory: Negative for cough and shortness of breath  Cardiovascular: Negative for chest pain and palpitations  Gastrointestinal: Negative for abdominal pain and vomiting  Musculoskeletal: Positive for arthralgias (left hip pain)  Skin: Negative for color change and rash  Neurological: Negative for seizures and syncope  All other systems reviewed and are negative  Left Hip Exam     Tenderness   The patient is experiencing tenderness in the anterior and lateral (She is tender over the anterior superior aspect of the greater trochanter)      Comments:  Internal rotation is approximately 45 degrees  Extrnal rotation causes her pain              Physical Exam  Constitutional:       Appearance: She is well-developed  HENT:      Head: Normocephalic and atraumatic  Eyes:      Conjunctiva/sclera: Conjunctivae normal    Cardiovascular:      Comments: No discernible arrhthymias  Pulmonary:      Effort: Pulmonary effort is normal  No respiratory distress  Musculoskeletal:      Cervical back: Normal range of motion  Skin:     General: Skin is warm and dry  Neurological:      Mental Status: She is alert and oriented to person, place, and time  Psychiatric:         Behavior: Behavior normal          Procedures  No Procedures performed today    I have personally reviewed pertinent films in PACS and my interpretation is mild to moderate osteoarthritis of the left hip and high-grade partial/near-complete tearing left gluteus minimus insertion  Mild tendinosis anterior half left gluteus medius insertion  Scribe Attestation    I,:  Talita Argueta PA-C am acting as a scribe while in the presence of the attending physician :       I,:  Darline Garnett DO personally performed the services described in this documentation    as scribed in my presence :           Portions of the record may have been created with voice recognition software   Occasional wrong word or "sound a like" substitutions may have occurred due to the inherent limitations of voice recognition software   Read the chart carefully and recognize, using context, where substitutions have occurred

## 2023-01-12 ENCOUNTER — OFFICE VISIT (OUTPATIENT)
Dept: FAMILY MEDICINE CLINIC | Facility: CLINIC | Age: 83
End: 2023-01-12

## 2023-01-12 VITALS
BODY MASS INDEX: 39.15 KG/M2 | OXYGEN SATURATION: 97 % | TEMPERATURE: 97.7 F | HEART RATE: 58 BPM | WEIGHT: 235 LBS | SYSTOLIC BLOOD PRESSURE: 126 MMHG | RESPIRATION RATE: 16 BRPM | HEIGHT: 65 IN | DIASTOLIC BLOOD PRESSURE: 84 MMHG

## 2023-01-12 DIAGNOSIS — M25.552 HIP PAIN, CHRONIC, LEFT: Primary | ICD-10-CM

## 2023-01-12 DIAGNOSIS — G89.29 HIP PAIN, CHRONIC, LEFT: Primary | ICD-10-CM

## 2023-01-12 DIAGNOSIS — Z23 NEED FOR VACCINATION: ICD-10-CM

## 2023-01-12 RX ORDER — MELOXICAM 15 MG/1
15 TABLET ORAL DAILY
Qty: 30 TABLET | Refills: 5 | Status: SHIPPED | OUTPATIENT
Start: 2023-01-12

## 2023-01-16 NOTE — PROGRESS NOTES
Patient ID: Rocky Bro is a [de-identified] y o  female  HPI: [de-identified] y  o female presents for evaluation after an allergic reaction to celebrex  She had hives, tightness on her chest, but no difficulty swallowing or breathing  SUBJECTIVE    Family History   Problem Relation Age of Onset   • Prostate cancer Father    • Other Family         back problem   • Arthritis Family    • Arthritis Mother    • No Known Problems Sister    • No Known Problems Daughter    • No Known Problems Sister    • No Known Problems Paternal Aunt    • No Known Problems Paternal Aunt    • No Known Problems Paternal Aunt    • No Known Problems Paternal Aunt    • Prostate cancer Brother      Social History     Socioeconomic History   • Marital status:       Spouse name: Not on file   • Number of children: Not on file   • Years of education: Not on file   • Highest education level: Not on file   Occupational History   • Not on file   Tobacco Use   • Smoking status: Former   • Smokeless tobacco: Never   Vaping Use   • Vaping Use: Never used   Substance and Sexual Activity   • Alcohol use: Yes     Comment: social use   • Drug use: No   • Sexual activity: Not on file   Other Topics Concern   • Not on file   Social History Narrative    Daily coffee consumption (_cups/day)    Denied hx of daily cola consumption    Daily tea consumption (_cups/day)     Social Determinants of Health     Financial Resource Strain: Not on file   Food Insecurity: Not on file   Transportation Needs: Not on file   Physical Activity: Not on file   Stress: Not on file   Social Connections: Not on file   Intimate Partner Violence: Not on file   Housing Stability: Not on file     Past Medical History:   Diagnosis Date   • Acute venous embolism and thrombosis of deep vessels of distal lower extremity ([facility] [address] ) 01/27/2014   • Blood type A+    • Closed fracture of right distal radius and ulna 03/06/2019   • Convulsions ([facility] [facility] )    • Deviated septum 05/2000   • Fracture of c2    • Gallstones 09/15/2019   • Hiatal hernia     1976   • HL (hearing loss)    • Hyperlipidemia    • Osteoarthritis, localized, knee    • Seizure ([facility] [address] )     1990   • Seizures ([facility] [address] )    • Spondylosis    • Tinnitus      Past Surgical History:   Procedure Laterality Date   • BREAST BIOPSY Right over 10 years    benign   • CATARACT EXTRACTION     • COMBINED HYSTEROSCOPY DIAGNOSTIC / D&C     • ESOPHAGOGASTRODUODENOSCOPY      diagnostic   • HAND SURGERY     • HERNIA REPAIR     • HYSTERECTOMY     • KNEE ARTHROPLASTY      Revision;total   • KNEE ARTHROSCOPY Right     1997   • NASAL SEPTUM SURGERY      Deviation repair   • [place] TENDON SHEATH INCISION Right 11/21/2019    Procedure: RING TRIGGER FINGER RELEASE;  Surgeon: Elena Louise MD;  Location: [facility];  Service: Orthopedics   • REPLACEMENT TOTAL KNEE Right     2000   • TONSILLECTOMY      1948   • TUBAL LIGATION     • TUMOR REMOVAL Left 1956    left index finger     Allergies   Allergen Reactions   • Adhesive  [Medical Tape]      Other reaction(s): Unknown Reaction   • Celebrex [Celecoxib] Itching and GI Intolerance     Voice change   • Latex      Other reaction(s): Unknown Reaction   • Midazolam    • Penicillins    • Thiopental        Current Outpatient Medications:   •  Ascorbic Acid (vitamin C) 1000 MG tablet, Take 1,000 mg by mouth daily, Disp: , Rfl:   •  aspirin (ECOTRIN LOW STRENGTH) 81 mg EC tablet, Take by mouth daily , Disp: , Rfl:   •  atorvastatin (LIPITOR) 40 mg tablet, TAKE 1 TABLET DAILY WITH DINNER, Disp: 90 tablet, Rfl: 3  •  Calcium Carb-Cholecalciferol (CALCIUM 1000 + D PO), Take 1 tablet by mouth daily, Disp: , Rfl:   •  carvedilol (COREG) 6 25 mg tablet, TAKE 1 TABLET TWICE A DAY WITH MEALS, Disp: 180 tablet, Rfl: 3  •  cholecalciferol (VITAMIN D3) 1,000 units tablet, Take 1,000 Units by mouth daily, Disp: , Rfl:   •  Docusate Sodium (STOOL SOFTENER LAXATIVE PO), Take by mouth 2 (two) times a day, Disp: , Rfl:   •  EPINEPHrine (EPIPEN) 0 3 mg/0 3 mL SOAJ, Inject 0 3 mL (0 3 mg total) into a muscle once for 1 dose, Disp: 0 6 mL, Rfl: 0  •  furosemide (LASIX) 20 mg tablet, TAKE 1 TABLET DAILY (Patient taking differently: if needed), Disp: 90 tablet, Rfl: 3  •  levETIRAcetam (KEPPRA) 250 mg tablet, Take 1 5 tablets (375 mg total) by mouth every 12 (twelve) hours, Disp: 270 tablet, Rfl: 3  •  lisinopril (ZESTRIL) 40 mg tablet, TAKE 1 TABLET DAILY, Disp: 90 tablet, Rfl: 3  •  meloxicam (Mobic) 15 mg tablet, Take 1 tablet (15 mg total) by mouth daily, Disp: 30 tablet, Rfl: 5  •  Multiple Vitamin (MULTIVITAMINS PO), Take 1 capsule by mouth daily, Disp: , Rfl:   •  Zinc 100 MG TABS, Take by mouth, Disp: , Rfl:   •  methocarbamol (ROBAXIN) 500 mg tablet, Take 1 tablet (500 mg total) by mouth daily at bedtime, Disp: 10 tablet, Rfl: 0  •  predniSONE 10 mg tablet, 3 tabs po bid x2 days, then 2 tabs po bid x2 days, then 1 tab bid x2 days, then 1 daily until done , Disp: 26 tablet, Rfl: 0    Review of Systems  Constitutional:     Denies fever, chills ,fatigue ,weakness ,weight loss, weight gain     ENT: Denies earache ,loss of hearing ,nosebleed, nasal discharge,nasal congestion ,sore throat ,hoarseness  Pulmonary: Denies shortness of breath ,cough  ,dyspnea on exertion, orthopnea  ,PND   Cardiovascular:  Denies bradycardia , tachycardia  ,palpations, lower extremity edema leg, claudication  Breast:  Denies new or changing breast lumps ,nipple discharge ,nipple changes  Abdomen:  Denies abdominal pain , anorexia , indigestion, nausea, vomiting, constipation, diarrhea  Musculoskeletal: Denies myalgias,, joint swelling, joint stiffness , limb pain, limb swelling+ hip pain  Gu: denies dysuria, polyuria  Skin: Denies skin rash, skin lesion, skin wound, itching, dry skin  Neuro: Denies headache, numbness, tingling, confusion, loss of consciousness, dizziness, vertigo  Psychiatric: Denies feelings of depression, suicidal ideation, anxiety, sleep disturbances    OBJECTIVE  BP 126/84   Pulse 58   Temp 97 7 °F (36 5 °C)   Resp 16   Ht 5' 5" (1 651 m)   Wt 107 kg (235 lb)   SpO2 97%   BMI 39 11 kg/m²   Constitutional:   NAD, well appearing and well nourished      ENT:   Conjunctiva and lids: no injection, edema, or discharge     Pupils and iris: STEFAN bilaterally    External inspection of ears and nose: normal without deformities or discharge  Otoscopic exam: Canals patent without erythema  Nasal mucosa, septum and turbinates: Normal or edema or discharge         Oropharynx:  Moist mucosa, normal tongue and tonsils without lesions  No erythema        Pulmonary:Respiratory effort normal rate and rhythm, no increased work of breathing  Auscultation of lungs:  Clear bilaterally with no adventitious breath sounds       Cardiovascular: regular rate and rhythm, S1 and S2, no murmur, no edema and/or varicosities of LE     Abdomen: Soft and non-distended     Positive bowel sounds      No heptomegaly or splenomegaly      Gu: no suprapubic tenderness or CVA tenderness, no urethral discharge  Lymphatic:  No anterior or posterior cervical lymphadenopathy         Musculoskeletal:  Gait and station: Normal gait      Digits and nails normal without clubbing or cyanosis       Inspection/palpation of joints, bones, and muscles:  No joint tenderness, swelling, full active and passive range of motion       Skin: Normal skin turgor and no rashes      Neuro:    Normal reflexes      Psych:   alert and oriented to person, place and time     normal mood and affect       Assessment/Plan:Diagnoses and all orders for this visit:    Hip pain, chronic, left  -     meloxicam (Mobic) 15 mg tablet; Take 1 tablet (15 mg total) by mouth daily    Need for vaccination  -     influenza vaccine, high-dose, PF 0 7 mL (FLUZONE HIGH-DOSE)        Reviewed with patient plan to treat with above plan      Patient instructed to call in 72 hours if not feeling better or if symptoms worsen

## 2023-01-24 ENCOUNTER — OFFICE VISIT (OUTPATIENT)
Dept: PODIATRY | Facility: CLINIC | Age: 83
End: 2023-01-24

## 2023-01-24 VITALS
BODY MASS INDEX: 39.15 KG/M2 | HEART RATE: 85 BPM | SYSTOLIC BLOOD PRESSURE: 125 MMHG | WEIGHT: 235 LBS | DIASTOLIC BLOOD PRESSURE: 80 MMHG | HEIGHT: 65 IN

## 2023-01-24 DIAGNOSIS — L60.2 LONG TOENAIL: Primary | ICD-10-CM

## 2023-01-24 NOTE — PROGRESS NOTES
Assessment/Plan:         Diagnoses and all orders for this visit:    Long toenail      No paronychia on exam  Nails trimmed out of courtesy  Reappoint as needed  Subjective:      Patient ID: Cassia Jon is a [de-identified] y o  female  Patient has pain from her thick toenails  The following portions of the patient's history were reviewed and updated as appropriate: allergies, current medications, past family history, past medical history, past social history, past surgical history and problem list     Review of Systems   Constitutional: Negative  Gastrointestinal: Negative for diarrhea, nausea and vomiting  Musculoskeletal: Negative for arthralgias  Skin: Negative for color change  Thick toenails   Neurological: Negative for numbness  Objective:      /80   Pulse 85   Ht 5' 5" (1 651 m)   Wt 107 kg (235 lb)   BMI 39 11 kg/m²          Physical Exam  Vitals reviewed  Constitutional:       Appearance: She is obese  She is not ill-appearing or diaphoretic  Cardiovascular:      Pulses:           Dorsalis pedis pulses are 2+ on the right side and 2+ on the left side  Posterior tibial pulses are 2+ on the right side and 2+ on the left side  Pulmonary:      Effort: Pulmonary effort is normal  No respiratory distress  Musculoskeletal:         General: No deformity  Feet:      Right foot:      Toenail Condition: Right toenails are long  Left foot:      Toenail Condition: Left toenails are long  Skin:     Capillary Refill: Capillary refill takes less than 2 seconds  Findings: No erythema or rash  Neurological:      Mental Status: She is alert and oriented to person, place, and time  Sensory: No sensory deficit

## 2023-01-30 ENCOUNTER — OFFICE VISIT (OUTPATIENT)
Dept: FAMILY MEDICINE CLINIC | Facility: CLINIC | Age: 83
End: 2023-01-30

## 2023-01-30 VITALS
WEIGHT: 233.6 LBS | OXYGEN SATURATION: 96 % | HEART RATE: 57 BPM | BODY MASS INDEX: 38.92 KG/M2 | SYSTOLIC BLOOD PRESSURE: 138 MMHG | DIASTOLIC BLOOD PRESSURE: 86 MMHG | TEMPERATURE: 98.2 F | HEIGHT: 65 IN

## 2023-01-30 DIAGNOSIS — E78.00 HYPERCHOLESTEROLEMIA: ICD-10-CM

## 2023-01-30 DIAGNOSIS — I10 ESSENTIAL HYPERTENSION: Primary | ICD-10-CM

## 2023-01-30 DIAGNOSIS — G40.909 NONINTRACTABLE EPILEPSY WITHOUT STATUS EPILEPTICUS, UNSPECIFIED EPILEPSY TYPE (HCC): ICD-10-CM

## 2023-01-30 DIAGNOSIS — I73.9 PERIPHERAL VASCULAR DISEASE, UNSPECIFIED (HCC): ICD-10-CM

## 2023-02-01 NOTE — PROGRESS NOTES
Patient ID: Gricel Castillo is a [de-identified] y o  female  HPI: [de-identified] y  o female presents for follow up hypertension ,epilepsy, pvd,and hypercholesterolemia  Pt denies any dizziness,  chest pain, palpitations, or dypsnea with exertion  SUBJECTIVE    Family History   Problem Relation Age of Onset   • Prostate cancer Father    • Other Family         back problem   • Arthritis Family    • Arthritis Mother    • No Known Problems Sister    • No Known Problems Daughter    • No Known Problems Sister    • No Known Problems Paternal Aunt    • No Known Problems Paternal Aunt    • No Known Problems Paternal Aunt    • No Known Problems Paternal Aunt    • Prostate cancer Brother      Social History     Socioeconomic History   • Marital status:       Spouse name: Not on file   • Number of children: Not on file   • Years of education: Not on file   • Highest education level: Not on file   Occupational History   • Not on file   Tobacco Use   • Smoking status: Former   • Smokeless tobacco: Never   Vaping Use   • Vaping Use: Never used   Substance and Sexual Activity   • Alcohol use: Yes     Comment: social use   • Drug use: No   • Sexual activity: Not on file   Other Topics Concern   • Not on file   Social History Narrative    Daily coffee consumption (_cups/day)    Denied hx of daily cola consumption    Daily tea consumption (_cups/day)     Social Determinants of Health     Financial Resource Strain: Not on file   Food Insecurity: Not on file   Transportation Needs: Not on file   Physical Activity: Not on file   Stress: Not on file   Social Connections: Not on file   Intimate Partner Violence: Not on file   Housing Stability: Not on file     Past Medical History:   Diagnosis Date   • Acute venous embolism and thrombosis of deep vessels of distal lower extremity ([facility] ) 01/27/2014   • Blood type A+    • Closed fracture of right distal radius and ulna 03/06/2019   • Convulsions ([facility] )    • Deviated septum 05/2000   • Fracture of c2 • Gallstones 09/15/2019   • Hiatal hernia     1976   • HL (hearing loss)    • Hyperlipidemia    • Osteoarthritis, localized, knee    • Seizure ([facility] )     1990   • Seizures ([facility] [facility] )    • Spondylosis    • Tinnitus      Past Surgical History:   Procedure Laterality Date   • BREAST BIOPSY Right over 10 years    benign   • CATARACT EXTRACTION     • COMBINED HYSTEROSCOPY DIAGNOSTIC / D&C     • ESOPHAGOGASTRODUODENOSCOPY      diagnostic   • HAND SURGERY     • HERNIA REPAIR     • HYSTERECTOMY     • KNEE ARTHROPLASTY      Revision;total   • KNEE ARTHROSCOPY Right     1997   • NASAL SEPTUM SURGERY      Deviation repair   • SC TENDON SHEATH INCISION Right 11/21/2019    Procedure: RING TRIGGER FINGER RELEASE;  Surgeon: El Kim MD;  Location: [facility]  MAIN OR;  Service: Orthopedics   • REPLACEMENT TOTAL KNEE Right     2000   • TONSILLECTOMY      1948   • TUBAL LIGATION     • TUMOR REMOVAL Left 1956    left index finger     Allergies   Allergen Reactions   • Adhesive  [Medical Tape]      Other reaction(s): Unknown Reaction   • Celebrex [Celecoxib] Itching and GI Intolerance     Voice change   • Latex      Other reaction(s): Unknown Reaction   • Midazolam    • Penicillins    • Thiopental        Current Outpatient Medications:   •  Ascorbic Acid (vitamin C) 1000 MG tablet, Take 1,000 mg by mouth daily, Disp: , Rfl:   •  aspirin (ECOTRIN LOW STRENGTH) 81 mg EC tablet, Take by mouth daily , Disp: , Rfl:   •  atorvastatin (LIPITOR) 40 mg tablet, TAKE 1 TABLET DAILY WITH DINNER, Disp: 90 tablet, Rfl: 3  •  Calcium Carb-Cholecalciferol (CALCIUM 1000 + D PO), Take 1 tablet by mouth daily, Disp: , Rfl:   •  carvedilol (COREG) 6 25 mg tablet, TAKE 1 TABLET TWICE A DAY WITH MEALS, Disp: 180 tablet, Rfl: 3  •  cholecalciferol (VITAMIN D3) 1,000 units tablet, Take 1,000 Units by mouth daily, Disp: , Rfl:   •  Docusate Sodium (STOOL SOFTENER LAXATIVE PO), Take by mouth 2 (two) times a day, Disp: , Rfl:   •  EPINEPHrine (EPIPEN) 0 3 mg/0 3 mL SOAJ, Inject 0 3 mL (0 3 mg total) into a muscle once for 1 dose, Disp: 0 6 mL, Rfl: 0  •  furosemide (LASIX) 20 mg tablet, TAKE 1 TABLET DAILY (Patient taking differently: if needed), Disp: 90 tablet, Rfl: 3  •  levETIRAcetam (KEPPRA) 250 mg tablet, Take 1 5 tablets (375 mg total) by mouth every 12 (twelve) hours, Disp: 270 tablet, Rfl: 3  •  lisinopril (ZESTRIL) 40 mg tablet, TAKE 1 TABLET DAILY, Disp: 90 tablet, Rfl: 3  •  meloxicam (Mobic) 15 mg tablet, Take 1 tablet (15 mg total) by mouth daily, Disp: 30 tablet, Rfl: 5  •  Multiple Vitamin (MULTIVITAMINS PO), Take 1 capsule by mouth daily, Disp: , Rfl:   •  Zinc 100 MG TABS, Take by mouth, Disp: , Rfl:   •  methocarbamol (ROBAXIN) 500 mg tablet, Take 1 tablet (500 mg total) by mouth daily at bedtime (Patient not taking: Reported on 1/30/2023), Disp: 10 tablet, Rfl: 0    Review of Systems  Constitutional:     Denies fever, chills ,fatigue ,weakness ,weight loss, weight gain     ENT: Denies earache ,loss of hearing ,nosebleed, nasal discharge,nasal congestion ,sore throat ,hoarseness  Pulmonary: Denies shortness of breath ,cough  ,dyspnea on exertion, orthopnea  ,PND   Cardiovascular:  Denies bradycardia , tachycardia  ,palpations, lower extremity edema leg, claudication  Breast:  Denies new or changing breast lumps ,nipple discharge ,nipple changes  Abdomen:  Denies abdominal pain , anorexia , indigestion, nausea, vomiting, constipation, diarrhea  Musculoskeletal: Denies myalgias, arthralgias, joint swelling, joint stiffness , limb pain, limb swelling  Gu: denies dysuria, polyuria  Skin: Denies skin rash, skin lesion, skin wound, itching, dry skin  Neuro: Denies headache, numbness, tingling, confusion, loss of consciousness, dizziness, vertigo  Psychiatric: Denies feelings of depression, suicidal ideation, anxiety, sleep disturbances    OBJECTIVE  /86   Pulse 57   Temp 98 2 °F (36 8 °C)   Ht 5' 5" (1 651 m)   Wt 106 kg (233 lb 9 6 oz)   SpO2 96% BMI 38 87 kg/m²   Constitutional:   NAD, well appearing and well nourished      ENT:   Conjunctiva and lids: no injection, edema, or discharge     Pupils and iris: TSEFAN bilaterally    External inspection of ears and nose: normal without deformities or discharge  Otoscopic exam: Canals patent without erythema  Nasal mucosa, septum and turbinates: Normal or edema or discharge         Oropharynx:  Moist mucosa, normal tongue and tonsils without lesions  No erythema        Pulmonary:Respiratory effort normal rate and rhythm, no increased work of breathing  Auscultation of lungs:  Clear bilaterally with no adventitious breath sounds       Cardiovascular: regular rate and rhythm, S1 and S2, no murmur, no edema and/or varicosities of LE      Abdomen: Soft and non-distended     Positive bowel sounds      No heptomegaly or splenomegaly      Gu: no suprapubic tenderness or CVA tenderness, no urethral discharge  Lymphatic:  No anterior or posterior cervical lymphadenopathy         Musculoskeletal:  Gait and station: Normal gait      Digits and nails normal without clubbing or cyanosis       Inspection/palpation of joints, bones, and muscles:  No joint tenderness, swelling, full active and passive range of motion       Skin: Normal skin turgor and no rashes      Neuro:    Normal reflexes     Psych:   alert and oriented to person, place and time     normal mood and affect       Assessment/Plan:Diagnoses and all orders for this visit:    Essential hypertension  Comments:  Stable on carvedilol tx  Orders:  -     Comprehensive metabolic panel; Future    Hypercholesterolemia  Comments:  Cotninue statin tx  Orders:  -     Lipid panel; Future    Nonintractable epilepsy without status epilepticus, unspecified epilepsy type ([facility] )  Comments:  wEll controlled on keppra tx  Peripheral vascular disease, unspecified ([facility] [address] [facility] )  Comments:  Kiana with yearly surveillence            Reviewed with patient plan to treat with above plan     Patient instructed to call in 72 hours if not feeling better or if symptoms worse

## 2023-02-02 ENCOUNTER — OFFICE VISIT (OUTPATIENT)
Dept: OBGYN CLINIC | Facility: MEDICAL CENTER | Age: 83
End: 2023-02-02

## 2023-02-02 VITALS
HEART RATE: 60 BPM | HEIGHT: 65 IN | SYSTOLIC BLOOD PRESSURE: 190 MMHG | DIASTOLIC BLOOD PRESSURE: 82 MMHG | WEIGHT: 233 LBS | BODY MASS INDEX: 38.82 KG/M2

## 2023-02-02 DIAGNOSIS — M16.12 PRIMARY OSTEOARTHRITIS OF ONE HIP, LEFT: Primary | ICD-10-CM

## 2023-02-02 DIAGNOSIS — M16.0 PRIMARY OSTEOARTHRITIS OF BOTH HIPS: ICD-10-CM

## 2023-02-02 RX ORDER — TRIAMCINOLONE ACETONIDE 40 MG/ML
80 INJECTION, SUSPENSION INTRA-ARTICULAR; INTRAMUSCULAR
Status: COMPLETED | OUTPATIENT
Start: 2023-02-02 | End: 2023-02-02

## 2023-02-02 RX ORDER — ROPIVACAINE HYDROCHLORIDE 5 MG/ML
10 INJECTION, SOLUTION EPIDURAL; INFILTRATION; PERINEURAL
Status: COMPLETED | OUTPATIENT
Start: 2023-02-02 | End: 2023-02-02

## 2023-02-02 RX ADMIN — TRIAMCINOLONE ACETONIDE 80 MG: 40 INJECTION, SUSPENSION INTRA-ARTICULAR; INTRAMUSCULAR at 11:34

## 2023-02-02 RX ADMIN — ROPIVACAINE HYDROCHLORIDE 10 ML: 5 INJECTION, SOLUTION EPIDURAL; INFILTRATION; PERINEURAL at 11:34

## 2023-02-02 NOTE — PROGRESS NOTES
1  Primary osteoarthritis of one hip, left  Ambulatory Referral to Sports Medicine    Large joint arthrocentesis: L hip joint      2  Primary osteoarthritis of both hips          Orders Placed This Encounter   Procedures   • Large joint arthrocentesis: L hip joint        Impression: This is a patient referred by Dr Farr Alberto team with left hip pain likely multifactorial and secondary to greater trochanteric pain syndrome and hip osteoarthritis  The patient is a good candidate for ultrasound-guided hip joint injection for both diagnostic and therapeutic reasons  Please see procedure note below  Patient did not report immediate relief of her typical symptoms  Patient will take the next few weeks to see how this helps with her symptoms  Also consider targeting her greater trochanteric area although she wanted to hold off on this injection in December with Dr Elise Matthews  Would consider this on her follow up visit in 4 weeks  Imaging Studies (I personally reviewed images in PACS and report):  Left hip MRI reviewed  LEFT HIP:  Joint Effusion: None      Bones: Normal marrow signal demonstrated without hip fracture or AVN     Articular Surface:  Mild chondral thinning superior femoral head  Moderate joint line osteophytes      Acetabular Labrum: Diffuse fraying/tearing of the labrum      Trochanteric Bursa: Normal      Muscles:Mild gluteus minimus atrophy      Tendons: High-grade partial/near-complete tearing gluteus minimus insertion  Mild tendinosis anterior half gluteus medius insertion      RIGHT HIP:  Mild osteoarthritis  No fracture or marrow infiltrative process    Moderate tendinosis gluteus minimus insertion      REST OF PELVIS:  Bones: Normal      Si Joints And Symphysis Pubis:  Mild degenerative changes      Visualized Lumbar Spine:  Degenerative changes of the lumbar spine      Muscles: Intact      Pelvic Soft Tissues: Normal      Subcutaneous Tissues: Normal         IMPRESSION:     High-grade partial/near-complete tearing left gluteus minimus insertion  Mild tendinosis anterior half left gluteus medius insertion      Mild bilateral hip osteoarthritis  The patient's pertinent emergency department/urgent care/referring physician's notes were reviewed  Return if symptoms worsen or fail to improve  Patient is in agreement with the above plan  HPI:  Syl Yip is a [de-identified] y o  female  who presents for evaluation of Pain of the Left Hip     History of present illness from referring clinician's notes reviewed  Chronic pain without recent injury  Pain is along the lateral hip  She wants to try a hip injection to see if it helps with pain      Following history reviewed and updated:  Past Medical History:   Diagnosis Date   • Acute venous embolism and thrombosis of deep vessels of distal lower extremity ([address] ) 01/27/2014   • Blood type A+    • Closed fracture of right distal radius and ulna 03/06/2019   • Convulsions ([address] )    • Deviated septum 05/2000   • Fracture of c2    • Gallstones 09/15/2019   • Hiatal hernia     1976   • HL (hearing loss)    • Hyperlipidemia    • Osteoarthritis, localized, knee    • Seizure ([address] )     1990   • Seizures ([address] )    • Spondylosis    • Tinnitus      Past Surgical History:   Procedure Laterality Date   • BREAST BIOPSY Right over 10 years    benign   • CATARACT EXTRACTION     • COMBINED HYSTEROSCOPY DIAGNOSTIC / D&C     • ESOPHAGOGASTRODUODENOSCOPY      diagnostic   • HAND SURGERY     • HERNIA REPAIR     • HYSTERECTOMY     • KNEE ARTHROPLASTY      Revision;total   • KNEE ARTHROSCOPY Right     1997   • NASAL SEPTUM SURGERY      Deviation repair   • MT TENDON SHEATH INCISION Right 11/21/2019    Procedure: RING TRIGGER FINGER RELEASE;  Surgeon: Miguel Angel Ji MD;  Location: [facility]  MAIN [facility];  Service: Orthopedics   • REPLACEMENT TOTAL KNEE Right     2000   • TONSILLECTOMY      1948   • TUBAL LIGATION     • TUMOR REMOVAL Left 1956    left index finger     Social History Social History     Substance and Sexual Activity   Alcohol Use Yes    Comment: social use     Social History     Substance and Sexual Activity   Drug Use No     Social History     Tobacco Use   Smoking Status Former   Smokeless Tobacco Never     Family History   Problem Relation Age of Onset   • Prostate cancer Father    • Other Family         back problem   • Arthritis Family    • Arthritis Mother    • No Known Problems Sister    • No Known Problems Daughter    • No Known Problems Sister    • No Known Problems Paternal Aunt    • No Known Problems Paternal Aunt    • No Known Problems Paternal Aunt    • No Known Problems Paternal Aunt    • Prostate cancer Brother      Allergies   Allergen Reactions   • Adhesive  [Medical Tape]      Other reaction(s): Unknown Reaction   • Celebrex [Celecoxib] Itching and GI Intolerance     Voice change   • Latex      Other reaction(s): Unknown Reaction   • Midazolam    • Penicillins    • Thiopental         Constitutional:  BP (!) 190/82   Pulse 60   Ht 5' 5" (1 651 m)   Wt 106 kg (233 lb)   BMI 38 77 kg/m²    General: NAD  Eyes: Anicteric sclerae  Neck: Supple  Lungs: Unlabored breathing  Cardiovascular: No lower extremity edema  Skin: Intact without erythema  Neurologic: Sensation intact to light touch  Psychiatric: Mood and affect are appropriate  Musculoskeletal Exam: Positive Stinchfield, log roll and hip scouring maneuver  Pain with passive hip internal rotation while seated  Tender along the greater trochanteric area  Normal straight leg raise test   No erythema or skin abnormalities at the injection site and surrounding region  Large joint arthrocentesis: L hip joint  Universal Protocol:  Procedure performed by:  Consent: Verbal consent obtained  Written consent not obtained    Consent given by: patient  Timeout called at: 2/2/2023 11:32 AM   Patient understanding: patient states understanding of the procedure being performed  Site marked: the operative site was marked  Radiology Images displayed and confirmed  If images not available, report reviewed: imaging studies available  Patient identity confirmed: verbally with patient    Supporting Documentation  Indications: pain and diagnostic evaluation   Procedure Details  Location: hip - L hip joint  Ultrasound guidance: yes (US guidance was used to find the area of interest )  Medications administered: 80 mg triamcinolone acetonide 40 mg/mL; 10 mL ropivacaine 0 5 %    Patient tolerance: patient tolerated the procedure well with no immediate complications  Dressing:  Sterile dressing applied    The left hip joint was visualized with ultrasound and injected with steroid/anesthetic solution as indicated  Prior to the injection, the ultrasound was used to evaluate for any neural or vascular structures  Care was taken to avoid these structures  The images (and video if taken) were saved to the [employer] ultrasound system  Risks of this procedure include:    - Risk of bleeding since a needle is involved  - Risk of infection (1/10,000 chance as per recent studies)  Signs/symptoms were discussed and they would prompt an urgent evaluation at an emergency department   - Risk of pigmentation or skin dimpling in the skin (2-3% chance as per recent studies) from the steroid  - Risk of increased pain from steroid flare (1% chance as per recent studies) that typically lasts 24-48 hours  - Risk of increased blood sugars from the steroid medication that can last for a few weeks  If the patient is a diabetic or pre-diabetic, they were encouraged to closely monitor their blood sugars and discuss with PCP if elevated more than usual or if having symptoms  We decided that the benefits outweigh the risks and so we proceeded with the procedure

## 2023-02-20 DIAGNOSIS — R00.2 PALPITATIONS: ICD-10-CM

## 2023-02-20 DIAGNOSIS — E78.5 HYPERLIPIDEMIA: ICD-10-CM

## 2023-02-20 DIAGNOSIS — I10 ESSENTIAL HYPERTENSION: ICD-10-CM

## 2023-02-20 RX ORDER — LISINOPRIL 40 MG/1
TABLET ORAL
Qty: 90 TABLET | Refills: 3 | Status: SHIPPED | OUTPATIENT
Start: 2023-02-20

## 2023-02-20 RX ORDER — CARVEDILOL 6.25 MG/1
TABLET ORAL
Qty: 180 TABLET | Refills: 3 | Status: SHIPPED | OUTPATIENT
Start: 2023-02-20

## 2023-02-20 RX ORDER — ATORVASTATIN CALCIUM 40 MG/1
TABLET, FILM COATED ORAL
Qty: 90 TABLET | Refills: 3 | Status: SHIPPED | OUTPATIENT
Start: 2023-02-20

## 2023-05-09 ENCOUNTER — RA CDI HCC (OUTPATIENT)
Dept: OTHER | Facility: HOSPITAL | Age: 83
End: 2023-05-09

## 2023-05-09 NOTE — PROGRESS NOTES
[facility]  coding opportunities       Chart reviewed, no opportunity found:   [address]        Patients Insurance     Medicare Insurance: [employer]

## 2023-05-11 ENCOUNTER — APPOINTMENT (OUTPATIENT)
Dept: LAB | Facility: CLINIC | Age: 83
End: 2023-05-11

## 2023-05-11 DIAGNOSIS — I10 ESSENTIAL HYPERTENSION: ICD-10-CM

## 2023-05-11 DIAGNOSIS — E78.00 HYPERCHOLESTEROLEMIA: ICD-10-CM

## 2023-05-11 LAB
ALBUMIN SERPL BCP-MCNC: 3.3 G/DL (ref 3.5–5)
ALP SERPL-CCNC: 72 U/L (ref 46–116)
ALT SERPL W P-5'-P-CCNC: 22 U/L (ref 12–78)
ANION GAP SERPL CALCULATED.3IONS-SCNC: 0 MMOL/L (ref 4–13)
AST SERPL W P-5'-P-CCNC: 23 U/L (ref 5–45)
BILIRUB SERPL-MCNC: 0.44 MG/DL (ref 0.2–1)
BUN SERPL-MCNC: 13 MG/DL (ref 5–25)
CALCIUM ALBUM COR SERPL-MCNC: 10.4 MG/DL (ref 8.3–10.1)
CALCIUM SERPL-MCNC: 9.8 MG/DL (ref 8.3–10.1)
CHLORIDE SERPL-SCNC: 108 MMOL/L (ref 96–108)
CHOLEST SERPL-MCNC: 151 MG/DL
CO2 SERPL-SCNC: 30 MMOL/L (ref 21–32)
CREAT SERPL-MCNC: 0.66 MG/DL (ref 0.6–1.3)
GFR SERPL CREATININE-BSD FRML MDRD: 83 ML/MIN/1.73SQ M
GLUCOSE P FAST SERPL-MCNC: 99 MG/DL (ref 65–99)
HDLC SERPL-MCNC: 50 MG/DL
LDLC SERPL CALC-MCNC: 74 MG/DL (ref 0–100)
NONHDLC SERPL-MCNC: 101 MG/DL
POTASSIUM SERPL-SCNC: 4 MMOL/L (ref 3.5–5.3)
PROT SERPL-MCNC: 7.4 G/DL (ref 6.4–8.4)
SODIUM SERPL-SCNC: 138 MMOL/L (ref 135–147)
TRIGL SERPL-MCNC: 133 MG/DL

## 2023-05-17 ENCOUNTER — OFFICE VISIT (OUTPATIENT)
Dept: NEUROLOGY | Facility: CLINIC | Age: 83
End: 2023-05-17

## 2023-05-17 VITALS
WEIGHT: 233.1 LBS | DIASTOLIC BLOOD PRESSURE: 85 MMHG | SYSTOLIC BLOOD PRESSURE: 135 MMHG | HEART RATE: 55 BPM | BODY MASS INDEX: 38.79 KG/M2

## 2023-05-17 DIAGNOSIS — G40.909 NONINTRACTABLE EPILEPSY WITHOUT STATUS EPILEPTICUS, UNSPECIFIED EPILEPSY TYPE (HCC): Primary | ICD-10-CM

## 2023-05-17 NOTE — ASSESSMENT & PLAN NOTE
Patient has been seizure-free for over 30 years  Transitioned in 2020 from phenytoin to levetiracetam monotherapy  She is currently on levetiracetam 375 mg twice per day  Tolerating well without issues or side effects  Most recent EEG in 2020 with intermittent delta activity in the left temporal region  MRI brain in 2019 with mild chronic microvascular ischemic disease and age-related atrophy  No focal deficits on exam       Patient will continue on levetiracetam 375 mg twice per day  Patient will call the office with any breakthrough seizures, issues or concerns  Patient will follow-up in 1 year or sooner if needed

## 2023-05-17 NOTE — PROGRESS NOTES
Patient ID: Brain Patel is a [de-identified] y o  female with single seizure more than 30 years ago, who is returning to Neurology office for follow up of her seizure  Assessment/Plan:    Epilepsy [facility])  Patient has been seizure-free for over 30 years  Transitioned in 2020 from phenytoin to levetiracetam monotherapy  She is currently on levetiracetam 375 mg twice per day  Tolerating well without issues or side effects  Most recent EEG in 2020 with intermittent delta activity in the left temporal region  MRI brain in 2019 with mild chronic microvascular ischemic disease and age-related atrophy  No focal deficits on exam       Patient will continue on levetiracetam 375 mg twice per day  Patient will call the office with any breakthrough seizures, issues or concerns  Patient will follow-up in 1 year or sooner if needed  She will Return in about 1 year (around 5/17/2024) for Follow up with Dr Divine Kellogg  Subjective:  Anusha Rolon is a [de-identified] y o  female with single seizure more than 30 years ago, who is returning to Neurology office for follow up of her seizure  She was last seen in the office on 5/6/22  At that time, she had recently transitioned in 2020 from phenytoin to levetiracetam monotherapy of 375 mg BID without side effects or concerns  No changes were made to AED regimen  Recommended 1 year follow up  Since their last visit, she continues to be seizure free Doing well on levetiracetam monotherapy  No issues or concerns for today's visit  She does live alone and drives  Had a recent injury to her left hip during an xray  MRI with high-grade partial/near-complete tearing left gluteus minimus insertion, mild tendinosis anterior half left gluteus medius insertion, and mild bilateral hip osteoarthritis  Reports constant nagging pain, difficulty with stairs and worsening pain with ambulation  May get second option from other ortho       Her blood pressure is slightly elevated at 173/74 on arrival to office, repeat BP prior to leaving 135/85  DXA 12/14/22 - Normal bone density    Current seizure medications:  - levetiracetam 375 mg BID  Other medications as per Epic  Prior Evaluation:  REEG 1/27/2020: intermittent delta activity in the left temporal region     MRI brain wo contrast 8/7/2019:  IMPRESSION:  Mild chronic microvascular ischemic disease  No acute ischemic disease  Age-related atrophy  Consistent with CT     Prior Seizure Medications: phenytoin       I reviewed prior neurology notes, most recent labs, as documented in Epic/[facility], and summarized above  Objective:    Blood pressure 135/85, pulse 55, weight 106 kg (233 lb 1 6 oz)  Physical Exam  No apparent distress  Appears well nourished  Mood appropriate for situation     Neurologic Exam  Mental status- alert and oriented to person, place, and time  Speech appropriate for conversation  Good attention and knowledge  Cranial Nerves- PERRL, VFFTC, EOMS normal, facial sensation and muscles symmetric, hearing intact bilaterally to finger rubs, tongue midline, palate rise symmetrical, shoulder shrug symmetrical     Motor- No pronator drift  Appropriate strength with exception of left hip flexion (4/5 strength due to injury)  Moves all extremities freely  No tremor  Sensory-  Intact distally in all extremities to light touch  DTRs- 2+ and symmetric in all extremities  Gait- normal casual gait  Coordination- FNF intact  ROS:  Review of Systems   Constitutional: Negative  Negative for appetite change and fever  HENT: Negative  Negative for hearing loss, tinnitus, trouble swallowing and voice change  Eyes: Negative  Negative for photophobia, pain and visual disturbance  Respiratory: Negative  Negative for shortness of breath  Cardiovascular: Negative  Negative for palpitations  Gastrointestinal: Negative  Negative for nausea and vomiting  Endocrine: Negative    Negative for cold intolerance  Genitourinary: Negative  Negative for dysuria, frequency and urgency  Musculoskeletal: Negative  Negative for gait problem, myalgias and neck pain  Skin: Negative  Negative for rash  Allergic/Immunologic: Negative  Neurological: Negative  Negative for dizziness, tremors, seizures, syncope, facial asymmetry, speech difficulty, weakness, light-headedness, numbness and headaches  Hematological: Negative  Does not bruise/bleed easily  Psychiatric/Behavioral: Negative  Negative for confusion, hallucinations and sleep disturbance  All other systems reviewed and are negative  ROS obtained by MA and reviewed by myself  This note may have been created using voice recognition software  There may be unintentional errors such as grammatical errors, spelling errors, or pronoun errors

## 2023-05-17 NOTE — PATIENT INSTRUCTIONS
- Continue levetiracetam 250 mg tablets - 1 5 tablets twice per day  - Call the office if there are any episodes concerning for seizures  - Think about going back to ortho for your hip  - Follow up in 1 year or sooner if needed That was already be discussed at the time of office visit today

## 2023-05-19 ENCOUNTER — OFFICE VISIT (OUTPATIENT)
Dept: CARDIOLOGY CLINIC | Facility: CLINIC | Age: 83
End: 2023-05-19

## 2023-05-19 VITALS
BODY MASS INDEX: 39.18 KG/M2 | DIASTOLIC BLOOD PRESSURE: 80 MMHG | SYSTOLIC BLOOD PRESSURE: 146 MMHG | HEART RATE: 56 BPM | WEIGHT: 235.2 LBS | HEIGHT: 65 IN

## 2023-05-19 DIAGNOSIS — I10 ESSENTIAL HYPERTENSION: ICD-10-CM

## 2023-05-19 DIAGNOSIS — R00.2 PALPITATIONS: Primary | ICD-10-CM

## 2023-05-19 NOTE — PROGRESS NOTES
Cardiology Consultation     Heather Bower  [id number]  1940  HEART & VASCULAR [facility] [address] [place] [address]      1  Palpitations  Holter monitor    POCT ECG      2  Essential hypertension            Discussion/Summary:    35year-old female with a history of PACs  She has sinus bradycardia but is maintained on Coreg both for hypertension and history of palpitations and the PACs  She had been pretty stable for a while, but recently had a more sustained episode of palpitations which lasted about 45 minutes  She attributes this to a higher caffeine intake previously which she has now cut back on and she is doing better  She still feels palpitations every 1 to 2 days but she says when she takes a deep breath in and holds it, these resolved  I recommended a repeat 48-hour Holter monitor  Despite the sinus bradycardia in the office, she is asymptomatic without any dizziness or lightheadedness and would recommend continuing the Coreg  If there are a lot of PACs or if there is SVT on the monitor, would have to see what her average heart rate is before adjusting her beta-blocker much further  History of Present Illness:    I have seen Anusha in the past for hypertension, palpitations  She is to come to the office regularly with her , Rhys Lobo, who passed away several years ago  She was last seen by me in January 2020  Around that time, she had a Holter monitor which was pretty unremarkable  She does have a history of PACs  She has complained of some more sustained palpitations in the past     Despite her relative bradycardia which has been a longstanding diagnosis, she has been maintained on 6 25 mg twice a day of Coreg in addition to 40 mg of lisinopril, which generally tends to control her blood pressure recently well though it is a little sporadic      She has Lasix to take as needed and she takes this maybe once or twice a month  She returns for follow-up today because a few months ago, she was feeling along episode of palpitations which lasted about 45 minutes  She says she will feel palpitations at times pretty frequently, but she takes a big deep breath in and holds it and this tends to improve her symptoms  On this occasion, the symptoms would not resolve on their own and took a very long time  She has cut back on caffeine  She used to drink a lot of coffee and is now drinking tea more so than coffee, and her symptoms have improved  She was contemplating canceling the appointment but wanted to just touch base and make sure everything else was okay  Patient Active Problem List   Diagnosis   • Allergic rhinitis   • Bulging of lumbar intervertebral disc   • Chronic osteoarthritis   • Edema   • Epilepsy ([facility] )   • Hyperlipidemia   • Essential hypertension   • Seizure ([facility] )   • Severe obesity (BMI 35 0-39  9) with comorbidity (HCC)   • Leukopenia   • Thrombocytopenia (HCC)   • Trigger finger, right ring finger   • Primary osteoarthritis of both hips   • Peripheral vascular disease, unspecified ([facility] )   • Pain in left hip   • Stress fracture of left hip     Past Medical History:   Diagnosis Date   • Acute venous embolism and thrombosis of deep vessels of distal lower extremity ([facility] ) 01/27/2014   • Blood type A+    • Closed fracture of right distal radius and ulna 03/06/2019   • Convulsions ([facility] )    • Deviated septum 05/2000   • Fracture of c2    • Gallstones 09/15/2019   • Hiatal hernia     1976   • HL (hearing loss)    • Hyperlipidemia    • Osteoarthritis, localized, knee    • Seizure ([facility] )     1990   • Seizures ([facility] )    • Spondylosis    • Tinnitus      Social History     Tobacco Use   • Smoking status: Former   • Smokeless tobacco: Never   Vaping Use   • Vaping Use: Never used   Substance Use Topics   • Alcohol use: Yes     Comment: social use   • Drug use: No      Family History   Problem Relation Age of Onset   • Prostate cancer Father    • Other Family         back problem   • Arthritis Family    • Arthritis Mother    • No Known Problems Sister    • No Known Problems Daughter    • No Known Problems Sister    • No Known Problems Paternal Aunt    • No Known Problems Paternal Aunt    • No Known Problems Paternal Aunt    • No Known Problems Paternal Aunt    • Prostate cancer Brother      Past Surgical History:   Procedure Laterality Date   • BREAST BIOPSY Right over 10 years    benign   • CATARACT EXTRACTION     • COMBINED HYSTEROSCOPY DIAGNOSTIC / D&C     • ESOPHAGOGASTRODUODENOSCOPY      diagnostic   • HAND SURGERY     • HERNIA REPAIR     • HYSTERECTOMY     • KNEE ARTHROPLASTY      Revision;total   • KNEE ARTHROSCOPY Right     1997   • NASAL SEPTUM SURGERY      Deviation repair   • [place] TENDON SHEATH INCISION Right 11/21/2019    Procedure: RING TRIGGER FINGER RELEASE;  Surgeon: Nitesh Holland MD;  Location: [facility]  MAIN OR;  Service: Orthopedics   • REPLACEMENT TOTAL KNEE Right     2000   • TONSILLECTOMY      1948   • TUBAL LIGATION     • TUMOR REMOVAL Left 1956    left index finger       Current Outpatient Medications:   •  Ascorbic Acid (vitamin C) 1000 MG tablet, Take 1,000 mg by mouth daily, Disp: , Rfl:   •  aspirin (ECOTRIN LOW STRENGTH) 81 mg EC tablet, Take by mouth daily , Disp: , Rfl:   •  atorvastatin (LIPITOR) 40 mg tablet, TAKE 1 TABLET DAILY WITH DINNER, Disp: 90 tablet, Rfl: 3  •  Calcium Carb-Cholecalciferol (CALCIUM 1000 + D PO), Take 1 tablet by mouth daily, Disp: , Rfl:   •  carvedilol (COREG) 6 25 mg tablet, TAKE 1 TABLET TWICE A DAY WITH MEALS, Disp: 180 tablet, Rfl: 3  •  cholecalciferol (VITAMIN D3) 1,000 units tablet, Take 1,000 Units by mouth daily, Disp: , Rfl:   •  Docusate Sodium (STOOL SOFTENER LAXATIVE PO), Take by mouth 2 (two) times a day, Disp: , Rfl:   •  furosemide (LASIX) 20 mg tablet, TAKE 1 TABLET DAILY (Patient taking differently: if needed), Disp: 90 tablet, Rfl: 3  • "levETIRAcetam (KEPPRA) 250 mg tablet, TAKE ONE AND ONE-HALF TABLETS EVERY 12 HOURS, Disp: 270 tablet, Rfl: 3  •  lisinopril (ZESTRIL) 40 mg tablet, TAKE 1 TABLET DAILY, Disp: 90 tablet, Rfl: 3  •  Multiple Vitamin (MULTIVITAMINS PO), Take 1 capsule by mouth daily, Disp: , Rfl:   •  Zinc 100 MG TABS, Take by mouth, Disp: , Rfl:   •  EPINEPHrine (EPIPEN) 0 3 mg/0 3 mL SOAJ, Inject 0 3 mL (0 3 mg total) into a muscle once for 1 dose, Disp: 0 6 mL, Rfl: 0  Allergies   Allergen Reactions   • Adhesive  [Medical Tape]      Other reaction(s): Unknown Reaction   • Celebrex [Celecoxib] Itching and GI Intolerance     Voice change   • Latex      Other reaction(s): Unknown Reaction   • Midazolam    • Penicillins    • Thiopental        Vitals:    05/19/23 1005   BP: 146/80   BP Location: Left arm   Patient Position: Sitting   Cuff Size: Large   Pulse: 56   Weight: 107 kg (235 lb 3 2 oz)   Height: 5' 5\" (1 651 m)     Vitals:    05/19/23 1005   Weight: 107 kg (235 lb 3 2 oz)      Height: 5' 5\" (165 1 cm)   Body mass index is 39 14 kg/m²  Physical Exam:  GEN: Anusha Urias Palm appears well, alert and oriented x 3, pleasant and cooperative   HEENT: pupils equal, round, and reactive to light; extraocular muscles intact  NECK: supple, no carotid bruits   HEART: regular rhythm, normal S1 and S2, no murmurs, clicks, gallops or rubs   LUNGS: clear to auscultation bilaterally; no wheezes, rales, or rhonchi   ABDOMEN: normal bowel sounds, soft, no tenderness, no distention  EXTREMITIES: peripheral pulses normal; no clubbing, cyanosis, or edema  NEURO: no focal findings   SKIN: normal without suspicious lesions on exposed skin      ROS:  Positive for joint pains, depression, palpitations  Except as noted in HPI, is otherwise reviewed in detail and a 12 point review of systems is negative    ROS reviewed and is unchanged    Labs:  Lab Results   Component Value Date     11/05/2014    K 4 0 05/11/2023     05/11/2023    CREATININE " 0 66 05/11/2023    BUN 13 05/11/2023    CO2 30 05/11/2023    ALT 22 05/11/2023    AST 23 05/11/2023    GLUF 99 05/11/2023    HGBA1C 5 1 08/08/2019    WBC 3 68 (L) 08/09/2019    HGB 12 6 08/09/2019    HCT 39 1 08/09/2019     (L) 08/09/2019       Lab Results   Component Value Date    CHOL 243 (H) 01/09/2017    CHOL 190 11/05/2014     Lab Results   Component Value Date    HDL 50 05/11/2023    HDL 49 (L) 09/01/2022    HDL 52 03/28/2022     Lab Results   Component Value Date    LDLCALC 74 05/11/2023    LDLCALC 80 09/01/2022    LDLCALC 86 03/28/2022     Lab Results   Component Value Date    TRIG 133 05/11/2023    TRIG 110 09/01/2022    TRIG 80 03/28/2022       Testing:  Holter   1  Sinus mechanism throughout with rare PAC's and PVC's  Few short bursts of atrial tachycardia  Echo 8/2019:  LEFT VENTRICLE:  Systolic function was normal  Ejection fraction was estimated to be 60 %  There were no regional wall motion abnormalities      RIGHT VENTRICLE:  The size was normal   Systolic function was normal      MITRAL VALVE:  There was trace regurgitation      TRICUSPID VALVE:  There was trace regurgitation    Estimated peak PA pressure was 30 mmHg      PULMONIC VALVE:  There was trace regurgitation      EKG:  Sinus bradycardia 56 bpm  Possible old inferior MI

## 2023-06-02 ENCOUNTER — HOSPITAL ENCOUNTER (OUTPATIENT)
Dept: NON INVASIVE DIAGNOSTICS | Facility: CLINIC | Age: 83
Discharge: HOME | End: 2023-06-02

## 2023-06-02 DIAGNOSIS — R00.2 PALPITATIONS: ICD-10-CM

## 2023-06-20 ENCOUNTER — RA CDI HCC (OUTPATIENT)
Dept: OTHER | Facility: HOSPITAL | Age: 83
End: 2023-06-20

## 2023-06-20 NOTE — PROGRESS NOTES
[facility]  coding opportunities       Chart reviewed, no opportunity found:   [address]        Patients Insurance     Medicare Insurance: [employer]

## 2023-06-27 ENCOUNTER — OFFICE VISIT (OUTPATIENT)
Dept: FAMILY MEDICINE CLINIC | Facility: CLINIC | Age: 83
End: 2023-06-27
Payer: COMMERCIAL

## 2023-06-27 VITALS
WEIGHT: 233.2 LBS | HEIGHT: 65 IN | SYSTOLIC BLOOD PRESSURE: 126 MMHG | TEMPERATURE: 97.8 F | DIASTOLIC BLOOD PRESSURE: 84 MMHG | HEART RATE: 61 BPM | OXYGEN SATURATION: 96 % | BODY MASS INDEX: 38.85 KG/M2

## 2023-06-27 DIAGNOSIS — R56.9 SEIZURE (HCC): ICD-10-CM

## 2023-06-27 DIAGNOSIS — I10 ESSENTIAL HYPERTENSION: ICD-10-CM

## 2023-06-27 DIAGNOSIS — E78.5 HYPERLIPIDEMIA, UNSPECIFIED HYPERLIPIDEMIA TYPE: ICD-10-CM

## 2023-06-27 DIAGNOSIS — Z00.00 MEDICARE ANNUAL WELLNESS VISIT, SUBSEQUENT: Primary | ICD-10-CM

## 2023-06-27 DIAGNOSIS — L57.0 ACTINIC KERATOSIS: ICD-10-CM

## 2023-06-27 DIAGNOSIS — R60.0 LOCALIZED EDEMA: ICD-10-CM

## 2023-06-27 PROCEDURE — 99214 OFFICE O/P EST MOD 30 MIN: CPT | Performed by: FAMILY MEDICINE

## 2023-06-27 PROCEDURE — G0439 PPPS, SUBSEQ VISIT: HCPCS | Performed by: FAMILY MEDICINE

## 2023-06-27 NOTE — PROGRESS NOTES
Assessment and Plan:     Problem List Items Addressed This Visit    None       Preventive health issues were discussed with patient, and age appropriate screening tests were ordered as noted in patient's After Visit Summary  Personalized health advice and appropriate referrals for health education or preventive services given if needed, as noted in patient's After Visit Summary  History of Present Illness:     Patient presents for a Medicare Wellness Visit    HPI   Patient Care Team:  Joe Benjamin DO as PCP - General  Joe Benjamin DO as PCP - General  Steve Styles MD     Review of Systems:     Review of Systems   Constitutional: Negative for appetite change, chills and fever  HENT: Negative for ear pain, facial swelling, rhinorrhea, sinus pain, sore throat and trouble swallowing  Eyes: Negative for discharge and redness  Respiratory: Negative for chest tightness, shortness of breath and wheezing  Cardiovascular: Negative for chest pain and palpitations  Gastrointestinal: Negative for abdominal pain, diarrhea, nausea and vomiting  Endocrine: Negative for polyuria  Genitourinary: Negative for dysuria and urgency  Musculoskeletal: Negative for arthralgias and back pain  Skin: Negative for rash  Positive scaly white lesions on the right forehead area   Neurological: Negative for dizziness, weakness and headaches  Hematological: Negative for adenopathy  Psychiatric/Behavioral: Negative for behavioral problems, confusion and sleep disturbance  All other systems reviewed and are negative  Problem List:     Patient Active Problem List   Diagnosis   • Allergic rhinitis   • Bulging of lumbar intervertebral disc   • Chronic osteoarthritis   • Edema   • Epilepsy ([address] )   • Hyperlipidemia   • Essential hypertension   • Seizure ([facility] )   • Severe obesity (BMI 35 0-39  9) with comorbidity (HCC)   • Leukopenia   • Thrombocytopenia (HCC)   • Trigger finger, right ring finger   • Primary osteoarthritis of both hips   • Peripheral vascular disease, unspecified ([facility] )   • Pain in left hip   • Stress fracture of left hip      Past Medical and Surgical History:     Past Medical History:   Diagnosis Date   • Acute venous embolism and thrombosis of deep vessels of distal lower extremity ([address] ) 01/27/2014   • Blood type A+    • Closed fracture of right distal radius and ulna 03/06/2019   • Convulsions ([facility] )    • Deviated septum 05/2000   • Fracture of c2    • Gallstones 09/15/2019   • Hiatal hernia     1976   • HL (hearing loss)    • Hyperlipidemia    • Osteoarthritis, localized, knee    • Seizure ([facility] )     1990   • Seizures ([facility] )    • Spondylosis    • Tinnitus      Past Surgical History:   Procedure Laterality Date   • BREAST BIOPSY Right over 10 years    benign   • CATARACT EXTRACTION     • COMBINED HYSTEROSCOPY DIAGNOSTIC / D&C     • ESOPHAGOGASTRODUODENOSCOPY      diagnostic   • HAND SURGERY     • HERNIA REPAIR     • HYSTERECTOMY     • KNEE ARTHROPLASTY      Revision;total   • KNEE ARTHROSCOPY Right     1997   • NASAL SEPTUM SURGERY      Deviation repair   • [place] TENDON SHEATH INCISION Right 11/21/2019    Procedure: RING TRIGGER FINGER RELEASE;  Surgeon: Tejinder Pitts MD;  Location: [facility];  Service: Orthopedics   • REPLACEMENT TOTAL KNEE Right     2000   • TONSILLECTOMY      1948   • TUBAL LIGATION     • TUMOR REMOVAL Left 1956    left index finger      Family History:     Family History   Problem Relation Age of Onset   • Prostate cancer Father    • Other Family         back problem   • Arthritis Family    • Arthritis Mother    • No Known Problems Sister    • No Known Problems Daughter    • No Known Problems Sister    • No Known Problems Paternal Aunt    • No Known Problems Paternal Aunt    • No Known Problems Paternal Aunt    • No Known Problems Paternal Aunt    • Prostate cancer Brother       Social History:     Social History     Socioeconomic History   • Marital status:      Spouse name: None   • Number of children: None   • Years of education: None   • Highest education level: None   Occupational History   • None   Tobacco Use   • Smoking status: Former     Passive exposure: Past   • Smokeless tobacco: Never   Vaping Use   • Vaping Use: Never used   Substance and Sexual Activity   • Alcohol use: Yes     Comment: social use   • Drug use: No   • Sexual activity: None   Other Topics Concern   • None   Social History Narrative    Daily coffee consumption (_cups/day)    Denied hx of daily cola consumption    Daily tea consumption (_cups/day)     Social Determinants of Health     Financial Resource Strain: Not on file   Food Insecurity: Not on file   Transportation Needs: Not on file   Physical Activity: Not on file   Stress: Not on file   Social Connections: Not on file   Intimate Partner Violence: Not on file   Housing Stability: Not on file      Medications and Allergies:     Current Outpatient Medications   Medication Sig Dispense Refill   • Ascorbic Acid (vitamin C) 1000 MG tablet Take 1,000 mg by mouth daily     • aspirin (ECOTRIN LOW STRENGTH) 81 mg EC tablet Take by mouth daily      • atorvastatin (LIPITOR) 40 mg tablet TAKE 1 TABLET DAILY WITH DINNER 90 tablet 3   • Calcium Carb-Cholecalciferol (CALCIUM 1000 + D PO) Take 1 tablet by mouth daily     • carvedilol (COREG) 6 25 mg tablet TAKE 1 TABLET TWICE A DAY WITH MEALS 180 tablet 3   • cholecalciferol (VITAMIN D3) 1,000 units tablet Take 1,000 Units by mouth daily     • Docusate Sodium (STOOL SOFTENER LAXATIVE PO) Take by mouth 2 (two) times a day     • EPINEPHrine (EPIPEN) 0 3 mg/0 3 mL SOAJ Inject 0 3 mL (0 3 mg total) into a muscle once for 1 dose 0 6 mL 0   • furosemide (LASIX) 20 mg tablet TAKE 1 TABLET DAILY (Patient taking differently: if needed) 90 tablet 3   • levETIRAcetam (KEPPRA) 250 mg tablet TAKE ONE AND ONE-HALF TABLETS EVERY 12 HOURS 270 tablet 3   • lisinopril (ZESTRIL) 40 mg tablet TAKE 1 TABLET DAILY 90 tablet 3   • Multiple Vitamin (MULTIVITAMINS PO) Take 1 capsule by mouth daily     • Zinc 100 MG TABS Take by mouth       No current facility-administered medications for this visit  Allergies   Allergen Reactions   • Adhesive  [Medical Tape]      Other reaction(s): Unknown Reaction   • Celebrex [Celecoxib] Itching and GI Intolerance     Voice change   • Latex      Other reaction(s): Unknown Reaction   • Midazolam    • Penicillins    • Thiopental       Immunizations:     Immunization History   Administered Date(s) Administered   • COVID-19 PFIZER VACCINE 0 3 ML IM 01/03/2022, 01/24/2022   • Influenza Split High Dose Preservative Free IM 01/08/2013, 10/21/2014, 10/07/2015, 01/09/2017, 09/21/2017   • Influenza, high dose seasonal 0 7 mL 10/08/2018, 09/18/2019, 10/08/2020, 11/29/2021, 01/12/2023   • Pneumococcal Conjugate 13-Valent 10/07/2015, 12/16/2015   • Pneumococcal Polysaccharide PPV23 09/21/2017   • Td (adult), adsorbed 12/16/2015   • Zoster 04/16/2013      Health Maintenance:         Topic Date Due   • Breast Cancer Screening: Mammogram  12/12/2023   • Colorectal Cancer Screening  08/19/2024   • DXA SCAN  12/14/2025         Topic Date Due   • COVID-19 Vaccine (3 - Pfizer series) 03/21/2022      Medicare Screening Tests and Risk Assessments:     Gregg Taylor is here for her Subsequent Wellness visit  Last Medicare Wellness visit information reviewed, patient interviewed, no change since last AWV  Health Risk Assessment:   Patient rates overall health as very good  Patient feels that their physical health rating is same  Patient is very satisfied with their life  Eyesight was rated as same  Hearing was rated as same  Patient feels that their emotional and mental health rating is same  Patients states they are never, rarely angry  Patient states they are sometimes unusually tired/fatigued  Pain experienced in the last 7 days has been none   Patient states that she has experienced no weight loss or gain in last 6 months  Depression Screening:   PHQ-2 Score: 0      Fall Risk Screening: In the past year, patient has experienced: no history of falling in past year      Urinary Incontinence Screening:   Patient has leaked urine accidently in the last six months  Home Safety:  Patient does not have trouble with stairs inside or outside of their home  Patient has working smoke alarms and has working carbon monoxide detector  Home safety hazards include: none  Nutrition:   Current diet is No Added Salt  Medications:   Patient is currently taking over-the-counter supplements  OTC medications include: see medication list  Patient is able to manage medications  Activities of Daily Living (ADLs)/Instrumental Activities of Daily Living (IADLs):   Walk and transfer into and out of bed and chair?: Yes  Dress and groom yourself?: Yes    Bathe or shower yourself?: Yes    Feed yourself? Yes  Do your laundry/housekeeping?: Yes  Manage your money, pay your bills and track your expenses?: Yes  Make your own meals?: Yes    Do your own shopping?: Yes    Previous Hospitalizations:   Any hospitalizations or ED visits within the last 12 months?: Yes    How many hospitalizations have you had in the last year?: 1-2    Advance Care Planning:   Living will: Yes    Durable POA for healthcare:  Yes    Advanced directive: Yes    Advanced directive counseling given: Yes    Five wishes given: Yes    Patient declined ACP directive: No    End of Life Decisions reviewed with patient: Yes    Provider agrees with end of life decisions: Yes      Cognitive Screening:   Provider or family/friend/caregiver concerned regarding cognition?: No    PREVENTIVE SCREENINGS      Cardiovascular Screening:    General: Screening Not Indicated and History Lipid Disorder      Diabetes Screening:     General: Screening Current      Colorectal Cancer Screening:     General: Screening Current      Breast Cancer Screening:     General: Screening Current "Cervical Cancer Screening:    General: Screening Not Indicated      Osteoporosis Screening:    General: Screening Current      Abdominal Aortic Aneurysm (AAA) Screening:        General: Screening Current and Screening Not Indicated      Lung Cancer Screening:     General: Screening Not Indicated      Hepatitis C Screening:    General: Screening Current    Screening, Brief Intervention, and Referral to Treatment (SBIRT)    Screening    Typical number of drinks in a week: 0    Single Item Drug Screening:  How often have you used an illegal drug (including marijuana) or a prescription medication for non-medical reasons in the past year? never    Single Item Drug Screen Score: 0  Interpretation: Negative screen for possible drug use disorder    No results found  Physical Exam:     /84   Pulse 61   Temp 97 8 °F (36 6 °C)   Ht 5' 5\" (1 651 m)   Wt 106 kg (233 lb 3 2 oz)   SpO2 96%   BMI 38 81 kg/m²     Physical Exam  Vitals and nursing note reviewed  Constitutional:       General: She is not in acute distress  Appearance: Normal appearance  She is not ill-appearing or diaphoretic  HENT:      Head: Normocephalic and atraumatic  Right Ear: Tympanic membrane, ear canal and external ear normal       Left Ear: Tympanic membrane, ear canal and external ear normal       Nose: Nose normal  No congestion or rhinorrhea  Mouth/Throat:      Mouth: Mucous membranes are moist       Pharynx: Oropharynx is clear  No posterior oropharyngeal erythema  Eyes:      General:         Right eye: No discharge  Left eye: No discharge  Extraocular Movements: Extraocular movements intact  Conjunctiva/sclera: Conjunctivae normal       Pupils: Pupils are equal, round, and reactive to light  Neck:      Vascular: No carotid bruit  Cardiovascular:      Rate and Rhythm: Normal rate and regular rhythm  Pulses: Normal pulses  Heart sounds: Normal heart sounds  No murmur heard    Pulmonary: " Effort: Pulmonary effort is normal  No respiratory distress  Breath sounds: Normal breath sounds  No wheezing or rhonchi  Abdominal:      General: Abdomen is flat  Bowel sounds are normal  There is no distension  Palpations: There is no mass  Tenderness: There is no abdominal tenderness  Musculoskeletal:         General: No swelling or deformity  Normal range of motion  Cervical back: Normal range of motion and neck supple  No rigidity  Right lower leg: No edema  Left lower leg: No edema  Lymphadenopathy:      Cervical: No cervical adenopathy  Skin:     General: Skin is warm and dry  Capillary Refill: Capillary refill takes less than 2 seconds  Coloration: Skin is not jaundiced  Findings: No bruising, erythema or rash  Comments: Positive erythematous scaly lesion right upper forehead   Neurological:      General: No focal deficit present  Mental Status: She is alert and oriented to person, place, and time  Cranial Nerves: No cranial nerve deficit  Sensory: No sensory deficit  Gait: Gait normal       Deep Tendon Reflexes: Reflexes normal    Psychiatric:         Mood and Affect: Mood normal          Behavior: Behavior normal          Thought Content:  Thought content normal          Judgment: Judgment normal           Lacinda Purple, DO

## 2023-06-27 NOTE — PATIENT INSTRUCTIONS
Medicare Preventive Visit Patient Instructions  Thank you for completing your Welcome to Medicare Visit or Medicare Annual Wellness Visit today  Your next wellness visit will be due in one year (6/27/2024)  The screening/preventive services that you may require over the next 5-10 years are detailed below  Some tests may not apply to you based off risk factors and/or age  Screening tests ordered at today's visit but not completed yet may show as past due  Also, please note that scanned in results may not display below  Preventive Screenings:  Service Recommendations Previous Testing/Comments   Colorectal Cancer Screening  * Colonoscopy    * Fecal Occult Blood Test (FOBT)/Fecal Immunochemical Test (FIT)  * Fecal DNA/Cologuard Test  * Flexible Sigmoidoscopy Age: 39-70 years old   Colonoscopy: every 10 years (may be performed more frequently if at higher risk)  OR  FOBT/FIT: every 1 year  OR  Cologuard: every 3 years  OR  Sigmoidoscopy: every 5 years  Screening may be recommended earlier than age 39 if at higher risk for colorectal cancer  Also, an individualized decision between you and your healthcare provider will decide whether screening between the ages of 74-80 would be appropriate  Colonoscopy: 08/18/2021  FOBT/FIT: Not on file  Cologuard: 08/18/2021  Sigmoidoscopy: Not on file    Screening Current     Breast Cancer Screening Age: 36 years old  Frequency: every 1-2 years  Not required if history of left and right mastectomy Mammogram: 12/12/2022    Screening Current   Cervical Cancer Screening Between the ages of 21-29, pap smear recommended once every 3 years  Between the ages of 33-67, can perform pap smear with HPV co-testing every 5 years     Recommendations may differ for women with a history of total hysterectomy, cervical cancer, or abnormal pap smears in past  Pap Smear: Not on file    Screening Not Indicated   Hepatitis C Screening Once for adults born between 1945 and 1965  More frequently in patients at high risk for Hepatitis C Hep C Antibody: Not on file        Diabetes Screening 1-2 times per year if you're at risk for diabetes or have pre-diabetes Fasting glucose: 99 mg/dL (5/11/2023)  A1C: 5 1 % (8/8/2019)  Screening Current   Cholesterol Screening Once every 5 years if you don't have a lipid disorder  May order more often based on risk factors  Lipid panel: 05/11/2023    Screening Not Indicated  History Lipid Disorder     Other Preventive Screenings Covered by Medicare:  1  Abdominal Aortic Aneurysm (AAA) Screening: covered once if your at risk  You're considered to be at risk if you have a family history of AAA  2  Lung Cancer Screening: covers low dose CT scan once per year if you meet all of the following conditions: (1) Age 50-69; (2) No signs or symptoms of lung cancer; (3) Current smoker or have quit smoking within the last 15 years; (4) You have a tobacco smoking history of at least 20 pack years (packs per day multiplied by number of years you smoked); (5) You get a written order from a healthcare provider  3  Glaucoma Screening: covered annually if you're considered high risk: (1) You have diabetes OR (2) Family history of glaucoma OR (3)  aged 48 and older OR (3)  American aged 72 and older  3  Osteoporosis Screening: covered every 2 years if you meet one of the following conditions: (1) You're estrogen deficient and at risk for osteoporosis based off medical history and other findings; (2) Have a vertebral abnormality; (3) On glucocorticoid therapy for more than 3 months; (4) Have primary hyperparathyroidism; (5) On osteoporosis medications and need to assess response to drug therapy  · Last bone density test (DXA Scan): 12/14/2022   5  HIV Screening: covered annually if you're between the age of 15-65  Also covered annually if you are younger than 13 and older than 72 with risk factors for HIV infection   For pregnant patients, it is covered up to 3 times per pregnancy  Immunizations:  Immunization Recommendations   Influenza Vaccine Annual influenza vaccination during flu season is recommended for all persons aged >= 6 months who do not have contraindications   Pneumococcal Vaccine   * Pneumococcal conjugate vaccine = PCV13 (Prevnar 13), PCV15 (Vaxneuvance), PCV20 (Prevnar 20)  * Pneumococcal polysaccharide vaccine = PPSV23 (Pneumovax) Adults 25-60 years old: 1-3 doses may be recommended based on certain risk factors  Adults 72 years old: 1-2 doses may be recommended based off what pneumonia vaccine you previously received   Hepatitis B Vaccine 3 dose series if at intermediate or high risk (ex: diabetes, end stage renal disease, liver disease)   Tetanus (Td) Vaccine - COST NOT COVERED BY MEDICARE PART B Following completion of primary series, a booster dose should be given every 10 years to maintain immunity against tetanus  Td may also be given as tetanus wound prophylaxis  Tdap Vaccine - COST NOT COVERED BY MEDICARE PART B Recommended at least once for all adults  For pregnant patients, recommended with each pregnancy  Shingles Vaccine (Shingrix) - COST NOT COVERED BY MEDICARE PART B  2 shot series recommended in those aged 48 and above     Health Maintenance Due:      Topic Date Due   • Breast Cancer Screening: Mammogram  12/12/2023   • Colorectal Cancer Screening  08/19/2024   • DXA SCAN  12/14/2025     Immunizations Due:      Topic Date Due   • COVID-19 Vaccine (3 - Pfizer series) 03/21/2022     Advance Directives   What are advance directives? Advance directives are legal documents that state your wishes and plans for medical care  These plans are made ahead of time in case you lose your ability to make decisions for yourself  Advance directives can apply to any medical decision, such as the treatments you want, and if you want to donate organs  What are the types of advance directives?   There are many types of advance directives, and each state has rules about how to use them  You may choose a combination of any of the following:  · Living will: This is a written record of the treatment you want  You can also choose which treatments you do not want, which to limit, and which to stop at a certain time  This includes surgery, medicine, IV fluid, and tube feedings  · Durable power of  for healthcare [facility]): This is a written record that states who you want to make healthcare choices for you when you are unable to make them for yourself  This person, called a proxy, is usually a family member or a friend  You may choose more than 1 proxy  · Do not resuscitate (DNR) order:  A DNR order is used in case your heart stops beating or you stop breathing  It is a request not to have certain forms of treatment, such as CPR  A DNR order may be included in other types of advance directives  · Medical directive: This covers the care that you want if you are in a coma, near death, or unable to make decisions for yourself  You can list the treatments you want for each condition  Treatment may include pain medicine, surgery, blood transfusions, dialysis, IV or tube feedings, and a ventilator (breathing machine)  · Values history: This document has questions about your views, beliefs, and how you feel and think about life  This information can help others choose the care that you would choose  Why are advance directives important? An advance directive helps you control your care  Although spoken wishes may be used, it is better to have your wishes written down  Spoken wishes can be misunderstood, or not followed  Treatments may be given even if you do not want them  An advance directive may make it easier for your family to make difficult choices about your care  Urinary Incontinence   Urinary incontinence (UI)  is when you lose control of your bladder  UI develops because your bladder cannot store or empty urine properly   The 3 most common types of UI are stress incontinence, urge incontinence, or both  Medicines:   · May be given to help strengthen your bladder control  Report any side effects of medication to your healthcare provider  Do pelvic muscle exercises often:  Your pelvic muscles help you stop urinating  Squeeze these muscles tight for 5 seconds, then relax for 5 seconds  Gradually work up to squeezing for 10 seconds  Do 3 sets of 15 repetitions a day, or as directed  This will help strengthen your pelvic muscles and improve bladder control  Train your bladder:  Go to the bathroom at set times, such as every 2 hours, even if you do not feel the urge to go  You can also try to hold your urine when you feel the urge to go  For example, hold your urine for 5 minutes when you feel the urge to go  As that becomes easier, hold your urine for 10 minutes  Self-care:   · Keep a UI record  Write down how often you leak urine and how much you leak  Make a note of what you were doing when you leaked urine  · Drink liquids as directed  You may need to limit the amount of liquid you drink to help control your urine leakage  Do not drink any liquid right before you go to bed  Limit or do not have drinks that contain caffeine or alcohol  · Prevent constipation  Eat a variety of high-fiber foods  Good examples are high-fiber cereals, beans, vegetables, and whole-grain breads  Walking is the best way to trigger your intestines to have a bowel movement  · Exercise regularly and maintain a healthy weight  Weight loss and exercise will decrease pressure on your bladder and help you control your leakage  · Use a catheter as directed  to help empty your bladder  A catheter is a tiny, plastic tube that is put into your bladder to drain your urine  · Go to behavior therapy as directed  Behavior therapy may be used to help you learn to control your urge to urinate      Weight Management   Why it is important to manage your weight:  Being overweight increases your risk of health conditions such as heart disease, high blood pressure, type 2 diabetes, and certain types of cancer  It can also increase your risk for osteoarthritis, sleep apnea, and other respiratory problems  Aim for a slow, steady weight loss  Even a small amount of weight loss can lower your risk of health problems  How to lose weight safely:  A safe and healthy way to lose weight is to eat fewer calories and get regular exercise  You can lose up about 1 pound a week by decreasing the number of calories you eat by 500 calories each day  Healthy meal plan for weight management:  A healthy meal plan includes a variety of foods, contains fewer calories, and helps you stay healthy  A healthy meal plan includes the following:  · Eat whole-grain foods more often  A healthy meal plan should contain fiber  Fiber is the part of grains, fruits, and vegetables that is not broken down by your body  Whole-grain foods are healthy and provide extra fiber in your diet  Some examples of whole-grain foods are whole-wheat breads and pastas, oatmeal, brown rice, and bulgur  · Eat a variety of vegetables every day  Include dark, leafy greens such as spinach, kale, donte greens, and mustard greens  Eat yellow and orange vegetables such as carrots, sweet potatoes, and winter squash  · Eat a variety of fruits every day  Choose fresh or canned fruit (canned in its own juice or light syrup) instead of juice  Fruit juice has very little or no fiber  · Eat low-fat dairy foods  Drink fat-free (skim) milk or 1% milk  Eat fat-free yogurt and low-fat cottage cheese  Try low-fat cheeses such as mozzarella and other reduced-fat cheeses  · Choose meat and other protein foods that are low in fat  Choose beans or other legumes such as split peas or lentils  Choose fish, skinless poultry (chicken or turkey), or lean cuts of red meat (beef or pork)  Before you cook meat or poultry, cut off any visible fat  · Use less fat and oil  Try baking foods instead of frying them  Add less fat, such as margarine, sour cream, regular salad dressing and mayonnaise to foods  Eat fewer high-fat foods  Some examples of high-fat foods include french fries, doughnuts, ice cream, and cakes  · Eat fewer sweets  Limit foods and drinks that are high in sugar  This includes candy, cookies, regular soda, and sweetened drinks  Exercise:  Exercise at least 30 minutes per day on most days of the week  Some examples of exercise include walking, biking, dancing, and swimming  You can also fit in more physical activity by taking the stairs instead of the elevator or parking farther away from stores  Ask your healthcare provider about the best exercise plan for you  © Copyright [employer] 2018 Information is for End User's use only and may not be sold, redistributed or otherwise used for commercial purposes   All illustrations and images included in CareNotes® are the copyrighted property of [employer]  or [address] [place], [place] [address]

## 2023-08-10 ENCOUNTER — OFFICE VISIT (OUTPATIENT)
Dept: DERMATOLOGY | Facility: CLINIC | Age: 83
End: 2023-08-10
Payer: COMMERCIAL

## 2023-08-10 VITALS — TEMPERATURE: 98.5 F | WEIGHT: 225 LBS | BODY MASS INDEX: 37.49 KG/M2 | HEIGHT: 65 IN

## 2023-08-10 DIAGNOSIS — D18.01 CHERRY ANGIOMA: ICD-10-CM

## 2023-08-10 DIAGNOSIS — D22.9 MULTIPLE MELANOCYTIC NEVI: ICD-10-CM

## 2023-08-10 DIAGNOSIS — L81.4 LENTIGO: ICD-10-CM

## 2023-08-10 DIAGNOSIS — L73.8 SEBACEOUS HYPERPLASIA: Primary | ICD-10-CM

## 2023-08-10 DIAGNOSIS — L82.1 SEBORRHEIC KERATOSIS: ICD-10-CM

## 2023-08-10 PROCEDURE — 99203 OFFICE O/P NEW LOW 30 MIN: CPT | Performed by: DERMATOLOGY

## 2023-08-10 NOTE — PATIENT INSTRUCTIONS
SEBACEOUS HYPERPLASIA      Assessment and Plan:  Based on a thorough discussion of this condition and the management approach to it (including a comprehensive discussion of the known risks, side effects and potential benefits of treatment), the patient (family) agrees to implement the following specific plan:  Reassured benign     Sebaceous Hyperplasia  Sebaceous hyperplasia is a common, benign condition of enlarged oil secreting (sebaceous) glands commonly found on the forehead and cheeks of middle-aged and elderly patients. They normally appear as small yellow bumps up to 3mm in diameter that can be single or multiple. The bumps on the face often display a centrall dell. Occasionally, these bumps can occur on the chest, areola, mouth, and genitals. Rarely, they can grow to take a giant form, or be arranged linearly. Causes of sebaceous hyperplasia  Sebaceous hyperplasic is a form of benign hair follicle tumor and can often be confused with basal cell carcinoma. It can be more prevalent in immunosuppressed patients such as those undergoing organ transplantation. In the rare Alexx-Melanie syndrome, sebaceous hyperplasia occurs in association with internal cancers. Lesions of sebaceous hyperplasia are benign, with no known potential for malignant transformation, but they may be associated with nonmelanoma skin cancer in transplantation patients. How we do diagnose sebaceous hyperplasia? Your dermatologist may take a closer look at the bumps with a device called a dermatoscope. Common features include a central hair follicle surrounded by yellowish lobules with prominent blood vessels. What is the treatment of sebaceous hyperplasia? Since sebaceous hyperplasia is benign with no known potential for transformation into cancer, treatment is mostly for cosmetic reasons or if the lesions become irritated.  Options include   Light electrocautery or laser vaporization  Oral isotrentinoin is effective for extensive or disfiguring spots, but do not prevent recurrence   Antiandrogens may be used in females to decrease the size and improve overall appearance of bumps   MELANOCYTIC NEVI ("Moles")        Assessment and Plan:  Based on a thorough discussion of this condition and the management approach to it (including a comprehensive discussion of the known risks, side effects and potential benefits of treatment), the patient (family) agrees to implement the following specific plan:  When outside we recommend using a wide brim hat, sunglasses, long sleeve and pants, sunscreen with SPF 30+ with reapplication every 2 hours, or SPF specific clothing   Benign, reassured  Annual skin check     Melanocytic Nevi  Melanocytic nevi ("moles") are tan or brown, raised or flat areas of the skin which have an increased number of melanocytes. Melanocytes are the cells in our body which make pigment and account for skin color. Some moles are present at birth (I.e., "congenital nevi"), while others come up later in life (i.e., "acquired nevi"). The sun can stimulate the body to make more moles. Sunburns are not the only thing that triggers more moles. Chronic sun exposure can do it too. Clinically distinguishing a healthy mole from melanoma may be difficult, even for experienced dermatologists. The "ABCDE's" of moles have been suggested as a means of helping to alert a person to a suspicious mole and the possible increased risk of melanoma. The suggestions for raising alert are as follows:    Asymmetry: Healthy moles tend to be symmetric, while melanomas are often asymmetric. Asymmetry means if you draw a line through the mole, the two halves do not match in color, size, shape, or surface texture. Asymmetry can be a result of rapid enlargement of a mole, the development of a raised area on a previously flat lesion, scaling, ulceration, bleeding or scabbing within the mole.   Any mole that starts to demonstrate "asymmetry" should be examined promptly by a board certified dermatologist.     Border: Healthy moles tend to have discrete, even borders. The border of a melanoma often blends into the normal skin and does not sharply delineate the mole from normal skin. Any mole that starts to demonstrate "uneven borders" should be examined promptly by a board certified dermatologist.     Color: Healthy moles tend to be one color throughout. Melanomas tend to be made up of different colors ranging from dark black, blue, white, or red. Any mole that demonstrates a color change should be examined promptly by a board certified dermatologist.     Diameter: Healthy moles tend to be smaller than 0.6 cm in size; an exception are "congenital nevi" that can be larger. Melanomas tend to grow and can often be greater than 0.6 cm (1/4 of an inch, or the size of a pencil eraser). This is only a guideline, and many normal moles may be larger than 0.6 cm without being unhealthy. Any mole that starts to change in size (small to bigger or bigger to smaller) should be examined promptly by a board certified dermatologist.     Evolving: Healthy moles tend to "stay the same."  Melanomas may often show signs of change or evolution such as a change in size, shape, color, or elevation. Any mole that starts to itch, bleed, crust, burn, hurt, or ulcerate or demonstrate a change or evolution should be examined promptly by a board certified dermatologist.        Fab Alvarez and Plan:  Based on a thorough discussion of this condition and the management approach to it (including a comprehensive discussion of the known risks, side effects and potential benefits of treatment), the patient (family) agrees to implement the following specific plan:  When outside we recommend using a wide brim hat, sunglasses, long sleeve and pants, sunscreen with SPF 54+ with reapplication every 2 hours, or SPF specific clothing       What is a lentigo?   A lentigo is a pigmented flat or slightly raised lesion with a clearly defined edge. Unlike an ephelis (freckle), it does not fade in the winter months. There are several kinds of lentigo. The name lentigo originally referred to its appearance resembling a small lentil. The plural of lentigo is lentigines, although “lentigos” is also in common use. Who gets lentigines? Lentigines can affect males and females of all ages and races. Solar lentigines are especially prevalent in fair skinned adults. Lentigines associated with syndromes are present at birth or arise during childhood. What causes lentigines? Common forms of lentigo are due to exposure to ultraviolet radiation:  Sun damage including sunburn   Indoor tanning   Phototherapy, especially photochemotherapy (PUVA)    Ionizing radiation, eg radiation therapy, can also cause lentigines. Several familial syndromes associated with widespread lentigines originate from mutations in Brent-MAP kinase, mTOR signaling and PTEN pathways. What is the treatment for lentigines? Most lentigines are left alone. Attempts to lighten them may not be successful. The following approaches are used:  SPF 50+ broad-spectrum sunscreen   Hydroquinone bleaching cream   Alpha hydroxy acids   Vitamin C   Retinoids   Azelaic acid   Chemical peels  Individual lesions can be permanently removed using:  Cryotherapy   Intense pulsed light   Pigment lasers    How can lentigines be prevented? Lentigines associated with exposure ultraviolet radiation can be prevented by very careful sun protection. Clothing is more successful at preventing new lentigines than are sunscreens. What is the outlook for lentigines? Lentigines usually persist. They may increase in number with age and sun exposure. Some in sun-protected sites may fade and disappear.     HOUGH ANGIOMAS        Assessment and Plan:  Based on a thorough discussion of this condition and the management approach to it (including a comprehensive discussion of the known risks, side effects and potential benefits of treatment), the patient (family) agrees to implement the following specific plan:  Monitor for changes  Benign, reassured      Assessment and Plan:    Cherry angioma, also known as Kenney Hey spots, are benign vascular skin lesions. A "cherry angioma" is a firm red, blue or purple papule, 0.1-1 cm in diameter. When thrombosed, they can appear black in colour until evaluated with a dermatoscope when the red or purple colour is more easily seen. Cherry angioma may develop on any part of the body but most often appear on the scalp, face, lips and trunk. An angioma is due to proliferating endothelial cells; these are the cells that line the inside of a blood vessel. Angiomas can arise in early life or later in life; the most common type of angioma is a cherry angioma. Cherry angiomas are very common in males and females of any age or race. They are more noticeable in white skin than in skin of colour. They markedly increase in number from about the age of 36. There may be a family history of similar lesions. Eruptive cherry angiomas have been rarely reported to be associated with internal malignancy. The cause of angiomas is unknown. Genetic analysis of cherry angiomas has shown that they frequently carry specific somatic missense mutations in the GNAQ and GNA11 (Q209H) genes, which are involved in other vascular and melanocytic proliferations.       SEBORRHEIC KERATOSIS; NON-INFLAMED        Assessment and Plan:  Based on a thorough discussion of this condition and the management approach to it (including a comprehensive discussion of the known risks, side effects and potential benefits of treatment), the patient (family) agrees to implement the following specific plan:  Monitor for changes  Benign, reassured      Seborrheic Keratosis  A seborrheic keratosis is a harmless warty spot that appears during adult life as a common sign of skin aging. Seborrheic keratoses can arise on any area of skin, covered or uncovered, with the usual exception of the palms and soles. They do not arise from mucous membranes. Seborrheic keratoses can have highly variable appearance. Seborrheic keratoses are extremely common. It has been estimated that over 90% of adults over the age of 61 years have one or more of them. They occur in males and females of all races, typically beginning to erupt in the 35s or 45s. They are uncommon under the age of 21 years. The precise cause of seborrhoeic keratoses is not known. Seborrhoeic keratoses are considered degenerative in nature. As time goes by, seborrheic keratoses tend to become more numerous. Some people inherit a tendency to develop a very large number of them; some people may have hundreds of them. There is no easy way to remove multiple lesions on a single occasion. Unless a specific lesion is "inflamed" and is causing pain or stinging/burning or is bleeding, most insurance companies do not authorize treatment.

## 2023-08-10 NOTE — PROGRESS NOTES
[facility] Note     Patient Name: Kierra Parnell  Encounter Date: 08/10/2023    Have you been cared for by a [facility]atologist in the last 3 years and, if so, which description applies to you? NO. I am considered a "new" patient and must complete all patient intake questions. I am FEMALE/of child-bearing potential.    REVIEW OF SYSTEMS:  Have you recently had or currently have any of the following? · Recent fever or chills? No  · Any non-healing wound? No  · Are you pregnant or planning to become pregnant? No  · Are you currently or planning to be nursing or breast feeding? No   PAST MEDICAL HISTORY:  Have you personally ever had or currently have any of the following? If "YES," then please provide more detail. · Skin cancer (such as Melanoma, Basal Cell Carcinoma, Squamous Cell Carcinoma? No  · Tuberculosis, HIV/AIDS, Hepatitis B or C: No  · Systemic Immunosuppression such as Diabetes, Biologic or Immunotherapy, Chemotherapy, Organ Transplantation, Bone Marrow Transplantation No  · Radiation Treatment No   FAMILY HISTORY:  Any "first degree relatives" (parent, brother, sister, or child) with the following? • Skin Cancer, Pancreatic or Other Cancer? No   PATIENT EXPERIENCE:    • Do you want the Dermatologist to perform a COMPLETE skin exam today including a clinical examination under the "bra and underwear" areas? NO UPPER BODY ONLY! NO BREAST   • If necessary, do we have your permission to call and leave a detailed message on your Preferred Phone number that includes your specific medical information?   Yes      Allergies   Allergen Reactions   • Adhesive  [Medical Tape]      Other reaction(s): Unknown Reaction   • Celebrex [Celecoxib] Itching and GI Intolerance     Voice change   • Latex      Other reaction(s): Unknown Reaction   • Midazolam    • Penicillins    • Thiopental       Current Outpatient Medications:   •  Ascorbic Acid (vitamin C) 1000 MG tablet, Take 1,000 mg by mouth daily, Disp: , Rfl:   •  aspirin (ECOTRIN LOW STRENGTH) 81 mg EC tablet, Take by mouth daily , Disp: , Rfl:   •  atorvastatin (LIPITOR) 40 mg tablet, TAKE 1 TABLET DAILY WITH DINNER, Disp: 90 tablet, Rfl: 3  •  Calcium Carb-Cholecalciferol (CALCIUM 1000 + D PO), Take 1 tablet by mouth daily, Disp: , Rfl:   •  carvedilol (COREG) 6.25 mg tablet, TAKE 1 TABLET TWICE A DAY WITH MEALS, Disp: 180 tablet, Rfl: 3  •  cholecalciferol (VITAMIN D3) 1,000 units tablet, Take 1,000 Units by mouth daily, Disp: , Rfl:   •  Docusate Sodium (STOOL SOFTENER LAXATIVE PO), Take by mouth 2 (two) times a day, Disp: , Rfl:   •  furosemide (LASIX) 20 mg tablet, TAKE 1 TABLET DAILY (Patient taking differently: if needed), Disp: 90 tablet, Rfl: 3  •  levETIRAcetam (KEPPRA) 250 mg tablet, TAKE ONE AND ONE-HALF TABLETS EVERY 12 HOURS, Disp: 270 tablet, Rfl: 3  •  lisinopril (ZESTRIL) 40 mg tablet, TAKE 1 TABLET DAILY, Disp: 90 tablet, Rfl: 3  •  Multiple Vitamin (MULTIVITAMINS PO), Take 1 capsule by mouth daily, Disp: , Rfl:   •  Zinc 100 MG TABS, Take by mouth, Disp: , Rfl:   •  EPINEPHrine (EPIPEN) 0.3 mg/0.3 mL SOAJ, Inject 0.3 mL (0.3 mg total) into a muscle once for 1 dose, Disp: 0.6 mL, Rfl: 0          • Whom besides the patient is providing clinical information about today's encounter?   o NO ADDITIONAL HISTORIAN (patient alone provided history)    Physical Exam and Assessment/Plan by Diagnosis:    SEBACEOUS HYPERPLASIA    Physical Exam:  • Anatomic Location Affected:  Right cheek   • Morphological Description:  Yellow umbilicated papules with crown vessels  • Pertinent Positives:  • Pertinent Negatives:     Additional History of Present Condition:  Patient here for spot of concern     Assessment and Plan:  Based on a thorough discussion of this condition and the management approach to it (including a comprehensive discussion of the known risks, side effects and potential benefits of treatment), the patient (family) agrees to implement the following specific plan:  • Reassured benign     Sebaceous Hyperplasia  Sebaceous hyperplasia is a common, benign condition of enlarged oil secreting (sebaceous) glands commonly found on the forehead and cheeks of middle-aged and elderly patients. They normally appear as small yellow bumps up to 3mm in diameter that can be single or multiple. The bumps on the face often display a centrall dell. Occasionally, these bumps can occur on the chest, areola, mouth, and genitals. Rarely, they can grow to take a giant form, or be arranged linearly. Causes of sebaceous hyperplasia  Sebaceous hyperplasic is a form of benign hair follicle tumor and can often be confused with basal cell carcinoma. It can be more prevalent in immunosuppressed patients such as those undergoing organ transplantation. In the rare Alexx-Melanie syndrome, sebaceous hyperplasia occurs in association with internal cancers. Lesions of sebaceous hyperplasia are benign, with no known potential for malignant transformation, but they may be associated with nonmelanoma skin cancer in transplantation patients. How we do diagnose sebaceous hyperplasia? Your dermatologist may take a closer look at the bumps with a device called a dermatoscope. Common features include a central hair follicle surrounded by yellowish lobules with prominent blood vessels. What is the treatment of sebaceous hyperplasia? Since sebaceous hyperplasia is benign with no known potential for transformation into cancer, treatment is mostly for cosmetic reasons or if the lesions become irritated.  Options include   - Light electrocautery or laser vaporization  - Oral isotrentinoin is effective for extensive or disfiguring spots, but do not prevent recurrence   - Antiandrogens may be used in females to decrease the size and improve overall appearance of bumps   MELANOCYTIC NEVI ("Moles")    Physical Exam:  • Anatomic Location Affected:   Mostly on sun-exposed areas of the trunk and extremities  • Morphological Description:  Scattered, 1-4mm round to ovoid, symmetrical-appearing, even bordered, skin colored to dark brown macules/papules, mostly in sun-exposed areas  • Pertinent Positives:  • Pertinent Negatives: Additional History of Present Condition:      Assessment and Plan:  Based on a thorough discussion of this condition and the management approach to it (including a comprehensive discussion of the known risks, side effects and potential benefits of treatment), the patient (family) agrees to implement the following specific plan:  • When outside we recommend using a wide brim hat, sunglasses, long sleeve and pants, sunscreen with SPF 29+ with reapplication every 2 hours, or SPF specific clothing   • Benign, reassured  • Annual skin check     Melanocytic Nevi  Melanocytic nevi ("moles") are tan or brown, raised or flat areas of the skin which have an increased number of melanocytes. Melanocytes are the cells in our body which make pigment and account for skin color. Some moles are present at birth (I.e., "congenital nevi"), while others come up later in life (i.e., "acquired nevi"). The sun can stimulate the body to make more moles. Sunburns are not the only thing that triggers more moles. Chronic sun exposure can do it too. Clinically distinguishing a healthy mole from melanoma may be difficult, even for experienced dermatologists. The "ABCDE's" of moles have been suggested as a means of helping to alert a person to a suspicious mole and the possible increased risk of melanoma. The suggestions for raising alert are as follows:    Asymmetry: Healthy moles tend to be symmetric, while melanomas are often asymmetric. Asymmetry means if you draw a line through the mole, the two halves do not match in color, size, shape, or surface texture.  Asymmetry can be a result of rapid enlargement of a mole, the development of a raised area on a previously flat lesion, scaling, ulceration, bleeding or scabbing within the mole. Any mole that starts to demonstrate "asymmetry" should be examined promptly by a board certified dermatologist.     Border: Healthy moles tend to have discrete, even borders. The border of a melanoma often blends into the normal skin and does not sharply delineate the mole from normal skin. Any mole that starts to demonstrate "uneven borders" should be examined promptly by a board certified dermatologist.     Color: Healthy moles tend to be one color throughout. Melanomas tend to be made up of different colors ranging from dark black, blue, white, or red. Any mole that demonstrates a color change should be examined promptly by a board certified dermatologist.     Diameter: Healthy moles tend to be smaller than 0.6 cm in size; an exception are "congenital nevi" that can be larger. Melanomas tend to grow and can often be greater than 0.6 cm (1/4 of an inch, or the size of a pencil eraser). This is only a guideline, and many normal moles may be larger than 0.6 cm without being unhealthy. Any mole that starts to change in size (small to bigger or bigger to smaller) should be examined promptly by a board certified dermatologist.     Evolving: Healthy moles tend to "stay the same."  Melanomas may often show signs of change or evolution such as a change in size, shape, color, or elevation. Any mole that starts to itch, bleed, crust, burn, hurt, or ulcerate or demonstrate a change or evolution should be examined promptly by a board certified dermatologist.        LENTIGO    Physical Exam:  • Anatomic Location Affected:  trunk, arms  • Morphological Description:  Light brown macules  • Pertinent Positives:  • Pertinent Negatives:     Additional History of Present Condition:      Assessment and Plan:  Based on a thorough discussion of this condition and the management approach to it (including a comprehensive discussion of the known risks, side effects and potential benefits of treatment), the patient (family) agrees to implement the following specific plan:  • When outside we recommend using a wide brim hat, sunglasses, long sleeve and pants, sunscreen with SPF 94+ with reapplication every 2 hours, or SPF specific clothing       What is a lentigo? A lentigo is a pigmented flat or slightly raised lesion with a clearly defined edge. Unlike an ephelis (freckle), it does not fade in the winter months. There are several kinds of lentigo. The name lentigo originally referred to its appearance resembling a small lentil. The plural of lentigo is lentigines, although “lentigos” is also in common use. Who gets lentigines? Lentigines can affect males and females of all ages and races. Solar lentigines are especially prevalent in fair skinned adults. Lentigines associated with syndromes are present at birth or arise during childhood. What causes lentigines? Common forms of lentigo are due to exposure to ultraviolet radiation:  • Sun damage including sunburn   • Indoor tanning   • Phototherapy, especially photochemotherapy (PUVA)    Ionizing radiation, eg radiation therapy, can also cause lentigines. Several familial syndromes associated with widespread lentigines originate from mutations in Brent-MAP kinase, mTOR signaling and PTEN pathways. What is the treatment for lentigines? Most lentigines are left alone. Attempts to lighten them may not be successful. The following approaches are used:  • SPF 50+ broad-spectrum sunscreen   • Hydroquinone bleaching cream   • Alpha hydroxy acids   • Vitamin C   • Retinoids   • Azelaic acid   • Chemical peels  Individual lesions can be permanently removed using:  • Cryotherapy   • Intense pulsed light   • Pigment lasers    How can lentigines be prevented? Lentigines associated with exposure ultraviolet radiation can be prevented by very careful sun protection.  Clothing is more successful at preventing new lentigines than are sunscreens. What is the outlook for lentigines? Lentigines usually persist. They may increase in number with age and sun exposure. Some in sun-protected sites may fade and disappear. CHERRY ANGIOMAS    Physical Exam:  • Anatomic Location Affected:  trunk  • Morphological Description:  Scattered cherry red, 1-4 mm papules. • Pertinent Positives:  • Pertinent Negatives: Additional History of Present Condition:      Assessment and Plan:  Based on a thorough discussion of this condition and the management approach to it (including a comprehensive discussion of the known risks, side effects and potential benefits of treatment), the patient (family) agrees to implement the following specific plan:  • Monitor for changes  • Benign, reassured  •     Assessment and Plan:    Cherry angioma, also known as  Dam spots, are benign vascular skin lesions. A "cherry angioma" is a firm red, blue or purple papule, 0.1-1 cm in diameter. When thrombosed, they can appear black in colour until evaluated with a dermatoscope when the red or purple colour is more easily seen. Cherry angioma may develop on any part of the body but most often appear on the scalp, face, lips and trunk. An angioma is due to proliferating endothelial cells; these are the cells that line the inside of a blood vessel. Angiomas can arise in early life or later in life; the most common type of angioma is a cherry angioma. Cherry angiomas are very common in males and females of any age or race. They are more noticeable in white skin than in skin of colour. They markedly increase in number from about the age of 36. There may be a family history of similar lesions. Eruptive cherry angiomas have been rarely reported to be associated with internal malignancy. The cause of angiomas is unknown.  Genetic analysis of cherry angiomas has shown that they frequently carry specific somatic missense mutations in the GNAQ and GNA11 (Q209H) genes, which are involved in other vascular and melanocytic proliferations. SEBORRHEIC KERATOSIS; NON-INFLAMED    Physical Exam:  • Anatomic Location Affected:  trunk  • Morphological Description:  Flat and raised, waxy, smooth to warty textured, yellow to brownish-grey to dark brown to blackish, discrete, "stuck-on" appearing papules. • Pertinent Positives:  • Pertinent Negatives: Additional History of Present Condition:      Assessment and Plan:  Based on a thorough discussion of this condition and the management approach to it (including a comprehensive discussion of the known risks, side effects and potential benefits of treatment), the patient (family) agrees to implement the following specific plan:  • Monitor for changes  • Benign, reassured  •     Seborrheic Keratosis  A seborrheic keratosis is a harmless warty spot that appears during adult life as a common sign of skin aging. Seborrheic keratoses can arise on any area of skin, covered or uncovered, with the usual exception of the palms and soles. They do not arise from mucous membranes. Seborrheic keratoses can have highly variable appearance. Seborrheic keratoses are extremely common. It has been estimated that over 90% of adults over the age of 61 years have one or more of them. They occur in males and females of all races, typically beginning to erupt in the 35s or 45s. They are uncommon under the age of 21 years. The precise cause of seborrhoeic keratoses is not known. Seborrhoeic keratoses are considered degenerative in nature. As time goes by, seborrheic keratoses tend to become more numerous. Some people inherit a tendency to develop a very large number of them; some people may have hundreds of them. There is no easy way to remove multiple lesions on a single occasion. Unless a specific lesion is "inflamed" and is causing pain or stinging/burning or is bleeding, most insurance companies do not authorize treatment.     Scribe Attestation I,:  Dale Martins MA am acting as a scribe while in the presence of the attending physician.:       I,:  Bhavana Tobias MD personally performed the services described in this documentation    as scribed in my presence.:

## 2024-01-04 ENCOUNTER — APPOINTMENT (OUTPATIENT)
Dept: LAB | Facility: CLINIC | Age: 84
End: 2024-01-04
Payer: COMMERCIAL

## 2024-01-04 DIAGNOSIS — E78.49 OTHER HYPERLIPIDEMIA: ICD-10-CM

## 2024-01-04 DIAGNOSIS — I10 ESSENTIAL HYPERTENSION: ICD-10-CM

## 2024-01-04 DIAGNOSIS — E78.49 OTHER HYPERLIPIDEMIA: Primary | ICD-10-CM

## 2024-01-04 LAB
ALBUMIN SERPL BCP-MCNC: 3.8 G/DL (ref 3.5–5)
ALP SERPL-CCNC: 66 U/L (ref 34–104)
ALT SERPL W P-5'-P-CCNC: 11 U/L (ref 7–52)
ANION GAP SERPL CALCULATED.3IONS-SCNC: 5 MMOL/L
AST SERPL W P-5'-P-CCNC: 18 U/L (ref 13–39)
BILIRUB SERPL-MCNC: 0.74 MG/DL (ref 0.2–1)
BUN SERPL-MCNC: 13 MG/DL (ref 5–25)
CALCIUM SERPL-MCNC: 9.9 MG/DL (ref 8.4–10.2)
CHLORIDE SERPL-SCNC: 104 MMOL/L (ref 96–108)
CHOLEST SERPL-MCNC: 140 MG/DL
CO2 SERPL-SCNC: 30 MMOL/L (ref 21–32)
CREAT SERPL-MCNC: 0.6 MG/DL (ref 0.6–1.3)
GFR SERPL CREATININE-BSD FRML MDRD: 85 ML/MIN/1.73SQ M
GLUCOSE P FAST SERPL-MCNC: 105 MG/DL (ref 65–99)
HDLC SERPL-MCNC: 45 MG/DL
LDLC SERPL CALC-MCNC: 74 MG/DL (ref 0–100)
NONHDLC SERPL-MCNC: 95 MG/DL
POTASSIUM SERPL-SCNC: 4.5 MMOL/L (ref 3.5–5.3)
PROT SERPL-MCNC: 7.1 G/DL (ref 6.4–8.4)
SODIUM SERPL-SCNC: 139 MMOL/L (ref 135–147)
TRIGL SERPL-MCNC: 103 MG/DL

## 2024-01-04 PROCEDURE — 36415 COLL VENOUS BLD VENIPUNCTURE: CPT

## 2024-01-04 PROCEDURE — 80053 COMPREHEN METABOLIC PANEL: CPT

## 2024-01-04 PROCEDURE — 80061 LIPID PANEL: CPT

## 2024-01-08 ENCOUNTER — OFFICE VISIT (OUTPATIENT)
Dept: FAMILY MEDICINE CLINIC | Facility: CLINIC | Age: 84
End: 2024-01-08
Payer: COMMERCIAL

## 2024-01-08 VITALS
HEIGHT: 65 IN | BODY MASS INDEX: 39.12 KG/M2 | SYSTOLIC BLOOD PRESSURE: 126 MMHG | WEIGHT: 234.8 LBS | HEART RATE: 81 BPM | TEMPERATURE: 97.4 F | DIASTOLIC BLOOD PRESSURE: 86 MMHG | OXYGEN SATURATION: 98 %

## 2024-01-08 DIAGNOSIS — I10 ESSENTIAL HYPERTENSION: Primary | ICD-10-CM

## 2024-01-08 DIAGNOSIS — R60.0 LOCALIZED EDEMA: ICD-10-CM

## 2024-01-08 DIAGNOSIS — E78.5 HYPERLIPIDEMIA, UNSPECIFIED HYPERLIPIDEMIA TYPE: ICD-10-CM

## 2024-01-08 DIAGNOSIS — Z23 ENCOUNTER FOR IMMUNIZATION: ICD-10-CM

## 2024-01-08 DIAGNOSIS — G40.909 NONINTRACTABLE EPILEPSY WITHOUT STATUS EPILEPTICUS, UNSPECIFIED EPILEPSY TYPE (HCC): ICD-10-CM

## 2024-01-08 PROCEDURE — G0008 ADMIN INFLUENZA VIRUS VAC: HCPCS | Performed by: FAMILY MEDICINE

## 2024-01-08 PROCEDURE — 90662 IIV NO PRSV INCREASED AG IM: CPT | Performed by: FAMILY MEDICINE

## 2024-01-08 PROCEDURE — 99214 OFFICE O/P EST MOD 30 MIN: CPT | Performed by: FAMILY MEDICINE

## 2024-01-10 NOTE — PROGRESS NOTES
Patient ID: Anusha Booth is a 83 y.o. female.    HPI: 83 y.o.female presents for follow up hypertension, peripheral edema, seizure disorder and hypercholesterolemia. Pt denies any dizziness,  chest pain, palpitations, or dypsnea with exertion.      SUBJECTIVE    Family History   Problem Relation Age of Onset    Prostate cancer Father     Other Family         back problem    Arthritis Family     Arthritis Mother     No Known Problems Sister     No Known Problems Daughter     No Known Problems Sister     No Known Problems Paternal Aunt     No Known Problems Paternal Aunt     No Known Problems Paternal Aunt     No Known Problems Paternal Aunt     Prostate cancer Brother      Social History     Socioeconomic History    Marital status:      Spouse name: Not on file    Number of children: Not on file    Years of education: Not on file    Highest education level: Not on file   Occupational History    Not on file   Tobacco Use    Smoking status: Former     Passive exposure: Past    Smokeless tobacco: Never   Vaping Use    Vaping status: Never Used   Substance and Sexual Activity    Alcohol use: Yes     Comment: social use    Drug use: No    Sexual activity: Not on file   Other Topics Concern    Not on file   Social History Narrative    Daily coffee consumption (_cups/day)    Denied hx of daily cola consumption    Daily tea consumption (_cups/day)     Social Determinants of Health     Financial Resource Strain: Low Risk  (6/27/2023)    Overall Financial Resource Strain (CARDIA)     Difficulty of Paying Living Expenses: Not very hard   Food Insecurity: Not on file   Transportation Needs: No Transportation Needs (6/27/2023)    PRAPARE - Transportation     Lack of Transportation (Medical): No     Lack of Transportation (Non-Medical): No   Physical Activity: Not on file   Stress: Not on file   Social Connections: Not on file   Intimate Partner Violence: Not on file   Housing Stability: Not on file     Past Medical  History:   Diagnosis Date    Acute venous embolism and thrombosis of deep vessels of distal lower extremity (HCC) 01/27/2014    Blood type A+     Closed fracture of right distal radius and ulna 03/06/2019    Convulsions (HCC)     Deviated septum 05/2000    Fracture of c2     Gallstones 09/15/2019    Hiatal hernia     1976    HL (hearing loss)     Hyperlipidemia     Osteoarthritis, localized, knee     Seizure (HCC)     1990    Seizures (HCC)     Spondylosis     Tinnitus      Past Surgical History:   Procedure Laterality Date    BREAST BIOPSY Right over 10 years    benign    CATARACT EXTRACTION      COMBINED HYSTEROSCOPY DIAGNOSTIC / D&C      ESOPHAGOGASTRODUODENOSCOPY      diagnostic    HAND SURGERY      HERNIA REPAIR      HYSTERECTOMY      KNEE ARTHROPLASTY      Revision;total    KNEE ARTHROSCOPY Right     1997    NASAL SEPTUM SURGERY      Deviation repair    LA TENDON SHEATH INCISION Right 11/21/2019    Procedure: RING TRIGGER FINGER RELEASE;  Surgeon: Victorino Ventura MD;  Location: [facility];  Service: Orthopedics    REPLACEMENT TOTAL KNEE Right     2000    TONSILLECTOMY      1948    TUBAL LIGATION      TUMOR REMOVAL Left 1956    left index finger     Allergies   Allergen Reactions    Adhesive  [Medical Tape]      Other reaction(s): Unknown Reaction    Celebrex [Celecoxib] Itching and GI Intolerance     Voice change    Latex      Other reaction(s): Unknown Reaction    Midazolam     Penicillins     Thiopental        Current Outpatient Medications:     Ascorbic Acid (vitamin C) 1000 MG tablet, Take 1,000 mg by mouth daily, Disp: , Rfl:     aspirin (ECOTRIN LOW STRENGTH) 81 mg EC tablet, Take by mouth daily , Disp: , Rfl:     atorvastatin (LIPITOR) 40 mg tablet, TAKE 1 TABLET DAILY WITH DINNER, Disp: 90 tablet, Rfl: 3    Calcium Carb-Cholecalciferol (CALCIUM 1000 + D PO), Take 1 tablet by mouth daily, Disp: , Rfl:     carvedilol (COREG) 6.25 mg tablet, TAKE 1 TABLET TWICE A DAY WITH MEALS, Disp: 180 tablet, Rfl:  "3    cholecalciferol (VITAMIN D3) 1,000 units tablet, Take 1,000 Units by mouth daily, Disp: , Rfl:     furosemide (LASIX) 20 mg tablet, TAKE 1 TABLET DAILY (Patient taking differently: if needed), Disp: 90 tablet, Rfl: 3    levETIRAcetam (KEPPRA) 250 mg tablet, TAKE ONE AND ONE-HALF TABLETS EVERY 12 HOURS, Disp: 270 tablet, Rfl: 3    lisinopril (ZESTRIL) 40 mg tablet, TAKE 1 TABLET DAILY, Disp: 90 tablet, Rfl: 3    Multiple Vitamin (MULTIVITAMINS PO), Take 1 capsule by mouth daily, Disp: , Rfl:     Zinc 100 MG TABS, Take by mouth, Disp: , Rfl:     EPINEPHrine (EPIPEN) 0.3 mg/0.3 mL SOAJ, Inject 0.3 mL (0.3 mg total) into a muscle once for 1 dose, Disp: 0.6 mL, Rfl: 0    Review of Systems  Constitutional:     Denies fever, chills ,fatigue ,weakness ,weight loss, weight gain     ENT: Denies earache ,loss of hearing ,nosebleed, nasal discharge,nasal congestion ,sore throat ,hoarseness  Pulmonary: Denies shortness of breath ,cough  ,dyspnea on exertion, orthopnea  ,PND   Cardiovascular:  Denies bradycardia , tachycardia  ,palpations, lower extremity edema leg, claudication  Breast:  Denies new or changing breast lumps ,nipple discharge ,nipple changes  Abdomen:  Denies abdominal pain , anorexia , indigestion, nausea, vomiting, constipation, diarrhea  Musculoskeletal: Denies myalgias, arthralgias, joint swelling, joint stiffness , limb pain, limb swelling  Gu: denies dysuria, polyuria  Skin: Denies skin rash, skin lesion, skin wound, itching, dry skin  Neuro: Denies headache, numbness, tingling, confusion, loss of consciousness, dizziness, vertigo  Psychiatric: Denies feelings of depression, suicidal ideation, anxiety, sleep disturbances    OBJECTIVE  /86   Pulse 81   Temp (!) 97.4 °F (36.3 °C)   Ht 5' 5\" (1.651 m)   Wt 107 kg (234 lb 12.8 oz)   SpO2 98%   BMI 39.07 kg/m²   Constitutional:   NAD, well appearing and well nourished      ENT:   Conjunctiva and lids: no injection, edema, or discharge     Pupils " and iris: STEFAN bilaterally    External inspection of ears and nose: normal without deformities or discharge.      Otoscopic exam: Canals patent without erythema.       Nasal mucosa, septum and turbinates: Normal or edema or discharge         Oropharynx:  Moist mucosa, normal tongue and tonsils without lesions. No erythema      Pulmonary:Respiratory effort normal rate and rhythm, no increased work of breathing. Auscultation of lungs:  Clear bilaterally with no adventitious breath sounds       Cardiovascular: regular rate and rhythm, S1 and S2, no murmur, no edema and/or varicosities of LE      Abdomen: Soft and non-distended     Positive bowel sounds      No heptomegaly or splenomegaly      Gu: no suprapubic tenderness or CVA tenderness, no urethral discharge  Lymphatic:  No anterior or posterior cervical lymphadenopathy         Musculoskeletal:  Gait and station: Normal gait      Digits and nails normal without clubbing or cyanosis       Inspection/palpation of joints, bones, and muscles:  No joint tenderness, swelling, full active and passive range of motion       Skin: Normal skin turgor and no rashes      Neuro:       Normal reflexes     Psych:   alert and oriented to person, place and time     normal mood and affect       Assessment/Plan:Diagnoses and all orders for this visit:    Essential hypertension  Comments:  Stable on ACE therapy  Orders:  -     Comprehensive metabolic panel; Future    Hyperlipidemia, unspecified hyperlipidemia type  Comments:  Continue statin therapy.  Orders:  -     Lipid panel; Future    Localized edema  Comments:  Continue with as needed furosemide therapy.    Nonintractable epilepsy without status epilepticus, unspecified epilepsy type (HCC)  Comments:  Stable on Keppra therapy.    Encounter for immunization  -     influenza vaccine, high-dose, PF 0.7 mL (FLUZONE HIGH-DOSE)        Reviewed with patient plan to treat with above plan.    Patient instructed to call in 72 hours if not  feeling better or if symptoms worse

## 2024-01-24 ENCOUNTER — CLINICAL SUPPORT (OUTPATIENT)
Dept: FAMILY MEDICINE CLINIC | Facility: CLINIC | Age: 84
End: 2024-01-24
Payer: COMMERCIAL

## 2024-01-24 ENCOUNTER — TELEPHONE (OUTPATIENT)
Dept: FAMILY MEDICINE CLINIC | Facility: CLINIC | Age: 84
End: 2024-01-24

## 2024-01-24 VITALS — OXYGEN SATURATION: 97 % | SYSTOLIC BLOOD PRESSURE: 124 MMHG | HEART RATE: 64 BPM | DIASTOLIC BLOOD PRESSURE: 84 MMHG

## 2024-01-24 DIAGNOSIS — Z01.30 BP CHECK: Primary | ICD-10-CM

## 2024-01-24 PROCEDURE — 99211 OFF/OP EST MAY X REQ PHY/QHP: CPT

## 2024-01-24 NOTE — TELEPHONE ENCOUNTER
Per our conversation-Pt came in office today for BP check. She had an elevated reading when the insurance nurses came to the house for the Wellness. She said she feels completely fine. BP in office today when she first sat down was 170/98 HR 71. Patient sat for 10 minutes, BP was then 124/84. Pt is currently taking Coreg 6.25 BID and Lisinopril 40mg daily

## 2024-01-24 NOTE — PROGRESS NOTES
Pt came in office today for BP check. She had an elevated reading when the insurance nurses came to the house for the Wellness. She said she feels completely fine. BP in office today when she first sat down was 170/98 HR 71. Patient sat for 10 minutes, BP was then 124/84. Pt is currently taking Coreg 6.25 BID and Lisinopril 40mg daily

## 2024-02-15 DIAGNOSIS — E78.5 HYPERLIPIDEMIA: ICD-10-CM

## 2024-02-15 DIAGNOSIS — R00.2 PALPITATIONS: ICD-10-CM

## 2024-02-15 DIAGNOSIS — I10 ESSENTIAL HYPERTENSION: ICD-10-CM

## 2024-02-15 RX ORDER — CARVEDILOL 6.25 MG/1
TABLET ORAL
Qty: 180 TABLET | Refills: 1 | Status: SHIPPED | OUTPATIENT
Start: 2024-02-15

## 2024-02-15 RX ORDER — ATORVASTATIN CALCIUM 40 MG/1
TABLET, FILM COATED ORAL
Qty: 90 TABLET | Refills: 1 | Status: SHIPPED | OUTPATIENT
Start: 2024-02-15

## 2024-02-15 RX ORDER — LISINOPRIL 40 MG/1
TABLET ORAL
Qty: 90 TABLET | Refills: 1 | Status: SHIPPED | OUTPATIENT
Start: 2024-02-15

## 2024-04-18 DIAGNOSIS — G40.909 NONINTRACTABLE EPILEPSY WITHOUT STATUS EPILEPTICUS, UNSPECIFIED EPILEPSY TYPE (HCC): ICD-10-CM

## 2024-04-18 RX ORDER — LEVETIRACETAM 250 MG/1
TABLET ORAL
Qty: 270 TABLET | Refills: 0 | Status: SHIPPED | OUTPATIENT
Start: 2024-04-18 | End: 2024-04-19 | Stop reason: SDUPTHER

## 2024-04-18 NOTE — TELEPHONE ENCOUNTER
Reason for call:   [x] Refill   [] Prior Auth  [] Other:     Office:   [] PCP/Provider -   [x] Specialty/Provider - Antnoia Maloney     Medication: Keppra     Dose/Frequency: 250mg - take 1 and one half tablet by mouth every 12     Quantity: 270     Pharmacy: [facility]     Does the patient have enough for 3 days?   [] Yes   [x] No - Send as HP to POD

## 2024-04-19 DIAGNOSIS — G40.909 NONINTRACTABLE EPILEPSY WITHOUT STATUS EPILEPTICUS, UNSPECIFIED EPILEPSY TYPE (HCC): ICD-10-CM

## 2024-04-19 RX ORDER — LEVETIRACETAM 250 MG/1
TABLET ORAL
Qty: 270 TABLET | Refills: 0 | Status: SHIPPED | OUTPATIENT
Start: 2024-04-19

## 2024-04-19 NOTE — TELEPHONE ENCOUNTER
Reason for call:   [x] Refill   [] Prior Auth  [x] Other: NOT A DUPLICATE.  WRONG PAHARMACY.    Office:   [x] PCP/Provider -   [] Specialty/Provider - NESTOR Thao     Medication: levETIRAcetam (KEPPRA) 250 mg tablet     Dose/Frequency:    1.5 tablets twice per day.       Quantity: #270    Pharmacy: [facility] - [address] [phone]     Does the patient have enough for 3 days?   [] Yes   [] No - Send as HP to POD

## 2024-05-07 ENCOUNTER — HOSPITAL ENCOUNTER (INPATIENT)
Facility: HOSPITAL | Age: 84
LOS: 1 days | Discharge: HOME | DRG: 305 | End: 2024-05-08
Attending: EMERGENCY MEDICINE | Admitting: INTERNAL MEDICINE
Payer: COMMERCIAL

## 2024-05-07 ENCOUNTER — APPOINTMENT (EMERGENCY)
Dept: RADIOLOGY | Facility: HOSPITAL | Age: 84
DRG: 305 | End: 2024-05-07
Payer: COMMERCIAL

## 2024-05-07 ENCOUNTER — APPOINTMENT (INPATIENT)
Dept: NON INVASIVE DIAGNOSTICS | Facility: HOSPITAL | Age: 84
DRG: 305 | End: 2024-05-07
Payer: COMMERCIAL

## 2024-05-07 DIAGNOSIS — I10 ESSENTIAL HYPERTENSION: ICD-10-CM

## 2024-05-07 DIAGNOSIS — I48.92 NEW ONSET ATRIAL FLUTTER (HCC): ICD-10-CM

## 2024-05-07 DIAGNOSIS — R60.0 BILATERAL LEG EDEMA: ICD-10-CM

## 2024-05-07 DIAGNOSIS — R00.2 PALPITATIONS: ICD-10-CM

## 2024-05-07 DIAGNOSIS — I48.91 NEW ONSET ATRIAL FIBRILLATION (HCC): Primary | ICD-10-CM

## 2024-05-07 DIAGNOSIS — R79.89 LOW TSH LEVEL: ICD-10-CM

## 2024-05-07 PROBLEM — E03.9 HYPOTHYROID: Status: ACTIVE | Noted: 2024-05-07

## 2024-05-07 LAB
2HR DELTA HS TROPONIN: 8 NG/L
4HR DELTA HS TROPONIN: 14 NG/L
ALBUMIN SERPL BCP-MCNC: 3.8 G/DL (ref 3.5–5)
ALP SERPL-CCNC: 67 U/L (ref 34–104)
ALT SERPL W P-5'-P-CCNC: 13 U/L (ref 7–52)
ANION GAP SERPL CALCULATED.3IONS-SCNC: 10 MMOL/L (ref 4–13)
AORTIC ROOT: 3.2 CM
APICAL FOUR CHAMBER EJECTION FRACTION: 63 %
ASCENDING AORTA: 3.2 CM
AST SERPL W P-5'-P-CCNC: 19 U/L (ref 13–39)
ATRIAL RATE: 86 BPM
BASOPHILS # BLD AUTO: 0.03 THOUSANDS/ÂΜL (ref 0–0.1)
BASOPHILS NFR BLD AUTO: 1 % (ref 0–1)
BILIRUB SERPL-MCNC: 0.59 MG/DL (ref 0.2–1)
BNP SERPL-MCNC: 110 PG/ML (ref 0–100)
BSA FOR ECHO PROCEDURE: 2.12 M2
BUN SERPL-MCNC: 12 MG/DL (ref 5–25)
CALCIUM SERPL-MCNC: 10.3 MG/DL (ref 8.4–10.2)
CARDIAC TROPONIN I PNL SERPL HS: 16 NG/L
CARDIAC TROPONIN I PNL SERPL HS: 22 NG/L
CARDIAC TROPONIN I PNL SERPL HS: 8 NG/L
CHLORIDE SERPL-SCNC: 105 MMOL/L (ref 96–108)
CO2 SERPL-SCNC: 27 MMOL/L (ref 21–32)
CREAT SERPL-MCNC: 0.66 MG/DL (ref 0.6–1.3)
E WAVE DECELERATION TIME: 213 MS
E/A RATIO: 0.89
EOSINOPHIL # BLD AUTO: 0.16 THOUSAND/ÂΜL (ref 0–0.61)
EOSINOPHIL NFR BLD AUTO: 3 % (ref 0–6)
ERYTHROCYTE [DISTWIDTH] IN BLOOD BY AUTOMATED COUNT: 12.6 % (ref 11.6–15.1)
FRACTIONAL SHORTENING: 34 (ref 28–44)
GFR SERPL CREATININE-BSD FRML MDRD: 83 ML/MIN/1.73SQ M
GLUCOSE SERPL-MCNC: 109 MG/DL (ref 65–140)
HCT VFR BLD AUTO: 44 % (ref 34.8–46.1)
HGB BLD-MCNC: 14.2 G/DL (ref 11.5–15.4)
IMM GRANULOCYTES # BLD AUTO: 0.01 THOUSAND/UL (ref 0–0.2)
IMM GRANULOCYTES NFR BLD AUTO: 0 % (ref 0–2)
INTERVENTRICULAR SEPTUM IN DIASTOLE (PARASTERNAL SHORT AXIS VIEW): 1.4 CM
INTERVENTRICULAR SEPTUM: 1.4 CM (ref 0.6–1.1)
LAAS-AP2: 17.7 CM2
LAAS-AP4: 19 CM2
LEFT ATRIUM SIZE: 3.6 CM
LEFT ATRIUM VOLUME (MOD BIPLANE): 51 ML
LEFT ATRIUM VOLUME INDEX (MOD BIPLANE): 24.1 ML/M2
LEFT INTERNAL DIMENSION IN SYSTOLE: 2.9 CM (ref 2.1–4)
LEFT VENTRICULAR INTERNAL DIMENSION IN DIASTOLE: 4.4 CM (ref 3.5–6)
LEFT VENTRICULAR POSTERIOR WALL IN END DIASTOLE: 1.4 CM
LEFT VENTRICULAR STROKE VOLUME: 53 ML
LVSV (TEICH): 53 ML
LYMPHOCYTES # BLD AUTO: 1.7 THOUSANDS/ÂΜL (ref 0.6–4.47)
LYMPHOCYTES NFR BLD AUTO: 30 % (ref 14–44)
MCH RBC QN AUTO: 30.5 PG (ref 26.8–34.3)
MCHC RBC AUTO-ENTMCNC: 32.3 G/DL (ref 31.4–37.4)
MCV RBC AUTO: 94 FL (ref 82–98)
MONOCYTES # BLD AUTO: 0.38 THOUSAND/ÂΜL (ref 0.17–1.22)
MONOCYTES NFR BLD AUTO: 7 % (ref 4–12)
MV E'TISSUE VEL-SEP: 10 CM/S
MV PEAK A VEL: 0.76 M/S
MV PEAK E VEL: 68 CM/S
MV STENOSIS PRESSURE HALF TIME: 62 MS
MV VALVE AREA P 1/2 METHOD: 3.55
NEUTROPHILS # BLD AUTO: 3.39 THOUSANDS/ÂΜL (ref 1.85–7.62)
NEUTS SEG NFR BLD AUTO: 59 % (ref 43–75)
NRBC BLD AUTO-RTO: 0 /100 WBCS
P AXIS: 87 DEGREES
PLATELET # BLD AUTO: 177 THOUSANDS/UL (ref 149–390)
PMV BLD AUTO: 9.6 FL (ref 8.9–12.7)
POTASSIUM SERPL-SCNC: 3.7 MMOL/L (ref 3.5–5.3)
PR INTERVAL: 168 MS
PROT SERPL-MCNC: 7.1 G/DL (ref 6.4–8.4)
QRS AXIS: -15 DEGREES
QRSD INTERVAL: 88 MS
QT INTERVAL: 350 MS
QTC INTERVAL: 418 MS
RBC # BLD AUTO: 4.66 MILLION/UL (ref 3.81–5.12)
RIGHT ATRIAL 2D VOLUME: 56 ML
RIGHT ATRIUM AREA SYSTOLE A4C: 19.3 CM2
RIGHT VENTRICLE ID DIMENSION: 3.9 CM
SL CV LEFT ATRIUM LENGTH A2C: 5 CM
SL CV LV EF: 60
SL CV PED ECHO LEFT VENTRICLE DIASTOLIC VOLUME (MOD BIPLANE) 2D: 86 ML
SL CV PED ECHO LEFT VENTRICLE SYSTOLIC VOLUME (MOD BIPLANE) 2D: 33 ML
SODIUM SERPL-SCNC: 142 MMOL/L (ref 135–147)
T WAVE AXIS: 54 DEGREES
T4 FREE SERPL-MCNC: 0.83 NG/DL (ref 0.61–1.12)
TRICUSPID ANNULAR PLANE SYSTOLIC EXCURSION: 2.3 CM
TSH SERPL DL<=0.05 MIU/L-ACNC: 0.4 UIU/ML (ref 0.45–4.5)
VENTRICULAR RATE: 86 BPM
WBC # BLD AUTO: 5.67 THOUSAND/UL (ref 4.31–10.16)

## 2024-05-07 PROCEDURE — 80053 COMPREHEN METABOLIC PANEL: CPT

## 2024-05-07 PROCEDURE — 85025 COMPLETE CBC W/AUTO DIFF WBC: CPT

## 2024-05-07 PROCEDURE — 99223 1ST HOSP IP/OBS HIGH 75: CPT | Performed by: INTERNAL MEDICINE

## 2024-05-07 PROCEDURE — 93306 TTE W/DOPPLER COMPLETE: CPT | Performed by: INTERNAL MEDICINE

## 2024-05-07 PROCEDURE — 93306 TTE W/DOPPLER COMPLETE: CPT

## 2024-05-07 PROCEDURE — 84443 ASSAY THYROID STIM HORMONE: CPT

## 2024-05-07 PROCEDURE — 36415 COLL VENOUS BLD VENIPUNCTURE: CPT

## 2024-05-07 PROCEDURE — 71046 X-RAY EXAM CHEST 2 VIEWS: CPT

## 2024-05-07 PROCEDURE — 84439 ASSAY OF FREE THYROXINE: CPT

## 2024-05-07 PROCEDURE — 93005 ELECTROCARDIOGRAM TRACING: CPT

## 2024-05-07 PROCEDURE — 84484 ASSAY OF TROPONIN QUANT: CPT

## 2024-05-07 PROCEDURE — 83880 ASSAY OF NATRIURETIC PEPTIDE: CPT

## 2024-05-07 PROCEDURE — 93010 ELECTROCARDIOGRAM REPORT: CPT | Performed by: INTERNAL MEDICINE

## 2024-05-07 PROCEDURE — 99285 EMERGENCY DEPT VISIT HI MDM: CPT

## 2024-05-07 PROCEDURE — 99285 EMERGENCY DEPT VISIT HI MDM: CPT | Performed by: EMERGENCY MEDICINE

## 2024-05-07 RX ORDER — ENOXAPARIN SODIUM 100 MG/ML
1.5 INJECTION SUBCUTANEOUS
Status: DISCONTINUED | OUTPATIENT
Start: 2024-05-07 | End: 2024-05-07

## 2024-05-07 RX ORDER — ACETAMINOPHEN 325 MG/1
650 TABLET ORAL EVERY 6 HOURS PRN
Status: DISCONTINUED | OUTPATIENT
Start: 2024-05-07 | End: 2024-05-08 | Stop reason: HOSPADM

## 2024-05-07 RX ORDER — CARVEDILOL 6.25 MG/1
6.25 TABLET ORAL ONCE
Status: COMPLETED | OUTPATIENT
Start: 2024-05-07 | End: 2024-05-07

## 2024-05-07 RX ORDER — ATORVASTATIN CALCIUM 40 MG/1
40 TABLET, FILM COATED ORAL
Status: DISCONTINUED | OUTPATIENT
Start: 2024-05-07 | End: 2024-05-08 | Stop reason: HOSPADM

## 2024-05-07 RX ORDER — LISINOPRIL 20 MG/1
40 TABLET ORAL DAILY
Status: DISCONTINUED | OUTPATIENT
Start: 2024-05-07 | End: 2024-05-08 | Stop reason: HOSPADM

## 2024-05-07 RX ORDER — CARVEDILOL 12.5 MG/1
12.5 TABLET ORAL 2 TIMES DAILY WITH MEALS
Status: DISCONTINUED | OUTPATIENT
Start: 2024-05-07 | End: 2024-05-08 | Stop reason: HOSPADM

## 2024-05-07 RX ORDER — HYDRALAZINE HYDROCHLORIDE 20 MG/ML
5 INJECTION INTRAMUSCULAR; INTRAVENOUS EVERY 6 HOURS PRN
Status: DISCONTINUED | OUTPATIENT
Start: 2024-05-07 | End: 2024-05-07

## 2024-05-07 RX ORDER — CARVEDILOL 6.25 MG/1
6.25 TABLET ORAL 2 TIMES DAILY WITH MEALS
Status: DISCONTINUED | OUTPATIENT
Start: 2024-05-07 | End: 2024-05-07

## 2024-05-07 RX ORDER — HYDRALAZINE HYDROCHLORIDE 20 MG/ML
5 INJECTION INTRAMUSCULAR; INTRAVENOUS EVERY 6 HOURS PRN
Status: DISCONTINUED | OUTPATIENT
Start: 2024-05-07 | End: 2024-05-08 | Stop reason: HOSPADM

## 2024-05-07 RX ORDER — LEVETIRACETAM 500 MG/1
250 TABLET ORAL DAILY
Status: DISCONTINUED | OUTPATIENT
Start: 2024-05-07 | End: 2024-05-08 | Stop reason: HOSPADM

## 2024-05-07 RX ORDER — ENOXAPARIN SODIUM 300 MG/3ML
1.5 INJECTION INTRAVENOUS; SUBCUTANEOUS
Status: DISCONTINUED | OUTPATIENT
Start: 2024-05-07 | End: 2024-05-07

## 2024-05-07 RX ADMIN — LISINOPRIL 40 MG: 20 TABLET ORAL at 08:27

## 2024-05-07 RX ADMIN — CARVEDILOL 6.25 MG: 6.25 TABLET, FILM COATED ORAL at 11:05

## 2024-05-07 RX ADMIN — ACETAMINOPHEN 650 MG: 325 TABLET, FILM COATED ORAL at 17:45

## 2024-05-07 RX ADMIN — LEVETIRACETAM 250 MG: 250 TABLET, FILM COATED ORAL at 08:27

## 2024-05-07 RX ADMIN — CARVEDILOL 6.25 MG: 6.25 TABLET, FILM COATED ORAL at 07:29

## 2024-05-07 RX ADMIN — CARVEDILOL 12.5 MG: 12.5 TABLET, FILM COATED ORAL at 16:40

## 2024-05-07 RX ADMIN — HYDRALAZINE HYDROCHLORIDE 5 MG: 20 INJECTION, SOLUTION INTRAMUSCULAR; INTRAVENOUS at 08:53

## 2024-05-07 RX ADMIN — ATORVASTATIN CALCIUM 40 MG: 40 TABLET, FILM COATED ORAL at 15:12

## 2024-05-07 RX ADMIN — HYDRALAZINE HYDROCHLORIDE 5 MG: 20 INJECTION, SOLUTION INTRAMUSCULAR; INTRAVENOUS at 15:13

## 2024-05-07 NOTE — ASSESSMENT & PLAN NOTE
Takes Lasix prn for lower extremity edema  Most recently has taken it in the last week  Mild trace edema on exam    Resume prn Lasix

## 2024-05-07 NOTE — ASSESSMENT & PLAN NOTE
- Patient presenting with sudden onset palpitations  - Per medics, afib/flutter was seen on the way in with a rate of 150  - No hx of afib or flutter but was previously known to have PAC's and follows with Dr. Walters     Plan:   - Echo   - Monitor on Tele   - Cardiology consult   - Anticoagulation with therapeutic Lovenox  - F/u T3 and T4  - Rate control with PTA Coreg

## 2024-05-07 NOTE — ASSESSMENT & PLAN NOTE
- Maintained on Coreg and Lisinopril   - Was 217 systolic on arrival     Plan:   - Hydralazine prn for SBP >160  - Continue Coreg and Lisinopril

## 2024-05-07 NOTE — ASSESSMENT & PLAN NOTE
- Patient presenting with sudden onset palpitations  - Per medics, afib/flutter was seen on the way in with a rate of 150  - No hx of afib or flutter but was previously known to have PAC's and follows with Dr. Walters     Free T4 WNL  Echo:   Left Ventricle: Left ventricular cavity size is normal. Wall thickness is mildly increased. The left ventricular ejection fraction is 60%. Systolic function is normal. Wall motion is normal. Diastolic function is mildly abnormal, consistent with grade I (abnormal) relaxation.    Mitral Valve: There is mild regurgitation.    Tricuspid Valve: There is mild regurgitation.    Plan:   Continue Coreg 12.5 bid  Follow-up with Dr. Walters on 6/4/24 for Zio patch

## 2024-05-07 NOTE — H&P
[facility]  H&P  Name: Anusha Booth 83 y.o. female I MRN: [id number]  Unit/Bed#: ED-23 I Date of Admission: 5/7/2024   Date of Service: 5/7/2024 I Hospital Day: 0      Assessment/Plan   * Palpitations  Assessment & Plan  - Patient presenting with sudden onset palpitations  - Per medics, afib/flutter was seen on the way in with a rate of 150  - No hx of afib or flutter but was previously known to have PAC's and follows with Dr. Walters     Plan:   - Echo   - Monitor on Tele   - Cardiology consult   - Anticoagulation with therapeutic Lovenox  - F/u T3 and T4  - Rate control with PTA Coreg    Essential hypertension  Assessment & Plan  - Maintained on Coreg and Lisinopril   - Was 217 systolic on arrival     Plan:   - Hydralazine prn for SBP >160  - Continue Coreg and Lisinopril     Hyperlipidemia  Assessment & Plan  - Continue PTA lipitor     Epilepsy (HCC)  Assessment & Plan  - Continue PTA Keppra    Edema  Assessment & Plan  Takes Lasix prn for lower extremity edema  Most recently has taken it in the last week  Mild trace edema on exam  Can consider restarting if patient has increasing lower extremity edema           VTE Pharmacologic Prophylaxis:    Anticoagulated with Lovenox  Code Status: Prior   Discussion with family: Patient declined call to .     Anticipated Length of Stay: Patient will be admitted on an inpatient basis with an anticipated length of stay of greater than 2 midnights secondary to new onset afib/flutter.    Chief Complaint: palpitations    History of Present Illness:  Anusha Booth is a 83 y.o. female with a PMH of HTN, HLD, epilepsy who presents with sudden onset of palpitations. She has a known history of sinus bradycardia with PACs maintained on Coreg. Palpitations began today - she began to feel lightheaded and dizzy.  She called EMS who saw her rhythm was either A-fib or a flutter with rates in the 150s.  This stopped on the way in and was NSR  from that time forward.  She was asymptomatic when she got to the ED and no further episode occurred.    In the ED was found to be hypertensive to the systolic 210s.  Her rates remain controlled and regular. TSH was mildly decreased and otherwise labs were grossly normal. She will be admitted for management and workup of new onset afib/flutter.    Review of Systems:  Review of Systems   Constitutional:  Negative for chills and fever.   HENT:  Negative for ear pain and sore throat.    Eyes:  Negative for visual disturbance.   Respiratory:  Negative for cough, shortness of breath and wheezing.    Cardiovascular:  Negative for chest pain, palpitations and leg swelling.   Gastrointestinal:  Negative for abdominal pain, constipation, diarrhea, nausea and vomiting.   Genitourinary:  Negative for dysuria and hematuria.   Musculoskeletal:  Negative for arthralgias and back pain.   Skin:  Negative for color change and rash.   Neurological:  Negative for dizziness, seizures, syncope, light-headedness and headaches.   All other systems reviewed and are negative.      Past Medical and Surgical History:   Past Medical History:   Diagnosis Date    Acute venous embolism and thrombosis of deep vessels of distal lower extremity (HCC) 01/27/2014    Blood type A+     Closed fracture of right distal radius and ulna 03/06/2019    Convulsions ([facility])     Deviated septum 05/2000    Fracture of c2     Gallstones 09/15/2019    Hiatal hernia     1976    HL (hearing loss)     Hyperlipidemia     Osteoarthritis, localized, knee     Seizure (HCC)     1990    Seizures ([facility])     Spondylosis     Tinnitus        Past Surgical History:   Procedure Laterality Date    BREAST BIOPSY Right over 10 years    benign    CATARACT EXTRACTION      COMBINED HYSTEROSCOPY DIAGNOSTIC / D&C      ESOPHAGOGASTRODUODENOSCOPY      diagnostic    HAND SURGERY      HERNIA REPAIR      HYSTERECTOMY      KNEE ARTHROPLASTY      Revision;total    KNEE ARTHROSCOPY Right     1997     NASAL SEPTUM SURGERY      Deviation repair    CA TENDON SHEATH INCISION Right 11/21/2019    Procedure: RING TRIGGER FINGER RELEASE;  Surgeon: Victorino Ventura MD;  Location: [facility];  Service: Orthopedics    REPLACEMENT TOTAL KNEE Right     2000    TONSILLECTOMY      1948    TUBAL LIGATION      TUMOR REMOVAL Left 1956    left index finger       Meds/Allergies:  Prior to Admission medications    Medication Sig Start Date End Date Taking? Authorizing Provider   Ascorbic Acid (vitamin C) 1000 MG tablet Take 1,000 mg by mouth daily   Yes Historical Provider, MD   aspirin (ECOTRIN LOW STRENGTH) 81 mg EC tablet Take by mouth daily    Yes Historical Provider, MD   atorvastatin (LIPITOR) 40 mg tablet TAKE 1 TABLET DAILY WITH DINNER 2/15/24  Yes Silke Gaspar,    Calcium Carb-Cholecalciferol (CALCIUM 1000 + D PO) Take 1 tablet by mouth daily   Yes Historical Provider, MD   cholecalciferol (VITAMIN D3) 1,000 units tablet Take 1,000 Units by mouth daily   Yes Historical Provider, MD   furosemide (LASIX) 20 mg tablet TAKE 1 TABLET DAILY  Patient taking differently: if needed 9/13/21  Yes Silke Gaspar DO   levETIRAcetam (KEPPRA) 250 mg tablet 1.5 tablets twice per day. 4/19/24  Yes NESTOR Thao   lisinopril (ZESTRIL) 40 mg tablet TAKE 1 TABLET DAILY 2/15/24  Yes Silke Gaspar DO   Multiple Vitamin (MULTIVITAMINS PO) Take 1 capsule by mouth daily   Yes Historical Provider, MD   Zinc 100 MG TABS Take by mouth   Yes Historical Provider, MD   carvedilol (COREG) 6.25 mg tablet TAKE 1 TABLET TWICE A DAY WITH MEALS  Patient not taking: Reported on 5/7/2024 2/15/24   Silke Gaspar DO   EPINEPHrine (EPIPEN) 0.3 mg/0.3 mL SOAJ Inject 0.3 mL (0.3 mg total) into a muscle once for 1 dose 12/22/22 6/27/23  Joseline Robertson DO     I have reviewed home medications with patient personally.    Allergies:   Allergies   Allergen Reactions    Adhesive  [Medical Tape]       Other reaction(s): Unknown Reaction    Celebrex [Celecoxib] Itching and GI Intolerance     Voice change    Latex      Other reaction(s): Unknown Reaction    Midazolam     Penicillins     Thiopental        Social History:  Marital Status:    Occupation: Did not assess  Patient Pre-hospital Living Situation: Home  Patient Pre-hospital Level of Mobility: walks  Patient Pre-hospital Diet Restrictions: Did not assess  Substance Use History:   Social History     Substance and Sexual Activity   Alcohol Use Yes    Comment: social use     Social History     Tobacco Use   Smoking Status Former    Passive exposure: Past   Smokeless Tobacco Never     Social History     Substance and Sexual Activity   Drug Use No       Family History:  Family History   Problem Relation Age of Onset    Prostate cancer Father     Other Family         back problem    Arthritis Family     Arthritis Mother     No Known Problems Sister     No Known Problems Daughter     No Known Problems Sister     No Known Problems Paternal Aunt     No Known Problems Paternal Aunt     No Known Problems Paternal Aunt     No Known Problems Paternal Aunt     Prostate cancer Brother        Physical Exam:     Vitals:   Blood Pressure: (!) 199/88 (05/07/24 0556)  Pulse: 71 (05/07/24 0556)  Temperature: 98.2 °F (36.8 °C) (05/07/24 0254)  Temp Source: Oral (05/07/24 0254)  Respirations: 20 (05/07/24 0556)  SpO2: 95 % (05/07/24 0556)    Physical Exam  Vitals and nursing note reviewed.   Constitutional:       General: She is not in acute distress.     Appearance: She is well-developed. She is not ill-appearing.   HENT:      Head: Normocephalic and atraumatic.      Mouth/Throat:      Pharynx: Oropharynx is clear.   Eyes:      Conjunctiva/sclera: Conjunctivae normal.   Cardiovascular:      Rate and Rhythm: Normal rate and regular rhythm.      Pulses: Normal pulses.      Heart sounds: Normal heart sounds. No murmur heard.     No gallop.   Pulmonary:      Effort: Pulmonary  effort is normal. No respiratory distress.      Breath sounds: Normal breath sounds. No wheezing or rales.   Abdominal:      General: Bowel sounds are normal. There is no distension.      Palpations: Abdomen is soft.      Tenderness: There is no abdominal tenderness. There is no guarding.   Musculoskeletal:         General: No swelling.      Cervical back: Neck supple.   Skin:     General: Skin is warm and dry.      Capillary Refill: Capillary refill takes less than 2 seconds.   Neurological:      General: No focal deficit present.      Mental Status: She is alert and oriented to person, place, and time. Mental status is at baseline.   Psychiatric:         Mood and Affect: Mood normal.          Additional Data:     Lab Results:  Results from last 7 days   Lab Units 05/07/24  0310   WBC Thousand/uL 5.67   HEMOGLOBIN g/dL 14.2   HEMATOCRIT % 44.0   PLATELETS Thousands/uL 177   SEGS PCT % 59   LYMPHO PCT % 30   MONO PCT % 7   EOS PCT % 3     Results from last 7 days   Lab Units 05/07/24  0310   SODIUM mmol/L 142   POTASSIUM mmol/L 3.7   CHLORIDE mmol/L 105   CO2 mmol/L 27   BUN mg/dL 12   CREATININE mg/dL 0.66   ANION GAP mmol/L 10   CALCIUM mg/dL 10.3*   ALBUMIN g/dL 3.8   TOTAL BILIRUBIN mg/dL 0.59   ALK PHOS U/L 67   ALT U/L 13   AST U/L 19   GLUCOSE RANDOM mg/dL 109                       Lines/Drains:  Invasive Devices       Peripheral Intravenous Line  Duration             Peripheral IV 05/07/24 Left Antecubital <1 day                        Imaging: Reviewed radiology reports from this admission including: chest xray  XR chest 2 views   ED Interpretation by Can Loza MD (05/07 0419)   No acute cardiopulmonary abnormalities present.          EKG and Other Studies Reviewed on Admission:   EKG: NSR. HR 86.    ** Please Note: This note has been constructed using a voice recognition system. **

## 2024-05-07 NOTE — ED ATTENDING ATTESTATION
5/7/2024  I, Dragan Jessica MD, saw and evaluated the patient. I have discussed the patient with the resident/non-physician practitioner and agree with the resident's/non-physician practitioner's findings, Plan of Care, and MDM as documented in the resident's/non-physician practitioner's note, except where noted. All available labs and Radiology studies were reviewed.  I was present for key portions of any procedure(s) performed by the resident/non-physician practitioner and I was immediately available to provide assistance.       At this point I agree with the current assessment done in the Emergency Department.  I have conducted an independent evaluation of this patient a history and physical is as follows: Patient is a 83 year old female with fluttering palpitations tonight and medics saw that patient was in rapid a. Fib/flutter briefly. No h/o a. Fib or flutter. No chest pain or sob. No N/V/D. No fever. No cough. No travel. Was last seen at [facility] in [place] for essential HTN. [facility] website checked on this patient and no Rx found. NCAT. No scleral icterus. Lungs clear. Heart irregularly irregular. Abdomen soft and nontender. Good bowel sounds. No edema. No rash noted. (+) R UE ecchymosis from venipuncture and then BP cuff inflation. DDx including but not limited to: metabolic abnormality, atrial fibrillation, atrial flutter, other cardiac arrhythmia, ACS, MI,  thyroid disease, doubt PE; anxiety, adverse reaction. I reviewed EKG. Will check labs and CXR. Will need admission.     ED Course         Critical Care Time  Procedures

## 2024-05-07 NOTE — DISCHARGE SUMMARY
Critical access hospital  Discharge- Anusha PAZ [place] 1940, 83 y.o. female MRN: [id number]  Unit/Bed#: S -[facility] Encounter: [id number]  Primary Care Provider: Silke Gaspar DO   Date and time admitted to hospital: 5/7/2024  2:46 AM    * Palpitations  Assessment & Plan  - Patient presenting with sudden onset palpitations  - Per medics, afib/flutter was seen on the way in with a rate of 150  - No hx of afib or flutter but was previously known to have PAC's and follows with Dr. Walters     Free T4 WNL  Echo:   Left Ventricle: Left ventricular cavity size is normal. Wall thickness is mildly increased. The left ventricular ejection fraction is 60%. Systolic function is normal. Wall motion is normal. Diastolic function is mildly abnormal, consistent with grade I (abnormal) relaxation.    Mitral Valve: There is mild regurgitation.    Tricuspid Valve: There is mild regurgitation.    Plan:   Continue Coreg 12.5 bid  Follow-up with Dr. Walters on 6/4/24 for Zio patch    Essential hypertension  Assessment & Plan  Continue Coreg 12.5 BID, amlodipine 2.5 mg daily and Lisinopril 40 daily.    Hyperlipidemia  Assessment & Plan  - Continue PTA lipitor     Epilepsy (HCC)  Assessment & Plan  Continue PTA Keppra    Edema  Assessment & Plan  Takes Lasix prn for lower extremity edema  Most recently has taken it in the last week  Mild trace edema on exam    Resume prn Lasix           Medical Problems       Resolved Problems  Date Reviewed: 5/7/2024   None       Discharging Resident: Mateo Lee MD  Discharging Attending: Hieu Clarke MD  PCP: Silke Gaspar DO  Admission Date:   Admission Orders (From admission, onward)       Ordered        05/07/24 0526  INPATIENT ADMISSION  Once                          Discharge Date: 05/08/24    Consultations During Hospital Stay:  Cardiology    Procedures Performed:   None    Significant Findings / Test Results:   XR chest 2 views   ED Interpretation by Can  MD Denia (05/07 9626)   No acute cardiopulmonary abnormalities present.      Final Result by Jesenia Centeno MD (05/07 0294)   No acute cardiopulmonary disease.            Workstation performed: [id number]          Echo:   Left Ventricle: Left ventricular cavity size is normal. Wall thickness is mildly increased. The left ventricular ejection fraction is 60%. Systolic function is normal. Wall motion is normal. Diastolic function is mildly abnormal, consistent with grade I (abnormal) relaxation.  Mitral Valve: There is mild regurgitation.  Tricuspid Valve: There is mild regurgitation.    Incidental Findings:   None     Test Results Pending at Discharge (will require follow up):  None     Outpatient Tests Requested:  Zio patch monitoring with Dr. Walters    Complications:  None    Reason for Admission: palpitations    Hospital Course:   Anusha Booth is a 83 y.o. female patient with PMH DVT, HTN, HLD, seizure disorder, sinus bradycardia, palpitations who originally presented to the hospital on 5/7/2024 due to intermittent palpitations and lightheadedness/dizziness starting 5/6 evening. Per EMS patient was in rapid Afib and converted spontaneously (no EKG available to verify). Patient states she frequently gets these palpitations but usually only last for seconds to minutes and resolves with holding her breath. Patient denied headache, fever, chills, chest pain, SOB, abdominal pain, diarrhea, constipation, rash, dysuria, hematuria, or recent illness. She had holter monitoring in June 2023 which showed 163 ventricular ectopic beats and 425 supraventricular ectopic beats.     In ED /99. EKG NSR 86, BMP and CBC WNL, TSH low but free T4 WNL. She was evaluated by cardiology and recommended Zio patch monitoring and no AC at this time as there was no evidence of Afib captured during admission. Her Coreg was increased to 12.5 BID and amlodipine 2.5 mg once daily was added for BP control. She has outpatient cardiology  "scheduled with Dr. Walters in June for extended cardiac monitoring.    Please see above list of diagnoses and related plan for additional information.     Condition at Discharge: good    Discharge Day Visit / Exam:   Subjective:  NAEON. Patient feels well and had no further episodes of dizziness, palpitations, HA, CP, N/V/D.   Vitals: Blood Pressure: 169/79 (05/08/24 0757)  Pulse: 63 (05/08/24 0757)  Temperature: 98.4 °F (36.9 °C) (05/08/24 0757)  Temp Source: Oral (05/08/24 0754)  Respirations: 17 (05/08/24 0754)  Height: 5' 5\" (165.1 cm) (05/07/24 1355)  Weight - Scale: 105 kg (232 lb 5.8 oz) (05/08/24 0600)  SpO2: 93 % (05/08/24 0757)  Exam:   Physical Exam  Constitutional:       General: She is not in acute distress.     Appearance: Normal appearance. She is not ill-appearing.   HENT:      Mouth/Throat:      Mouth: Mucous membranes are moist.      Pharynx: No oropharyngeal exudate.   Eyes:      General: No scleral icterus.     Conjunctiva/sclera: Conjunctivae normal.   Cardiovascular:      Rate and Rhythm: Normal rate and regular rhythm.      Pulses: Normal pulses.      Heart sounds: No murmur heard.  Pulmonary:      Effort: Pulmonary effort is normal. No respiratory distress.      Breath sounds: No wheezing.   Abdominal:      General: There is no distension.      Tenderness: There is no abdominal tenderness.   Musculoskeletal:      Cervical back: Neck supple. No tenderness.      Right lower leg: Edema present.      Left lower leg: Edema present.   Lymphadenopathy:      Cervical: No cervical adenopathy.   Skin:     General: Skin is warm.      Coloration: Skin is not jaundiced.   Neurological:      Mental Status: She is alert and oriented to person, place, and time. Mental status is at baseline.   Psychiatric:         Mood and Affect: Mood normal.         Behavior: Behavior normal.          Discussion with Family: Patient declined call to .     Discharge instructions/Information to patient and family: "   See after visit summary for information provided to patient and family.      Provisions for Follow-Up Care:  See after visit summary for information related to follow-up care and any pertinent home health orders.      Mobility at time of Discharge:   Basic Mobility Inpatient Raw Score: 23  JH-HLM Goal: 7: Walk 25 feet or more  JH-HLM Achieved: 8: Walk 250 feet ot more  HLM Goal achieved. Continue to encourage appropriate mobility.     Disposition:   Home    Planned Readmission: No    Discharge Medications:  See after visit summary for reconciled discharge medications provided to patient and/or family.      **Please Note: This note may have been constructed using a voice recognition system**

## 2024-05-07 NOTE — ED PROVIDER NOTES
History  Chief Complaint   Patient presents with    Palpitations     Pt arrives via EMS from home. Pt reports intermittent palpitations and dizziness starting tonight. Per EMS pt was in rapid afib and converted on the way ine     (Anusha Booth) Anusha Booth is a 83 y.o. female     They presented to the emergency department on May 7, 2024. Patient presents with:  Palpitations: Pt arrives via EMS from home. Pt reports intermittent palpitations and dizziness starting tonight 2 hours ago. Per EMS pt was in rapid afib and converted on the way. Patient states she frequently gets these palpitations but usually only last for seconds to minutes and resolves with holding her breath. The patient Is currently not on any anticoagulants. Patient had lightheadedness during palpitations but denies any current symptoms. Patient denies headache, fever, chills, chest pain, SOB, abdominal pain, diarrhea, constipation, rash, dysuria, hematuria, polyuria, or any other complaint at this time.                Prior to Admission Medications   Prescriptions Last Dose Informant Patient Reported? Taking?   Ascorbic Acid (vitamin C) 1000 MG tablet 5/6/2024 Self Yes Yes   Sig: Take 1,000 mg by mouth daily   Calcium Carb-Cholecalciferol (CALCIUM 1000 + D PO) 5/6/2024 Self Yes Yes   Sig: Take 1 tablet by mouth daily   EPINEPHrine (EPIPEN) 0.3 mg/0.3 mL SOAJ  Self No No   Sig: Inject 0.3 mL (0.3 mg total) into a muscle once for 1 dose   Multiple Vitamin (MULTIVITAMINS PO) 5/6/2024 Self Yes Yes   Sig: Take 1 capsule by mouth daily   Zinc 100 MG TABS 5/6/2024 Self Yes Yes   Sig: Take by mouth   aspirin (ECOTRIN LOW STRENGTH) 81 mg EC tablet 5/6/2024 Self Yes Yes   Sig: Take by mouth daily    atorvastatin (LIPITOR) 40 mg tablet 5/6/2024  No Yes   Sig: TAKE 1 TABLET DAILY WITH DINNER   carvedilol (COREG) 6.25 mg tablet Not Taking  No No   Sig: TAKE 1 TABLET TWICE A DAY WITH MEALS   Patient not taking: Reported on 5/7/2024   cholecalciferol  (VITAMIN D3) 1,000 units tablet 2024 Self Yes Yes   Sig: Take 1,000 Units by mouth daily   furosemide (LASIX) 20 mg tablet Past Week Self No Yes   Sig: TAKE 1 TABLET DAILY   Patient taking differently: if needed   levETIRAcetam (KEPPRA) 250 mg tablet 2024  No Yes   Si.5 tablets twice per day.   lisinopril (ZESTRIL) 40 mg tablet 2024  No Yes   Sig: TAKE 1 TABLET DAILY      Facility-Administered Medications: None       Past Medical History:   Diagnosis Date    Acute venous embolism and thrombosis of deep vessels of distal lower extremity (HCC) 2014    Blood type A+     Closed fracture of right distal radius and ulna 2019    Convulsions (HCC)     Deviated septum 2000    Fracture of c2     Gallstones 09/15/2019    Hiatal hernia         HL (hearing loss)     Hyperlipidemia     Osteoarthritis, localized, knee     Seizure (HCC)         Seizures (HCC)     Spondylosis     Tinnitus        Past Surgical History:   Procedure Laterality Date    BREAST BIOPSY Right over 10 years    benign    CATARACT EXTRACTION      COMBINED HYSTEROSCOPY DIAGNOSTIC / D&C      ESOPHAGOGASTRODUODENOSCOPY      diagnostic    HAND SURGERY      HERNIA REPAIR      HYSTERECTOMY      KNEE ARTHROPLASTY      Revision;total    KNEE ARTHROSCOPY Right         NASAL SEPTUM SURGERY      Deviation repair    [place] TENDON SHEATH INCISION Right 2019    Procedure: RING TRIGGER FINGER RELEASE;  Surgeon: Victorino Ventura MD;  Location: [facility];  Service: Orthopedics    REPLACEMENT TOTAL KNEE Right         TONSILLECTOMY          TUBAL LIGATION      TUMOR REMOVAL Left     left index finger       Family History   Problem Relation Age of Onset    Prostate cancer Father     Other Family         back problem    Arthritis Family     Arthritis Mother     No Known Problems Sister     No Known Problems Daughter     No Known Problems Sister     No Known Problems Paternal Aunt     No Known Problems Paternal Aunt     No  Known Problems Paternal Aunt     No Known Problems Paternal Aunt     Prostate cancer Brother      I have reviewed and agree with the history as documented.    E-Cigarette/Vaping    E-Cigarette Use Never User      E-Cigarette/Vaping Substances    Nicotine No     THC No     CBD No      Social History     Tobacco Use    Smoking status: Former     Passive exposure: Past    Smokeless tobacco: Never   Vaping Use    Vaping status: Never Used   Substance Use Topics    Alcohol use: Yes     Comment: social use    Drug use: No        Review of Systems   Constitutional:  Negative for chills and fever.   HENT:  Negative for ear pain and sore throat.    Eyes:  Negative for pain and visual disturbance.   Respiratory:  Negative for cough and shortness of breath.    Cardiovascular:  Positive for palpitations. Negative for chest pain.   Gastrointestinal:  Negative for abdominal pain, constipation, diarrhea, nausea and vomiting.   Genitourinary:  Negative for dysuria and hematuria.   Musculoskeletal:  Negative for arthralgias and back pain.   Skin:  Negative for color change and rash.   Neurological:  Negative for syncope.   All other systems reviewed and are negative.      Physical Exam  ED Triage Vitals   Temperature Pulse Respirations Blood Pressure SpO2   05/07/24 0254 05/07/24 0251 05/07/24 0251 05/07/24 0252 05/07/24 0251   98.2 °F (36.8 °C) 95 20 (!) 217/99 96 %      Temp Source Heart Rate Source Patient Position - Orthostatic VS BP Location FiO2 (%)   05/07/24 0254 05/07/24 0251 -- 05/07/24 0252 --   Oral Monitor  Right arm       Pain Score       --                    Orthostatic Vital Signs  Vitals:    05/07/24 0251 05/07/24 0252 05/07/24 0556   BP:  (!) 217/99 (!) 199/88   Pulse: 95  71       Physical Exam  Vitals and nursing note reviewed.   Constitutional:       General: She is not in acute distress.     Appearance: She is well-developed.   HENT:      Head: Normocephalic and atraumatic.   Eyes:      Conjunctiva/sclera:  Conjunctivae normal.   Cardiovascular:      Rate and Rhythm: Normal rate. Rhythm irregularly irregular.      Heart sounds: Normal heart sounds. No murmur heard.     No friction rub. No gallop.   Pulmonary:      Effort: Pulmonary effort is normal. No respiratory distress.      Breath sounds: Normal breath sounds. No stridor. No wheezing, rhonchi or rales.   Abdominal:      Palpations: Abdomen is soft.      Tenderness: There is no abdominal tenderness.   Musculoskeletal:         General: No swelling.      Cervical back: Neck supple.   Skin:     General: Skin is warm and dry.      Capillary Refill: Capillary refill takes less than 2 seconds.   Neurological:      Mental Status: She is alert.   Psychiatric:         Mood and Affect: Mood normal.         ED Medications  Medications   atorvastatin (LIPITOR) tablet 40 mg (has no administration in time range)   carvedilol (COREG) tablet 6.25 mg (has no administration in time range)   levETIRAcetam (KEPPRA) tablet 250 mg (has no administration in time range)   lisinopril (ZESTRIL) tablet 40 mg (has no administration in time range)   hydrALAZINE (APRESOLINE) injection 5 mg (has no administration in time range)   enoxaparin (LOVENOX) subcutaneous injection 160 mg (has no administration in time range)       Diagnostic Studies  Results Reviewed       Procedure Component Value Units Date/Time    HS Troponin I 4hr [[id number]] Collected: 05/07/24 0713    Lab Status: No result Specimen: Blood from Arm, Left     HS Troponin I 2hr [[id number]]  (Normal) Collected: 05/07/24 0457    Lab Status: Final result Specimen: Blood from Arm, Left Updated: 05/07/24 0535     hs TnI 2hr 16 ng/L      Delta 2hr hsTnI 8 ng/L     TSH, 3rd generation with Free T4 reflex [[id number]]  (Abnormal) Collected: 05/07/24 0310    Lab Status: Final result Specimen: Blood from Arm, Left Updated: 05/07/24 0402     TSH 3RD GENERATON 0.397 uIU/mL     T4, free [[id number]] Collected: 05/07/24 0310    Lab Status: In  process Specimen: Blood from Arm, Left Updated: 05/07/24 0402    Comprehensive metabolic panel [[id number]]  (Abnormal) Collected: 05/07/24 0310    Lab Status: Final result Specimen: Blood from Arm, Left Updated: 05/07/24 0400     Sodium 142 mmol/L      Potassium 3.7 mmol/L      Chloride 105 mmol/L      CO2 27 mmol/L      ANION GAP 10 mmol/L      BUN 12 mg/dL      Creatinine 0.66 mg/dL      Glucose 109 mg/dL      Calcium 10.3 mg/dL      AST 19 U/L      ALT 13 U/L      Alkaline Phosphatase 67 U/L      Total Protein 7.1 g/dL      Albumin 3.8 g/dL      Total Bilirubin 0.59 mg/dL      eGFR 83 ml/min/1.73sq m     Narrative:      National Kid[employer] Foundation guidelines for Chronic Kidney Disease (CKD):     Stage 1 with normal or high GFR (GFR > 90 mL/min/1.73 square meters)    Stage 2 Mild CKD (GFR = 60-89 mL/min/1.73 square meters)    Stage 3A Moderate CKD (GFR = 45-59 mL/min/1.73 square meters)    Stage 3B Moderate CKD (GFR = 30-44 mL/min/1.73 square meters)    Stage 4 Severe CKD (GFR = 15-29 mL/min/1.73 square meters)    Stage 5 End Stage CKD (GFR <15 mL/min/1.73 square meters)  Note: GFR calculation is accurate only with a steady state creatinine    HS Troponin 0hr (reflex protocol) [[id number]]  (Normal) Collected: 05/07/24 0310    Lab Status: Final result Specimen: Blood from Arm, Left Updated: 05/07/24 0353     hs TnI 0hr 8 ng/L     B-Type Natriuretic Peptide(BNP) [[id number]]  (Abnormal) Collected: 05/07/24 0310    Lab Status: Final result Specimen: Blood from Arm, Left Updated: 05/07/24 0352      pg/mL     CBC and differential [[id number]] Collected: 05/07/24 0310    Lab Status: Final result Specimen: Blood from Arm, Left Updated: 05/07/24 0331     WBC 5.67 Thousand/uL      RBC 4.66 Million/uL      Hemoglobin 14.2 g/dL      Hematocrit 44.0 %      MCV 94 fL      MCH 30.5 pg      MCHC 32.3 g/dL      RDW 12.6 %      MPV 9.6 fL      Platelets 177 Thousands/uL      nRBC 0 /100 WBCs      Segmented % 59 %       Immature Grans % 0 %      Lymphocytes % 30 %      Monocytes % 7 %      Eosinophils Relative 3 %      Basophils Relative 1 %      Absolute Neutrophils 3.39 Thousands/µL      Absolute Immature Grans 0.01 Thousand/uL      Absolute Lymphocytes 1.70 Thousands/µL      Absolute Monocytes 0.38 Thousand/µL      Eosinophils Absolute 0.16 Thousand/µL      Basophils Absolute 0.03 Thousands/µL                    XR chest 2 views   ED Interpretation by Can Loza MD (05/07 0419)   No acute cardiopulmonary abnormalities present.            Procedures  ECG 12 Lead Documentation Only    Date/Time: 5/7/2024 3:12 AM    Performed by: Can Loza MD  Authorized by: Can Loza MD    Indications / Diagnosis:  Palpitations  ECG reviewed by me, the ED Provider: yes    Patient location:  ED  Previous ECG:     Previous ECG:  Compared to current    Comparison ECG info:  Loss of P waves, new irregular rhythm    Similarity:  Changes noted    Comparison to cardiac monitor: Yes    Interpretation:     Interpretation: abnormal    Rate:     ECG rate:  86    ECG rate assessment: normal    Rhythm:     Rhythm: atrial fibrillation    Ectopy:     Ectopy: none    QRS:     QRS axis:  Normal    QRS intervals:  Normal  Conduction:     Conduction: normal    ST segments:     ST segments:  Normal  T waves:     T waves: normal          ED Course             HEART Risk Score      Flowsheet Row Most Recent Value   Heart Score Risk Calculator    History 1 Filed at: 05/07/2024 0716   ECG 0 Filed at: 05/07/2024 0716   Age 2 Filed at: 05/07/2024 0716   Risk Factors 1 Filed at: 05/07/2024 0716   Troponin 0 Filed at: 05/07/2024 0716   HEART Score 4 Filed at: 05/07/2024 0716                        SBIRT 22yo+      Flowsheet Row Most Recent Value   Initial Alcohol Screen: US AUDIT-C     1. How often do you have a drink containing alcohol? 0 Filed at: 05/07/2024 0533   2. How many drinks containing alcohol do you have on a typical day you are drinking?  0 Filed at:  05/07/2024 0533   3a. Male UNDER 65: How often do you have five or more drinks on one occasion? 0 Filed at: 05/07/2024 0533   3b. FEMALE Any Age, or MALE 65+: How often do you have 4 or more drinks on one occassion? 0 Filed at: 05/07/2024 0533   Audit-C Score 0 Filed at: 05/07/2024 0533   TANA: How many times in the past year have you...    Used an illegal drug or used a prescription medication for non-medical reasons? Never Filed at: 05/07/2024 0533                  Medical Decision Making  Patient presents with:  Palpitations: Pt arrives via EMS from home. Pt reports intermittent palpitations and dizziness starting tonight 2 hours ago. Per EMS pt was in rapid afib and converted on the way. Patient states she frequently gets these palpitations but usually only last for seconds to minutes and resolves with holding her breath. The patient Is currently not on any anticoagulants. Patient had lightheadedness during palpitations but denies any current symptoms.    Patient seen and examined noted to have irregular pulses.      Differential diagnosis includes but is not limited to new onset a-fib, SVT, atrial flutter, hyperthyroidism induced palpitations, ACS.      Patient's labs notable for: TSH of 0.397, troponin of 8.     Imaging revealed:   XR chest 2 views   ED Interpretation by Can Loza MD (05/07 0419)    No acute cardiopulmonary abnormalities present.    EKG: interpreted by myself as above.    Discussed patient's case with resident and agreed to be in the care of Dr. Gasca regarding admission who accepted the patient for further evaluation and management.      Amount and/or Complexity of Data Reviewed  Labs: ordered.  Radiology: ordered and independent interpretation performed.    Risk  Decision regarding hospitalization.          Disposition  Final diagnoses:   Palpitations   New onset atrial fibrillation (HCC)   New onset atrial flutter (HCC)   Low TSH level     Time reflects when diagnosis was documented in  both MDM as applicable and the Disposition within this note       Time User Action Codes Description Comment    5/7/2024  5:25 AM JaskaranDragan amaya S Add [R00.2] Palpitations     5/7/2024  5:26 AM Jaskaran, Dragan S Add [I48.91] New onset atrial fibrillation (HCC)     5/7/2024  5:26 AM Jaskaran, Dragan S Add [I48.92] New onset atrial flutter (HCC)     5/7/2024  5:26 AM Jaskaran, Dragan S Add [R79.89] Low TSH level     5/7/2024  5:26 AM Jaskaran, Dragan S Modify [R00.2] Palpitations     5/7/2024  5:26 AM Jaskaran, Dragan S Modify [I48.91] New onset atrial fibrillation (HCC)           ED Disposition       ED Disposition   Admit    Condition   Stable    Date/Time   Tue May 7, 2024 2125    Comment   Case was discussed with [facility] resident and the patient's admission status was agreed to be Admission Status: inpatient status to the service of Dr. Gasca .               Follow-up Information    None         Patient's Medications   Discharge Prescriptions    No medications on file     No discharge procedures on file.    PDMP Review         Value Time User    PDMP Reviewed  Yes 5/7/2024  2:48 AM Dragan Jessica MD             ED Provider  Attending physically available and evaluated Anusha Booth. I managed the patient along with the ED Attending.    Electronically Signed by           Can Loza MD  05/07/24 0716       Can Loza MD  05/07/24 0717

## 2024-05-07 NOTE — CONSULTS
Consultation - Cardiology Team 1  Anusha Booth 83 y.o. female MRN: [id number]  Unit/Bed#: ED-23 Encounter: [id number]  05/07/24  8:31 AM    Assessment/ Plan:  1. Palpitations   - reported afib/flutter en route to [place] via EMS - no ECG to review to confirm  - presenting ECG - NSR w/ SA, HR 86  - 48 holter (6/2/2023) - predominantly NSR, 163 ventricular ectopic beats, 425 supraventricular ectopic beats  - echo (8/9/2019) - LVEF 60%, no RWMA; new echo ordered and pending   - telemetry review - NSR, HR 60s, no evidence of atrial fibrillation  - [id number] 6 - will hold off on AC presently as there is no evidence of fib/flutter yet  - continue home carvedilol for rate control  - would benefit from outpatient ZIO monitor for further rhythm surveillance  - continue to monitor on telemetry + notify of any arrhythmia    2. Hypertensive urgency  - BP on arrival 217/99 - last documented at 203/90 s/p hydralazine 5 mg IV x1  - increase carvedilol to 12.5 mg twice daily - dose another 6.25 mg x 1 now  - continue home lisinopril 40 mg daily    3. Hyperlipidemia - continue home statin  4. B/L LE edema - on PRN lasix outpatient    History of Present Illness   Physician Requesting Consult: Hieu Clarke MD    Reason for Consult / Principal Problem: Palpitations    HPI: Anusha Booth is a 83 y.o. female with past medical history of hyperlipidemia, hypertension, bilateral lower extremity edema, and palpitations who presents to the ED early this morning with complaints of palpitations.  Symptoms began about 30 minutes prior to EMS arrival.  Reportedly, en route to the hospital, patient was transiently in atrial fibrillation.  An ECG was not obtained.  Upon arrival to the ED, presenting ECG demonstrated a normal sinus rhythm with sinus arrhythmia.  Heart rate of 86.  Initial BP of 217/99 for which she received hydralazine 5 mg IV x 1 with some improvement.  Given palpitations and reported atrial  fibrillation/flutter, Cardiology was consulted for further recommendations and management.    Upon examination, patient is currently asymptomatic.  She reports history of palpitations, but not since she wore her Holter monitor about a year ago.  At that point in time, patient was able to terminate her palpitations by taking deep breath in.  However, early this morning, her palpitations did not stop and she called EMS.  Patient states that she was told she was an abnormal rhythm in the ambulance and within a few seconds, her palpitations resolved and she was in a normal rhythm.  She denies any other associated symptoms of lightheadedness, dizziness, SOB, chest pain/discomfort, nausea, vomiting, or diaphoresis.  She does endorse mild intermittent lower extremity edema for which she takes as needed Lasix at home, but has not had to recently.  She denies any other signs of volume overload including orthopnea or dyspnea on exertion.  Patient denies any excessive caffeine intake or stimulant use.  She denies any alcohol, drug, or tobacco abuse.  She does not carry a diagnosis of HELENE or any thyroid issues.  No recent fevers, chills, or productive cough at home.  Patient follows with Dr. Mark outpatient.    Inpatient consult to Cardiology  Consult performed by: NESTOR Rosas  Consult ordered by: Nilda Saenz DO      EKG: NSR, HR 60s, no evidence of atrial fibrillation    Review of Systems   Constitutional: Negative.    HENT: Negative.     Eyes: Negative.    Respiratory:  Negative for chest tightness and shortness of breath.    Cardiovascular:  Positive for palpitations. Negative for chest pain and leg swelling.   Gastrointestinal: Negative.    Endocrine: Negative.    Genitourinary: Negative.    Musculoskeletal: Negative.    Skin: Negative.    Allergic/Immunologic: Negative.    Neurological: Negative.  Negative for dizziness, syncope, weakness and light-headedness.   Hematological: Negative.     Psychiatric/Behavioral: Negative.       Historical Information   Past Medical History:   Diagnosis Date    Acute venous embolism and thrombosis of deep vessels of distal lower extremity (HCC) 01/27/2014    Blood type A+     Closed fracture of right distal radius and ulna 03/06/2019    Convulsions (HCC)     Deviated septum 05/2000    Fracture of c2     Gallstones 09/15/2019    Hiatal hernia     1976    HL (hearing loss)     Hyperlipidemia     Osteoarthritis, localized, knee     Seizure (HCC)     1990    Seizures (HCC)     Spondylosis     Tinnitus      Past Surgical History:   Procedure Laterality Date    BREAST BIOPSY Right over 10 years    benign    CATARACT EXTRACTION      COMBINED HYSTEROSCOPY DIAGNOSTIC / D&C      ESOPHAGOGASTRODUODENOSCOPY      diagnostic    HAND SURGERY      HERNIA REPAIR      HYSTERECTOMY      KNEE ARTHROPLASTY      Revision;total    KNEE ARTHROSCOPY Right     1997    NASAL SEPTUM SURGERY      Deviation repair    [place] TENDON SHEATH INCISION Right 11/21/2019    Procedure: RING TRIGGER FINGER RELEASE;  Surgeon: Victorino Ventura MD;  Location: [facility];  Service: Orthopedics    REPLACEMENT TOTAL KNEE Right     2000    TONSILLECTOMY      1948    TUBAL LIGATION      TUMOR REMOVAL Left 1956    left index finger     Social History     Substance and Sexual Activity   Alcohol Use Yes    Comment: social use     Social History     Substance and Sexual Activity   Drug Use No     Social History     Tobacco Use   Smoking Status Former    Passive exposure: Past   Smokeless Tobacco Never     Family History:   Family History   Problem Relation Age of Onset    Prostate cancer Father     Other Family         back problem    Arthritis Family     Arthritis Mother     No Known Problems Sister     No Known Problems Daughter     No Known Problems Sister     No Known Problems Paternal Aunt     No Known Problems Paternal Aunt     No Known Problems Paternal Aunt     No Known Problems Paternal Aunt     Prostate cancer  Brother      Meds/Allergies   all current active meds have been reviewed  Allergies   Allergen Reactions    Adhesive  [Medical Tape]      Other reaction(s): Unknown Reaction    Celebrex [Celecoxib] Itching and GI Intolerance     Voice change    Latex      Other reaction(s): Unknown Reaction    Midazolam     Penicillins     Thiopental      Objective   Vitals: Blood pressure (S) (!) 211/88, pulse 64, temperature 98.2 °F (36.8 °C), temperature source Oral, resp. rate 18, SpO2 95%., There is no height or weight on file to calculate BMI.,   Orthostatic Blood Pressures      Flowsheet Row Most Recent Value   Blood Pressure 211/88  filed at 2024 0830          Systolic (24hrs), Av , Min:199 , Max:217     Diastolic (24hrs), Av, Min:87, Max:99    No intake or output data in the 24 hours ending 24 0831    Invasive Devices       Peripheral Intravenous Line  Duration             Peripheral IV 24 Left Antecubital <1 day                    Physical Exam  Constitutional:       General: She is not in acute distress.     Appearance: She is obese. She is not ill-appearing.   HENT:      Head: Normocephalic.      Nose: Nose normal.      Mouth/Throat:      Mouth: Mucous membranes are moist.      Pharynx: Oropharynx is clear.   Eyes:      Pupils: Pupils are equal, round, and reactive to light.   Cardiovascular:      Rate and Rhythm: Normal rate and regular rhythm.      Pulses: Normal pulses.      Heart sounds: Normal heart sounds.   Pulmonary:      Effort: Pulmonary effort is normal. No respiratory distress.      Breath sounds: No wheezing or rales.      Comments: On RA  Abdominal:      Palpations: Abdomen is soft.   Musculoskeletal:         General: Normal range of motion.      Cervical back: Normal range of motion.      Right lower leg: Edema present.      Left lower leg: Edema present.      Comments: Trace bilateral lower extremity edema   Skin:     General: Skin is warm and dry.      Capillary Refill:  Capillary refill takes less than 2 seconds.   Neurological:      General: No focal deficit present.      Mental Status: She is alert and oriented to person, place, and time.   Psychiatric:         Mood and Affect: Mood normal.         Behavior: Behavior normal.     Lab Results:   Troponins:     CBC with diff:   Results from last 7 days   Lab Units 05/07/24  0310   WBC Thousand/uL 5.67   HEMOGLOBIN g/dL 14.2   HEMATOCRIT % 44.0   MCV fL 94   PLATELETS Thousands/uL 177   RBC Million/uL 4.66   MCH pg 30.5   MCHC g/dL 32.3   RDW % 12.6   MPV fL 9.6   NRBC AUTO /100 WBCs 0     CMP:   Results from last 7 days   Lab Units 05/07/24  0310   POTASSIUM mmol/L 3.7   CHLORIDE mmol/L 105   CO2 mmol/L 27   BUN mg/dL 12   CREATININE mg/dL 0.66   CALCIUM mg/dL 10.3*   AST U/L 19   ALT U/L 13   ALK PHOS U/L 67   EGFR ml/min/1.73sq m 83

## 2024-05-07 NOTE — ASSESSMENT & PLAN NOTE
Takes Lasix prn for lower extremity edema  Most recently has taken it in the last week  Mild trace edema on exam  Can consider restarting if patient has increasing lower extremity edema

## 2024-05-08 ENCOUNTER — TELEPHONE (OUTPATIENT)
Dept: CARDIOLOGY CLINIC | Facility: CLINIC | Age: 84
End: 2024-05-08

## 2024-05-08 VITALS
HEART RATE: 62 BPM | RESPIRATION RATE: 17 BRPM | BODY MASS INDEX: 38.71 KG/M2 | TEMPERATURE: 98.4 F | HEIGHT: 65 IN | OXYGEN SATURATION: 95 % | DIASTOLIC BLOOD PRESSURE: 70 MMHG | WEIGHT: 232.37 LBS | SYSTOLIC BLOOD PRESSURE: 149 MMHG

## 2024-05-08 LAB
ANION GAP SERPL CALCULATED.3IONS-SCNC: 6 MMOL/L (ref 4–13)
BASOPHILS # BLD AUTO: 0.02 THOUSANDS/ÂΜL (ref 0–0.1)
BASOPHILS NFR BLD AUTO: 0 % (ref 0–1)
BUN SERPL-MCNC: 12 MG/DL (ref 5–25)
CALCIUM SERPL-MCNC: 9.4 MG/DL (ref 8.4–10.2)
CHLORIDE SERPL-SCNC: 106 MMOL/L (ref 96–108)
CO2 SERPL-SCNC: 28 MMOL/L (ref 21–32)
CREAT SERPL-MCNC: 0.58 MG/DL (ref 0.6–1.3)
EOSINOPHIL # BLD AUTO: 0.11 THOUSAND/ÂΜL (ref 0–0.61)
EOSINOPHIL NFR BLD AUTO: 2 % (ref 0–6)
ERYTHROCYTE [DISTWIDTH] IN BLOOD BY AUTOMATED COUNT: 12.6 % (ref 11.6–15.1)
GFR SERPL CREATININE-BSD FRML MDRD: 86 ML/MIN/1.73SQ M
GLUCOSE SERPL-MCNC: 107 MG/DL (ref 65–140)
HCT VFR BLD AUTO: 41 % (ref 34.8–46.1)
HGB BLD-MCNC: 13.2 G/DL (ref 11.5–15.4)
IMM GRANULOCYTES # BLD AUTO: 0.01 THOUSAND/UL (ref 0–0.2)
IMM GRANULOCYTES NFR BLD AUTO: 0 % (ref 0–2)
LYMPHOCYTES # BLD AUTO: 1.1 THOUSANDS/ÂΜL (ref 0.6–4.47)
LYMPHOCYTES NFR BLD AUTO: 21 % (ref 14–44)
MAGNESIUM SERPL-MCNC: 1.9 MG/DL (ref 1.9–2.7)
MCH RBC QN AUTO: 30.5 PG (ref 26.8–34.3)
MCHC RBC AUTO-ENTMCNC: 32.2 G/DL (ref 31.4–37.4)
MCV RBC AUTO: 95 FL (ref 82–98)
MONOCYTES # BLD AUTO: 0.44 THOUSAND/ÂΜL (ref 0.17–1.22)
MONOCYTES NFR BLD AUTO: 8 % (ref 4–12)
NEUTROPHILS # BLD AUTO: 3.69 THOUSANDS/ÂΜL (ref 1.85–7.62)
NEUTS SEG NFR BLD AUTO: 69 % (ref 43–75)
NRBC BLD AUTO-RTO: 0 /100 WBCS
PLATELET # BLD AUTO: 164 THOUSANDS/UL (ref 149–390)
PMV BLD AUTO: 9.3 FL (ref 8.9–12.7)
POTASSIUM SERPL-SCNC: 3.6 MMOL/L (ref 3.5–5.3)
RBC # BLD AUTO: 4.33 MILLION/UL (ref 3.81–5.12)
SODIUM SERPL-SCNC: 140 MMOL/L (ref 135–147)
WBC # BLD AUTO: 5.37 THOUSAND/UL (ref 4.31–10.16)

## 2024-05-08 PROCEDURE — 99232 SBSQ HOSP IP/OBS MODERATE 35: CPT | Performed by: INTERNAL MEDICINE

## 2024-05-08 PROCEDURE — 83735 ASSAY OF MAGNESIUM: CPT | Performed by: PHARMACIST

## 2024-05-08 PROCEDURE — 85025 COMPLETE CBC W/AUTO DIFF WBC: CPT

## 2024-05-08 PROCEDURE — 99239 HOSP IP/OBS DSCHRG MGMT >30: CPT | Performed by: INTERNAL MEDICINE

## 2024-05-08 PROCEDURE — 80048 BASIC METABOLIC PNL TOTAL CA: CPT

## 2024-05-08 RX ORDER — CARVEDILOL 12.5 MG/1
12.5 TABLET ORAL 2 TIMES DAILY WITH MEALS
Qty: 60 TABLET | Refills: 0 | Status: SHIPPED | OUTPATIENT
Start: 2024-05-08

## 2024-05-08 RX ORDER — FUROSEMIDE 20 MG/1
20 TABLET ORAL DAILY PRN
Qty: 10 TABLET | Refills: 0 | Status: SHIPPED | OUTPATIENT
Start: 2024-05-08

## 2024-05-08 RX ORDER — AMLODIPINE BESYLATE 2.5 MG/1
2.5 TABLET ORAL DAILY
Qty: 30 TABLET | Refills: 0 | Status: SHIPPED | OUTPATIENT
Start: 2024-05-09

## 2024-05-08 RX ORDER — AMLODIPINE BESYLATE 2.5 MG/1
2.5 TABLET ORAL DAILY
Status: DISCONTINUED | OUTPATIENT
Start: 2024-05-08 | End: 2024-05-08 | Stop reason: HOSPADM

## 2024-05-08 RX ADMIN — CARVEDILOL 12.5 MG: 12.5 TABLET, FILM COATED ORAL at 08:46

## 2024-05-08 RX ADMIN — LEVETIRACETAM 250 MG: 250 TABLET, FILM COATED ORAL at 08:46

## 2024-05-08 RX ADMIN — LISINOPRIL 40 MG: 20 TABLET ORAL at 08:46

## 2024-05-08 RX ADMIN — ACETAMINOPHEN 650 MG: 325 TABLET, FILM COATED ORAL at 08:53

## 2024-05-08 RX ADMIN — AMLODIPINE BESYLATE 2.5 MG: 2.5 TABLET ORAL at 10:26

## 2024-05-08 NOTE — CASE MANAGEMENT
Case Management Assessment & Discharge Planning Note    Patient name Anusha Booth  Location S /S -01 MRN [id number]  : 1940 Date 2024       Current Admission Date: 2024  Current Admission Diagnosis:Palpitations   Patient Active Problem List    Diagnosis Date Noted    Palpitations 2024    Pain in left hip 2022    Stress fracture of left hip 2022    Peripheral vascular disease, unspecified ([facility]) 2021    Trigger finger, right ring finger 2019    Thrombocytopenia ([facility]) 2019    Essential hypertension 2019    Seizure ([facility]) 2019    Severe obesity (BMI 35.0-39.9) with comorbidity ([facility]) 2019    Leukopenia 2019    Allergic rhinitis 2016    Edema 10/07/2015    Primary osteoarthritis of both hips 2015    Bulging of lumbar intervertebral disc 2015    Chronic osteoarthritis 01/10/2014    Epilepsy ([facility]) 2013    Hyperlipidemia 10/26/2012      LOS (days): 1  Geometric Mean LOS (GMLOS) (days): 2.1  Days to GMLOS:0.9     OBJECTIVE:    Risk of Unplanned Readmission Score: 7.69         Current admission status: Inpatient       Preferred Pharmacy:   [id number] [place] - [address]  [address]  [place] [address]  Phone: [phone] Fax: [phone]    [facility] - [place], [place] - [address]  [address] [address]  Phone: [phone] Fax: [phone]    [id number] - [place], [place] - [address]  [address] [address]  Phone: [phone] Fax: [phone]    Primary Care Provider: Silke Gaspar DO    Primary Insurance: [email or web address] [facility]  Secondary Insurance:     ASSESSMENT:  Active Health Care Proxies    There are no active Health Care Proxies on file.                      Patient Information  Admitted from:: Home  Mental Status: Alert  During Assessment patient was accompanied by: Not  accompanied during assessment  Assessment information provided by:: Patient  Primary Caregiver: Self  Support Systems: Family members  County of Residence: [place]  What city do you live in?: [place]  Home entry access options. Select all that apply.: Stairs  Number of steps to enter home.: 5  Do the steps have railings?: Yes  Type of Current Residence: Apartment  Floor Level: 2 (stair glide access to second level)  Upon entering residence, is there a bedroom on the main floor (no further steps)?: Yes  Upon entering residence, is there a bathroom on the main floor (no further steps)?: Yes  Living Arrangements: Lives Alone    Activities of Daily Living Prior to Admission  Functional Status: Independent  Completes ADLs independently?: Yes  Ambulates independently?: Yes  Does patient use assisted devices?: No  Assisted Devices (DME) used: Electric wheelchair  Does patient currently own DME?: Yes  What DME does the patient currently own?: Electric Wheelchair, Straight Cane, Rollator, Shower Chair, Walker  Does patient have a history of Outpatient Therapy (PT/OT)?: Yes  Does the patient have a history of Short-Term Rehab?: Yes ([facility])  Does patient have a history of HHC?: No  Does patient currently have HHC?: No         Patient Information Continued  Does patient have prescription coverage?: Yes  Does patient receive dialysis treatments?: No         Means of Transportation  Means of Transport to Appts:: Drives Self      Social Determinants of Health (SDOH)      Flowsheet Row Most Recent Value   Housing Stability    In the last 12 months, was there a time when you were not able to pay the mortgage or rent on time? N   In the last 12 months, was there a time when you did not have a steady place to sleep or slept in a shelter (including now)? N   Transportation Needs    In the past 12 months, has lack of transportation kept you from medical appointments or from getting medications? no   In the past 12 months,  has lack of transportation kept you from meetings, work, or from getting things needed for daily living? No   Food Insecurity    Within the past 12 months, you worried that your food would run out before you got the money to buy more. Never true   Within the past 12 months, the food you bought just didn't last and you didn't have money to get more. Never true   Utilities    In the past 12 months has the electric, gas, oil, or water company threatened to shut off services in your home? No            DISCHARGE DETAILS:    Discharge planning discussed with:: patient  Freedom of Choice: Yes  Comments - Freedom of Choice: CM met with patient at bedside re: assessment and dcp. Patient relayed she lives alone in second floor apartment, 4-5 DORY home then stair glide access to second level. Patient relayed she is independent w/ ADLS and does not use any dme at baseline (has electric scooter for long distances). Pt reported hx of acute rehab w/ [facility] and OPPT after previous knee surgery. Patient relayed no concerns for d/c today and no concerns w/ ambulation of steps upon return to home. Pt relayed need for transport upon d/c - Lyft waiver provided to pt. Lyft transport requested for 1400 via [employer] - direct RN TT added for confirmation of Lyft p/u time.                               Other Referral/Resources/Interventions Provided:  Interventions: Transportation  Referral Comments: Pt relayed need for transport upon d/c - Lyft waiver provided to pt. Lyft transport requested for 1400 via [employer] - direct RN TT added for confirmation of Lyft p/u time.         Treatment Team Recommendation: Home  Discharge Destination Plan:: Home  Transport at Discharge : Free Local Transportation        Transported by (Company and Unit #): [employer]  ETA of Transport (Date): 05/08/24  ETA of Transport (Time): 1400

## 2024-05-08 NOTE — DISCHARGE INSTR - AVS FIRST PAGE
Dear Anusha Booth,     It was our pleasure to care for you here at [facility].  It is our hope that we were always able to exceed the expected standards for your care during your stay.  You were hospitalized due to palpitations.  You were cared for on the South 3rd floor by Mateo Lee MD under the service of Hieu Clarke MD with the [facility] who covers for your primary care physician (PCP), Silke Gaspar DO, while you were hospitalized.  If you have any questions or concerns related to this hospitalization, you may contact us at .  For follow up as well as any medication refills, we recommend that you follow up with your primary care physician.  A registered nurse will reach out to you by phone within a few days after your discharge to answer any additional questions that you may have after going home.  However, at this time we provide for you here, the most important instructions / recommendations at discharge:     Notable Medication Adjustments -   Start taking carvedilol 12.5 mg twice a day for your heart rate and blood pressure. This is a dose increase  Start taking amlodipine 2.5 mg once daily. This is a new medication to help with your blood pressure  Continue taking furosemide 20 mg daily as needed for leg swelling. You must check your blood pressure before restarting this medication. Your blood pressure medications were change during this admission  Continue your other medications as outlined in the after visit summary.  Testing Required after Discharge -   Zio patch (extended cardiac monitoring) - this will be ordered by your cardiologist  BMP in 1 week - contact your PCP to get this blood work checked.  Important follow up information -   Follow-up with your PCP within 1 week of discharge to discuss your hospital course and medication adjustments - you may or may not need changes to your blood pressure  medications at this time.  Follow-up with Dr. Walters on 6/4/24 as recommended to have further cardiac monitoring done  Other Instructions -   If you feel lightheaded, dizzy, or palpitations, sit down and measure your blood pressure. Call your PCP or cardiologist for advice.  Measure your blood pressure at least once a day and keep a log for your PCP or cardiologist to review at your next appointment.  Please review this entire after visit summary as additional general instructions including medication list, appointments, activity, diet, any pertinent wound care, and other additional recommendations from your care team that may be provided for you.      Sincerely,     Mateo Lee MD

## 2024-05-08 NOTE — UTILIZATION REVIEW
NOTIFICATION OF INPATIENT ADMISSION   AUTHORIZATION REQUEST   SERVICING FACILITY:   [place] [address]  Tax ID: [id number]  NPI: [id number]   ATTENDING PROVIDER:  Attending Name and NPI#: Hieu Clarke Md [[id number]]  Address: [address] [place] [address]  Phone: [phone]     ADMISSION INFORMATION:  Place of Service: Inpatient [facility]  Place of Service Code: 21  Inpatient Admission Date/Time: 5/7/24  5:26 AM  Discharge Date/Time: No discharge date for patient encounter.  Admitting Diagnosis Code/Description:  Palpitations [R00.2]  New onset atrial fibrillation (HCC) [I48.91]  Low TSH level [R79.89]  New onset atrial flutter (HCC) [I48.92]     UTILIZATION REVIEW CONTACT:  Kate Bajwa Utilization   Network Utilization Review Department  Phone: [phone]  Fax: [phone]  Email: [email or web address]  Contact for approvals/pending authorizations, clinical reviews, and discharge.     PHYSICIAN ADVISORY SERVICES:  Medical Necessity Denial & Tjmx-uf-Ymqv Review  Phone: [phone]  Fax: [phone]  Email: [email or web address]     DISCHARGE SUPPORT TEAM:  For Patients Discharge Needs & Updates  Phone: [phone] opt. 2 Fax: [phone]  Email: [email or web address]

## 2024-05-08 NOTE — TELEPHONE ENCOUNTER
Pt's son called back [place] [address] [place] [address]. [employer] still did not recognize address but I completed registration anyway. Pt should be getting heart monitor in mail this week or early next week.

## 2024-05-08 NOTE — PROGRESS NOTES
"Cardiology Progress Note - Team 1  Anusha PAZ Kle[place]op 83 y.o. female MRN: [id number]  Unit/Bed#: S -01 Encounter: [id number]      Assessment/Plan:  1. Palpitations   - reported afib/flutter en route to [place] via EMS - no ECG to review to confirm  - presenting ECG - NSR w/ SA, HR 86  - 48 holter (6/2/2023) - predominantly NSR, 163 ventricular ectopic beats, 425 supraventricular ectopic beats  - echo yesterday - LVEF 60%, no RWMA, grade 1 DD, mild MR/TR  - telemetry review - NSR, HR 60s, no evidence of atrial fibrillation/flutter  - [id number] 6 - will hold off on AC presently as there is no evidence of fib/flutter  - continue home carvedilol for rate control  - outpatient ZIO patch ordered for further rhythm surveillance  - outpatient Cardiology f/u previously arranged with Dr. Walters on 6/4/2024     2. Hypertensive urgency  - BP on arrival 217/99 - last documented at 169/79 prior to a.m meds  - carvedilol increased to 12.5 mg BID yesterday, continue home lisinopril 40 mg daily  - recheck BP prior to discharge     3. Hyperlipidemia - continue home statin  4. B/L LE edema - on PRN lasix outpatient    Subjective:   Patient seen and examined.  No significant events overnight. Patient denies any further palpitations. Feels well enough to go home.     Objective:   Vitals: Blood pressure 169/79, pulse 63, temperature 98.4 °F (36.9 °C), resp. rate 17, height 5' 5\" (1.651 m), weight 105 kg (232 lb 5.8 oz), SpO2 93%., Body mass index is 38.67 kg/m².,   Orthostatic Blood Pressures      Flowsheet Row Most Recent Value   Blood Pressure 169/79 filed at 05/08/2024 0757   Patient Position - Orthostatic VS Lying filed at 05/08/2024 0754          No intake or output data in the 24 hours ending 05/08/24 0935    Physical Exam  Constitutional:       Appearance: She is obese.   HENT:      Head: Normocephalic.      Nose: Nose normal.      Mouth/Throat:      Mouth: Mucous membranes are moist.      Pharynx: Oropharynx is clear. "   Eyes:      Pupils: Pupils are equal, round, and reactive to light.   Cardiovascular:      Rate and Rhythm: Normal rate and regular rhythm.      Pulses: Normal pulses.      Heart sounds: Normal heart sounds.   Pulmonary:      Effort: Pulmonary effort is normal. No respiratory distress.      Breath sounds: No wheezing or rales.      Comments: On RA  Abdominal:      Palpations: Abdomen is soft.   Musculoskeletal:         General: Normal range of motion.      Right lower leg: Edema present.      Left lower leg: Edema present.      Comments: Trace B/L LE edema   Skin:     General: Skin is warm and dry.      Capillary Refill: Capillary refill takes less than 2 seconds.   Neurological:      General: No focal deficit present.      Mental Status: She is alert and oriented to person, place, and time.   Psychiatric:         Mood and Affect: Mood normal.         Behavior: Behavior normal.     Medications:    Current Facility-Administered Medications:     acetaminophen (TYLENOL) tablet 650 mg, 650 mg, Oral, Q6H PRN, Bernie Fairchild MD, 650 mg at 05/08/24 0853    atorvastatin (LIPITOR) tablet 40 mg, 40 mg, Oral, Daily With Dinner, Nilda Saenz DO, 40 mg at 05/07/24 1512    carvedilol (COREG) tablet 12.5 mg, 12.5 mg, Oral, BID With Meals, NESTOR Rosas, 12.5 mg at 05/08/24 0846    hydrALAZINE (APRESOLINE) injection 5 mg, 5 mg, Intravenous, Q6H PRN, Mateo Lee MD    levETIRAcetam (KEPPRA) tablet 250 mg, 250 mg, Oral, Daily, Nilda Saenz DO, 250 mg at 05/08/24 0846    lisinopril (ZESTRIL) tablet 40 mg, 40 mg, Oral, Daily, Nilda Saenz DO, 40 mg at 05/08/24 0846     Labs & Results:      Results from last 7 days   Lab Units 05/08/24  0557 05/07/24  0310   WBC Thousand/uL 5.37 5.67   HEMOGLOBIN g/dL 13.2 14.2   HEMATOCRIT % 41.0 44.0   PLATELETS Thousands/uL 164 177         Results from last 7 days   Lab Units 05/08/24  0557 05/07/24  0310   POTASSIUM mmol/L 3.6 3.7   CHLORIDE mmol/L 106 105   CO2 mmol/L  28 27   BUN mg/dL 12 12   CREATININE mg/dL 0.58* 0.66   CALCIUM mg/dL 9.4 10.3*   ALK PHOS U/L  --  67   ALT U/L  --  13   AST U/L  --  19         Results from last 7 days   Lab Units 05/08/24  0557   MAGNESIUM mg/dL 1.9

## 2024-05-08 NOTE — TELEPHONE ENCOUNTER
Called pt's son to confirm address of pt and left message with office call back number. [email or web address] did not recognize the address that was provided on the order form for the 2 week heart monitor. Saved registration as draft in [employer] system.

## 2024-05-08 NOTE — UTILIZATION REVIEW
Initial Clinical Review    Admission: Date/Time/Statement:   Admission Orders (From admission, onward)       Ordered        05/07/24 0526  INPATIENT ADMISSION  Once                          Orders Placed This Encounter   Procedures    INPATIENT ADMISSION     Telemetry     Standing Status:   Standing     Number of Occurrences:   1     Order Specific Question:   Level of Care     Answer:   Med Surg [16]     Order Specific Question:   Bed request comments     Answer:   telemetry     Order Specific Question:   Estimated length of stay     Answer:   More than 2 Midnights     Order Specific Question:   Certification     Answer:   I certify that inpatient services are medically necessary for this patient for a duration of greater than two midnights. See H&P and MD Progress Notes for additional information about the patient's course of treatment.     ED Arrival Information       Expected   -    Arrival   5/7/2024 02:46    Acuity   Urgent              Means of arrival   Ambulance    Escorted by   [facility]    Service   Hospitalist    Admission type   Emergency              Arrival complaint   EMS             Chief Complaint   Patient presents with    Palpitations     Pt arrives via EMS from home. Pt reports intermittent palpitations and dizziness starting tonight. Per EMS pt was in rapid afib and converted on the way ine       Initial Presentation: 83 y.o. female who presented via EMS   to [facility] ED. Inpatient admission for evaluation and treatment of  New onset atrial fibrillation . Diagnoses of Palpitations and Low TSH level were also pertinent to this visit.  PMHx:  has a past medical history of Acute venous embolism and thrombosis of deep vessels of distal lower extremity (HCC) (01/27/2014), Blood type A+, Closed fracture of right distal radius and ulna (03/06/2019), Convulsions ([facility]), Deviated septum (05/2000), Fracture of c2, Gallstones (09/15/2019), Hiatal hernia, HL (hearing loss), Hyperlipidemia,  Osteoarthritis, localized, knee, Seizure (HCC), Seizures (HCC), Spondylosis, and Tinnitus.  Presented w/ Palpitations, intermittently and dizziness that started 2 hours prior. She was fond to be in rapid afib by EMS and converted enroute. She reports she frequently gets these palpitations but they usually last for seconds to minutes and resolve with holding her breath. .   On exam,  she is now in NSR but  hypertensive SBP to 210, Her rates remain controlled and regular. TSH was mildly increased.      Anticipated Length of Stay/Certification Statement: Patient will be admitted on an inpatient basis with an anticipated length of stay of greater than 2 midnights secondary to new onset afib/flutter.     Cardiology Consult - 5/7 - Pt is known for office visits, she has a history of PVC's, and is quite hypertensive. She had significant palpitations which did nto respond to usual measures, which make it sound like she may have had SVT in the past. There is report of afib, but no ECG to be reviewed.  Plan monitor on telem, if no evidence of afib. Plan then to recommend outpatient ZIO patch and hold off on anticoagulation. She has had bruising on her forearm ( BP cuff related while on ASA.   Increase Coreg, IV hydralazine for now, continue lisinopril. Additional antihypertensives as her BP warrants.     Date: 5/8/24   Day 2:  No significant events overnight, She denies any further palpitations. Last documented /79 prior to AM meds.   Carvedilol increased to 12.5 mg bid yesterday, continue home lisinopril 40 mg qd.  Amlodipine 2.5mg qd. Follow up outpatient on 6/4/24 for ZIO patch  Resume prn Lasix.     ED Triage Vitals   Temperature Pulse Respirations Blood Pressure SpO2   05/07/24 0254 05/07/24 0251 05/07/24 0251 05/07/24 0252 05/07/24 0251   98.2 °F (36.8 °C) 95 20 (!) 217/99 96 %      Temp Source Heart Rate Source Patient Position - Orthostatic VS BP Location FiO2 (%)   05/07/24 0254 05/07/24 0251 05/07/24 0940  05/07/24 0252 --   Oral Monitor Sitting Right arm       Pain Score       05/07/24 1500       No Pain          Wt Readings from Last 1 Encounters:   05/08/24 105 kg (232 lb 5.8 oz)     Additional Vital Signs:   Date/Time Temp Pulse Resp BP MAP (mmHg) SpO2 O2 Device Patient Position - Orthostatic VS   05/08/24 1146 -- 62 -- 149/70 96 95 % -- --   05/08/24 11:44:41 -- 54 Abnormal  -- 149/70 96 93 % -- --   05/08/24 07:57:10 98.4 °F (36.9 °C) 63 -- 169/79 109 93 % -- --   05/08/24 07:54:59 98.4 °F (36.9 °C) 64 17 169/79 109 93 % None (Room air) Lying     Date/Time Temp Pulse Resp BP MAP (mmHg) SpO2 O2 Device Patient Position - Orthostatic VS   05/07/24 22:02:44 97.7 °F (36.5 °C) 60 18 162/79 107 94 % -- --   05/07/24 19:36:24 -- 60 -- 190/90 Abnormal  123 95 % -- --   05/07/24 1900 -- 61 16 186/77 Abnormal  111 96 % -- --   05/07/24 1800 -- 62 -- 170/77 110 -- -- --   05/07/24 1730 -- 60 16 180/77 Abnormal  111 -- -- --   05/07/24 1630 -- 65 16 187/76 Abnormal  109 98 % -- --   05/07/24 1600 -- 64 16 167/74 107 97 % -- --   05/07/24 1500 -- 63 16 181/86 Abnormal  123 96 % None (Room air) --   05/07/24 1355 -- 67 -- 159/77 -- -- -- --   05/07/24 1330 -- 64 16 159/77 110 95 % None (Room air) --   05/07/24 1230 -- 64 18 175/82 Abnormal  120 96 % None (Room air) --   05/07/24 0940 -- 76 18 203/90 Abnormal  -- 96 % None (Room air) Sitting   05/07/24 0830 -- 64 18 211/88 Abnormal    127 95 % None (Room air) --   BP: provider made aware at 05/07/24 0830   05/07/24 0715 -- 68 18 209/87 Abnormal  125 95 % None (Room air) --   05/07/24 0556 -- 71 20 199/88 Abnormal  127 95 % None (Room air) --   05/07/24 0254 98.2 °F (36.8 °C) -- -- -- -- -- -- --     Pertinent Labs/Diagnostic Test Results:   XR chest 2 views   ED Interpretation by Can Loza MD (05/07 5099)   No acute cardiopulmonary abnormalities present.      Final Result by Jesenia Centeno MD (05/07 0043)   No acute cardiopulmonary disease.            Workstation performed:  [id number]               Results from last 7 days   Lab Units 05/08/24  0557 05/07/24  0310   WBC Thousand/uL 5.37 5.67   HEMOGLOBIN g/dL 13.2 14.2   HEMATOCRIT % 41.0 44.0   PLATELETS Thousands/uL 164 177   TOTAL NEUT ABS Thousands/µL 3.69 3.39         Results from last 7 days   Lab Units 05/08/24  0557 05/07/24  0310   SODIUM mmol/L 140 142   POTASSIUM mmol/L 3.6 3.7   CHLORIDE mmol/L 106 105   CO2 mmol/L 28 27   ANION GAP mmol/L 6 10   BUN mg/dL 12 12   CREATININE mg/dL 0.58* 0.66   EGFR ml/min/1.73sq m 86 83   CALCIUM mg/dL 9.4 10.3*   MAGNESIUM mg/dL 1.9  --      Results from last 7 days   Lab Units 05/07/24  0310   AST U/L 19   ALT U/L 13   ALK PHOS U/L 67   TOTAL PROTEIN g/dL 7.1   ALBUMIN g/dL 3.8   TOTAL BILIRUBIN mg/dL 0.59         Results from last 7 days   Lab Units 05/08/24  0557 05/07/24  0310   GLUCOSE RANDOM mg/dL 107 109         Results from last 7 days   Lab Units 05/07/24  0713 05/07/24  0457 05/07/24  0310   HS TNI 0HR ng/L  --   --  8   HS TNI 2HR ng/L  --  16  --    HSTNI D2 ng/L  --  8  --    HS TNI 4HR ng/L 22  --   --    HSTNI D4 ng/L 14  --   --              Results from last 7 days   Lab Units 05/07/24  0310   TSH 3RD GENERATON uIU/mL 0.397*       Results from last 7 days   Lab Units 05/07/24  0310   BNP pg/mL 110*       ED Treatment:   Medication Administration from 05/07/2024 0246 to 05/07/2024 1926         Date/Time Order Dose Route Action Comments     05/07/2024 1512 EDT atorvastatin (LIPITOR) tablet 40 mg 40 mg Oral Given --     05/07/2024 0729 EDT carvedilol (COREG) tablet 6.25 mg 6.25 mg Oral Given /87     05/07/2024 0827 EDT levETIRAcetam (KEPPRA) tablet 250 mg 250 mg Oral Given --     05/07/2024 0827 EDT lisinopril (ZESTRIL) tablet 40 mg 40 mg Oral Given --     05/07/2024 1513 EDT hydrALAZINE (APRESOLINE) injection 5 mg 5 mg Intravenous Given --     05/07/2024 0853 EDT hydrALAZINE (APRESOLINE) injection 5 mg 5 mg Intravenous Given --     05/07/2024 1640 EDT carvedilol  (COREG) tablet 12.5 mg 12.5 mg Oral Given --     05/07/2024 1105 EDT carvedilol (COREG) tablet 6.25 mg 6.25 mg Oral Given --     05/07/2024 1745 EDT acetaminophen (TYLENOL) tablet 650 mg 650 mg Oral Given --          Past Medical History:   Diagnosis Date    Acute venous embolism and thrombosis of deep vessels of distal lower extremity (HCC) 01/27/2014    Blood type A+     Closed fracture of right distal radius and ulna 03/06/2019    Convulsions (HCC)     Deviated septum 05/2000    Fracture of c2     Gallstones 09/15/2019    Hiatal hernia     1976    HL (hearing loss)     Hyperlipidemia     Osteoarthritis, localized, knee     Seizure (HCC)     1990    Seizures (HCC)     Spondylosis     Tinnitus      Present on Admission:   Epilepsy (HCC)   Hyperlipidemia   Essential hypertension   Edema      Admitting Diagnosis: Palpitations [R00.2]  New onset atrial fibrillation (HCC) [I48.91]  Low TSH level [R79.89]  New onset atrial flutter (HCC) [I48.92]  Age/Sex: 83 y.o. female      Admission Orders:  Scheduled Medications:  atorvastatin, 40 mg, Oral, Daily With Dinner  carvedilol, 12.5 mg, Oral, BID With Meals ( dose increased on 5/7- from 6.25mg)   levETIRAcetam, 250 mg, Oral, Daily  lisinopril, 40 mg, Oral, Daily      Continuous IV Infusions:     PRN Meds:  acetaminophen, 650 mg, Oral, Q6H PRN - 5/7 x 1 - 5/8 x 1   hydrALAZINE, 5 mg, Intravenous, Q6H PRN - 5/7 x 2             Network Utilization Review Department  ATTENTION: Please call with any questions or concerns to [phone] and carefully listen to the prompts so that you are directed to the right person. All voicemails are confidential.   For Discharge needs, contact Care Management DC Support Team at [phone] opt. 2  Send all requests for admission clinical reviews, approved or denied determinations and any other requests to dedicated fax number below belonging to the campus where the patient is receiving treatment. List of dedicated fax numbers for the  Facilities:  FACILITY NAME UR FAX NUMBER   ADMISSION DENIALS (Administrative/Medical Necessity) [phone]   DISCHARGE SUPPORT TEAM (NETWORK) [phone]   PARENT CHILD HEALTH (Maternity/NICU/Pediatrics) [phone]   [facility] [phone]   [facility] [phone]   [facility] [phone]   [facility] [phone]   [facility] [phone]   [facility] [phone]   [facility] [phone]   [facility] [phone]   [facility] [phone]   [facility] [phone]   [facility] [phone]   [facility] [phone]

## 2024-05-08 NOTE — PHYSICAL THERAPY NOTE
PHYSICAL THERAPY NOTE    Patient Name: Anusha Booth  Today's Date: 5/8/2024 05/08/24 3259   Note Type   Note type Screen   Cancel Reasons Other   Additional Comments Spoke to Marie from case management who spoke to pt --Pt reports that she would like to go home, and has been mobilizing here without A. Pt declined need for any IPPT consult prior to DC. Will screen from IPPT services      Corby Tsai, PT

## 2024-05-09 ENCOUNTER — TRANSITIONAL CARE MANAGEMENT (OUTPATIENT)
Dept: FAMILY MEDICINE CLINIC | Facility: CLINIC | Age: 84
End: 2024-05-09

## 2024-05-09 NOTE — UTILIZATION REVIEW
NOTIFICATION OF ADMISSION DISCHARGE   This is a Notification of Discharge from [facility]. Please be advised that this patient has been discharge from our facility. Below you will find the admission and discharge date and time including the patient’s disposition.   UTILIZATION REVIEW CONTACT:  Kate Bajwa  Utilization   Network Utilization Review Department  Phone: [phone] [phone] carefully listen to the prompts. All voicemails are confidential.  Email: [email or web address]     ADMISSION INFORMATION  PRESENTATION DATE: 5/7/2024  2:46 AM  OBERVATION ADMISSION DATE:   INPATIENT ADMISSION DATE: 5/7/24  5:26 AM   DISCHARGE DATE: 5/8/2024  2:25 PM   DISPOSITION:Home/Self Care    Network Utilization Review Department  ATTENTION: Please call with any questions or concerns to [phone] and carefully listen to the prompts so that you are directed to the right person. All voicemails are confidential.   For Discharge needs, contact Care Management DC Support Team at [phone] opt. 2  Send all requests for admission clinical reviews, approved or denied determinations and any other requests to dedicated fax number below belonging to the campus where the patient is receiving treatment. List of dedicated fax numbers for the Facilities:  FACILITY NAME UR FAX NUMBER   ADMISSION DENIALS (Administrative/Medical Necessity) [phone]   DISCHARGE SUPPORT TEAM ([facility]) [phone]   PARENT CHILD HEALTH (Maternity/NICU/Pediatrics) [phone]   [facility] [phone]   [facility] [phone]   [facility] [phone]   [facility] [phone]   [facility] [phone]   [facility] [phone]   [facility] [phone]   [facility] [phone]   [facility]  [facility] [phone]   [facility] [phone]   [facility] [phone]   [facility] [phone]

## 2024-05-14 ENCOUNTER — OFFICE VISIT (OUTPATIENT)
Dept: FAMILY MEDICINE CLINIC | Facility: CLINIC | Age: 84
End: 2024-05-14
Payer: COMMERCIAL

## 2024-05-14 VITALS
BODY MASS INDEX: 39.02 KG/M2 | HEIGHT: 65 IN | HEART RATE: 50 BPM | SYSTOLIC BLOOD PRESSURE: 124 MMHG | DIASTOLIC BLOOD PRESSURE: 66 MMHG | TEMPERATURE: 97.5 F | OXYGEN SATURATION: 96 % | WEIGHT: 234.2 LBS

## 2024-05-14 DIAGNOSIS — R79.89 LOW TSH LEVEL: Primary | ICD-10-CM

## 2024-05-14 DIAGNOSIS — I10 ESSENTIAL HYPERTENSION: ICD-10-CM

## 2024-05-14 DIAGNOSIS — R00.2 PALPITATIONS: ICD-10-CM

## 2024-05-14 PROCEDURE — 99496 TRANSJ CARE MGMT HIGH F2F 7D: CPT | Performed by: FAMILY MEDICINE

## 2024-05-15 RX ORDER — LISINOPRIL 40 MG/1
40 TABLET ORAL DAILY
Qty: 90 TABLET | Refills: 1 | Status: SHIPPED | OUTPATIENT
Start: 2024-05-15

## 2024-05-15 NOTE — PROGRESS NOTES
Transition of Care Visit  Name: Anusha Booth      : 1940      MRN: [id number]  Encounter Provider: Silke Gaspar DO  Encounter Date: 2024   Encounter department: [facility]    Assessment & Plan   1. Low TSH level  Comments:  Recheck sh in 6 weeks.  Orders:  -     TSH w/Reflex; Future  2. Palpitations  Comments:  await event recorder findings  3. Essential hypertension  Comments:  Continue current therapy         History of Present Illness     Transitional Care Management Review:   Anusha Booth is a 83 y.o. female here for TCM follow up.  She was hospitalized with uncontrolled hypertension and palpitations.  She ruled out for heart attack, currently has an event recorder on for another 2 weeks and blood pressure meds were adjusted due to uncontrolled hypertension.  Her TSH was found to be low and needs to have that rechecked.  She denies any chest pain shortness of breath or palpitations currently.  She understands that she has an episode of palpitations that lasts longer than a half an hour she is to go to the ER.    During the TCM phone call patient stated:  TCM Call       Date and time call was made  2024 11:10 AM    Hospital care reviewed  Discussed with Inpatient Physician    Patient was hospitialized at  [facility]    Date of Admission  24    Date of discharge  24    Diagnosis  Palpitations    Disposition  Home    Were the patients medications reviewed and updated  Yes    Current Symptoms  None          TCM Call       Post hospital issues  None    Should patient be enrolled in anticoag monitoring?  No    Scheduled for follow up?  Yes    Patients specialists  Cardiologist    Cardiologist name  Dr. Mark    Cardiologist contact #  [phone]    Referrals needed  none    Did you obtain your prescribed medications  Yes    Do you need help managing your prescriptions or medications  No    Is transportation to your appointment  "needed  No    I have advised the patient to call PCP with any new or worsening symptoms  Lynette Saravia MA          HPI  Review of Systems   Constitutional:  Negative for appetite change, chills and fever.   HENT:  Negative for ear pain, facial swelling, rhinorrhea, sinus pain, sore throat and trouble swallowing.    Eyes:  Negative for discharge and redness.   Respiratory:  Negative for chest tightness, shortness of breath and wheezing.    Cardiovascular:  Positive for palpitations. Negative for chest pain.   Gastrointestinal:  Negative for abdominal pain, diarrhea, nausea and vomiting.   Endocrine: Negative for polyuria.   Genitourinary:  Negative for dysuria and urgency.   Musculoskeletal:  Negative for arthralgias and back pain.   Skin:  Negative for rash.   Neurological:  Negative for dizziness, weakness and headaches.   Hematological:  Negative for adenopathy.   Psychiatric/Behavioral:  Negative for behavioral problems, confusion and sleep disturbance.    All other systems reviewed and are negative.    Objective     /66   Pulse (!) 50   Temp 97.5 °F (36.4 °C)   Ht 5' 5\" (1.651 m)   Wt 106 kg (234 lb 3.2 oz)   SpO2 96%   BMI 38.97 kg/m²     Physical Exam  Vitals and nursing note reviewed.   Constitutional:       General: She is not in acute distress.     Appearance: Normal appearance. She is not ill-appearing or diaphoretic.   HENT:      Head: Normocephalic and atraumatic.      Right Ear: Tympanic membrane, ear canal and external ear normal.      Left Ear: Tympanic membrane, ear canal and external ear normal.      Nose: Nose normal. No congestion or rhinorrhea.      Mouth/Throat:      Mouth: Mucous membranes are moist.      Pharynx: Oropharynx is clear. No posterior oropharyngeal erythema.   Eyes:      General:         Right eye: No discharge.         Left eye: No discharge.      Extraocular Movements: Extraocular movements intact.      Conjunctiva/sclera: Conjunctivae normal.      Pupils: Pupils " are equal, round, and reactive to light.   Neck:      Vascular: No carotid bruit.   Cardiovascular:      Rate and Rhythm: Normal rate and regular rhythm.      Pulses: Normal pulses.      Heart sounds: Normal heart sounds. No murmur heard.  Pulmonary:      Effort: Pulmonary effort is normal. No respiratory distress.      Breath sounds: Normal breath sounds. No wheezing or rhonchi.   Abdominal:      General: Abdomen is flat. Bowel sounds are normal. There is no distension.      Palpations: There is no mass.      Tenderness: There is no abdominal tenderness.   Musculoskeletal:         General: No swelling or deformity. Normal range of motion.      Cervical back: Normal range of motion and neck supple. No rigidity.      Right lower leg: No edema.      Left lower leg: No edema.   Lymphadenopathy:      Cervical: No cervical adenopathy.   Skin:     General: Skin is warm and dry.      Capillary Refill: Capillary refill takes less than 2 seconds.      Coloration: Skin is not jaundiced.      Findings: No bruising, erythema or rash.   Neurological:      General: No focal deficit present.      Mental Status: She is alert and oriented to person, place, and time.      Cranial Nerves: No cranial nerve deficit.      Sensory: No sensory deficit.      Gait: Gait normal.      Deep Tendon Reflexes: Reflexes normal.   Psychiatric:         Mood and Affect: Mood normal.         Behavior: Behavior normal.         Thought Content: Thought content normal.         Judgment: Judgment normal.             I have spent a total time of 20 minutes on 05/15/24 In caring for this patient including Diagnostic results, Impressions, and Counseling / Coordination of care.

## 2024-05-16 NOTE — PROGRESS NOTES
Transition of Care Visit  Name: Anusha Booth      : 1940      MRN: [id number]  Encounter Provider: Silke Gaspar DO  Encounter Date: 2024   Encounter department: [facility]    Assessment & Plan   1. Low TSH level  Comments:  Recheck sh in 6 weeks.  Orders:  -     TSH w/Reflex; Future  2. Palpitations  Comments:  await event recorder findings  3. Essential hypertension  Comments:  Continue current therapy  Orders:  -     lisinopril (ZESTRIL) 40 mg tablet; Take 1 tablet (40 mg total) by mouth daily         History of Present Illness     Transitional Care Management Review:   Anusha Booth is a 83 y.o. female here for TCM follow up.  She was hospitalized with uncontrolled hypertension and palpitations.  She ruled out for heart attack, currently has an event recorder on for another 2 weeks and blood pressure meds were adjusted due to uncontrolled hypertension.  Her TSH was found to be low and needs to have that rechecked.  She denies any chest pain shortness of breath or palpitations currently.  She understands that she has an episode of palpitations that lasts longer than a half an hour she is to go to the ER.    During the TCM phone call patient stated:  TCM Call       Date and time call was made  2024 11:10 AM    Hospital care reviewed  Discussed with Inpatient Physician    Patient was hospitialized at  [facility]    Date of Admission  24    Date of discharge  24    Diagnosis  Palpitations    Disposition  Home    Were the patients medications reviewed and updated  Yes    Current Symptoms  None          TCM Call       Post hospital issues  None    Should patient be enrolled in anticoag monitoring?  No    Scheduled for follow up?  Yes    Patients specialists  Cardiologist    Cardiologist name  Dr. Mark    Cardiologist contact #  [phone]    Referrals needed  none    Did you obtain your prescribed medications  Yes    Do you need help  "managing your prescriptions or medications  No    Is transportation to your appointment needed  No    I have advised the patient to call PCP with any new or worsening symptoms  Lynette Saravia MA          HPI  Review of Systems   Constitutional:  Negative for appetite change, chills and fever.   HENT:  Negative for ear pain, facial swelling, rhinorrhea, sinus pain, sore throat and trouble swallowing.    Eyes:  Negative for discharge and redness.   Respiratory:  Negative for chest tightness, shortness of breath and wheezing.    Cardiovascular:  Positive for palpitations. Negative for chest pain.   Gastrointestinal:  Negative for abdominal pain, diarrhea, nausea and vomiting.   Endocrine: Negative for polyuria.   Genitourinary:  Negative for dysuria and urgency.   Musculoskeletal:  Negative for arthralgias and back pain.   Skin:  Negative for rash.   Neurological:  Negative for dizziness, weakness and headaches.   Hematological:  Negative for adenopathy.   Psychiatric/Behavioral:  Negative for behavioral problems, confusion and sleep disturbance.    All other systems reviewed and are negative.    Objective     /66   Pulse (!) 50   Temp 97.5 °F (36.4 °C)   Ht 5' 5\" (1.651 m)   Wt 106 kg (234 lb 3.2 oz)   SpO2 96%   BMI 38.97 kg/m²     Physical Exam  Vitals and nursing note reviewed.   Constitutional:       General: She is not in acute distress.     Appearance: Normal appearance. She is not ill-appearing or diaphoretic.   HENT:      Head: Normocephalic and atraumatic.      Right Ear: Tympanic membrane, ear canal and external ear normal.      Left Ear: Tympanic membrane, ear canal and external ear normal.      Nose: Nose normal. No congestion or rhinorrhea.      Mouth/Throat:      Mouth: Mucous membranes are moist.      Pharynx: Oropharynx is clear. No posterior oropharyngeal erythema.   Eyes:      General:         Right eye: No discharge.         Left eye: No discharge.      Extraocular Movements: " Extraocular movements intact.      Conjunctiva/sclera: Conjunctivae normal.      Pupils: Pupils are equal, round, and reactive to light.   Neck:      Vascular: No carotid bruit.   Cardiovascular:      Rate and Rhythm: Normal rate and regular rhythm.      Pulses: Normal pulses.      Heart sounds: Normal heart sounds. No murmur heard.  Pulmonary:      Effort: Pulmonary effort is normal. No respiratory distress.      Breath sounds: Normal breath sounds. No wheezing or rhonchi.   Abdominal:      General: Abdomen is flat. Bowel sounds are normal. There is no distension.      Palpations: There is no mass.      Tenderness: There is no abdominal tenderness.   Musculoskeletal:         General: No swelling or deformity. Normal range of motion.      Cervical back: Normal range of motion and neck supple. No rigidity.      Right lower leg: No edema.      Left lower leg: No edema.   Lymphadenopathy:      Cervical: No cervical adenopathy.   Skin:     General: Skin is warm and dry.      Capillary Refill: Capillary refill takes less than 2 seconds.      Coloration: Skin is not jaundiced.      Findings: No bruising, erythema or rash.   Neurological:      General: No focal deficit present.      Mental Status: She is alert and oriented to person, place, and time.      Cranial Nerves: No cranial nerve deficit.      Sensory: No sensory deficit.      Gait: Gait normal.      Deep Tendon Reflexes: Reflexes normal.   Psychiatric:         Mood and Affect: Mood normal.         Behavior: Behavior normal.         Thought Content: Thought content normal.         Judgment: Judgment normal.             I have spent a total time of 20 minutes on 05/16/24 In caring for this patient including Diagnostic results, Impressions, and Counseling / Coordination of care.

## 2024-05-29 DIAGNOSIS — I10 ESSENTIAL HYPERTENSION: ICD-10-CM

## 2024-05-29 DIAGNOSIS — R60.0 BILATERAL LEG EDEMA: ICD-10-CM

## 2024-05-29 RX ORDER — FUROSEMIDE 20 MG/1
20 TABLET ORAL DAILY PRN
Qty: 10 TABLET | Refills: 0 | Status: SHIPPED | OUTPATIENT
Start: 2024-05-29

## 2024-05-29 RX ORDER — AMLODIPINE BESYLATE 2.5 MG/1
2.5 TABLET ORAL DAILY
Qty: 30 TABLET | Refills: 0 | Status: SHIPPED | OUTPATIENT
Start: 2024-05-29

## 2024-05-29 NOTE — TELEPHONE ENCOUNTER
Anusha called in to get refill on medication that was prescribed in hospital she stated that she only has enough for 5 daysMedication: furosemide (LASIX) 20 mg tablet     Dose/Frequency: 1 tablet     Quantity: 30    Pharmacy: [id number] - [place] [address]     Office:   [x] PCP/Provider -   [] Speciality/Provider -     Does the patient have enough for 3 days?   [] Yes   [x] No - Send as HP to POD  . She wouldn't have anything before her appointment on 6/28      Medication: amLODIPine (NORVASC) 2.5 mg tablet     Dose/Frequency:     Take 1 tablet (2.5 mg total) by mouth daily       Quantity: 30    Pharmacy: [id number] [place] [address]      Office:   [x] PCP/Provider -  Dr Gaspar   [] Speciality/Provider -     Does the patient have enough for 3 days?   [x] Yes   [] No - Send as HP to POD

## 2024-05-30 DIAGNOSIS — R00.2 PALPITATIONS: ICD-10-CM

## 2024-06-04 ENCOUNTER — OFFICE VISIT (OUTPATIENT)
Dept: CARDIOLOGY CLINIC | Facility: CLINIC | Age: 84
End: 2024-06-04
Payer: COMMERCIAL

## 2024-06-04 VITALS
BODY MASS INDEX: 38.82 KG/M2 | TEMPERATURE: 99.2 F | DIASTOLIC BLOOD PRESSURE: 80 MMHG | SYSTOLIC BLOOD PRESSURE: 160 MMHG | WEIGHT: 233 LBS | HEART RATE: 54 BPM | OXYGEN SATURATION: 98 % | HEIGHT: 65 IN

## 2024-06-04 DIAGNOSIS — R00.2 PALPITATIONS: ICD-10-CM

## 2024-06-04 DIAGNOSIS — I47.10 PSVT (PAROXYSMAL SUPRAVENTRICULAR TACHYCARDIA): Primary | ICD-10-CM

## 2024-06-04 DIAGNOSIS — I73.9 PERIPHERAL VASCULAR DISEASE, UNSPECIFIED (HCC): ICD-10-CM

## 2024-06-04 DIAGNOSIS — E66.01 SEVERE OBESITY (BMI 35.0-39.9) WITH COMORBIDITY (HCC): ICD-10-CM

## 2024-06-04 DIAGNOSIS — I10 ESSENTIAL HYPERTENSION: ICD-10-CM

## 2024-06-04 PROCEDURE — 99214 OFFICE O/P EST MOD 30 MIN: CPT | Performed by: INTERNAL MEDICINE

## 2024-06-04 NOTE — PROGRESS NOTES
Cardiology Consultation     Anusha PAZ Kleinrolando  [id number]  1940  HEART & VASCULAR [facility]  [address]  [facility] [address]      1. PSVT (paroxysmal supraventricular tachycardia)        2. Peripheral vascular disease, unspecified (HCC)        3. Severe obesity (BMI 35.0-39.9) with comorbidity (HCC)        4. Essential hypertension        5. Palpitations            Discussion/Summary:      PSVT: No clear afib. No anticoagulation at this time. HR on the lower side, but tolerating the higher dose of Coreg 12.5 twice daily. I would be comfortable continuing her with this. If there is a lot more episodes in the future, consider a different type of monitoring.    HTN: BP is better at home. Continue amlodipine (with room to increase if needed), lisinopril 40mg, coreg 12.5 twice a day.    HL: Controlled with atorvastatin. Last LDL was 74.      History of Present Illness:  I have seen Anusha in the past for hypertension, palpitations.    She was last seen by me in January 2020.    Around that time, she had a Holter monitor which was pretty unremarkable. She does have a history of PACs. She has complained of some more sustained palpitations in the past.    Despite her relative bradycardia which has been a longstanding diagnosis, she has been maintained on Coreg in addition to 40 mg of lisinopril, which generally tends to control her blood pressure recently well though it is a little sporadic.    She has Lasix to take as needed and she takes this maybe once or twice a month.    Interval History:  When I last saw her in the office, she had complained of an episode of palpitations which lasted about 45 minutes. She was feeling better at the time but we did a monitor and there was no major arrhythmia.    She then was admitted to the hospital in May after another episode of palpitations which lasted about 30 minutes. EMS seem to report that  she was in SVT. There was initially question whether this was A-fib, but she performed vagal maneuvers like she normally does and then the symptoms improved. By the time she was at the hospital, she was back in sinus rhythm.    Her blood pressure was quite high. We increased her Coreg. She had a Zio patch done as an outpatient which did not show any A-fib, but showed brief episodes of SVT though she was not symptomatic with it (the longest was less than 9 seconds).    Patient Active Problem List   Diagnosis    Allergic rhinitis    Bulging of lumbar intervertebral disc    Chronic osteoarthritis    Edema    Epilepsy (HCC)    Hyperlipidemia    Essential hypertension    Seizure (HCC)    Severe obesity (BMI 35.0-39.9) with comorbidity ([facility])    Leukopenia    Thrombocytopenia ([facility])    Trigger finger, right ring finger    Primary osteoarthritis of both hips    Peripheral vascular disease, unspecified ([facility])    Pain in left hip    Stress fracture of left hip    Palpitations     Past Medical History:   Diagnosis Date    Acute venous embolism and thrombosis of deep vessels of distal lower extremity ([facility]) 01/27/2014    Blood type A+     Closed fracture of right distal radius and ulna 03/06/2019    Convulsions ([facility])     Deviated septum 05/2000    Fracture of c2     Gallstones 09/15/2019    Hiatal hernia     1976    HL (hearing loss)     Hyperlipidemia     Osteoarthritis, localized, knee     Seizure ([facility])     1990    Seizures ([facility])     Spondylosis     Tinnitus      Social History     Tobacco Use    Smoking status: Former     Passive exposure: Past    Smokeless tobacco: Never   Vaping Use    Vaping status: Never Used   Substance Use Topics    Alcohol use: Yes     Comment: social use    Drug use: No      Family History   Problem Relation Age of Onset    Prostate cancer Father     Other Family         back problem    Arthritis Family     Arthritis Mother     No Known Problems Sister     No Known Problems Daughter     No Known  Problems Sister     No Known Problems Paternal Aunt     No Known Problems Paternal Aunt     No Known Problems Paternal Aunt     No Known Problems Paternal Aunt     Prostate cancer Brother      Past Surgical History:   Procedure Laterality Date    BREAST BIOPSY Right over 10 years    benign    CATARACT EXTRACTION      COMBINED HYSTEROSCOPY DIAGNOSTIC / D&C      ESOPHAGOGASTRODUODENOSCOPY      diagnostic    HAND SURGERY      HERNIA REPAIR      HYSTERECTOMY      KNEE ARTHROPLASTY      Revision;total    KNEE ARTHROSCOPY Right     1997    NASAL SEPTUM SURGERY      Deviation repair    ME TENDON SHEATH INCISION Right 11/21/2019    Procedure: RING TRIGGER FINGER RELEASE;  Surgeon: Victorino Ventura MD;  Location: [facility]  MAIN OR;  Service: Orthopedics    REPLACEMENT TOTAL KNEE Right     2000    TONSILLECTOMY      1948    TUBAL LIGATION      TUMOR REMOVAL Left 1956    left index finger       Current Outpatient Medications:     amLODIPine (NORVASC) 2.5 mg tablet, Take 1 tablet (2.5 mg total) by mouth daily, Disp: 30 tablet, Rfl: 0    Ascorbic Acid (vitamin C) 1000 MG tablet, Take 1,000 mg by mouth daily, Disp: , Rfl:     aspirin (ECOTRIN LOW STRENGTH) 81 mg EC tablet, Take by mouth daily , Disp: , Rfl:     atorvastatin (LIPITOR) 40 mg tablet, TAKE 1 TABLET DAILY WITH DINNER, Disp: 90 tablet, Rfl: 1    Calcium Carb-Cholecalciferol (CALCIUM 1000 + D PO), Take 1 tablet by mouth daily, Disp: , Rfl:     carvedilol (COREG) 12.5 mg tablet, Take 1 tablet (12.5 mg total) by mouth 2 (two) times a day with meals, Disp: 60 tablet, Rfl: 0    cholecalciferol (VITAMIN D3) 1,000 units tablet, Take 1,000 Units by mouth daily, Disp: , Rfl:     furosemide (LASIX) 20 mg tablet, Take 1 tablet (20 mg total) by mouth daily as needed (for leg swelling. Contact your PCP for further refills), Disp: 10 tablet, Rfl: 0    levETIRAcetam (KEPPRA) 250 mg tablet, 1.5 tablets twice per day., Disp: 270 tablet, Rfl: 0    lisinopril (ZESTRIL) 40 mg tablet, Take 1  "tablet (40 mg total) by mouth daily, Disp: 90 tablet, Rfl: 1    Multiple Vitamin (MULTIVITAMINS PO), Take 1 capsule by mouth daily, Disp: , Rfl:     Zinc 100 MG TABS, Take by mouth, Disp: , Rfl:     EPINEPHrine (EPIPEN) 0.3 mg/0.3 mL SOAJ, Inject 0.3 mL (0.3 mg total) into a muscle once for 1 dose, Disp: 0.6 mL, Rfl: 0  Allergies   Allergen Reactions    Adhesive  [Medical Tape]      Other reaction(s): Unknown Reaction    Celebrex [Celecoxib] Itching and GI Intolerance     Voice change    Latex      Other reaction(s): Unknown Reaction    Midazolam     Penicillins     Thiopental        Vitals:    06/04/24 1132   BP: 160/80   BP Location: Left arm   Patient Position: Sitting   Cuff Size: Large   Pulse: (!) 54   Temp: 99.2 °F (37.3 °C)   SpO2: 98%   Weight: 106 kg (233 lb)   Height: 5' 5\" (1.651 m)     Vitals:    06/04/24 1132   Weight: 106 kg (233 lb)      Height: 5' 5\" (165.1 cm)   Body mass index is 38.77 kg/m².    Physical Exam:  GEN: Anusha Booth appears well, alert and oriented x 3, pleasant and cooperative   HEENT: pupils equal, round, and reactive to light; extraocular muscles intact  NECK: supple, no carotid bruits   HEART: regular rhythm, normal S1 and S2, no murmurs, clicks, gallops or rubs   LUNGS: clear to auscultation bilaterally; no wheezes, rales, or rhonchi   ABDOMEN: normal bowel sounds, soft, no tenderness, no distention  EXTREMITIES: peripheral pulses normal; no clubbing, cyanosis, or edema  NEURO: no focal findings   SKIN: normal without suspicious lesions on exposed skin    ROS:  Positive for joint pains, depression, palpitations.  Except as noted in HPI, is otherwise reviewed in detail and a 12 point review of systems is negative.  ROS reviewed and is unchanged    Labs:  Lab Results   Component Value Date     11/05/2014    K 3.6 05/08/2024     05/08/2024    CREATININE 0.58 (L) 05/08/2024    BUN 12 05/08/2024    CO2 28 05/08/2024    ALT 13 05/07/2024    AST 19 05/07/2024    RADHA " 105 (H) 01/04/2024    HGBA1C 5.1 08/08/2019    WBC 5.37 05/08/2024    HGB 13.2 05/08/2024    HCT 41.0 05/08/2024     05/08/2024       Lab Results   Component Value Date    CHOL 243 (H) 01/09/2017    CHOL 190 11/05/2014     Lab Results   Component Value Date    HDL 45 (L) 01/04/2024    HDL 50 05/11/2023    HDL 49 (L) 09/01/2022     Lab Results   Component Value Date    LDLCALC 74 01/04/2024    LDLCALC 74 05/11/2023    LDLCALC 80 09/01/2022     Lab Results   Component Value Date    TRIG 103 01/04/2024    TRIG 133 05/11/2023    TRIG 110 09/01/2022       Testing:  o 5/30/24  Patient had a min HR of 40 bpm, max HR of 182 bpm, and avg HR of 56 bpm. Predominant underlying rhythm was Sinus Rhythm. 51 Supraventricular Tachycardia runs occurred, the run with the fastest interval lasting 5 beats with a max rate of 182 bpm, the longest lasting 8.7 secs with an avg rate of 147 bpm. Supraventricular Tachycardia was detected within +/- 45 seconds of symptomatic patient event(s). Isolated SVEs were rare (<1.0%), SVE Couplets were rare (<1.0%), and SVE Triplets were rare (<1.0%). Isolated VEs were rare (<1.0%, 463), VE Couplets were rare (<1.0%, 5), and VE Triplets were rare (<1.0%, 1)    Echo 5/7/24:  Left Ventricle: Left ventricular cavity size is normal. Wall thickness is mildly increased. The left ventricular ejection fraction is 60%. Systolic function is normal. Wall motion is normal. Diastolic function is mildly abnormal, consistent with grade I (abnormal) relaxation.    Mitral Valve: There is mild regurgitation.    Tricuspid Valve: There is mild regurgitation.    Holter   1.  Sinus mechanism throughout with rare PAC's and PVC's.  Few short bursts of atrial tachycardia.     Echo 8/2019:  LEFT VENTRICLE:  Systolic function was normal. Ejection fraction was estimated to be 60 %.  There were no regional wall motion abnormalities.     RIGHT VENTRICLE:  The size was normal.  Systolic function was normal.     MITRAL  VALVE:  There was trace regurgitation.     TRICUSPID VALVE:  There was trace regurgitation.  Estimated peak PA pressure was 30 mmHg.     PULMONIC VALVE:  There was trace regurgitation.

## 2024-06-24 ENCOUNTER — APPOINTMENT (OUTPATIENT)
Dept: LAB | Facility: CLINIC | Age: 84
End: 2024-06-24
Payer: COMMERCIAL

## 2024-06-24 ENCOUNTER — RA CDI HCC (OUTPATIENT)
Dept: OTHER | Facility: HOSPITAL | Age: 84
End: 2024-06-24

## 2024-06-24 DIAGNOSIS — E78.5 HYPERLIPIDEMIA, UNSPECIFIED HYPERLIPIDEMIA TYPE: ICD-10-CM

## 2024-06-24 DIAGNOSIS — R79.89 LOW TSH LEVEL: ICD-10-CM

## 2024-06-24 LAB
CHOLEST SERPL-MCNC: 143 MG/DL
HDLC SERPL-MCNC: 50 MG/DL
LDLC SERPL CALC-MCNC: 69 MG/DL (ref 0–100)
NONHDLC SERPL-MCNC: 93 MG/DL
T4 FREE SERPL-MCNC: 0.9 NG/DL (ref 0.61–1.12)
TRIGL SERPL-MCNC: 119 MG/DL
TSH SERPL DL<=0.05 MIU/L-ACNC: 0.14 UIU/ML (ref 0.45–4.5)

## 2024-06-24 PROCEDURE — 36415 COLL VENOUS BLD VENIPUNCTURE: CPT

## 2024-06-24 PROCEDURE — 84439 ASSAY OF FREE THYROXINE: CPT

## 2024-06-24 PROCEDURE — 80061 LIPID PANEL: CPT

## 2024-06-24 PROCEDURE — 84443 ASSAY THYROID STIM HORMONE: CPT

## 2024-06-25 ENCOUNTER — TELEPHONE (OUTPATIENT)
Dept: FAMILY MEDICINE CLINIC | Facility: CLINIC | Age: 84
End: 2024-06-25

## 2024-06-25 NOTE — TELEPHONE ENCOUNTER
----- Message from Silke Gaspar DO sent at 6/25/2024  5:43 AM EDT -----  Her tsh is .145 which is now in hyperthyroidism range; its lower than last month.  I would like to do an E consult which she needs asking her rheumatologist what the next step should be without the patient actually having to go manage and be examined.  We need to get her permission to allow them to review her labs and make recommendations.

## 2024-06-28 ENCOUNTER — TELEPHONE (OUTPATIENT)
Dept: FAMILY MEDICINE CLINIC | Facility: CLINIC | Age: 84
End: 2024-06-28

## 2024-07-05 ENCOUNTER — TELEPHONE (OUTPATIENT)
Age: 84
End: 2024-07-05

## 2024-07-05 DIAGNOSIS — I10 ESSENTIAL HYPERTENSION: ICD-10-CM

## 2024-07-05 RX ORDER — AMLODIPINE BESYLATE 2.5 MG/1
2.5 TABLET ORAL DAILY
Qty: 30 TABLET | Refills: 0 | Status: SHIPPED | OUTPATIENT
Start: 2024-07-05

## 2024-07-05 NOTE — TELEPHONE ENCOUNTER
It looks like the amlodipine was since recent hospital stay - yes stay on this. Rx sent.     Dr gaspar had wanted to do econsult see message below. Pt never gave permission - is it ok for us to do?      Silke Gaspar, DO  6/25/2024  5:43 AM EDT       Her tsh is .145 which is now in hyperthyroidism range; its lower than last month.  I would like to do an E consult which she needs asking her rheumatologist what the next step should be without the patient actually having to go manage and be examined.  We need to get her permission to allow them to review her labs and make recommendations.

## 2024-07-05 NOTE — TELEPHONE ENCOUNTER
Patient called and wanted to know since she is taking Amlodipine on a trial basis ,patient wanted to know should she continue the medication ,patient stated the only side affect she is having is mild dizziness at times.Patient also stated she is concerned about the abnormal TSH level.Patient is scheduled for a AWV 7/30. Patient will need a refill on the Amlodipine if she continues to take the medication.

## 2024-07-16 ENCOUNTER — TELEPHONE (OUTPATIENT)
Dept: FAMILY MEDICINE CLINIC | Facility: CLINIC | Age: 84
End: 2024-07-16

## 2024-07-16 ENCOUNTER — TELEMEDICINE (OUTPATIENT)
Dept: FAMILY MEDICINE CLINIC | Facility: CLINIC | Age: 84
End: 2024-07-16

## 2024-07-16 DIAGNOSIS — R79.89 LOW TSH LEVEL: Primary | ICD-10-CM

## 2024-07-16 PROCEDURE — RECHECK: Performed by: FAMILY MEDICINE

## 2024-07-18 DIAGNOSIS — G40.909 NONINTRACTABLE EPILEPSY WITHOUT STATUS EPILEPTICUS, UNSPECIFIED EPILEPSY TYPE (HCC): ICD-10-CM

## 2024-07-18 NOTE — PROGRESS NOTES
Virtual Brief Visit  Name: Anusha Booth      : 1940      MRN: [id number]  Encounter Provider: Silke Gaspar DO  Encounter Date: 2024   Encounter department: [facility]    This Visit is being completed by telephone. The Patient is located at Home and in the following state in which I hold an active license [place]    The patient was identified by name and date of birth. Anusha Booth was informed that this is a telemedicine visit and that the visit is being conducted through the Epic Embedded platform. She agrees to proceed..  My office door was closed. No one else was in the room.  She acknowledged consent and understanding of privacy and security of the video platform. The patient has agreed to participate and understands they can discontinue the visit at any time.  Discussed low tsh with the patient.  She denies any symptoms of hyperthyroidism such as unexplained weight loss, diarrhea, insomnia or palpitations.  She does complain of fatigue which does not fit him with her low TSH.  The patient needs an endocrinology consult.  She is in agreement with any consult knowing that she may need to be seen in person at some point in time.  Patient is aware this is a billable service.     Assessment & Plan   1. Low TSH level  -     AMB E-CONSULT TO ENDOCRINOLOGY         History of Present Illness   HPI    Visit Time  Total Visit Duration: 20

## 2024-07-23 RX ORDER — LEVETIRACETAM 250 MG/1
TABLET ORAL
Qty: 270 TABLET | Refills: 3 | Status: SHIPPED | OUTPATIENT
Start: 2024-07-23

## 2024-07-30 ENCOUNTER — OFFICE VISIT (OUTPATIENT)
Dept: FAMILY MEDICINE CLINIC | Facility: CLINIC | Age: 84
End: 2024-07-30

## 2024-07-30 VITALS
BODY MASS INDEX: 38.55 KG/M2 | DIASTOLIC BLOOD PRESSURE: 80 MMHG | OXYGEN SATURATION: 96 % | HEART RATE: 63 BPM | HEIGHT: 65 IN | WEIGHT: 231.4 LBS | SYSTOLIC BLOOD PRESSURE: 138 MMHG | TEMPERATURE: 97.4 F

## 2024-07-30 DIAGNOSIS — I10 ESSENTIAL HYPERTENSION: ICD-10-CM

## 2024-07-30 DIAGNOSIS — D49.9 NEOPLASM: ICD-10-CM

## 2024-07-30 DIAGNOSIS — Z00.00 MEDICARE ANNUAL WELLNESS VISIT, SUBSEQUENT: Primary | ICD-10-CM

## 2024-07-30 DIAGNOSIS — R79.89 LOW TSH LEVEL: ICD-10-CM

## 2024-07-30 DIAGNOSIS — E78.5 HYPERLIPIDEMIA, UNSPECIFIED HYPERLIPIDEMIA TYPE: ICD-10-CM

## 2024-07-30 RX ORDER — AMLODIPINE BESYLATE 2.5 MG/1
2.5 TABLET ORAL DAILY
Qty: 90 TABLET | Refills: 3 | Status: SHIPPED | OUTPATIENT
Start: 2024-07-30 | End: 2024-10-28

## 2024-07-30 NOTE — PATIENT INSTRUCTIONS
Medicare Preventive Visit Patient Instructions  Thank you for completing your Welcome to Medicare Visit or Medicare Annual Wellness Visit today. Your next wellness visit will be due in one year (7/31/2025).  The screening/preventive services that you may require over the next 5-10 years are detailed below. Some tests may not apply to you based off risk factors and/or age. Screening tests ordered at today's visit but not completed yet may show as past due. Also, please note that scanned in results may not display below.  Preventive Screenings:  Service Recommendations Previous Testing/Comments   Colorectal Cancer Screening  * Colonoscopy    * Fecal Occult Blood Test (FOBT)/Fecal Immunochemical Test (FIT)  * Fecal DNA/Cologuard Test  * Flexible Sigmoidoscopy Age: 45-75 years old   Colonoscopy: every 10 years (may be performed more frequently if at higher risk)  OR  FOBT/FIT: every 1 year  OR  Cologuard: every 3 years  OR  Sigmoidoscopy: every 5 years  Screening may be recommended earlier than age 45 if at higher risk for colorectal cancer. Also, an individualized decision between you and your healthcare provider will decide whether screening between the ages of 76-85 would be appropriate. Colonoscopy: 09/19/2016  FOBT/FIT: Not on file  Cologuard: 08/18/2021  Sigmoidoscopy: Not on file          Breast Cancer Screening Age: 40+ years old  Frequency: every 1-2 years  Not required if history of left and right mastectomy Mammogram: 12/19/2023        Cervical Cancer Screening Between the ages of 21-29, pap smear recommended once every 3 years.   Between the ages of 30-65, can perform pap smear with HPV co-testing every 5 years.   Recommendations may differ for women with a history of total hysterectomy, cervical cancer, or abnormal pap smears in past. Pap Smear: Not on file        Hepatitis C Screening Once for adults born between 1945 and 1965  More frequently in patients at high risk for Hepatitis C Hep C Antibody: Not on  file        Diabetes Screening 1-2 times per year if you're at risk for diabetes or have pre-diabetes Fasting glucose: 105 mg/dL (1/4/2024)  A1C: 5.1 % (8/8/2019)      Cholesterol Screening Once every 5 years if you don't have a lipid disorder. May order more often based on risk factors. Lipid panel: 06/24/2024          Other Preventive Screenings Covered by Medicare:  Abdominal Aortic Aneurysm (AAA) Screening: covered once if your at risk. You're considered to be at risk if you have a family history of AAA.  Lung Cancer Screening: covers low dose CT scan once per year if you meet all of the following conditions: (1) Age 55-77; (2) No signs or symptoms of lung cancer; (3) Current smoker or have quit smoking within the last 15 years; (4) You have a tobacco smoking history of at least 20 pack years (packs per day multiplied by number of years you smoked); (5) You get a written order from a healthcare provider.  Glaucoma Screening: covered annually if you're considered high risk: (1) You have diabetes OR (2) Family history of glaucoma OR (3)  aged 50 and older OR (4)  American aged 65 and older  Osteoporosis Screening: covered every 2 years if you meet one of the following conditions: (1) You're estrogen deficient and at risk for osteoporosis based off medical history and other findings; (2) Have a vertebral abnormality; (3) On glucocorticoid therapy for more than 3 months; (4) Have primary hyperparathyroidism; (5) On osteoporosis medications and need to assess response to drug therapy.   Last bone density test (DXA Scan): 12/14/2022.  HIV Screening: covered annually if you're between the age of 15-65. Also covered annually if you are younger than 15 and older than 65 with risk factors for HIV infection. For pregnant patients, it is covered up to 3 times per pregnancy.    Immunizations:  Immunization Recommendations   Influenza Vaccine Annual influenza vaccination during flu season is  recommended for all persons aged >= 6 months who do not have contraindications   Pneumococcal Vaccine   * Pneumococcal conjugate vaccine = PCV13 (Prevnar 13), PCV15 (Vaxneuvance), PCV20 (Prevnar 20)  * Pneumococcal polysaccharide vaccine = PPSV23 (Pneumovax) Adults 19-63 yo with certain risk factors or if 65+ yo  If never received any pneumonia vaccine: recommend Prevnar 20 (PCV20)  Give PCV20 if previously received 1 dose of PCV13 or PPSV23   Hepatitis B Vaccine 3 dose series if at intermediate or high risk (ex: diabetes, end stage renal disease, liver disease)   Respiratory syncytial virus (RSV) Vaccine - COVERED BY MEDICARE PART D  * RSVPreF3 (Arexvy) CDC recommends that adults 60 years of age and older may receive a single dose of RSV vaccine using shared clinical decision-making (SCDM)   Tetanus (Td) Vaccine - COST NOT COVERED BY MEDICARE PART B Following completion of primary series, a booster dose should be given every 10 years to maintain immunity against tetanus. Td may also be given as tetanus wound prophylaxis.   Tdap Vaccine - COST NOT COVERED BY MEDICARE PART B Recommended at least once for all adults. For pregnant patients, recommended with each pregnancy.   Shingles Vaccine (Shingrix) - COST NOT COVERED BY MEDICARE PART B  2 shot series recommended in those 19 years and older who have or will have weakened immune systems or those 50 years and older     Health Maintenance Due:      Topic Date Due   • Colorectal Cancer Screening  08/19/2024   • Breast Cancer Screening: Mammogram  12/19/2024   • DXA SCAN  12/14/2025     Immunizations Due:      Topic Date Due   • COVID-19 Vaccine (3 - 2023-24 season) 09/01/2023   • Influenza Vaccine (1) 09/01/2024     Advance Directives   What are advance directives?  Advance directives are legal documents that state your wishes and plans for medical care. These plans are made ahead of time in case you lose your ability to make decisions for yourself. Advance directives  can apply to any medical decision, such as the treatments you want, and if you want to donate organs.   What are the types of advance directives?  There are many types of advance directives, and each state has rules about how to use them. You may choose a combination of any of the following:  Living will:  This is a written record of the treatment you want. You can also choose which treatments you do not want, which to limit, and which to stop at a certain time. This includes surgery, medicine, IV fluid, and tube feedings.   Durable power of  for healthcare (DPAHC):  This is a written record that states who you want to make healthcare choices for you when you are unable to make them for yourself. This person, called a proxy, is usually a family member or a friend. You may choose more than 1 proxy.  Do not resuscitate (DNR) order:  A DNR order is used in case your heart stops beating or you stop breathing. It is a request not to have certain forms of treatment, such as CPR. A DNR order may be included in other types of advance directives.  Medical directive:  This covers the care that you want if you are in a coma, near death, or unable to make decisions for yourself. You can list the treatments you want for each condition. Treatment may include pain medicine, surgery, blood transfusions, dialysis, IV or tube feedings, and a ventilator (breathing machine).  Values history:  This document has questions about your views, beliefs, and how you feel and think about life. This information can help others choose the care that you would choose.  Why are advance directives important?  An advance directive helps you control your care. Although spoken wishes may be used, it is better to have your wishes written down. Spoken wishes can be misunderstood, or not followed. Treatments may be given even if you do not want them. An advance directive may make it easier for your family to make difficult choices about your care.    Urinary Incontinence   Urinary incontinence (UI)  is when you lose control of your bladder. UI develops because your bladder cannot store or empty urine properly. The 3 most common types of UI are stress incontinence, urge incontinence, or both.  Medicines:   May be given to help strengthen your bladder control. Report any side effects of medication to your healthcare provider.  Do pelvic muscle exercises often:  Your pelvic muscles help you stop urinating. Squeeze these muscles tight for 5 seconds, then relax for 5 seconds. Gradually work up to squeezing for 10 seconds. Do 3 sets of 15 repetitions a day, or as directed. This will help strengthen your pelvic muscles and improve bladder control.  Train your bladder:  Go to the bathroom at set times, such as every 2 hours, even if you do not feel the urge to go. You can also try to hold your urine when you feel the urge to go. For example, hold your urine for 5 minutes when you feel the urge to go. As that becomes easier, hold your urine for 10 minutes.   Self-care:   Keep a UI record.  Write down how often you leak urine and how much you leak. Make a note of what you were doing when you leaked urine.  Drink liquids as directed. You may need to limit the amount of liquid you drink to help control your urine leakage. Do not drink any liquid right before you go to bed. Limit or do not have drinks that contain caffeine or alcohol.   Prevent constipation.  Eat a variety of high-fiber foods. Good examples are high-fiber cereals, beans, vegetables, and whole-grain breads. Walking is the best way to trigger your intestines to have a bowel movement.  Exercise regularly and maintain a healthy weight.  Weight loss and exercise will decrease pressure on your bladder and help you control your leakage.   Use a catheter as directed  to help empty your bladder. A catheter is a tiny, plastic tube that is put into your bladder to drain your urine.   Go to behavior therapy as  directed.  Behavior therapy may be used to help you learn to control your urge to urinate.    Weight Management   Why it is important to manage your weight:  Being overweight increases your risk of health conditions such as heart disease, high blood pressure, type 2 diabetes, and certain types of cancer. It can also increase your risk for osteoarthritis, sleep apnea, and other respiratory problems. Aim for a slow, steady weight loss. Even a small amount of weight loss can lower your risk of health problems.  How to lose weight safely:  A safe and healthy way to lose weight is to eat fewer calories and get regular exercise. You can lose up about 1 pound a week by decreasing the number of calories you eat by 500 calories each day.   Healthy meal plan for weight management:  A healthy meal plan includes a variety of foods, contains fewer calories, and helps you stay healthy. A healthy meal plan includes the following:  Eat whole-grain foods more often.  A healthy meal plan should contain fiber. Fiber is the part of grains, fruits, and vegetables that is not broken down by your body. Whole-grain foods are healthy and provide extra fiber in your diet. Some examples of whole-grain foods are whole-wheat breads and pastas, oatmeal, brown rice, and bulgur.  Eat a variety of vegetables every day.  Include dark, leafy greens such as spinach, kale, donte greens, and mustard greens. Eat yellow and orange vegetables such as carrots, sweet potatoes, and winter squash.   Eat a variety of fruits every day.  Choose fresh or canned fruit (canned in its own juice or light syrup) instead of juice. Fruit juice has very little or no fiber.  Eat low-fat dairy foods.  Drink fat-free (skim) milk or 1% milk. Eat fat-free yogurt and low-fat cottage cheese. Try low-fat cheeses such as mozzarella and other reduced-fat cheeses.  Choose meat and other protein foods that are low in fat.  Choose beans or other legumes such as split peas or  lentils. Choose fish, skinless poultry (chicken or turkey), or lean cuts of red meat (beef or pork). Before you cook meat or poultry, cut off any visible fat.   Use less fat and oil.  Try baking foods instead of frying them. Add less fat, such as margarine, sour cream, regular salad dressing and mayonnaise to foods. Eat fewer high-fat foods. Some examples of high-fat foods include french fries, doughnuts, ice cream, and cakes.  Eat fewer sweets.  Limit foods and drinks that are high in sugar. This includes candy, cookies, regular soda, and sweetened drinks.  Exercise:  Exercise at least 30 minutes per day on most days of the week. Some examples of exercise include walking, biking, dancing, and swimming. You can also fit in more physical activity by taking the stairs instead of the elevator or parking farther away from stores. Ask your healthcare provider about the best exercise plan for you.      © Copyright [employer] 2018 Information is for End User's use only and may not be sold, redistributed or otherwise used for commercial purposes. All illustrations and images included in CareNotes® are the copyrighted property of [employer] Inc[employer] or [employer]

## 2024-07-30 NOTE — PROGRESS NOTES
Ambulatory Visit  Name: Anusha Booth      : 1940      MRN: [id number]  Encounter Provider: Silke Gaspar DO  Encounter Date: 2024   Encounter department: [facility]    Assessment & Plan        Preventive health issues were discussed with patient, and age appropriate screening tests were ordered as noted in patient's After Visit Summary. Personalized health advice and appropriate referrals for health education or preventive services given if needed, as noted in patient's After Visit Summary.    History of Present Illness     HPI   Patient Care Team:  Silke Gaspar DO as PCP - General  Silke Gaspar DO as PCP - General  Roshan Rhoades MD    Review of Systems   Constitutional:  Negative for appetite change, chills and fever.   HENT:  Negative for ear pain, facial swelling, rhinorrhea, sinus pain, sore throat and trouble swallowing.    Eyes:  Negative for discharge and redness.   Respiratory:  Negative for chest tightness, shortness of breath and wheezing.    Cardiovascular:  Negative for chest pain and palpitations.   Gastrointestinal:  Negative for abdominal pain, diarrhea, nausea and vomiting.   Endocrine: Negative for polyuria.   Genitourinary:  Negative for dysuria and urgency.   Musculoskeletal:  Negative for arthralgias and back pain.   Skin:  Negative for rash.        Positive skin lesion of the right cheek area   Neurological:  Negative for dizziness, weakness and headaches.   Hematological:  Negative for adenopathy.   Psychiatric/Behavioral:  Negative for behavioral problems, confusion and sleep disturbance.    All other systems reviewed and are negative.    Medical History Reviewed by provider this encounter:       Annual Wellness Visit Questionnaire   Anusha is here for her Subsequent Wellness visit. Last Medicare Wellness visit information reviewed, patient interviewed, no change since last AWV.     Health Risk Assessment:   Patient  rates overall health as good. Patient feels that their physical health rating is same. Patient is satisfied with their life. Eyesight was rated as same. Hearing was rated as same. Patient feels that their emotional and mental health rating is same. Patients states they are never, rarely angry. Patient states they are always unusually tired/fatigued. Pain experienced in the last 7 days has been none. Patient states that she has experienced no weight loss or gain in last 6 months.     Depression Screening:   PHQ-2 Score: 0      Fall Risk Screening:   In the past year, patient has experienced: no history of falling in past year      Urinary Incontinence Screening:   Patient has leaked urine accidently in the last six months.     Home Safety:  Patient does not have trouble with stairs inside or outside of their home. Patient has working smoke alarms and has working carbon monoxide detector. Home safety hazards include: none.     Nutrition:   Current diet is Unhealthy.     Medications:   Patient is currently taking over-the-counter supplements. OTC medications include: see medication list. Patient is able to manage medications.     Activities of Daily Living (ADLs)/Instrumental Activities of Daily Living (IADLs):   Walk and transfer into and out of bed and chair?: Yes  Dress and groom yourself?: Yes    Bathe or shower yourself?: Yes    Feed yourself? Yes  Do your laundry/housekeeping?: Yes  Manage your money, pay your bills and track your expenses?: Yes  Make your own meals?: Yes    Do your own shopping?: Yes    Previous Hospitalizations:   Any hospitalizations or ED visits within the last 12 months?: Yes    How many hospitalizations have you had in the last year?: 1-2    Advance Care Planning:   Living will: Yes    Durable POA for healthcare: Yes    Advanced directive: Yes    Advanced directive counseling given: Yes    ACP document given: Yes    Patient declined ACP directive: No    End of Life Decisions reviewed with  patient: Yes    Provider agrees with end of life decisions: Yes      Cognitive Screening:   Provider or family/friend/caregiver concerned regarding cognition?: No    PREVENTIVE SCREENINGS      Cardiovascular Screening:    General: Screening Not Indicated and History Lipid Disorder      Diabetes Screening:     General: Screening Current      Colorectal Cancer Screening:     General: Screening Current      Prostate Cancer Screening:    General: Screening Current      Breast Cancer Screening:     General: Screening Current      Cervical Cancer Screening:    General: Screening Not Indicated      Osteoporosis Screening:    General: Screening Current      Abdominal Aortic Aneurysm (AAA) Screening:        General: Screening Current and Screening Not Indicated      Lung Cancer Screening:     General: Screening Not Indicated      Hepatitis C Screening:    General: Screening Current    Screening, Brief Intervention, and Referral to Treatment (SBIRT)    Screening  Typical number of drinks in a day: 0  Typical number of drinks in a week: 0  Interpretation: Low risk drinking behavior.    Single Item Drug Screening:  How often have you used an illegal drug (including marijuana) or a prescription medication for non-medical reasons in the past year? never    Single Item Drug Screen Score: 0  Interpretation: Negative screen for possible drug use disorder    Social Determinants of Health     Financial Resource Strain: Low Risk  (6/27/2023)    Overall Financial Resource Strain (CARDIA)     Difficulty of Paying Living Expenses: Not very hard   Food Insecurity: No Food Insecurity (5/8/2024)    Hunger Vital Sign     Worried About Running Out of Food in the Last Year: Never true     Ran Out of Food in the Last Year: Never true   Transportation Needs: No Transportation Needs (5/8/2024)    PRAPARE - Transportation     Lack of Transportation (Medical): No     Lack of Transportation (Non-Medical): No   Housing Stability: Unknown (5/8/2024)     "Housing Stability Vital Sign     Unable to Pay for Housing in the Last Year: No     Homeless in the Last Year: No   Utilities: Not At Risk (5/8/2024)    [employer] [facility] [employer]     Threatened with loss of utilities: No     No results found.    Objective     Ht 5' 5\" (1.651 m)   BMI 38.77 kg/m²     Physical Exam  Vitals and nursing note reviewed.   Constitutional:       General: She is not in acute distress.     Appearance: Normal appearance. She is well-developed. She is not ill-appearing or diaphoretic.   HENT:      Head: Normocephalic and atraumatic.      Right Ear: Tympanic membrane, ear canal and external ear normal.      Left Ear: Tympanic membrane, ear canal and external ear normal.      Nose: Nose normal. No congestion or rhinorrhea.      Mouth/Throat:      Mouth: Mucous membranes are moist.      Pharynx: Oropharynx is clear. No oropharyngeal exudate or posterior oropharyngeal erythema.   Eyes:      General: No scleral icterus.        Right eye: No discharge.         Left eye: No discharge.      Extraocular Movements: Extraocular movements intact.      Conjunctiva/sclera: Conjunctivae normal.      Pupils: Pupils are equal, round, and reactive to light.   Neck:      Thyroid: No thyromegaly.      Vascular: No carotid bruit or JVD.      Trachea: No tracheal deviation.   Cardiovascular:      Rate and Rhythm: Normal rate and regular rhythm.      Pulses: Normal pulses.      Heart sounds: Normal heart sounds. No murmur heard.  Pulmonary:      Effort: Pulmonary effort is normal. No respiratory distress.      Breath sounds: Normal breath sounds. No stridor. No wheezing, rhonchi or rales.   Abdominal:      General: Abdomen is flat. Bowel sounds are normal. There is no distension.      Palpations: Abdomen is soft. There is no mass.      Tenderness: There is no abdominal tenderness. There is no guarding or rebound.   Musculoskeletal:         General: No swelling, tenderness or deformity. Normal range of motion.      Cervical " back: Normal range of motion and neck supple. No rigidity.      Right lower leg: No edema.      Left lower leg: No edema.   Lymphadenopathy:      Cervical: No cervical adenopathy.   Skin:     General: Skin is warm and dry.      Capillary Refill: Capillary refill takes less than 2 seconds.      Coloration: Skin is not jaundiced.      Findings: Lesion present. No bruising, erythema or rash.      Comments: Skin lesion of the right maxillary area approximately1.5 cm in diameter erythematous with an irregular border   Neurological:      General: No focal deficit present.      Mental Status: She is alert and oriented to person, place, and time.      Cranial Nerves: No cranial nerve deficit.      Sensory: No sensory deficit.      Motor: No abnormal muscle tone.      Coordination: Coordination normal.      Gait: Gait normal.      Deep Tendon Reflexes: Reflexes are normal and symmetric. Reflexes normal (20).   Psychiatric:         Mood and Affect: Mood normal.         Behavior: Behavior normal.         Thought Content: Thought content normal.         Judgment: Judgment normal.       Administrative Statements   I have spent a total time of 20 minutes in caring for this patient on the day of the visit/encounter including Diagnostic results, Prognosis, Risks and benefits of tx options, and Instructions for management.

## 2024-08-12 ENCOUNTER — CONSULT (OUTPATIENT)
Dept: PLASTIC SURGERY | Facility: CLINIC | Age: 84
End: 2024-08-12

## 2024-08-12 VITALS
HEIGHT: 64 IN | SYSTOLIC BLOOD PRESSURE: 116 MMHG | WEIGHT: 230 LBS | TEMPERATURE: 98.9 F | HEART RATE: 64 BPM | BODY MASS INDEX: 39.27 KG/M2 | DIASTOLIC BLOOD PRESSURE: 62 MMHG

## 2024-08-12 DIAGNOSIS — L98.9 SKIN LESION: ICD-10-CM

## 2024-08-12 PROCEDURE — 88305 TISSUE EXAM BY PATHOLOGIST: CPT | Performed by: PATHOLOGY

## 2024-08-12 NOTE — PROGRESS NOTES
Assessment/Plan:     Diagnoses and all orders for this visit:    Skin lesion  Pt was referred by her PCP for a right cheek lesion. Punch biopsy was performed. I will see her back in 2 weeks to review her path report. We will monitor the left cheek for any changes or growth.  -     Ambulatory Referral to Plastic Surgery      Subjective:      Patient ID: Anusha Booth is a 83 y.o. female.    HPI    Pt was referred by her PCP for a right cheek lesion. She states it has been there for a few months. She denies bleeding. She more recently has noticed something on the left as well. She has a PMH of HTN, hyperlipidemia & seizure disorder.     Patient Active Problem List   Diagnosis    Allergic rhinitis    Bulging of lumbar intervertebral disc    Chronic osteoarthritis    Edema    Epilepsy (HCC)    Hyperlipidemia    Essential hypertension    Seizure (HCC)    Severe obesity (BMI 35.0-39.9) with comorbidity (HCC)    Leukopenia    Thrombocytopenia (HCC)    Trigger finger, right ring finger    Primary osteoarthritis of both hips    Peripheral vascular disease, unspecified (HCC)    Pain in left hip    Stress fracture of left hip    Palpitations     Allergies   Allergen Reactions    Adhesive  [Medical Tape]      Other reaction(s): Unknown Reaction    Celebrex [Celecoxib] Itching and GI Intolerance     Voice change    Latex      Other reaction(s): Unknown Reaction    Midazolam     Penicillins     Thiopental      Current Outpatient Medications on File Prior to Visit   Medication Sig    amLODIPine (NORVASC) 2.5 mg tablet Take 1 tablet (2.5 mg total) by mouth daily    Ascorbic Acid (vitamin C) 1000 MG tablet Take 1,000 mg by mouth daily    aspirin (ECOTRIN LOW STRENGTH) 81 mg EC tablet Take by mouth daily     atorvastatin (LIPITOR) 40 mg tablet TAKE 1 TABLET DAILY WITH DINNER    Calcium Carb-Cholecalciferol (CALCIUM 1000 + D PO) Take 1 tablet by mouth daily    carvedilol (COREG) 12.5 mg tablet Take 1 tablet (12.5 mg total) by  mouth 2 (two) times a day with meals    cholecalciferol (VITAMIN D3) 1,000 units tablet Take 1,000 Units by mouth daily    EPINEPHrine (EPIPEN) 0.3 mg/0.3 mL SOAJ Inject 0.3 mL (0.3 mg total) into a muscle once for 1 dose (Patient not taking: Reported on 7/30/2024)    furosemide (LASIX) 20 mg tablet Take 1 tablet (20 mg total) by mouth daily as needed (for leg swelling. Contact your PCP for further refills)    levETIRAcetam (KEPPRA) 250 mg tablet TAKE ONE AND ONE-HALF TABLETS TWICE A DAY    lisinopril (ZESTRIL) 40 mg tablet Take 1 tablet (40 mg total) by mouth daily    Multiple Vitamin (MULTIVITAMINS PO) Take 1 capsule by mouth daily    Zinc 100 MG TABS Take by mouth     No current facility-administered medications on file prior to visit.     Family History   Problem Relation Age of Onset    Prostate cancer Father     Other Family         back problem    Arthritis Family     Arthritis Mother     No Known Problems Sister     No Known Problems Daughter     No Known Problems Sister     No Known Problems Paternal Aunt     No Known Problems Paternal Aunt     No Known Problems Paternal Aunt     No Known Problems Paternal Aunt     Prostate cancer Brother      Past Medical History:   Diagnosis Date    Acute venous embolism and thrombosis of deep vessels of distal lower extremity (HCC) 01/27/2014    Blood type A+     Closed fracture of right distal radius and ulna 03/06/2019    Convulsions (HCC)     Deviated septum 05/2000    Fracture of c2     Gallstones 09/15/2019    Hiatal hernia     1976    HL (hearing loss)     Hyperlipidemia     Osteoarthritis, localized, knee     Seizure (HCC)     1990    Seizures (HCC)     Spondylosis     Tinnitus      Social History     Socioeconomic History    Marital status:      Spouse name: Not on file    Number of children: Not on file    Years of education: Not on file    Highest education level: Not on file   Occupational History    Not on file   Tobacco Use    Smoking status: Former      Passive exposure: Past    Smokeless tobacco: Never   Vaping Use    Vaping status: Never Used   Substance and Sexual Activity    Alcohol use: Yes     Comment: social use    Drug use: No    Sexual activity: Not on file   Other Topics Concern    Not on file   Social History Narrative    Daily coffee consumption (_cups/day)    Denied hx of daily cola consumption    Daily tea consumption (_cups/day)     Social Determinants of Health     Financial Resource Strain: Low Risk  (6/27/2023)    Overall Financial Resource Strain (CARDIA)     Difficulty of Paying Living Expenses: Not very hard   Food Insecurity: No Food Insecurity (7/30/2024)    Hunger Vital Sign     Worried About Running Out of Food in the Last Year: Never true     Ran Out of Food in the Last Year: Never true   Transportation Needs: No Transportation Needs (7/30/2024)    PRAPARE - Transportation     Lack of Transportation (Medical): No     Lack of Transportation (Non-Medical): No   Physical Activity: Not on file   Stress: Not on file   Social Connections: Not on file   Intimate Partner Violence: Not on file   Housing Stability: Low Risk  (7/30/2024)    Housing Stability Vital Sign     Unable to Pay for Housing in the Last Year: No     Number of Times Moved in the Last Year: 1     Homeless in the Last Year: No     Past Surgical History:   Procedure Laterality Date    BREAST BIOPSY Right over 10 years    benign    CATARACT EXTRACTION      COMBINED HYSTEROSCOPY DIAGNOSTIC / D&C      ESOPHAGOGASTRODUODENOSCOPY      diagnostic    HAND SURGERY      HERNIA REPAIR      HYSTERECTOMY      KNEE ARTHROPLASTY      Revision;total    KNEE ARTHROSCOPY Right     1997    NASAL SEPTUM SURGERY      Deviation repair    [place] TENDON SHEATH INCISION Right 11/21/2019    Procedure: RING TRIGGER FINGER RELEASE;  Surgeon: Victorino Ventura MD;  Location: [facility];  Service: Orthopedics    REPLACEMENT TOTAL KNEE Right     2000    TONSILLECTOMY      1948    TUBAL LIGATION      TUMOR  REMOVAL Left 1956    left index finger         Review of Systems   All other systems reviewed and are negative.        Objective:      There were no vitals taken for this visit.         Physical Exam  Constitutional:       Appearance: Normal appearance. She is well-developed.   HENT:      Head: Normocephalic and atraumatic.      Comments: Right upper cheek/ lateral eye, white crusted lesion, see picture in media.     Left upper cheek - slightly rough, no visible lesion, see picture  Eyes:      Conjunctiva/sclera: Conjunctivae normal.   Pulmonary:      Effort: Pulmonary effort is normal.   Musculoskeletal:         General: Normal range of motion.      Cervical back: Normal range of motion.   Skin:     General: Skin is warm and dry.   Neurological:      Mental Status: She is alert and oriented to person, place, and time.   Psychiatric:         Mood and Affect: Mood normal.         Behavior: Behavior normal.         After informed written consent was obtained, using alcohol for cleansing and 1% Lidocaine with epinephrine for anesthetic, with sterile technique a 2 mm punch biopsy was used to obtain a biopsy specimen of the lesion. Hemostasis was obtained by pressure and silver nitrate. The specimen is labeled and sent to pathology for evaluation. The procedure was well tolerated without complications.

## 2024-08-13 DIAGNOSIS — E78.5 HYPERLIPIDEMIA: ICD-10-CM

## 2024-08-13 RX ORDER — ATORVASTATIN CALCIUM 40 MG/1
TABLET, FILM COATED ORAL
Qty: 90 TABLET | Refills: 1 | Status: SHIPPED | OUTPATIENT
Start: 2024-08-13

## 2024-08-16 PROCEDURE — 88305 TISSUE EXAM BY PATHOLOGIST: CPT | Performed by: PATHOLOGY

## 2024-08-28 ENCOUNTER — OFFICE VISIT (OUTPATIENT)
Dept: PLASTIC SURGERY | Facility: CLINIC | Age: 84
End: 2024-08-28
Payer: COMMERCIAL

## 2024-08-28 DIAGNOSIS — L57.0 ACTINIC KERATOSIS OF RIGHT CHEEK: Primary | ICD-10-CM

## 2024-08-28 PROCEDURE — 99213 OFFICE O/P EST LOW 20 MIN: CPT | Performed by: PHYSICIAN ASSISTANT

## 2024-08-28 RX ORDER — FLUOROURACIL 50 MG/G
CREAM TOPICAL 2 TIMES DAILY
Qty: 40 G | Refills: 0 | Status: SHIPPED | OUTPATIENT
Start: 2024-08-28

## 2024-08-28 NOTE — PROGRESS NOTES
Assessment/Plan:     Diagnoses and all orders for this visit:    Actinic keratosis of right cheek  Pt underwent biopsy of right cheek lesion, consistent with seborrheic keratosis with features of actinic keratosis. We discussed using Efudex. She will be seeing derm next month. We will see her back as needed.      -     fluorouracil (EFUDEX) 5 % cream; Apply topically 2 (two) times a day      Subjective:      Patient ID: Anusha Booth is a 83 y.o. female.    HPI    Pt was referred by her PCP for a right cheek lesion. Punch biopsy was performed at her last visit. Pathology is consistent with seborrheic keratosis with features of actinic keratosis.      Patient Active Problem List   Diagnosis    Allergic rhinitis    Bulging of lumbar intervertebral disc    Chronic osteoarthritis    Edema    Epilepsy (HCC)    Hyperlipidemia    Essential hypertension    Seizure (HCC)    Severe obesity (BMI 35.0-39.9) with comorbidity (HCC)    Leukopenia    Thrombocytopenia (HCC)    Trigger finger, right ring finger    Primary osteoarthritis of both hips    Peripheral vascular disease, unspecified (HCC)    Pain in left hip    Stress fracture of left hip    Palpitations    Skin lesion     Allergies   Allergen Reactions    Adhesive  [Medical Tape]      Other reaction(s): Unknown Reaction    Celebrex [Celecoxib] Itching and GI Intolerance     Voice change    Latex      Other reaction(s): Unknown Reaction    Midazolam     Penicillins     Thiopental      Current Outpatient Medications on File Prior to Visit   Medication Sig    amLODIPine (NORVASC) 2.5 mg tablet Take 1 tablet (2.5 mg total) by mouth daily    Ascorbic Acid (vitamin C) 1000 MG tablet Take 1,000 mg by mouth daily    aspirin (ECOTRIN LOW STRENGTH) 81 mg EC tablet Take by mouth daily     atorvastatin (LIPITOR) 40 mg tablet TAKE 1 TABLET DAILY WITH DINNER    Calcium Carb-Cholecalciferol (CALCIUM 1000 + D PO) Take 1 tablet by mouth daily    carvedilol (COREG) 12.5 mg tablet Take 1  tablet (12.5 mg total) by mouth 2 (two) times a day with meals    cholecalciferol (VITAMIN D3) 1,000 units tablet Take 1,000 Units by mouth daily    EPINEPHrine (EPIPEN) 0.3 mg/0.3 mL SOAJ Inject 0.3 mL (0.3 mg total) into a muscle once for 1 dose (Patient not taking: Reported on 7/30/2024)    furosemide (LASIX) 20 mg tablet Take 1 tablet (20 mg total) by mouth daily as needed (for leg swelling. Contact your PCP for further refills)    levETIRAcetam (KEPPRA) 250 mg tablet TAKE ONE AND ONE-HALF TABLETS TWICE A DAY    lisinopril (ZESTRIL) 40 mg tablet Take 1 tablet (40 mg total) by mouth daily    Multiple Vitamin (MULTIVITAMINS PO) Take 1 capsule by mouth daily    Zinc 100 MG TABS Take by mouth     No current facility-administered medications on file prior to visit.     Family History   Problem Relation Age of Onset    Prostate cancer Father     Other Family         back problem    Arthritis Family     Arthritis Mother     No Known Problems Sister     No Known Problems Daughter     No Known Problems Sister     No Known Problems Paternal Aunt     No Known Problems Paternal Aunt     No Known Problems Paternal Aunt     No Known Problems Paternal Aunt     Prostate cancer Brother      Past Medical History:   Diagnosis Date    Acute venous embolism and thrombosis of deep vessels of distal lower extremity (HCC) 01/27/2014    Blood type A+     Closed fracture of right distal radius and ulna 03/06/2019    Convulsions (HCC)     Deviated septum 05/2000    Fracture of c2     Gallstones 09/15/2019    Hiatal hernia     1976    HL (hearing loss)     Hyperlipidemia     Osteoarthritis, localized, knee     Seizure (HCC)     1990    Seizures (HCC)     Spondylosis     Tinnitus      Social History     Socioeconomic History    Marital status:      Spouse name: Not on file    Number of children: Not on file    Years of education: Not on file    Highest education level: Not on file   Occupational History    Not on file   Tobacco Use     Smoking status: Former     Passive exposure: Past    Smokeless tobacco: Never   Vaping Use    Vaping status: Never Used   Substance and Sexual Activity    Alcohol use: Yes     Comment: social use    Drug use: No    Sexual activity: Not on file   Other Topics Concern    Not on file   Social History Narrative    Daily coffee consumption (_cups/day)    Denied hx of daily cola consumption    Daily tea consumption (_cups/day)     Social Determinants of Health     Financial Resource Strain: Low Risk  (6/27/2023)    Overall Financial Resource Strain (CARDIA)     Difficulty of Paying Living Expenses: Not very hard   Food Insecurity: No Food Insecurity (7/30/2024)    Hunger Vital Sign     Worried About Running Out of Food in the Last Year: Never true     Ran Out of Food in the Last Year: Never true   Transportation Needs: No Transportation Needs (7/30/2024)    PRAPARE - Transportation     Lack of Transportation (Medical): No     Lack of Transportation (Non-Medical): No   Physical Activity: Not on file   Stress: Not on file   Social Connections: Not on file   Intimate Partner Violence: Not on file   Housing Stability: Low Risk  (7/30/2024)    Housing Stability Vital Sign     Unable to Pay for Housing in the Last Year: No     Number of Times Moved in the Last Year: 1     Homeless in the Last Year: No     Past Surgical History:   Procedure Laterality Date    BREAST BIOPSY Right over 10 years    benign    CATARACT EXTRACTION      COMBINED HYSTEROSCOPY DIAGNOSTIC / D&C      ESOPHAGOGASTRODUODENOSCOPY      diagnostic    HAND SURGERY      HERNIA REPAIR      HYSTERECTOMY      KNEE ARTHROPLASTY      Revision;total    KNEE ARTHROSCOPY Right     1997    NASAL SEPTUM SURGERY      Deviation repair    [place] TENDON SHEATH INCISION Right 11/21/2019    Procedure: RING TRIGGER FINGER RELEASE;  Surgeon: Victorino Ventura MD;  Location: [facility];  Service: Orthopedics    REPLACEMENT TOTAL KNEE Right     2000    TONSILLECTOMY      1948    TUBAL  LIGATION      TUMOR REMOVAL Left 1956    left index finger         Review of Systems   All other systems reviewed and are negative.        Objective:      There were no vitals taken for this visit.         Physical Exam  Constitutional:       Appearance: Normal appearance. She is well-developed.   HENT:      Head: Normocephalic and atraumatic.      Comments: Right cheek seborrheic keratosis w/ actinic keratosis, biopsy site is healing nicely.   Eyes:      Conjunctiva/sclera: Conjunctivae normal.   Pulmonary:      Effort: Pulmonary effort is normal.   Musculoskeletal:         General: Normal range of motion.      Cervical back: Normal range of motion.   Skin:     General: Skin is warm and dry.   Neurological:      Mental Status: She is alert and oriented to person, place, and time.   Psychiatric:         Mood and Affect: Mood normal.         Behavior: Behavior normal.

## 2024-09-12 DIAGNOSIS — R00.2 PALPITATIONS: ICD-10-CM

## 2024-09-12 DIAGNOSIS — I10 ESSENTIAL HYPERTENSION: ICD-10-CM

## 2024-09-12 RX ORDER — CARVEDILOL 12.5 MG/1
12.5 TABLET ORAL 2 TIMES DAILY WITH MEALS
Qty: 60 TABLET | Refills: 5 | Status: SHIPPED | OUTPATIENT
Start: 2024-09-12

## 2024-09-12 NOTE — TELEPHONE ENCOUNTER
Patient calling bc she is almost out of   Requested Prescriptions     Pending Prescriptions Disp Refills    carvedilol (COREG) 12.5 mg tablet 60 tablet 0     Sig: Take 1 tablet (12.5 mg total) by mouth 2 (two) times a day with meals     Last prescribed by Dr Lee in hospital- she is not sure if Dr. Gaspar wants her to continue this and if she can send it to the [id number]

## 2024-09-13 ENCOUNTER — APPOINTMENT (OUTPATIENT)
Dept: LAB | Facility: CLINIC | Age: 84
End: 2024-09-13
Payer: COMMERCIAL

## 2024-09-13 DIAGNOSIS — R79.89 LOW TSH LEVEL: ICD-10-CM

## 2024-09-13 LAB
T4 FREE SERPL-MCNC: 0.93 NG/DL (ref 0.61–1.12)
TSH SERPL DL<=0.05 MIU/L-ACNC: 0.16 UIU/ML (ref 0.45–4.5)

## 2024-09-13 PROCEDURE — 84443 ASSAY THYROID STIM HORMONE: CPT

## 2024-09-13 PROCEDURE — 84439 ASSAY OF FREE THYROXINE: CPT

## 2024-09-13 PROCEDURE — 36415 COLL VENOUS BLD VENIPUNCTURE: CPT

## 2024-09-18 ENCOUNTER — OFFICE VISIT (OUTPATIENT)
Dept: FAMILY MEDICINE CLINIC | Facility: CLINIC | Age: 84
End: 2024-09-18

## 2024-09-18 VITALS
HEIGHT: 64 IN | SYSTOLIC BLOOD PRESSURE: 132 MMHG | TEMPERATURE: 97.8 F | WEIGHT: 233.4 LBS | DIASTOLIC BLOOD PRESSURE: 72 MMHG | BODY MASS INDEX: 39.85 KG/M2 | OXYGEN SATURATION: 96 % | HEART RATE: 62 BPM

## 2024-09-18 DIAGNOSIS — R79.89 LOW TSH LEVEL: Primary | ICD-10-CM

## 2024-09-20 NOTE — PROGRESS NOTES
Patient ID: Anusha Booth is a 84 y.o. female.    HPI: 84 y.o.female presents to discuss low TSH.  She has had a tendency toward her low TSH but in the past 6 months is dropped even lower but patient denies any palpitations, diarrhea, unexplained weight loss,, tachycardia or heat intolerance.  We discussed rechecking it again if it drops further I will refer her to endocrinology for treatment.    SUBJECTIVE    Family History   Problem Relation Age of Onset    Prostate cancer Father     Other Family         back problem    Arthritis Family     Arthritis Mother     No Known Problems Sister     No Known Problems Daughter     No Known Problems Sister     No Known Problems Paternal Aunt     No Known Problems Paternal Aunt     No Known Problems Paternal Aunt     No Known Problems Paternal Aunt     Prostate cancer Brother      Social History     Socioeconomic History    Marital status:      Spouse name: Not on file    Number of children: Not on file    Years of education: Not on file    Highest education level: Not on file   Occupational History    Not on file   Tobacco Use    Smoking status: Former     Passive exposure: Past    Smokeless tobacco: Never   Vaping Use    Vaping status: Never Used   Substance and Sexual Activity    Alcohol use: Yes     Comment: social use    Drug use: No    Sexual activity: Not on file   Other Topics Concern    Not on file   Social History Narrative    Daily coffee consumption (_cups/day)    Denied hx of daily cola consumption    Daily tea consumption (_cups/day)     Social Determinants of Health     Financial Resource Strain: Low Risk  (6/27/2023)    Overall Financial Resource Strain (CARDIA)     Difficulty of Paying Living Expenses: Not very hard   Food Insecurity: No Food Insecurity (7/30/2024)    Hunger Vital Sign     Worried About Running Out of Food in the Last Year: Never true     Ran Out of Food in the Last Year: Never true   Transportation Needs: No Transportation Needs  (7/30/2024)    PRAPARE - Transportation     Lack of Transportation (Medical): No     Lack of Transportation (Non-Medical): No   Physical Activity: Not on file   Stress: Not on file   Social Connections: Not on file   Intimate Partner Violence: Not on file   Housing Stability: Low Risk  (7/30/2024)    Housing Stability Vital Sign     Unable to Pay for Housing in the Last Year: No     Number of Times Moved in the Last Year: 1     Homeless in the Last Year: No     Past Medical History:   Diagnosis Date    Acute venous embolism and thrombosis of deep vessels of distal lower extremity (HCC) 01/27/2014    Blood type A+     Closed fracture of right distal radius and ulna 03/06/2019    Convulsions (HCC)     Deviated septum 05/2000    Fracture of c2     Gallstones 09/15/2019    Hiatal hernia     1976    HL (hearing loss)     Hyperlipidemia     Osteoarthritis, localized, knee     Seizure (HCC)     1990    Seizures (HCC)     Spondylosis     Tinnitus      Past Surgical History:   Procedure Laterality Date    BREAST BIOPSY Right over 10 years    benign    CATARACT EXTRACTION      COMBINED HYSTEROSCOPY DIAGNOSTIC / D&C      ESOPHAGOGASTRODUODENOSCOPY      diagnostic    HAND SURGERY      HERNIA REPAIR      HYSTERECTOMY      KNEE ARTHROPLASTY      Revision;total    KNEE ARTHROSCOPY Right     1997    NASAL SEPTUM SURGERY      Deviation repair    [place] TEND[place] SHEATH INCISION Right 11/21/2019    Procedure: RING TRIGGER FINGER RELEASE;  Surgeon: Victorino Ventura MD;  Location: [facility];  Service: Orthopedics    REPLACEMENT TOTAL KNEE Right     2000    TONSILLECTOMY      1948    TUBAL LIGATION      TUMOR REMOVAL Left 1956    left index finger     Allergies   Allergen Reactions    Adhesive  [Medical Tape]      Other reaction(s): Unknown Reaction    Celebrex [Celecoxib] Itching and GI Intolerance     Voice change    Latex      Other reaction(s): Unknown Reaction    Midazolam     Penicillins     Thiopental        Current Outpatient  Medications:     amLODIPine (NORVASC) 2.5 mg tablet, Take 1 tablet (2.5 mg total) by mouth daily, Disp: 90 tablet, Rfl: 3    Ascorbic Acid (vitamin C) 1000 MG tablet, Take 1,000 mg by mouth daily, Disp: , Rfl:     aspirin (ECOTRIN LOW STRENGTH) 81 mg EC tablet, Take by mouth daily , Disp: , Rfl:     atorvastatin (LIPITOR) 40 mg tablet, TAKE 1 TABLET DAILY WITH DINNER, Disp: 90 tablet, Rfl: 1    Calcium Carb-Cholecalciferol (CALCIUM 1000 + D PO), Take 1 tablet by mouth daily, Disp: , Rfl:     carvedilol (COREG) 12.5 mg tablet, Take 1 tablet (12.5 mg total) by mouth 2 (two) times a day with meals, Disp: 60 tablet, Rfl: 5    cholecalciferol (VITAMIN D3) 1,000 units tablet, Take 1,000 Units by mouth daily, Disp: , Rfl:     fluorouracil (EFUDEX) 5 % cream, Apply topically 2 (two) times a day, Disp: 40 g, Rfl: 0    furosemide (LASIX) 20 mg tablet, Take 1 tablet (20 mg total) by mouth daily as needed (for leg swelling. Contact your PCP for further refills), Disp: 10 tablet, Rfl: 0    levETIRAcetam (KEPPRA) 250 mg tablet, TAKE ONE AND ONE-HALF TABLETS TWICE A DAY, Disp: 270 tablet, Rfl: 3    lisinopril (ZESTRIL) 40 mg tablet, Take 1 tablet (40 mg total) by mouth daily, Disp: 90 tablet, Rfl: 1    Multiple Vitamin (MULTIVITAMINS PO), Take 1 capsule by mouth daily, Disp: , Rfl:     Zinc 100 MG TABS, Take by mouth, Disp: , Rfl:     Review of Systems  Constitutional:     Denies fever, chills ,fatigue ,weakness ,weight loss, weight gain     ENT: Denies earache ,loss of hearing ,nosebleed, nasal discharge,nasal congestion ,sore throat ,hoarseness  Pulmonary: Denies shortness of breath ,cough  ,dyspnea on exertion, orthopnea  ,PND   Cardiovascular:  Denies bradycardia , tachycardia  ,palpations, lower extremity edema leg, claudication  Breast:  Denies new or changing breast lumps ,nipple discharge ,nipple changes  Abdomen:  Denies abdominal pain , anorexia , indigestion, nausea, vomiting, constipation, diarrhea  Musculoskeletal:  "Denies myalgias, arthralgias, joint swelling, joint stiffness , limb pain, limb swelling  Gu: denies dysuria, polyuria  Skin: Denies skin rash, skin lesion, skin wound, itching, dry skin  Neuro: Denies headache, numbness, tingling, confusion, loss of consciousness, dizziness, vertigo  Psychiatric: Denies feelings of depression, suicidal ideation, anxiety, sleep disturbances    OBJECTIVE  /72   Pulse 62   Temp 97.8 °F (36.6 °C)   Ht 5' 3.5\" (1.613 m)   Wt 106 kg (233 lb 6.4 oz)   SpO2 96%   BMI 40.70 kg/m²   Constitutional:   NAD, well appearing and well nourished      ENT:   Conjunctiva and lids: no injection, edema, or discharge     Pupils and iris: STEFAN bilaterally    External inspection of ears and nose: normal without deformities or discharge.      Otoscopic exam: Canals patent without erythema.       Nasal mucosa, septum and turbinates: Normal or edema or discharge         Oropharynx:  Moist mucosa, normal tongue and tonsils without lesions. No erythema        Pulmonary:Respiratory effort normal rate and rhythm, no increased work of breathing. Auscultation of lungs:  Clear bilaterally with no adventitious breath sounds       Cardiovascular: regular rate and rhythm, S1 and S2, no murmur, no edema and/or varicosities of LE      Abdomen: Soft and non-distended     Positive bowel sounds      No heptomegaly or splenomegaly      Gu: no suprapubic tenderness or CVA tenderness, no urethral discharge  Lymphatic:  No anterior or posterior cervical lymphadenopathy         Musculoskeletal:  Gait and station: Normal gait      Digits and nails normal without clubbing or cyanosis       Inspection/palpation of joints, bones, and muscles:  No joint tenderness, swelling, full active and passive range of motion       Skin: Normal skin turgor and no rashes      Neuro:    Normal reflexes     Psych:   alert and oriented to person, place and time     normal mood and affect       Assessment/Plan:Diagnoses and all orders " for this visit:    Low TSH level  Comments:  Will recheck a TSH in 6 weeks and if it continues to drop will refer to endocrinology.  If patient develops symptoms as reviewed she is to call.  Orders:  -     TSH w/Reflex; Future        Reviewed with patient plan to treat with above plan.    Patient instructed to call in 72 hours if not feeling better or if symptoms worsen

## 2024-10-24 ENCOUNTER — TELEPHONE (OUTPATIENT)
Dept: ADMINISTRATIVE | Facility: OTHER | Age: 84
End: 2024-10-24

## 2024-10-24 NOTE — TELEPHONE ENCOUNTER
10/24/24 10:56 AM    Patient was flagged by a Third Party Payer report as being due for a refill on the following medications, Lisinopril 40 mg. Patient was contacted to review these medications. There was no answer and a message was left.     Thank you.  Kathya Collins MA  [facility]

## 2024-10-30 ENCOUNTER — TELEPHONE (OUTPATIENT)
Age: 84
End: 2024-10-30

## 2024-10-30 NOTE — TELEPHONE ENCOUNTER
Patient called in asking how long she has to be on Carvedilol and amlodipine as she would like to know for the future if she should have them switched over to mail order.    Please advise, thank you

## 2024-11-29 DIAGNOSIS — I10 ESSENTIAL HYPERTENSION: ICD-10-CM

## 2024-11-29 DIAGNOSIS — R00.2 PALPITATIONS: ICD-10-CM

## 2024-11-29 RX ORDER — AMLODIPINE BESYLATE 2.5 MG/1
2.5 TABLET ORAL DAILY
Qty: 30 TABLET | Refills: 5 | Status: SHIPPED | OUTPATIENT
Start: 2024-11-29 | End: 2025-02-27

## 2024-11-29 RX ORDER — CARVEDILOL 12.5 MG/1
12.5 TABLET ORAL 2 TIMES DAILY WITH MEALS
Qty: 60 TABLET | Refills: 5 | Status: SHIPPED | OUTPATIENT
Start: 2024-11-29

## 2024-11-29 NOTE — TELEPHONE ENCOUNTER
Pt is requesting a half months supply for these medications, until she receives her medications from [facility].  Anusha would like to switch these two medications to [facility].  Pt would like half month supply sent to [facility], to cover her until she receives the mail order, and then the scripts for the 2 medications sent to [facility] going forward.     Pt states she only has 1 Amlodipine left    Requesting a call back once sent

## 2024-12-09 ENCOUNTER — OFFICE VISIT (OUTPATIENT)
Dept: CARDIOLOGY CLINIC | Facility: CLINIC | Age: 84
End: 2024-12-09
Payer: COMMERCIAL

## 2024-12-09 VITALS
TEMPERATURE: 98.9 F | HEART RATE: 63 BPM | WEIGHT: 236.8 LBS | BODY MASS INDEX: 40.43 KG/M2 | HEIGHT: 64 IN | SYSTOLIC BLOOD PRESSURE: 150 MMHG | DIASTOLIC BLOOD PRESSURE: 76 MMHG | OXYGEN SATURATION: 97 %

## 2024-12-09 DIAGNOSIS — I47.10 PSVT (PAROXYSMAL SUPRAVENTRICULAR TACHYCARDIA) (HCC): ICD-10-CM

## 2024-12-09 DIAGNOSIS — R00.2 PALPITATIONS: Primary | ICD-10-CM

## 2024-12-09 DIAGNOSIS — I10 ESSENTIAL HYPERTENSION: ICD-10-CM

## 2024-12-09 PROCEDURE — 99214 OFFICE O/P EST MOD 30 MIN: CPT | Performed by: INTERNAL MEDICINE

## 2024-12-09 NOTE — PROGRESS NOTES
Cardiology Follow Up     Anusha Booth  [id number]  1940  [facility] [place] [id number] [facility] [address] [phone]      1. Palpitations        2. PSVT (paroxysmal supraventricular tachycardia) (HCC)        3. Essential hypertension            Discussion/Summary:      PSVT: Her monitor did not show any atrial fibrillation. Her symptoms are limited with the use of vagal maneuvers. She is on carvedilol. Continue current management.    HTN: I repeated her blood pressure and it was a little bit lower than what was initially taken. She monitors this at home and it is controlled. Continue Coreg, amlodipine, lisinopril..    HL: Controlled with atorvastatin. Last LDL was 69.      History of Present Illness:  I have seen Anusha in the past for hypertension, palpitations.    She had a Holter monitor which was pretty unremarkable. She does have a history of PACs. She has complained of some more sustained palpitations in the past.    Despite her relative bradycardia which has been a longstanding diagnosis, she has been maintained on Coreg in addition to 40 mg of lisinopril, which generally tends to control her blood pressure recently well though it is a little sporadic.    She has Lasix to take as needed and she takes this maybe once or twice a month.    She had c/o palpitations, was a concern for AF, but it looked more like SVT and responded to Vagal maneuvers.  We increased her coreg    Interval History:    She is doing well. Still feels occasional palpitations but these are pretty infrequent and self-limited. She does her vagal maneuvers which she does by taking a breath in and holding it and the symptoms resolve pretty quickly.    She has not needed any Lasix recently.      Patient Active Problem List   Diagnosis    Allergic rhinitis    Bulging of lumbar intervertebral disc    Chronic osteoarthritis    Edema    Epilepsy (HCC)     Hyperlipidemia    Essential hypertension    Seizure (HCC)    Severe obesity (BMI 35.0-39.9) with comorbidity (HCC)    Leukopenia    Thrombocytopenia (HCC)    Trigger finger, right ring finger    Primary osteoarthritis of both hips    Peripheral vascular disease, unspecified (HCC)    Pain in left hip    Stress fracture of left hip    Palpitations    Actinic keratosis of right cheek    PSVT (paroxysmal supraventricular tachycardia) (HCC)     Past Medical History:   Diagnosis Date    Acute venous embolism and thrombosis of deep vessels of distal lower extremity ([facility]) 01/27/2014    Blood type A+     Closed fracture of right distal radius and ulna 03/06/2019    Convulsions ([facility])     Deviated septum 05/2000    Fracture of c2     Gallstones 09/15/2019    Hiatal hernia     1976    HL (hearing loss)     Hyperlipidemia     Osteoarthritis, localized, knee     Seizure ([facility])     1990    Seizures ([facility])     Spondylosis     Tinnitus      Social History     Tobacco Use    Smoking status: Former     Passive exposure: Past    Smokeless tobacco: Never   Vaping Use    Vaping status: Never Used   Substance Use Topics    Alcohol use: Yes     Comment: social use    Drug use: No      Family History   Problem Relation Age of Onset    Prostate cancer Father     Other Family         back problem    Arthritis Family     Arthritis Mother     No Known Problems Sister     No Known Problems Daughter     No Known Problems Sister     No Known Problems Paternal Aunt     No Known Problems Paternal Aunt     No Known Problems Paternal Aunt     No Known Problems Paternal Aunt     Prostate cancer Brother      Past Surgical History:   Procedure Laterality Date    BREAST BIOPSY Right over 10 years    benign    CATARACT EXTRACTION      COMBINED HYSTEROSCOPY DIAGNOSTIC / D&C      ESOPHAGOGASTRODUODENOSCOPY      diagnostic    HAND SURGERY      HERNIA REPAIR      HYSTERECTOMY      KNEE ARTHROPLASTY      Revision;total    KNEE ARTHROSCOPY Right     1997    NASAL  SEPTUM SURGERY      Deviation repair    OR TENDON SHEATH INCISION Right 11/21/2019    Procedure: RING TRIGGER FINGER RELEASE;  Surgeon: Victorino Ventura MD;  [facility];  Service: Orthopedics    REPLACEMENT TOTAL KNEE Right     2000    TONSILLECTOMY      1948    TUBAL LIGATION      TUMOR REMOVAL Left 1956    left index finger       Current Outpatient Medications:     amLODIPine (NORVASC) 2.5 mg tablet, Take 1 tablet (2.5 mg total) by mouth daily, Disp: 30 tablet, Rfl: 5    Ascorbic Acid (vitamin C) 1000 MG tablet, Take 1,000 mg by mouth daily, Disp: , Rfl:     aspirin (ECOTRIN LOW STRENGTH) 81 mg EC tablet, Take by mouth daily , Disp: , Rfl:     atorvastatin (LIPITOR) 40 mg tablet, TAKE 1 TABLET DAILY WITH DINNER, Disp: 90 tablet, Rfl: 1    Calcium Carb-Cholecalciferol (CALCIUM 1000 + D PO), Take 1 tablet by mouth daily, Disp: , Rfl:     carvedilol (COREG) 12.5 mg tablet, Take 1 tablet (12.5 mg total) by mouth 2 (two) times a day with meals, Disp: 60 tablet, Rfl: 5    cholecalciferol (VITAMIN D3) 1,000 units tablet, Take 1,000 Units by mouth daily, Disp: , Rfl:     fluorouracil (EFUDEX) 5 % cream, Apply topically 2 (two) times a day, Disp: 40 g, Rfl: 0    furosemide (LASIX) 20 mg tablet, Take 1 tablet (20 mg total) by mouth daily as needed (for leg swelling. Contact your PCP for further refills), Disp: 10 tablet, Rfl: 0    levETIRAcetam (KEPPRA) 250 mg tablet, TAKE ONE AND ONE-HALF TABLETS TWICE A DAY, Disp: 270 tablet, Rfl: 3    lisinopril (ZESTRIL) 40 mg tablet, Take 1 tablet (40 mg total) by mouth daily, Disp: 90 tablet, Rfl: 1    Multiple Vitamin (MULTIVITAMINS PO), Take 1 capsule by mouth daily, Disp: , Rfl:     Zinc 100 MG TABS, Take by mouth, Disp: , Rfl:   Allergies   Allergen Reactions    Adhesive  [Medical Tape]      Other reaction(s): Unknown Reaction    Celebrex [Celecoxib] Itching and GI Intolerance     Voice change    Latex      Other reaction(s): Unknown Reaction    Midazolam     Penicillins  "    Thiopental        Vitals:    12/09/24 1505 12/09/24 1530   BP: (!) 172/84 150/76   BP Location: Left arm    Patient Position: Sitting    Cuff Size: Adult    Pulse: 63    Temp: 98.9 °F (37.2 °C)    TempSrc: Tympanic    SpO2: 97%    Weight: 107 kg (236 lb 12.8 oz)    Height: 5' 3.5\" (1.613 m)      Vitals:    12/09/24 1505   Weight: 107 kg (236 lb 12.8 oz)      Height: 5' 3.5\" (161.3 cm)   Body mass index is 41.29 kg/m².    Physical Exam:  GEN: Anusha Booth appears well, alert and oriented x 3, pleasant and cooperative   HEENT: pupils equal, round, and reactive to light; extraocular muscles intact  NECK: supple, no carotid bruits   HEART: regular rhythm, normal S1 and S2, no murmurs, clicks, gallops or rubs   LUNGS: clear to auscultation bilaterally; no wheezes, rales, or rhonchi   ABDOMEN: normal bowel sounds, soft, no tenderness, no distention  EXTREMITIES: peripheral pulses normal; no clubbing, cyanosis, or edema  NEURO: no focal findings   SKIN: normal without suspicious lesions on exposed skin  ROS:  Positive for joint pains, depression, palpitations.  Except as noted in HPI, is otherwise reviewed in detail and a 12 point review of systems is negative.  ROS reviewed and is unchanged    Labs:  Lab Results   Component Value Date     11/05/2014    K 3.6 05/08/2024     05/08/2024    CREATININE 0.58 (L) 05/08/2024    BUN 12 05/08/2024    CO2 28 05/08/2024    ALT 13 05/07/2024    AST 19 05/07/2024    GLUF 105 (H) 01/04/2024    HGBA1C 5.1 08/08/2019    WBC 5.37 05/08/2024    HGB 13.2 05/08/2024    HCT 41.0 05/08/2024     05/08/2024       Lab Results   Component Value Date    CHOL 243 (H) 01/09/2017    CHOL 190 11/05/2014     Lab Results   Component Value Date    HDL 50 06/24/2024    HDL 45 (L) 01/04/2024    HDL 50 05/11/2023     Lab Results   Component Value Date    LDLCALC 69 06/24/2024    LDLCALC 74 01/04/2024    LDLCALC 74 05/11/2023     Lab Results   Component Value Date    TRIG 119 " 06/24/2024    TRIG 103 01/04/2024    TRIG 133 05/11/2023       Testing:  [facility] 5/30/24  Patient had a min HR of 40 bpm, max HR of 182 bpm, and avg HR of 56 bpm. Predominant underlying rhythm was Sinus Rhythm. 51 Supraventricular Tachycardia runs occurred, the run with the fastest interval lasting 5 beats with a max rate of 182 bpm, the longest lasting 8.7 secs with an avg rate of 147 bpm. Supraventricular Tachycardia was detected within +/- 45 seconds of symptomatic patient event(s). Isolated SVEs were rare (<1.0%), SVE Couplets were rare (<1.0%), and SVE Triplets were rare (<1.0%). Isolated VEs were rare (<1.0%, 463), VE Couplets were rare (<1.0%, 5), and VE Triplets were rare (<1.0%, 1)    Echo 5/7/24:  Left Ventricle: Left ventricular cavity size is normal. Wall thickness is mildly increased. The left ventricular ejection fraction is 60%. Systolic function is normal. Wall motion is normal. Diastolic function is mildly abnormal, consistent with grade I (abnormal) relaxation.    Mitral Valve: There is mild regurgitation.    Tricuspid Valve: There is mild regurgitation.    Holter   1.  Sinus mechanism throughout with rare PAC's and PVC's.  Few short bursts of atrial tachycardia.     Echo 8/2019:  LEFT VENTRICLE:  Systolic function was normal. Ejection fraction was estimated to be 60 %.  There were no regional wall motion abnormalities.     RIGHT VENTRICLE:  The size was normal.  Systolic function was normal.     MITRAL VALVE:  There was trace regurgitation.     TRICUSPID VALVE:  There was trace regurgitation.  Estimated peak PA pressure was 30 mmHg.     PULMONIC VALVE:  There was trace regurgitation.

## 2025-01-08 ENCOUNTER — RESULTS FOLLOW-UP (OUTPATIENT)
Dept: FAMILY MEDICINE CLINIC | Facility: CLINIC | Age: 85
End: 2025-01-08

## 2025-01-21 ENCOUNTER — TELEPHONE (OUTPATIENT)
Age: 85
End: 2025-01-21

## 2025-01-21 DIAGNOSIS — I10 ESSENTIAL HYPERTENSION: ICD-10-CM

## 2025-01-21 DIAGNOSIS — R00.2 PALPITATIONS: ICD-10-CM

## 2025-01-21 RX ORDER — AMLODIPINE BESYLATE 2.5 MG/1
2.5 TABLET ORAL DAILY
Qty: 90 TABLET | Refills: 5 | Status: SHIPPED | OUTPATIENT
Start: 2025-01-21 | End: 2025-04-21

## 2025-01-21 RX ORDER — LISINOPRIL 40 MG/1
40 TABLET ORAL DAILY
Qty: 90 TABLET | Refills: 1 | Status: SHIPPED | OUTPATIENT
Start: 2025-01-21

## 2025-01-21 RX ORDER — CARVEDILOL 12.5 MG/1
12.5 TABLET ORAL 2 TIMES DAILY WITH MEALS
Qty: 180 TABLET | Refills: 5 | Status: SHIPPED | OUTPATIENT
Start: 2025-01-21

## 2025-01-21 NOTE — TELEPHONE ENCOUNTER
Anusha is calling in regards to the following medications:  Amlodipine 2.5 mg  Carvedilol 12.5 mg  Lisinopril 40 mg    She is requesting all these medications be switched to be filled at [facility] at 90 day supply.     Anusha is wondering if its possible to get a limited (2 week) supply sent to the local [facility] so she does not run out waiting for the mail delivery service.     She has a 1 week supply of both Carvedilol & Amlodipine and she has 1 more pill of Lisinopril (she will run out of this tomorrow)    Please advise, thank you.

## 2025-01-24 ENCOUNTER — RA CDI HCC (OUTPATIENT)
Dept: OTHER | Facility: HOSPITAL | Age: 85
End: 2025-01-24

## 2025-01-24 NOTE — PROGRESS NOTES
HCC coding opportunities          Chart Reviewed number of suggestions sent to Provider: 2   E66.01, Z68.41    Patients Insurance     Medicare Insurance: [employer]

## 2025-01-27 ENCOUNTER — APPOINTMENT (OUTPATIENT)
Dept: LAB | Facility: CLINIC | Age: 85
End: 2025-01-27
Payer: COMMERCIAL

## 2025-01-27 DIAGNOSIS — R79.89 LOW TSH LEVEL: ICD-10-CM

## 2025-01-28 ENCOUNTER — RESULTS FOLLOW-UP (OUTPATIENT)
Dept: FAMILY MEDICINE CLINIC | Facility: CLINIC | Age: 85
End: 2025-01-28

## 2025-01-30 ENCOUNTER — OFFICE VISIT (OUTPATIENT)
Dept: FAMILY MEDICINE CLINIC | Facility: CLINIC | Age: 85
End: 2025-01-30
Payer: COMMERCIAL

## 2025-01-30 VITALS
TEMPERATURE: 97.4 F | WEIGHT: 236.2 LBS | DIASTOLIC BLOOD PRESSURE: 70 MMHG | OXYGEN SATURATION: 94 % | HEIGHT: 64 IN | BODY MASS INDEX: 40.33 KG/M2 | SYSTOLIC BLOOD PRESSURE: 130 MMHG | HEART RATE: 78 BPM

## 2025-01-30 DIAGNOSIS — G40.909 NONINTRACTABLE EPILEPSY WITHOUT STATUS EPILEPTICUS, UNSPECIFIED EPILEPSY TYPE (HCC): ICD-10-CM

## 2025-01-30 DIAGNOSIS — I47.10 PSVT (PAROXYSMAL SUPRAVENTRICULAR TACHYCARDIA) (HCC): ICD-10-CM

## 2025-01-30 DIAGNOSIS — I10 ESSENTIAL HYPERTENSION: Primary | ICD-10-CM

## 2025-01-30 DIAGNOSIS — E78.5 HYPERLIPIDEMIA, UNSPECIFIED HYPERLIPIDEMIA TYPE: ICD-10-CM

## 2025-01-30 DIAGNOSIS — E66.01 MORBID OBESITY WITH BMI OF 40.0-44.9, ADULT (HCC): ICD-10-CM

## 2025-01-30 PROCEDURE — 99214 OFFICE O/P EST MOD 30 MIN: CPT | Performed by: FAMILY MEDICINE

## 2025-01-30 PROCEDURE — G2211 COMPLEX E/M VISIT ADD ON: HCPCS | Performed by: FAMILY MEDICINE

## 2025-01-31 NOTE — PROGRESS NOTES
Name: Anusha Booth      : 1940      MRN: [id number]  Encounter Provider: Silke Gaspar DO  Encounter Date: 2025   Encounter department: [facility]    Assessment & Plan  Essential hypertension  Continue with ACE inhibitor therapy.       Hyperlipidemia, unspecified hyperlipidemia type  Continue statin therapy.  Orders:    Comprehensive metabolic panel; Future    Lipid panel; Future    PSVT (paroxysmal supraventricular tachycardia) (HCC)  Stable on Coreg therapy.       Nonintractable epilepsy without status epilepticus, unspecified epilepsy type (HCC)  Stable on Keppra therapy.       Morbid obesity with BMI of 40.0-44.9, adult (HCC)  BMI Counseling: Body mass index is 41.18 kg/m². The BMI is above normal. No BMI follow-up plan is appropriate. Patient is 67+ years old and weight reduction/weight gain would further complicate their underlying physical disability.   (Diffuse arthralgia)            History of Present Illness     The patient presents for follow-up of hypertension, hypercholesterolemia, PSVT, epilepsy and increased BMI.  She is doing well denies any chest pain, shortness of breath, no recent seizures, or peripheral edema.    Epilepsy  Review of Systems   Constitutional:  Negative for appetite change, chills and fever.   HENT:  Negative for ear pain, facial swelling, rhinorrhea, sinus pain, sore throat and trouble swallowing.    Eyes:  Negative for discharge and redness.   Respiratory:  Negative for chest tightness, shortness of breath and wheezing.    Cardiovascular:  Negative for chest pain and palpitations.   Gastrointestinal:  Negative for abdominal pain, diarrhea, nausea and vomiting.   Endocrine: Negative for polyuria.   Genitourinary:  Negative for dysuria and urgency.   Musculoskeletal:  Positive for arthralgias. Negative for back pain.        Positive diffuse arthralgia   Skin:  Negative for rash.   Neurological:  Negative for dizziness, weakness  and headaches.   Hematological:  Negative for adenopathy.   Psychiatric/Behavioral:  Negative for behavioral problems, confusion and sleep disturbance.    All other systems reviewed and are negative.    Past Medical History:   Diagnosis Date    Acute venous embolism and thrombosis of deep vessels of distal lower extremity (HCC) 01/27/2014    Blood type A+     Closed fracture of right distal radius and ulna 03/06/2019    Convulsions (HCC)     Deviated septum 05/2000    Fracture of c2     Gallstones 09/15/2019    Hiatal hernia     1976    HL (hearing loss)     Hyperlipidemia     Osteoarthritis, localized, knee     Seizure (HCC)     1990    Seizures (HCC)     Spondylosis     Tinnitus      Past Surgical History:   Procedure Laterality Date    BREAST BIOPSY Right over 10 years    benign    CATARACT EXTRACTION      COMBINED HYSTEROSCOPY DIAGNOSTIC / D&C      ESOPHAGOGASTRODUODENOSCOPY      diagnostic    HAND SURGERY      HERNIA REPAIR      HYSTERECTOMY      KNEE ARTHROPLASTY      Revision;total    KNEE ARTHROSCOPY Right     1997    NASAL SEPTUM SURGERY      Deviation repair    OR TENDON SHEATH INCISION Right 11/21/2019    Procedure: RING TRIGGER FINGER RELEASE;  Surgeon: Victorino Ventura MD;  [facility];  Service: Orthopedics    REPLACEMENT TOTAL KNEE Right     2000    TONSILLECTOMY      1948    TUBAL LIGATION      TUMOR REMOVAL Left 1956    left index finger     Family History   Problem Relation Age of Onset    Prostate cancer Father     Other Family         back problem    Arthritis Family     Arthritis Mother     No Known Problems Sister     No Known Problems Daughter     No Known Problems Sister     No Known Problems Paternal Aunt     No Known Problems Paternal Aunt     No Known Problems Paternal Aunt     No Known Problems Paternal Aunt     Prostate cancer Brother      Social History     Tobacco Use    Smoking status: Former     Passive exposure: Past    Smokeless tobacco: Never   Vaping Use    Vaping  status: Never Used   Substance and Sexual Activity    Alcohol use: Yes     Comment: social use    Drug use: No    Sexual activity: Not on file     Current Outpatient Medications on File Prior to Visit   Medication Sig    amLODIPine (NORVASC) 2.5 mg tablet Take 1 tablet (2.5 mg total) by mouth daily    Ascorbic Acid (vitamin C) 1000 MG tablet Take 1,000 mg by mouth daily    aspirin (ECOTRIN LOW STRENGTH) 81 mg EC tablet Take by mouth daily     atorvastatin (LIPITOR) 40 mg tablet TAKE 1 TABLET DAILY WITH DINNER    Calcium Carb-Cholecalciferol (CALCIUM 1000 + D PO) Take 1 tablet by mouth daily    carvedilol (COREG) 12.5 mg tablet Take 1 tablet (12.5 mg total) by mouth 2 (two) times a day with meals    cholecalciferol (VITAMIN D3) 1,000 units tablet Take 1,000 Units by mouth daily    furosemide (LASIX) 20 mg tablet Take 1 tablet (20 mg total) by mouth daily as needed (for leg swelling. Contact your PCP for further refills)    levETIRAcetam (KEPPRA) 250 mg tablet TAKE ONE AND ONE-HALF TABLETS TWICE A DAY    lisinopril (ZESTRIL) 40 mg tablet Take 1 tablet (40 mg total) by mouth daily    Multiple Vitamin (MULTIVITAMINS PO) Take 1 capsule by mouth daily    Zinc 100 MG TABS Take by mouth    fluorouracil (EFUDEX) 5 % cream Apply topically 2 (two) times a day (Patient not taking: Reported on 1/30/2025)     Allergies   Allergen Reactions    Adhesive  [Medical Tape]      Other reaction(s): Unknown Reaction    Celebrex [Celecoxib] Itching and GI Intolerance     Voice change    Latex      Other reaction(s): Unknown Reaction    Midazolam     Penicillins     Thiopental      Immunization History   Administered Date(s) Administered    COVID-19 PFIZER VACCINE 0.3 ML IM 01/03/2022, 01/24/2022    Influenza Split High Dose Preservative Free IM 01/08/2013, 10/21/2014, 10/07/2015, 01/09/2017, 09/21/2017    Influenza, high dose seasonal 0.7 mL 10/08/2018, 09/18/2019, 10/08/2020, 11/29/2021, 01/12/2023, 01/08/2024    Pneumococcal Conjugate  "13-Valent 10/07/2015, 12/16/2015    Pneumococcal Polysaccharide PPV23 09/21/2017    Td (adult), adsorbed 12/16/2015    Tdap 08/01/2022    Zoster 04/16/2013     Objective   /70   Pulse 78   Temp (!) 97.4 °F (36.3 °C)   Ht 5' 3.5\" (1.613 m)   Wt 107 kg (236 lb 3.2 oz)   SpO2 94%   BMI 41.18 kg/m²     Physical Exam  Vitals and nursing note reviewed.   Constitutional:       General: She is not in acute distress.     Appearance: Normal appearance. She is well-developed. She is not ill-appearing or diaphoretic.   HENT:      Head: Normocephalic and atraumatic.      Right Ear: Tympanic membrane, ear canal and external ear normal.      Left Ear: Tympanic membrane, ear canal and external ear normal.      Nose: Nose normal. No congestion or rhinorrhea.      Mouth/Throat:      Mouth: Mucous membranes are moist.      Pharynx: Oropharynx is clear. No oropharyngeal exudate or posterior oropharyngeal erythema.   Eyes:      General: No scleral icterus.        Right eye: No discharge.         Left eye: No discharge.      Extraocular Movements: Extraocular movements intact.      Conjunctiva/sclera: Conjunctivae normal.      Pupils: Pupils are equal, round, and reactive to light.   Neck:      Thyroid: No thyromegaly.      Vascular: No carotid bruit or JVD.      Trachea: No tracheal deviation.   Cardiovascular:      Rate and Rhythm: Normal rate and regular rhythm.      Pulses: Normal pulses.      Heart sounds: Normal heart sounds. No murmur heard.  Pulmonary:      Effort: Pulmonary effort is normal. No respiratory distress.      Breath sounds: Normal breath sounds. No stridor. No wheezing, rhonchi or rales.   Abdominal:      General: Abdomen is flat. Bowel sounds are normal. There is no distension.      Palpations: Abdomen is soft. There is no mass.      Tenderness: There is no abdominal tenderness. There is no guarding or rebound.   Musculoskeletal:         General: No swelling, tenderness or deformity. Normal range of " motion.      Cervical back: Normal range of motion and neck supple. No rigidity.      Right lower leg: No edema.      Left lower leg: No edema.   Lymphadenopathy:      Cervical: No cervical adenopathy.   Skin:     General: Skin is warm and dry.      Capillary Refill: Capillary refill takes less than 2 seconds.      Coloration: Skin is not jaundiced.      Findings: No bruising, erythema or rash.   Neurological:      General: No focal deficit present.      Mental Status: She is alert and oriented to person, place, and time.      Cranial Nerves: No cranial nerve deficit.      Sensory: No sensory deficit.      Motor: No abnormal muscle tone.      Coordination: Coordination normal.      Gait: Gait normal.      Deep Tendon Reflexes: Reflexes are normal and symmetric. Reflexes normal.   Psychiatric:         Mood and Affect: Mood normal.         Behavior: Behavior normal.         Thought Content: Thought content normal.         Judgment: Judgment normal.       Administrative Statements   I have spent a total time of 20 minutes in caring for this patient on the day of the visit/encounter including Diagnostic results, Prognosis, Risks and benefits of tx options, Instructions for management, Patient and family education, and Importance of tx compliance. Topics discussed with the patient / family include symptom assessment and management, medication review, medication adjustment, psychosocial support, and advanced directives.

## 2025-01-31 NOTE — ASSESSMENT & PLAN NOTE
BMI Counseling: Body mass index is 41.18 kg/m². The BMI is above normal. No BMI follow-up plan is appropriate. Patient is 67+ years old and weight reduction/weight gain would further complicate their underlying physical disability.   (Diffuse arthralgia)

## 2025-01-31 NOTE — ASSESSMENT & PLAN NOTE
Continue statin therapy.  Orders:    Comprehensive metabolic panel; Future    Lipid panel; Future

## 2025-02-10 DIAGNOSIS — E78.5 HYPERLIPIDEMIA: ICD-10-CM

## 2025-02-11 RX ORDER — ATORVASTATIN CALCIUM 40 MG/1
40 TABLET, FILM COATED ORAL
Qty: 90 TABLET | Refills: 1 | Status: SHIPPED | OUTPATIENT
Start: 2025-02-11

## 2025-03-17 ENCOUNTER — VBI (OUTPATIENT)
Dept: ADMINISTRATIVE | Facility: OTHER | Age: 85
End: 2025-03-17

## 2025-03-17 NOTE — TELEPHONE ENCOUNTER
03/17/25 11:31 AM     Chart reviewed for Blood Pressure ; nothing is submitted to the patient's insurance at this time.     Ned Short MA   [facility]

## 2025-03-25 ENCOUNTER — HOSPITAL ENCOUNTER (EMERGENCY)
Facility: HOSPITAL | Age: 85
Discharge: HOME | End: 2025-03-25
Attending: EMERGENCY MEDICINE
Payer: COMMERCIAL

## 2025-03-25 ENCOUNTER — APPOINTMENT (EMERGENCY)
Dept: RADIOLOGY | Facility: HOSPITAL | Age: 85
End: 2025-03-25
Payer: COMMERCIAL

## 2025-03-25 ENCOUNTER — APPOINTMENT (EMERGENCY)
Dept: CT IMAGING | Facility: HOSPITAL | Age: 85
End: 2025-03-25
Payer: COMMERCIAL

## 2025-03-25 VITALS
DIASTOLIC BLOOD PRESSURE: 70 MMHG | RESPIRATION RATE: 20 BRPM | HEART RATE: 75 BPM | TEMPERATURE: 98.1 F | OXYGEN SATURATION: 94 % | SYSTOLIC BLOOD PRESSURE: 156 MMHG

## 2025-03-25 DIAGNOSIS — R91.8 MULTIPLE LUNG NODULES ON CT: ICD-10-CM

## 2025-03-25 DIAGNOSIS — S20.219A RIB CONTUSION: ICD-10-CM
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
## 2025-03-25 PROCEDURE — 99285 EMERGENCY DEPT VISIT HI MDM: CPT | Performed by: EMERGENCY MEDICINE

## 2025-03-25 PROCEDURE — 70450 CT HEAD/BRAIN W/O DYE: CPT

## 2025-03-25 PROCEDURE — 73590 X-RAY EXAM OF LOWER LEG: CPT

## 2025-03-25 PROCEDURE — 99284 EMERGENCY DEPT VISIT MOD MDM: CPT

## 2025-03-25 PROCEDURE — 73130 X-RAY EXAM OF HAND: CPT

## 2025-03-25 PROCEDURE — 71250 CT THORAX DX C-: CPT

## 2025-03-25 RX ORDER — ACETAMINOPHEN 325 MG/1
975 TABLET ORAL ONCE
Status: COMPLETED | OUTPATIENT
Start: 2025-03-25 | End: 2025-03-25

## 2025-03-25 RX ADMIN — ACETAMINOPHEN 975 MG: 325 TABLET, FILM COATED ORAL at 16:07

## 2025-03-25 NOTE — INCIDENTAL FINDINGS
The following findings require follow up:  Radiographic finding   Finding: Multiple right lung nodules   Follow up required: Repeat chest CT    Follow up should be done within 3-6 month(s)    Please notify the following clinician to assist with the follow up:   Dr. Gaspar    Incidental finding results were discussed with the Patient by Navi Jordan MD on 03/25/25.   They expressed understanding and all questions answered.
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
## 2025-03-31 ENCOUNTER — TELEPHONE (OUTPATIENT)
Age: 85
End: 2025-03-31
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
## 2025-04-01 ENCOUNTER — HOSPITAL ENCOUNTER (INPATIENT)
Facility: HOSPITAL | Age: 85
LOS: 1 days | Discharge: HOME | DRG: 066 | End: 2025-04-03
Attending: EMERGENCY MEDICINE | Admitting: INTERNAL MEDICINE
Payer: COMMERCIAL

## 2025-04-01 ENCOUNTER — OFFICE VISIT (OUTPATIENT)
Dept: FAMILY MEDICINE CLINIC | Facility: CLINIC | Age: 85
End: 2025-04-01

## 2025-04-01 ENCOUNTER — APPOINTMENT (EMERGENCY)
Dept: CT IMAGING | Facility: HOSPITAL | Age: 85
DRG: 066 | End: 2025-04-01
Payer: COMMERCIAL

## 2025-04-01 VITALS
HEART RATE: 65 BPM | TEMPERATURE: 97.9 F | BODY MASS INDEX: 38.93 KG/M2 | HEIGHT: 64 IN | WEIGHT: 228 LBS | OXYGEN SATURATION: 98 % | DIASTOLIC BLOOD PRESSURE: 78 MMHG | SYSTOLIC BLOOD PRESSURE: 128 MMHG

## 2025-04-01 DIAGNOSIS — I63.9 CVA (CEREBRAL VASCULAR ACCIDENT) (HCC): ICD-10-CM

## 2025-04-01 DIAGNOSIS — S06.0XAA CONCUSSION: ICD-10-CM

## 2025-04-01 DIAGNOSIS — R53.1 GENERALIZED WEAKNESS: ICD-10-CM

## 2025-04-01 DIAGNOSIS — R41.82 ALTERED MENTAL STATUS: Primary | ICD-10-CM

## 2025-04-01 DIAGNOSIS — T14.8XXA HEMATOMA: ICD-10-CM

## 2025-04-01 DIAGNOSIS — R55 SYNCOPE, UNSPECIFIED SYNCOPE TYPE: ICD-10-CM

## 2025-04-01 DIAGNOSIS — I63.9 STROKE (HCC): ICD-10-CM
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
## 2025-04-01 DIAGNOSIS — R41.89 COGNITIVE IMPAIRMENT: ICD-10-CM

## 2025-04-01 DIAGNOSIS — R41.82 ALTERED MENTAL STATUS, UNSPECIFIED ALTERED MENTAL STATUS TYPE: Primary | ICD-10-CM

## 2025-04-01 LAB
ALBUMIN SERPL BCG-MCNC: 3.9 G/DL (ref 3.5–5)
ALP SERPL-CCNC: 63 U/L (ref 34–104)
ALT SERPL W P-5'-P-CCNC: 11 U/L (ref 7–52)
AMMONIA PLAS-SCNC: 29 UMOL/L (ref 18–72)
ANION GAP SERPL CALCULATED.3IONS-SCNC: 6 MMOL/L (ref 4–13)
APAP SERPL-MCNC: <2 UG/ML (ref 10–20)
AST SERPL W P-5'-P-CCNC: 19 U/L (ref 13–39)
BASOPHILS # BLD AUTO: 0.02 THOUSANDS/ÂΜL (ref 0–0.1)
BASOPHILS NFR BLD AUTO: 0 % (ref 0–1)
BILIRUB SERPL-MCNC: 0.74 MG/DL (ref 0.2–1)
BILIRUB UR QL STRIP: NEGATIVE
BUN SERPL-MCNC: 13 MG/DL (ref 5–25)
CALCIUM SERPL-MCNC: 10.1 MG/DL (ref 8.4–10.2)
CHLORIDE SERPL-SCNC: 106 MMOL/L (ref 96–108)
CLARITY UR: CLEAR
CO2 SERPL-SCNC: 29 MMOL/L (ref 21–32)
COLOR UR: NORMAL
CREAT SERPL-MCNC: 0.62 MG/DL (ref 0.6–1.3)
EOSINOPHIL # BLD AUTO: 0.06 THOUSAND/ÂΜL (ref 0–0.61)
EOSINOPHIL NFR BLD AUTO: 1 % (ref 0–6)
ERYTHROCYTE [DISTWIDTH] IN BLOOD BY AUTOMATED COUNT: 12.4 % (ref 11.6–15.1)
ETHANOL SERPL-MCNC: 14 MG/DL
FLUAV AG UPPER RESP QL IA.RAPID: NEGATIVE
FLUBV AG UPPER RESP QL IA.RAPID: NEGATIVE
GFR SERPL CREATININE-BSD FRML MDRD: 84 ML/MIN/1.73SQ M
GLUCOSE SERPL-MCNC: 115 MG/DL (ref 65–140)
GLUCOSE UR STRIP-MCNC: NEGATIVE MG/DL
HCT VFR BLD AUTO: 40.2 % (ref 34.8–46.1)
HGB BLD-MCNC: 13.6 G/DL (ref 11.5–15.4)
HGB UR QL STRIP.AUTO: NEGATIVE
IMM GRANULOCYTES # BLD AUTO: 0.01 THOUSAND/UL (ref 0–0.2)
IMM GRANULOCYTES NFR BLD AUTO: 0 % (ref 0–2)
KETONES UR STRIP-MCNC: NEGATIVE MG/DL
LEUKOCYTE ESTERASE UR QL STRIP: NEGATIVE
LYMPHOCYTES # BLD AUTO: 1.03 THOUSANDS/ÂΜL (ref 0.6–4.47)
LYMPHOCYTES NFR BLD AUTO: 20 % (ref 14–44)
MCH RBC QN AUTO: 30.7 PG (ref 26.8–34.3)
MCHC RBC AUTO-ENTMCNC: 33.8 G/DL (ref 31.4–37.4)
MCV RBC AUTO: 91 FL (ref 82–98)
MONOCYTES # BLD AUTO: 0.36 THOUSAND/ÂΜL (ref 0.17–1.22)
MONOCYTES NFR BLD AUTO: 7 % (ref 4–12)
NEUTROPHILS # BLD AUTO: 3.64 THOUSANDS/ÂΜL (ref 1.85–7.62)
NEUTS SEG NFR BLD AUTO: 72 % (ref 43–75)
NITRITE UR QL STRIP: NEGATIVE
NRBC BLD AUTO-RTO: 0 /100 WBCS
PH UR STRIP.AUTO: 6.5 [PH]
PLATELET # BLD AUTO: 165 THOUSANDS/UL (ref 149–390)
PMV BLD AUTO: 9.4 FL (ref 8.9–12.7)
POTASSIUM SERPL-SCNC: 3.9 MMOL/L (ref 3.5–5.3)
PROT SERPL-MCNC: 6.4 G/DL (ref 6.4–8.4)
PROT UR STRIP-MCNC: NEGATIVE MG/DL
RBC # BLD AUTO: 4.43 MILLION/UL (ref 3.81–5.12)
SALICYLATES SERPL-MCNC: <5 MG/DL (ref 3–20)
SARS-COV+SARS-COV-2 AG RESP QL IA.RAPID: NEGATIVE
SODIUM SERPL-SCNC: 141 MMOL/L (ref 135–147)
SP GR UR STRIP.AUTO: 1.01 (ref 1–1.03)
T4 FREE SERPL-MCNC: 1.11 NG/DL (ref 0.61–1.12)
TSH SERPL DL<=0.05 MIU/L-ACNC: 0.02 UIU/ML (ref 0.45–4.5)
UROBILINOGEN UR STRIP-ACNC: <2 MG/DL
WBC # BLD AUTO: 5.12 THOUSAND/UL (ref 4.31–10.16)

## 2025-04-01 PROCEDURE — 81003 URINALYSIS AUTO W/O SCOPE: CPT

## 2025-04-01 PROCEDURE — 70496 CT ANGIOGRAPHY HEAD: CPT

## 2025-04-01 PROCEDURE — 82077 ASSAY SPEC XCP UR&BREATH IA: CPT

## 2025-04-01 PROCEDURE — 82140 ASSAY OF AMMONIA: CPT

## 2025-04-01 PROCEDURE — 85025 COMPLETE CBC W/AUTO DIFF WBC: CPT

## 2025-04-01 PROCEDURE — 36415 COLL VENOUS BLD VENIPUNCTURE: CPT

## 2025-04-01 PROCEDURE — 99214 OFFICE O/P EST MOD 30 MIN: CPT | Performed by: FAMILY MEDICINE

## 2025-04-01 PROCEDURE — 70498 CT ANGIOGRAPHY NECK: CPT

## 2025-04-01 PROCEDURE — 80179 DRUG ASSAY SALICYLATE: CPT

## 2025-04-01 PROCEDURE — 84439 ASSAY OF FREE THYROXINE: CPT

## 2025-04-01 PROCEDURE — 93005 ELECTROCARDIOGRAM TRACING: CPT

## 2025-04-01 PROCEDURE — 87804 INFLUENZA ASSAY W/OPTIC: CPT

## 2025-04-01 PROCEDURE — 84443 ASSAY THYROID STIM HORMONE: CPT

## 2025-04-01 PROCEDURE — 99285 EMERGENCY DEPT VISIT HI MDM: CPT

## 2025-04-01 PROCEDURE — 99223 1ST HOSP IP/OBS HIGH 75: CPT | Performed by: INTERNAL MEDICINE

## 2025-04-01 PROCEDURE — 99285 EMERGENCY DEPT VISIT HI MDM: CPT | Performed by: EMERGENCY MEDICINE

## 2025-04-01 PROCEDURE — 80053 COMPREHEN METABOLIC PANEL: CPT

## 2025-04-01 PROCEDURE — 80143 DRUG ASSAY ACETAMINOPHEN: CPT

## 2025-04-01 PROCEDURE — 87811 SARS-COV-2 COVID19 W/OPTIC: CPT

## 2025-04-01 RX ORDER — LISINOPRIL 20 MG/1
40 TABLET ORAL DAILY
Status: DISCONTINUED | OUTPATIENT
Start: 2025-04-02 | End: 2025-04-03 | Stop reason: HOSPADM

## 2025-04-01 RX ORDER — CARVEDILOL 12.5 MG/1
12.5 TABLET ORAL 2 TIMES DAILY WITH MEALS
Status: DISCONTINUED | OUTPATIENT
Start: 2025-04-01 | End: 2025-04-03 | Stop reason: HOSPADM

## 2025-04-01 RX ORDER — ENOXAPARIN SODIUM 100 MG/ML
40 INJECTION SUBCUTANEOUS DAILY
Status: DISCONTINUED | OUTPATIENT
Start: 2025-04-02 | End: 2025-04-03 | Stop reason: HOSPADM

## 2025-04-01 RX ORDER — AMLODIPINE BESYLATE 2.5 MG/1
2.5 TABLET ORAL DAILY
Status: DISCONTINUED | OUTPATIENT
Start: 2025-04-02 | End: 2025-04-03 | Stop reason: HOSPADM

## 2025-04-01 RX ORDER — LEVETIRACETAM 250 MG/1
375 TABLET ORAL 2 TIMES DAILY
Status: DISCONTINUED | OUTPATIENT
Start: 2025-04-01 | End: 2025-04-03 | Stop reason: HOSPADM

## 2025-04-01 RX ORDER — ASPIRIN 81 MG/1
81 TABLET ORAL DAILY
Status: DISCONTINUED | OUTPATIENT
Start: 2025-04-02 | End: 2025-04-03

## 2025-04-01 RX ORDER — FUROSEMIDE 20 MG/1
20 TABLET ORAL DAILY PRN
Status: DISCONTINUED | OUTPATIENT
Start: 2025-04-01 | End: 2025-04-03 | Stop reason: HOSPADM

## 2025-04-01 RX ORDER — ATORVASTATIN CALCIUM 40 MG/1
40 TABLET, FILM COATED ORAL
Status: DISCONTINUED | OUTPATIENT
Start: 2025-04-02 | End: 2025-04-03 | Stop reason: HOSPADM

## 2025-04-01 RX ORDER — ASCORBIC ACID 500 MG
1000 TABLET ORAL DAILY
Status: DISCONTINUED | OUTPATIENT
Start: 2025-04-02 | End: 2025-04-03 | Stop reason: HOSPADM

## 2025-04-01 RX ADMIN — CARVEDILOL 12.5 MG: 12.5 TABLET, FILM COATED ORAL at 17:01

## 2025-04-01 RX ADMIN — IOHEXOL 85 ML: 350 INJECTION, SOLUTION INTRAVENOUS at 14:29

## 2025-04-01 RX ADMIN — LEVETIRACETAM 375 MG: 250 TABLET, FILM COATED ORAL at 20:49

## 2025-04-01 NOTE — PLAN OF CARE
Problem: PAIN - ADULT  Goal: Verbalizes/displays adequate comfort level or baseline comfort level  Description: Interventions:- Encourage patient to monitor pain and request assistance- Assess pain using appropriate pain scale- Administer analgesics based on type and severity of pain and evaluate response- Implement non-pharmacological measures as appropriate and evaluate response- Consider cultural and social influences on pain and pain management- Notify physician/advanced practitioner if interventions unsuccessful or patient reports new pain  Outcome: Progressing     Problem: INFECTION - ADULT  Goal: Absence or prevention of progression during hospitalization  Description: INTERVENTIONS:- Assess and monitor for signs and symptoms of infection- Monitor lab/diagnostic results- Monitor all insertion sites, i.e. indwelling lines, tubes, and drains- Monitor endotracheal if appropriate and nasal secretions for changes in amount and color- [place] appropriate cooling/warming therapies per order- Administer medications as ordered- Instruct and encourage patient and family to use good hand hygiene technique- Identify and instruct in appropriate isolation precautions for identified infection/condition  Outcome: Progressing  Goal: Absence of fever/infection during neutropenic period  Description: INTERVENTIONS:- Monitor WBC  Outcome: Progressing     Problem: SAFETY ADULT  Goal: Patient will remain free of falls  Description: INTERVENTIONS:- Educate patient/family on patient safety including physical limitations- Instruct patient to call for assistance with activity - Consult OT/PT to assist with strengthening/mobility - Keep Call bell within reach- Keep bed low and locked with side rails adjusted as appropriate- Keep care items and personal belongings within reach- Initiate and maintain comfort rounds- Make Fall Risk Sign visible to staff- Offer Toileting every  Hours, in advance of need- Initiate/Maintain alarm- Obtain  necessary fall risk management equipment:- Apply yellow socks and bracelet for high fall risk patients- Consider moving patient to room near nurses station  Outcome: Progressing  Goal: Maintain or return to baseline ADL function  Description: INTERVENTIONS:-  Assess patient's ability to carry out ADLs; assess patient's baseline for ADL function and identify physical deficits which impact ability to perform ADLs (bathing, care of mouth/teeth, toileting, grooming, dressing, etc.)- Assess/evaluate cause of self-care deficits - Assess range of motion- Assess patient's mobility; develop plan if impaired- Assess patient's need for assistive devices and provide as appropriate- Encourage maximum independence but intervene and supervise when necessary- Involve family in performance of ADLs- Assess for home care needs following discharge - Consider OT consult to assist with ADL evaluation and planning for discharge- Provide patient education as appropriate  Outcome: Progressing  Goal: Maintains/Returns to pre admission functional level  Description: INTERVENTIONS:- Perform AM-PAC 6 Click Basic Mobility/ Daily Activity assessment daily.- Set and communicate daily mobility goal to care team and patient/family/caregiver. - Collaborate with rehabilitation services on mobility goals if consulted- Perform Range of Motion  times a day.- Reposition patient every  hours.- Dangle patient  times a day- Stand patient  times a day- Ambulate patient  times a day- Out of bed to chair  times a day - Out of bed for meals  times a day- Out of bed for toileting- Record patient progress and toleration of activity level   Outcome: Progressing     Problem: DISCHARGE PLANNING  Goal: Discharge to home or other facility with appropriate resources  Description: INTERVENTIONS:- Identify barriers to discharge w/patient and caregiver- Arrange for needed discharge resources and transportation as appropriate- Identify discharge learning needs (meds, wound  care, etc.)- Arrange for interpretive services to assist at discharge as needed- Refer to Case Management Department for coordinating discharge planning if the patient needs post-hospital services based on physician/advanced practitioner order or complex needs related to functional status, cognitive ability, or social support system  Outcome: Progressing     Problem: Knowledge Deficit  Goal: Patient/family/caregiver demonstrates understanding of disease process, treatment plan, medications, and discharge instructions  Description: Complete learning assessment and assess knowledge base.Interventions:- Provide teaching at level of understanding- Provide teaching via preferred learning methods  Outcome: Progressing
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
## 2025-04-01 NOTE — ASSESSMENT & PLAN NOTE
Historical med of Keppra 250 mg 1.5 table bid in the morning and night.     ordered as 3 times daily during this inpatient setting

## 2025-04-02 ENCOUNTER — APPOINTMENT (OUTPATIENT)
Dept: MRI IMAGING | Facility: HOSPITAL | Age: 85
DRG: 066 | End: 2025-04-02
Payer: COMMERCIAL

## 2025-04-02 ENCOUNTER — APPOINTMENT (INPATIENT)
Dept: NON INVASIVE DIAGNOSTICS | Facility: HOSPITAL | Age: 85
DRG: 066 | End: 2025-04-02
Payer: COMMERCIAL

## 2025-04-02 PROBLEM — R79.89 DECREASED THYROID STIMULATING HORMONE (TSH) LEVEL: Status: ACTIVE | Noted: 2025-04-02

## 2025-04-02 PROBLEM — I63.9 CVA (CEREBRAL VASCULAR ACCIDENT) (HCC): Status: ACTIVE | Noted: 2025-04-02

## 2025-04-02 LAB
ANION GAP SERPL CALCULATED.3IONS-SCNC: 6 MMOL/L (ref 4–13)
AORTIC ROOT: 3.2 CM
ASCENDING AORTA: 3.3 CM
ATRIAL RATE: 65 BPM
BSA FOR ECHO PROCEDURE: 2.04 M2
BUN SERPL-MCNC: 12 MG/DL (ref 5–25)
CALCIUM SERPL-MCNC: 9.5 MG/DL (ref 8.4–10.2)
CHLORIDE SERPL-SCNC: 106 MMOL/L (ref 96–108)
CHOLEST SERPL-MCNC: 120 MG/DL (ref ?–200)
CO2 SERPL-SCNC: 28 MMOL/L (ref 21–32)
CREAT SERPL-MCNC: 0.58 MG/DL (ref 0.6–1.3)
E WAVE DECELERATION TIME: 317 MS
E/A RATIO: 0.85
ERYTHROCYTE [DISTWIDTH] IN BLOOD BY AUTOMATED COUNT: 12.4 % (ref 11.6–15.1)
EST. AVERAGE GLUCOSE BLD GHB EST-MCNC: 108 MG/DL
FRACTIONAL SHORTENING: 39 (ref 28–44)
GFR SERPL CREATININE-BSD FRML MDRD: 86 ML/MIN/1.73SQ M
GLUCOSE SERPL-MCNC: 95 MG/DL (ref 65–140)
HBA1C MFR BLD: 5.4 %
HCT VFR BLD AUTO: 40.4 % (ref 34.8–46.1)
HDLC SERPL-MCNC: 37 MG/DL
HGB BLD-MCNC: 13.5 G/DL (ref 11.5–15.4)
INTERVENTRICULAR SEPTUM IN DIASTOLE (PARASTERNAL SHORT AXIS VIEW): 1.5 CM
INTERVENTRICULAR SEPTUM: 1.5 CM (ref 0.6–1.1)
LAAS-AP2: 18.5 CM2
LAAS-AP4: 16.2 CM2
LDLC SERPL CALC-MCNC: 63 MG/DL (ref 0–100)
LEFT ATRIUM SIZE: 3.6 CM
LEFT ATRIUM VOLUME (MOD BIPLANE): 45 ML
LEFT ATRIUM VOLUME INDEX (MOD BIPLANE): 21.8 ML/M2
LEFT INTERNAL DIMENSION IN SYSTOLE: 3 CM (ref 2.1–4)
LEFT VENTRICULAR INTERNAL DIMENSION IN DIASTOLE: 4.9 CM (ref 3.5–6)
LEFT VENTRICULAR POSTERIOR WALL IN END DIASTOLE: 1.2 CM
LEFT VENTRICULAR STROKE VOLUME: 77 ML
LV EF US.2D.A4C+ESTIMATED: 52 %
LVSV (TEICH): 77 ML
MCH RBC QN AUTO: 30.8 PG (ref 26.8–34.3)
MCHC RBC AUTO-ENTMCNC: 33.4 G/DL (ref 31.4–37.4)
MCV RBC AUTO: 92 FL (ref 82–98)
MV E'TISSUE VEL-LAT: 8 CM/S
MV E'TISSUE VEL-SEP: 8 CM/S
MV PEAK A VEL: 0.66 M/S
MV PEAK E VEL: 56 CM/S
MV STENOSIS PRESSURE HALF TIME: 92 MS
MV VALVE AREA P 1/2 METHOD: 2.39
P AXIS: 72 DEGREES
PLATELET # BLD AUTO: 162 THOUSANDS/UL (ref 149–390)
PMV BLD AUTO: 9.8 FL (ref 8.9–12.7)
POTASSIUM SERPL-SCNC: 4.3 MMOL/L (ref 3.5–5.3)
PR INTERVAL: 152 MS
QRS AXIS: -9 DEGREES
QRSD INTERVAL: 88 MS
QT INTERVAL: 384 MS
QTC INTERVAL: 399 MS
RBC # BLD AUTO: 4.38 MILLION/UL (ref 3.81–5.12)
RIGHT ATRIUM AREA SYSTOLE A4C: 14.6 CM2
RIGHT VENTRICLE ID DIMENSION: 3.7 CM
SL CV LEFT ATRIUM LENGTH A2C: 5.3 CM
SL CV LV EF: 65
SL CV PED ECHO LEFT VENTRICLE DIASTOLIC VOLUME (MOD BIPLANE) 2D: 114 ML
SL CV PED ECHO LEFT VENTRICLE SYSTOLIC VOLUME (MOD BIPLANE) 2D: 36 ML
SODIUM SERPL-SCNC: 140 MMOL/L (ref 135–147)
T WAVE AXIS: 43 DEGREES
T3 SERPL-MCNC: 1.1 NG/ML (ref 0.9–1.8)
T3FREE SERPL-MCNC: 3.72 PG/ML (ref 2.5–3.9)
TRICUSPID ANNULAR PLANE SYSTOLIC EXCURSION: 2 CM
TRIGL SERPL-MCNC: 100 MG/DL (ref ?–150)
VENTRICULAR RATE: 65 BPM
WBC # BLD AUTO: 5.02 THOUSAND/UL (ref 4.31–10.16)

## 2025-04-02 PROCEDURE — 84481 FREE ASSAY (FT-3): CPT | Performed by: INTERNAL MEDICINE

## 2025-04-02 PROCEDURE — 93306 TTE W/DOPPLER COMPLETE: CPT

## 2025-04-02 PROCEDURE — 80061 LIPID PANEL: CPT

## 2025-04-02 PROCEDURE — 93306 TTE W/DOPPLER COMPLETE: CPT | Performed by: INTERNAL MEDICINE

## 2025-04-02 PROCEDURE — 83036 HEMOGLOBIN GLYCOSYLATED A1C: CPT

## 2025-04-02 PROCEDURE — 84480 ASSAY TRIIODOTHYRONINE (T3): CPT | Performed by: INTERNAL MEDICINE

## 2025-04-02 PROCEDURE — 99232 SBSQ HOSP IP/OBS MODERATE 35: CPT | Performed by: INTERNAL MEDICINE

## 2025-04-02 PROCEDURE — 80048 BASIC METABOLIC PNL TOTAL CA: CPT

## 2025-04-02 PROCEDURE — 85027 COMPLETE CBC AUTOMATED: CPT

## 2025-04-02 PROCEDURE — 70551 MRI BRAIN STEM W/O DYE: CPT

## 2025-04-02 PROCEDURE — 97167 OT EVAL HIGH COMPLEX 60 MIN: CPT

## 2025-04-02 PROCEDURE — 97161 PT EVAL LOW COMPLEX 20 MIN: CPT

## 2025-04-02 PROCEDURE — 93010 ELECTROCARDIOGRAM REPORT: CPT | Performed by: STUDENT IN AN ORGANIZED HEALTH CARE EDUCATION/TRAINING PROGRAM

## 2025-04-02 PROCEDURE — 97129 THER IVNTJ 1ST 15 MIN: CPT

## 2025-04-02 RX ADMIN — LEVETIRACETAM 375 MG: 250 TABLET, FILM COATED ORAL at 08:50

## 2025-04-02 RX ADMIN — CARVEDILOL 12.5 MG: 12.5 TABLET, FILM COATED ORAL at 07:55

## 2025-04-02 RX ADMIN — LISINOPRIL 40 MG: 20 TABLET ORAL at 08:01

## 2025-04-02 RX ADMIN — ASPIRIN 81 MG: 81 TABLET, COATED ORAL at 08:01

## 2025-04-02 RX ADMIN — OXYCODONE HYDROCHLORIDE AND ACETAMINOPHEN 1000 MG: 500 TABLET ORAL at 08:01

## 2025-04-02 RX ADMIN — ATORVASTATIN CALCIUM 40 MG: 40 TABLET, FILM COATED ORAL at 16:58

## 2025-04-02 RX ADMIN — ENOXAPARIN SODIUM 40 MG: 40 INJECTION SUBCUTANEOUS at 08:01

## 2025-04-02 RX ADMIN — AMLODIPINE BESYLATE 2.5 MG: 2.5 TABLET ORAL at 08:01

## 2025-04-02 RX ADMIN — CARVEDILOL 12.5 MG: 12.5 TABLET, FILM COATED ORAL at 16:58

## 2025-04-02 RX ADMIN — LEVETIRACETAM 375 MG: 250 TABLET, FILM COATED ORAL at 23:33

## 2025-04-02 NOTE — DISCHARGE INSTR - AVS FIRST PAGE
Dear Anusha Booth,     It was our pleasure to care for you here at [facility].  It is our hope that we were always able to exceed the expected standards for your care during your stay.  You were hospitalized due to memory issues.  You were cared for on the 4th floor by Arianne Will DO under the service of Diana Paulino, Tracie with the [facility] who covers for your primary care physician (PCP), Silke Gaspar DO, while you were hospitalized.  If you have any questions or concerns related to this hospitalization, you may contact us at .  For follow up as well as any medication refills, we recommend that you follow up with your primary care physician.  A registered nurse will reach out to you by phone within a few days after your discharge to answer any additional questions that you may have after going home.  However, at this time we provide for you here, the most important instructions / recommendations at discharge:     Notable Medication Adjustments -   Please START taking one tablet Plavix 75 mg by mouth daily.  Please STOP taking aspirin 81 mg daily.   Important follow up information -   Please follow-up with your PCP within a week of discharge. Please discuss Comprehensive Concussion Program care with your PCP.   Please follow-up with outpatient neurology within 1 month after discharge to discuss stroke prevention.  Please call [phone] to make an appointment.  Please follow-up with [facility] within 1 month of discharge.  Please call [phone] to make an appointment  Other Instructions -   Please take all your medications regularly as directed  If symptoms return/persist contact your PCP if unable then visit to nearest ER  Please review this entire after visit summary as additional general instructions including medication list, appointments, activity, diet, any pertinent wound care, and other  additional recommendations from your care team that may be provided for you.      Sincerely,     Arianne Will, DO
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
## 2025-04-02 NOTE — ASSESSMENT & PLAN NOTE
Has been noted by PCP in the past.  Per patient, monitoring for any symptoms in outpatient setting  TSH this admission decreased to 0.022.  Was also decreased at 0.3042 months ago  Free T3 pending  Consider endocrine consult is if free T3 abnormal
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
## 2025-04-02 NOTE — PLAN OF CARE
Problem: PAIN - ADULT  Goal: Verbalizes/displays adequate comfort level or baseline comfort level  Description: Interventions:- Encourage patient to monitor pain and request assistance- Assess pain using appropriate pain scale- Administer analgesics based on type and severity of pain and evaluate response- Implement non-pharmacological measures as appropriate and evaluate response- Consider cultural and social influences on pain and pain management- Notify physician/advanced practitioner if interventions unsuccessful or patient reports new pain  Outcome: Progressing     Problem: INFECTION - ADULT  Goal: Absence or prevention of progression during hospitalization  Description: INTERVENTIONS:- Assess and monitor for signs and symptoms of infection- Monitor lab/diagnostic results- Monitor all insertion sites, i.e. indwelling lines, tubes, and drains- Monitor endotracheal if appropriate and nasal secretions for changes in amount and color- [place] appropriate cooling/warming therapies per order- Administer medications as ordered- Instruct and encourage patient and family to use good hand hygiene technique- Identify and instruct in appropriate isolation precautions for identified infection/condition  Outcome: Progressing  Goal: Absence of fever/infection during neutropenic period  Description: INTERVENTIONS:- Monitor WBC  Outcome: Progressing     Problem: SAFETY ADULT  Goal: Patient will remain free of falls  Description: INTERVENTIONS:- Educate patient/family on patient safety including physical limitations- Instruct patient to call for assistance with activity - Consult OT/PT to assist with strengthening/mobility - Keep Call bell within reach- Keep bed low and locked with side rails adjusted as appropriate- Keep care items and personal belongings within reach- Initiate and maintain comfort rounds- Make Fall Risk Sign visible to staff- Offer Toileting every  Hours, in advance of need- Initiate/Maintain alarm- Obtain  necessary fall risk management equipment:- Apply yellow socks and bracelet for high fall risk patients- Consider moving patient to room near nurses station  Outcome: Progressing  Goal: Maintain or return to baseline ADL function  Description: INTERVENTIONS:-  Assess patient's ability to carry out ADLs; assess patient's baseline for ADL function and identify physical deficits which impact ability to perform ADLs (bathing, care of mouth/teeth, toileting, grooming, dressing, etc.)- Assess/evaluate cause of self-care deficits - Assess range of motion- Assess patient's mobility; develop plan if impaired- Assess patient's need for assistive devices and provide as appropriate- Encourage maximum independence but intervene and supervise when necessary- Involve family in performance of ADLs- Assess for home care needs following discharge - Consider OT consult to assist with ADL evaluation and planning for discharge- Provide patient education as appropriate  Outcome: Progressing  Goal: Maintains/Returns to pre admission functional level  Description: INTERVENTIONS:- Perform AM-PAC 6 Click Basic Mobility/ Daily Activity assessment daily.- Set and communicate daily mobility goal to care team and patient/family/caregiver. - Collaborate with rehabilitation services on mobility goals if consulted- Perform Range of Motion  times a day.- Reposition patient every  hours.- Dangle patient  times a day- Stand patient  times a day- Ambulate patient  times a day- Out of bed to chair  times a day - Out of bed for meals  times a day- Out of bed for toileting- Record patient progress and toleration of activity level   Outcome: Progressing     Problem: DISCHARGE PLANNING  Goal: Discharge to home or other facility with appropriate resources  Description: INTERVENTIONS:- Identify barriers to discharge w/patient and caregiver- Arrange for needed discharge resources and transportation as appropriate- Identify discharge learning needs (meds, wound  care, etc.)- Arrange for interpretive services to assist at discharge as needed- Refer to Case Management Department for coordinating discharge planning if the patient needs post-hospital services based on physician/advanced practitioner order or complex needs related to functional status, cognitive ability, or social support system  Outcome: Progressing     Problem: Knowledge Deficit  Goal: Patient/family/caregiver demonstrates understanding of disease process, treatment plan, medications, and discharge instructions  Description: Complete learning assessment and assess knowledge base.Interventions:- Provide teaching at level of understanding- Provide teaching via preferred learning methods  Outcome: Progressing
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
## 2025-04-03 ENCOUNTER — TELEPHONE (OUTPATIENT)
Age: 85
End: 2025-04-03

## 2025-04-03 VITALS
DIASTOLIC BLOOD PRESSURE: 78 MMHG | SYSTOLIC BLOOD PRESSURE: 132 MMHG | BODY MASS INDEX: 40.4 KG/M2 | HEIGHT: 63 IN | TEMPERATURE: 98 F | RESPIRATION RATE: 18 BRPM | WEIGHT: 228 LBS | HEART RATE: 72 BPM | OXYGEN SATURATION: 92 %

## 2025-04-03 LAB
ANION GAP SERPL CALCULATED.3IONS-SCNC: 6 MMOL/L (ref 4–13)
BUN SERPL-MCNC: 11 MG/DL (ref 5–25)
CALCIUM SERPL-MCNC: 9.3 MG/DL (ref 8.4–10.2)
CHLORIDE SERPL-SCNC: 104 MMOL/L (ref 96–108)
CO2 SERPL-SCNC: 28 MMOL/L (ref 21–32)
CREAT SERPL-MCNC: 0.56 MG/DL (ref 0.6–1.3)
ERYTHROCYTE [DISTWIDTH] IN BLOOD BY AUTOMATED COUNT: 12.5 % (ref 11.6–15.1)
GFR SERPL CREATININE-BSD FRML MDRD: 87 ML/MIN/1.73SQ M
GLUCOSE SERPL-MCNC: 103 MG/DL (ref 65–140)
HCT VFR BLD AUTO: 39 % (ref 34.8–46.1)
HGB BLD-MCNC: 13.2 G/DL (ref 11.5–15.4)
MAGNESIUM SERPL-MCNC: 2.1 MG/DL (ref 1.9–2.7)
MCH RBC QN AUTO: 31.3 PG (ref 26.8–34.3)
MCHC RBC AUTO-ENTMCNC: 33.8 G/DL (ref 31.4–37.4)
MCV RBC AUTO: 92 FL (ref 82–98)
PLATELET # BLD AUTO: 170 THOUSANDS/UL (ref 149–390)
PMV BLD AUTO: 9.9 FL (ref 8.9–12.7)
POTASSIUM SERPL-SCNC: 4.1 MMOL/L (ref 3.5–5.3)
RBC # BLD AUTO: 4.22 MILLION/UL (ref 3.81–5.12)
SODIUM SERPL-SCNC: 138 MMOL/L (ref 135–147)
WBC # BLD AUTO: 4.2 THOUSAND/UL (ref 4.31–10.16)

## 2025-04-03 PROCEDURE — 99239 HOSP IP/OBS DSCHRG MGMT >30: CPT | Performed by: INTERNAL MEDICINE

## 2025-04-03 PROCEDURE — 80048 BASIC METABOLIC PNL TOTAL CA: CPT

## 2025-04-03 PROCEDURE — 83735 ASSAY OF MAGNESIUM: CPT

## 2025-04-03 PROCEDURE — 85027 COMPLETE CBC AUTOMATED: CPT

## 2025-04-03 RX ORDER — CLOPIDOGREL BISULFATE 75 MG/1
75 TABLET ORAL DAILY
Qty: 90 TABLET | Refills: 0 | Status: SHIPPED | OUTPATIENT
Start: 2025-04-03

## 2025-04-03 RX ORDER — CLOPIDOGREL BISULFATE 75 MG/1
75 TABLET ORAL DAILY
Status: DISCONTINUED | OUTPATIENT
Start: 2025-04-03 | End: 2025-04-03 | Stop reason: HOSPADM

## 2025-04-03 RX ADMIN — ASPIRIN 81 MG: 81 TABLET, COATED ORAL at 08:56

## 2025-04-03 RX ADMIN — ENOXAPARIN SODIUM 40 MG: 40 INJECTION SUBCUTANEOUS at 08:56

## 2025-04-03 RX ADMIN — AMLODIPINE BESYLATE 2.5 MG: 2.5 TABLET ORAL at 09:00

## 2025-04-03 RX ADMIN — LISINOPRIL 40 MG: 20 TABLET ORAL at 09:00

## 2025-04-03 RX ADMIN — OXYCODONE HYDROCHLORIDE AND ACETAMINOPHEN 1000 MG: 500 TABLET ORAL at 08:56

## 2025-04-03 RX ADMIN — CARVEDILOL 12.5 MG: 12.5 TABLET, FILM COATED ORAL at 09:00

## 2025-04-03 RX ADMIN — CLOPIDOGREL BISULFATE 75 MG: 75 TABLET ORAL at 14:57

## 2025-04-03 RX ADMIN — LEVETIRACETAM 375 MG: 250 TABLET, FILM COATED ORAL at 09:36
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
## 2025-04-03 NOTE — ASSESSMENT & PLAN NOTE
-On Keppra 375 mg BID at baseline  -Seizure precautions  -Previously followed with [facility] but has not had office visit since 2023  -Follow up outpatient with neurology

## 2025-04-03 NOTE — PLAN OF CARE
Problem: PAIN - ADULT  Goal: Verbalizes/displays adequate comfort level or baseline comfort level  Description: Interventions:- Encourage patient to monitor pain and request assistance- Assess pain using appropriate pain scale- Administer analgesics based on type and severity of pain and evaluate response- Implement non-pharmacological measures as appropriate and evaluate response- Consider cultural and social influences on pain and pain management- Notify physician/advanced practitioner if interventions unsuccessful or patient reports new pain  Outcome: Progressing     Problem: INFECTION - ADULT  Goal: Absence or prevention of progression during hospitalization  Description: INTERVENTIONS:- Assess and monitor for signs and symptoms of infection- Monitor lab/diagnostic results- Monitor all insertion sites, i.e. indwelling lines, tubes, and drains- Monitor endotracheal if appropriate and nasal secretions for changes in amount and color- [place] appropriate cooling/warming therapies per order- Administer medications as ordered- Instruct and encourage patient and family to use good hand hygiene technique- Identify and instruct in appropriate isolation precautions for identified infection/condition  Outcome: Progressing  Goal: Absence of fever/infection during neutropenic period  Description: INTERVENTIONS:- Monitor WBC  Outcome: Progressing     Problem: SAFETY ADULT  Goal: Patient will remain free of falls  Description: INTERVENTIONS:- Educate patient/family on patient safety including physical limitations- Instruct patient to call for assistance with activity - Consult OT/PT to assist with strengthening/mobility - Keep Call bell within reach- Keep bed low and locked with side rails adjusted as appropriate- Keep care items and personal belongings within reach- Initiate and maintain comfort rounds- Make Fall Risk Sign visible to staff- Offer Toileting every  Hours, in advance of need- Initiate/Maintain alarm- Obtain  necessary fall risk management equipment:- Apply yellow socks and bracelet for high fall risk patients- Consider moving patient to room near nurses station  Outcome: Progressing  Goal: Maintain or return to baseline ADL function  Description: INTERVENTIONS:-  Assess patient's ability to carry out ADLs; assess patient's baseline for ADL function and identify physical deficits which impact ability to perform ADLs (bathing, care of mouth/teeth, toileting, grooming, dressing, etc.)- Assess/evaluate cause of self-care deficits - Assess range of motion- Assess patient's mobility; develop plan if impaired- Assess patient's need for assistive devices and provide as appropriate- Encourage maximum independence but intervene and supervise when necessary- Involve family in performance of ADLs- Assess for home care needs following discharge - Consider OT consult to assist with ADL evaluation and planning for discharge- Provide patient education as appropriate  Outcome: Progressing  Goal: Maintains/Returns to pre admission functional level  Description: INTERVENTIONS:- Perform AM-PAC 6 Click Basic Mobility/ Daily Activity assessment daily.- Set and communicate daily mobility goal to care team and patient/family/caregiver. - Collaborate with rehabilitation services on mobility goals if consulted- Perform Range of Motion  times a day.- Reposition patient every  hours.- Dangle patient  times a day- Stand patient  times a day- Ambulate patient  times a day- Out of bed to chair  times a day - Out of bed for meals  times a day- Out of bed for toileting- Record patient progress and toleration of activity level   Outcome: Progressing     Problem: DISCHARGE PLANNING  Goal: Discharge to home or other facility with appropriate resources  Description: INTERVENTIONS:- Identify barriers to discharge w/patient and caregiver- Arrange for needed discharge resources and transportation as appropriate- Identify discharge learning needs (meds, wound  care, etc.)- Arrange for interpretive services to assist at discharge as needed- Refer to Case Management Department for coordinating discharge planning if the patient needs post-hospital services based on physician/advanced practitioner order or complex needs related to functional status, cognitive ability, or social support system  Outcome: Progressing     Problem: Knowledge Deficit  Goal: Patient/family/caregiver demonstrates understanding of disease process, treatment plan, medications, and discharge instructions  Description: Complete learning assessment and assess knowledge base.Interventions:- Provide teaching at level of understanding- Provide teaching via preferred learning methods  Outcome: Progressing

## 2025-04-03 NOTE — UTILIZATION REVIEW
Continued Stay Review    SEE INITIAL REVIEW AT BOTTOM    Date: 4/3/25                          Current Patient Class: Inpatient  Current Level of Care: Med Surg    HPI:84 y.o. female initially admitted on 4/2     Current Diagnosis: CVA    Assessment/Plan:     Date: 4/3  Day 3: Has surpassed a 2nd midnight with active treatments and services. MRI brain 4/2/2025 focal acute to subacute infarct involving the left corona radiata/basal ganglia with moderate chronic microangiopathic ischemic changes. Continue Lipitor. Per neurology will discontinue aspirin 81 mg daily and switch to Plavix 75 mg daily for stroke prevention. Continue home Keppra, carvedilol, amlodipine, lisinopril.       Medications:   Scheduled Medications:  amLODIPine, 2.5 mg, Oral, Daily  vitamin C, 1,000 mg, Oral, Daily  atorvastatin, 40 mg, Oral, Daily With Dinner  carvedilol, 12.5 mg, Oral, BID With Meals  clopidogrel, 75 mg, Oral, Daily  enoxaparin, 40 mg, Subcutaneous, Daily  levETIRAcetam, 375 mg, Oral, BID  lisinopril, 40 mg, Oral, Daily      Continuous IV Infusions:     PRN Meds:  furosemide, 20 mg, Oral, Daily PRN      Discharge Plan: TBD    Vital Signs (last 3 days)       Date/Time Temp Pulse Resp BP MAP (mmHg) SpO2 O2 Device Patient Position - Orthostatic VS Pain    04/03/25 11:05:30 -- 72 -- 132/78 96 92 % -- -- --    04/03/25 09:33:11 -- 69 -- 138/60 86 93 % -- -- --    04/03/25 0900 -- -- -- -- -- -- -- -- No Pain    04/03/25 08:55:32 -- 70 -- 110/51 71 94 % -- -- --    04/03/25 07:17:43 98 °F (36.7 °C) 70 -- 164/66 99 94 % -- -- --    04/03/25 07:17:37 98 °F (36.7 °C) -- 18 164/66 99 -- -- -- --    04/03/25 02:47:57 98 °F (36.7 °C) 66 -- 148/73 98 95 % -- -- --    04/02/25 23:33:12 -- 66 -- 129/72 91 94 % -- -- --    04/02/25 22:16:38 98.9 °F (37.2 °C) 71 -- 146/70 95 93 % -- -- --    04/02/25 21:27:49 98.4 °F (36.9 °C) 72 -- 136/64 88 94 % -- -- --    04/02/25 20:26:02 98 °F (36.7 °C) 72 -- 149/70 96 93 % -- -- --    04/02/25 20:25:49 98  °F (36.7 °C) 70 -- 149/70 96 94 % -- -- --    04/02/25 1930 -- -- -- -- -- -- -- -- No Pain    04/02/25 19:29:08 98.8 °F (37.1 °C) 72 -- 136/77 97 94 % -- -- --    04/02/25 1830 97.9 °F (36.6 °C) 93 18 125/85 98 93 % -- Sitting --    04/02/25 18:26:34 97.9 °F (36.6 °C) 79 -- 125/85 98 94 % -- -- --    04/02/25 1658 -- 59 -- -- -- -- -- -- --    04/02/25 1600 -- 59 -- 123/62 -- -- -- -- --    04/02/25 14:42:59 -- 59 -- 123/62 82 95 % -- -- --    04/02/25 1115 -- -- -- -- -- -- -- -- No Pain    04/02/25 1013 -- -- -- -- -- -- -- -- No Pain    04/02/25 0900 -- -- -- -- -- -- None (Room air) -- No Pain    04/02/25 0801 -- -- -- 124/64 -- -- -- -- --    04/02/25 0755 -- 62 -- 124/64 -- -- -- -- --    04/02/25 07:19:35 97.9 °F (36.6 °C) 62 18 124/64 84 96 % -- -- --    04/01/25 22:45:07 97.6 °F (36.4 °C) 55 -- 116/67 83 96 % -- -- --    04/01/25 2100 -- -- -- -- -- -- -- -- No Pain    04/01/25 1820 -- -- -- -- -- -- -- -- No Pain    04/01/25 18:16:43 97.8 °F (36.6 °C) 58 -- 144/80 101 96 % -- -- --    04/01/25 1730 -- 63 18 153/73 105 96 % -- -- --    04/01/25 1701 -- 64 -- 147/68 -- -- -- -- --    04/01/25 1628 -- 66 17 140/64 92 94 % None (Room air) Sitting No Pain    04/01/25 1151 97.5 °F (36.4 °C) 74 17 188/83 127 97 % None (Room air) Sitting --          Weight (last 2 days)       Date/Time Weight    04/02/25 1600 103 (228)            Pertinent Labs/Diagnostic Results:   Radiology:  MRI brain wo contrast   Final Interpretation by Hector Aragon MD (04/02 9509)      Focal acute to subacute infarct involving the left corona radiata/basal ganglia.      Moderate chronic microangiopathic ischemic changes.      The study was marked in EPIC for immediate notification.      Workstation performed: [id number]         CTA head and neck with and without contrast   Final Interpretation by Hector Aragon MD (04/01 0247)   CT brain: No acute intracranial abnormality. Chronic bilateral basal ganglia lacunar infarcts.  Chronic microangiopathic ischemic changes.      CTA head:   Negative for large vessel intracranial occlusion.   Moderate stenosis along the right PCA P2 segment.   The right vertebral artery terminates as a PICA with an attenuated right supra PICA V4 segment.   CTA neck: No extracranial carotid stenosis. The cervical vertebral arteries are patent with moderate left origin stenosis.               Workstation performed: [id number]           Cardiology:  Echo complete w/ contrast if indicated   Final Result by Zay Atkinson MD (04/02 0865)        Left Ventricle: Left ventricular cavity size is normal. There is    moderate concentric hypertrophy. The left ventricular ejection fraction is    65%. Systolic function is normal. Wall motion is normal. Diastolic    function is mildly abnormal, consistent with grade I (abnormal)    relaxation.     Right Ventricle: Right ventricular cavity size is normal. Systolic    function is normal.     Tricuspid Valve: There is mild regurgitation.     Prior TTE study available for comparison. Prior study date: 5/7/2024.    No significant changes noted compared to the prior study.         ECG 12 lead   Final Result by Aniyah Reyes MD (04/02 5382)   Normal sinus rhythm   Low voltage QRS   Cannot rule out Anterior infarct , age undetermined   Abnormal ECG   When compared with ECG of 07-May-2024 02:54,   T wave amplitude has decreased in Anterior leads   Confirmed by Aniyah Reyes ([id number]) on 4/2/2025 5:26:37 PM        GI:  No orders to display           Results from last 7 days   Lab Units 04/03/25 0457 04/02/25  0508 04/01/25  1243   WBC Thousand/uL 4.20* 5.02 5.12   HEMOGLOBIN g/dL 13.2 13.5 13.6   HEMATOCRIT % 39.0 40.4 40.2   PLATELETS Thousands/uL 170 162 165   TOTAL NEUT ABS Thousands/µL  --   --  3.64         Results from last 7 days   Lab Units 04/03/25 0457 04/02/25  0508 04/01/25  1243   SODIUM mmol/L 138 140 141   POTASSIUM mmol/L 4.1 4.3 3.9   CHLORIDE mmol/L 104 106 106    CO2 mmol/L 28 28 29   ANION GAP mmol/L 6 6 6   BUN mg/dL 11 12 13   CREATININE mg/dL 0.56* 0.58* 0.62   EGFR ml/min/1.73sq m 87 86 84   CALCIUM mg/dL 9.3 9.5 10.1   MAGNESIUM mg/dL 2.1  --   --      Results from last 7 days   Lab Units 04/01/25  1243   AST U/L 19   ALT U/L 11   ALK PHOS U/L 63   TOTAL PROTEIN g/dL 6.4   ALBUMIN g/dL 3.9   TOTAL BILIRUBIN mg/dL 0.74   AMMONIA umol/L 29         Results from last 7 days   Lab Units 04/03/25  0457 04/02/25  0508 04/01/25  1243   GLUCOSE RANDOM mg/dL 103 95 115         Results from last 7 days   Lab Units 04/02/25  0508   HEMOGLOBIN A1C % 5.4   EAG mg/dl 108           Results from last 7 days   Lab Units 04/01/25  1243   TSH 3RD GENERATON uIU/mL 0.022*           Results from last 7 days   Lab Units 04/01/25  1444   CLARITY UA  Clear   COLOR UA  Light Yellow   SPEC GRAV UA  1.008   PH UA  6.5   GLUCOSE UA mg/dl Negative   KETONES UA mg/dl Negative   BLOOD UA  Negative   PROTEIN UA mg/dl Negative   NITRITE UA  Negative   BILIRUBIN UA  Negative   UROBILINOGEN UA (BE) mg/dl <2.0   LEUKOCYTES UA  Negative                 Results from last 7 days   Lab Units 04/01/25  1243   ETHANOL LVL mg/dL 14*   ACETAMINOPHEN LVL ug/mL <2*   SALICYLATE LVL mg/dL <5         Network Utilization Review Department  ATTENTION: Please call with any questions or concerns to [phone] and carefully listen to the prompts so that you are directed to the right person. All voicemails are confidential.   For Discharge needs, contact Care Management DC Support Team at [phone] opt. 2  Send all requests for admission clinical reviews, approved or denied determinations and any other requests to dedicated fax number below belonging to the campus where the patient is receiving treatment. List of dedicated fax numbers for the Facilities:  FACILITY NAME UR FAX NUMBER   ADMISSION DENIALS (Administrative/Medical Necessity) [phone]   DISCHARGE SUPPORT TEAM (NETWORK) [phone]   PARENT CHILD  [facility] (Maternity/NICU/Pediatrics) [phone]   [facility] [phone]   [facility] [phone]   [facility] [phone]   [facility] [phone]   [facility] [phone]   General acute hospital [phone]   [facility] [phone]   [facility] [phone]   [facility] [phone]   [facility] [phone]   [facility] [phone]   [facility] [phone]

## 2025-04-03 NOTE — TELEPHONE ENCOUNTER
STILL ADMITTED:4/1/2025 - present (2 days)  [facility]      1ST ATTEMPT,     VIA [facility]     Thank you,     Tasha MONZON/ TOMAS PRATHER/ Small vessel lacunar infarct      ----- Message from NESTOR Arndt sent at 4/3/2025  1:56 PM EDT -----  Regarding: HFU  Anusha Mccauleyrolando will need follow-up in in 6 weeks with neurovascular team for Small vessel lacunar infarct in 60 minute appointment. They will not require outpatient neurological testing.

## 2025-04-04 ENCOUNTER — PATIENT OUTREACH (OUTPATIENT)
Dept: CASE MANAGEMENT | Facility: HOSPITAL | Age: 85
End: 2025-04-04

## 2025-04-04 ENCOUNTER — TRANSITIONAL CARE MANAGEMENT (OUTPATIENT)
Dept: FAMILY MEDICINE CLINIC | Facility: CLINIC | Age: 85
End: 2025-04-04

## 2025-04-04 ENCOUNTER — PATIENT OUTREACH (OUTPATIENT)
Dept: CASE MANAGEMENT | Facility: OTHER | Age: 85
End: 2025-04-04

## 2025-04-04 NOTE — UTILIZATION REVIEW
NOTIFICATION OF ADMISSION DISCHARGE   This is a Notification of Discharge from [facility]. Please be advised that this patient has been discharge from our facility. Below you will find the admission and discharge date and time including the patient’s disposition.   UTILIZATION REVIEW CONTACT:  Utilization Review Assistants  Network Utilization Review Department  Phone: [phone] x carefully listen to the prompts. All voicemails are confidential.  Email: [email or web address]     ADMISSION INFORMATION  PRESENTATION DATE: 4/1/2025 11:55 AM  OBERVATION ADMISSION DATE: 04/01/2025 1515  INPATIENT ADMISSION DATE: 4/2/25  3:04 PM   DISCHARGE DATE: 4/3/2025  4:21 PM   DISPOSITION:Home/Self Care    Network Utilization Review Department  ATTENTION: Please call with any questions or concerns to [phone] and carefully listen to the prompts so that you are directed to the right person. All voicemails are confidential.   For Discharge needs, contact Care Management DC Support Team at [phone] opt. 2  Send all requests for admission clinical reviews, approved or denied determinations and any other requests to dedicated fax number below belonging to the campus where the patient is receiving treatment. List of dedicated fax numbers for the Facilities:  FACILITY NAME UR FAX NUMBER   ADMISSION DENIALS (Administrative/Medical Necessity) [phone]   DISCHARGE SUPPORT TEAM ([facility]) [phone]   PARENT CHILD HEALTH (Maternity/NICU/Pediatrics) [phone]   [facility] [phone]   [facility] [phone]   Critical access hospital [phone]   [facility] [phone]   [facility] [phone]   [facility] [phone]   [facility] [phone]   [facility] [phone]   [facility]  [facility] [phone]   [facility] [phone]   [facility] [phone]   [facility] [phone]
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
## 2025-04-09 ENCOUNTER — OFFICE VISIT (OUTPATIENT)
Dept: FAMILY MEDICINE CLINIC | Facility: CLINIC | Age: 85
End: 2025-04-09

## 2025-04-09 VITALS
SYSTOLIC BLOOD PRESSURE: 118 MMHG | OXYGEN SATURATION: 98 % | BODY MASS INDEX: 40.4 KG/M2 | HEIGHT: 63 IN | TEMPERATURE: 97 F | HEART RATE: 94 BPM | DIASTOLIC BLOOD PRESSURE: 78 MMHG | WEIGHT: 228 LBS
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
## 2025-04-09 DIAGNOSIS — I63.9 CEREBROVASCULAR ACCIDENT (CVA), UNSPECIFIED MECHANISM (HCC): ICD-10-CM

## 2025-04-09 DIAGNOSIS — F07.81 POSTCONCUSSIVE SYNDROME: Primary | ICD-10-CM

## 2025-04-09 DIAGNOSIS — R79.89 LOW TSH LEVEL: ICD-10-CM

## 2025-04-14 ENCOUNTER — EVALUATION (OUTPATIENT)
Dept: PHYSICAL THERAPY | Facility: CLINIC | Age: 85
End: 2025-04-14
Payer: COMMERCIAL

## 2025-04-14 ENCOUNTER — TELEPHONE (OUTPATIENT)
Dept: NEUROLOGY | Facility: CLINIC | Age: 85
End: 2025-04-14

## 2025-04-14 DIAGNOSIS — I63.9 CVA (CEREBRAL VASCULAR ACCIDENT) (HCC): ICD-10-CM

## 2025-04-14 DIAGNOSIS — S06.0X0A CONCUSSION WITHOUT LOSS OF CONSCIOUSNESS, INITIAL ENCOUNTER: Primary | ICD-10-CM

## 2025-04-14 PROCEDURE — 97162 PT EVAL MOD COMPLEX 30 MIN: CPT | Performed by: PHYSICAL THERAPIST

## 2025-04-14 PROCEDURE — 97112 NEUROMUSCULAR REEDUCATION: CPT | Performed by: PHYSICAL THERAPIST

## 2025-04-14 NOTE — TELEPHONE ENCOUNTER
Post CVA Discharge Follow Up  Hospitalization: 4/1/25-4/3/25    Called patient to obtain an update. Since discharge, she denies experiencing any new or worsening stroke-like symptoms.     Patient reports she continues to have the following symptoms: word finding difficulty. She claims this has improved since discharge.    She is ambulating independently as well as preforming her own ADLs. Patient manages her own medications, appointments, and affairs.    Reviewed appointments - patient successfully followed up with PCP. Patient scheduled for the following: PCP on 5/22/25, neurology on 6/3/25, cardiology on 12/19/25.    Reviewed neurology-related medications with her. Reports she is taking as prescribed with no medication side effects or signs of bleeding.     During this call, we reviewed stroke education.    As for risk factors, patient reports monitoring her BP at home. Patient reports her BP has been around 130/64.  She is a non smoker.  Patient reports decreased appetite. She is trying to eat small, frequent meals. She will try the ensure shakes. She is staying well hydrated.     All of her questions were addressed. At the conclusion of the conversation, patient denies having any further questions or concerns.
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
## 2025-04-29 ENCOUNTER — TELEPHONE (OUTPATIENT)
Age: 85
End: 2025-04-29

## 2025-04-29 NOTE — TELEPHONE ENCOUNTER
Patient feels like she is too weak to travel back and forth would be interested in home PT. Agreeable to [facility] if you would order this.
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
## 2025-04-30 ENCOUNTER — TELEPHONE (OUTPATIENT)
Dept: FAMILY MEDICINE CLINIC | Facility: CLINIC | Age: 85
End: 2025-04-30

## 2025-04-30 DIAGNOSIS — R26.81 GAIT INSTABILITY: Primary | ICD-10-CM

## 2025-04-30 DIAGNOSIS — R29.898 MUSCULAR DECONDITIONING: ICD-10-CM
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
## 2025-05-23 ENCOUNTER — OFFICE VISIT (OUTPATIENT)
Dept: FAMILY MEDICINE CLINIC | Facility: CLINIC | Age: 85
End: 2025-05-23
Payer: COMMERCIAL

## 2025-05-23 ENCOUNTER — HOSPITAL ENCOUNTER (OUTPATIENT)
Dept: CT IMAGING | Facility: HOSPITAL | Age: 85
End: 2025-05-23
Attending: FAMILY MEDICINE
Payer: COMMERCIAL

## 2025-05-23 ENCOUNTER — RESULTS FOLLOW-UP (OUTPATIENT)
Dept: FAMILY MEDICINE CLINIC | Facility: CLINIC | Age: 85
End: 2025-05-23

## 2025-05-23 VITALS
BODY MASS INDEX: 39.6 KG/M2 | OXYGEN SATURATION: 98 % | WEIGHT: 223.5 LBS | DIASTOLIC BLOOD PRESSURE: 78 MMHG | TEMPERATURE: 97.1 F | HEIGHT: 63 IN | SYSTOLIC BLOOD PRESSURE: 140 MMHG | HEART RATE: 61 BPM

## 2025-05-23 DIAGNOSIS — R41.82 ALTERED MENTAL STATUS, UNSPECIFIED ALTERED MENTAL STATUS TYPE: ICD-10-CM

## 2025-05-23 DIAGNOSIS — R41.82 ALTERED MENTAL STATUS, UNSPECIFIED ALTERED MENTAL STATUS TYPE: Primary | ICD-10-CM

## 2025-05-23 PROCEDURE — 99213 OFFICE O/P EST LOW 20 MIN: CPT | Performed by: FAMILY MEDICINE

## 2025-05-23 PROCEDURE — G2211 COMPLEX E/M VISIT ADD ON: HCPCS | Performed by: FAMILY MEDICINE

## 2025-05-23 PROCEDURE — 70450 CT HEAD/BRAIN W/O DYE: CPT

## 2025-05-30 DIAGNOSIS — R00.2 PALPITATIONS: ICD-10-CM

## 2025-05-30 DIAGNOSIS — I10 ESSENTIAL HYPERTENSION: ICD-10-CM

## 2025-05-30 RX ORDER — AMLODIPINE BESYLATE 2.5 MG/1
2.5 TABLET ORAL DAILY
Qty: 90 TABLET | Refills: 0 | Status: SHIPPED | OUTPATIENT
Start: 2025-05-30 | End: 2025-08-28

## 2025-05-30 RX ORDER — CARVEDILOL 12.5 MG/1
12.5 TABLET ORAL 2 TIMES DAILY WITH MEALS
Qty: 180 TABLET | Refills: 1 | Status: SHIPPED | OUTPATIENT
Start: 2025-05-30

## 2025-05-30 NOTE — PROGRESS NOTES
":  Assessment & Plan  Altered mental status, unspecified altered mental status type    Orders:    CT head wo contrast; Future        History of Present Illness     Anusha Booth is a 84 y.o. female   The patient presents complaining of change in mental status ever since having a concussion several weeks ago.  She has been going to the concussion clinic and has an appointment tomorrow with neurology.  She had a CT approximately 2 weeks ago and in the hospital which was normal/negative for a subdural.  She is having trouble with short-term recall and some expressive aphasia intermittently this has been going on since her hospitalization.      Review of Systems   Constitutional:  Positive for fatigue. Negative for appetite change, chills and fever.   HENT:  Negative for ear pain, facial swelling, rhinorrhea, sinus pain, sore throat and trouble swallowing.    Eyes:  Negative for discharge and redness.   Respiratory:  Negative for chest tightness, shortness of breath and wheezing.    Cardiovascular:  Negative for chest pain and palpitations.   Gastrointestinal:  Negative for abdominal pain, diarrhea, nausea and vomiting.   Endocrine: Negative for polyuria.   Genitourinary:  Negative for dysuria and urgency.   Musculoskeletal:  Negative for arthralgias and back pain.   Skin:  Negative for rash.   Neurological:  Negative for dizziness, weakness and headaches.   Hematological:  Negative for adenopathy.   Psychiatric/Behavioral:  Negative for behavioral problems, confusion and sleep disturbance.         Positive short-term recall issues  + expressive aphasia   All other systems reviewed and are negative.    Objective   /78   Pulse 61   Temp (!) 97.1 °F (36.2 °C)   Ht 5' 3\" (1.6 m)   Wt 101 kg (223 lb 8 oz)   SpO2 98%   BMI 39.59 kg/m²      Physical Exam  Vitals and nursing note reviewed.   Constitutional:       General: She is not in acute distress.     Appearance: Normal appearance. She is well-developed. " She is not ill-appearing or diaphoretic.   HENT:      Head: Normocephalic and atraumatic.      Right Ear: Tympanic membrane, ear canal and external ear normal.      Left Ear: Tympanic membrane, ear canal and external ear normal.      Nose: Nose normal. No congestion or rhinorrhea.      Mouth/Throat:      Mouth: Mucous membranes are moist.      Pharynx: Oropharynx is clear. No oropharyngeal exudate or posterior oropharyngeal erythema.     Eyes:      General: No scleral icterus.        Right eye: No discharge.         Left eye: No discharge.      Extraocular Movements: Extraocular movements intact.      Conjunctiva/sclera: Conjunctivae normal.      Pupils: Pupils are equal, round, and reactive to light.     Neck:      Thyroid: No thyromegaly.      Vascular: No carotid bruit or JVD.      Trachea: No tracheal deviation.     Cardiovascular:      Rate and Rhythm: Normal rate and regular rhythm.      Pulses: Normal pulses.      Heart sounds: Normal heart sounds. No murmur heard.  Pulmonary:      Effort: Pulmonary effort is normal. No respiratory distress.      Breath sounds: Normal breath sounds. No stridor. No wheezing, rhonchi or rales.   Abdominal:      General: Abdomen is flat. Bowel sounds are normal. There is no distension.      Palpations: Abdomen is soft. There is no mass.      Tenderness: There is no abdominal tenderness. There is no guarding or rebound.     Musculoskeletal:         General: No swelling, tenderness or deformity. Normal range of motion.      Cervical back: Normal range of motion and neck supple. No rigidity.      Right lower leg: No edema.      Left lower leg: No edema.   Lymphadenopathy:      Cervical: No cervical adenopathy.     Skin:     General: Skin is warm and dry.      Capillary Refill: Capillary refill takes less than 2 seconds.      Coloration: Skin is not jaundiced.      Findings: No bruising, erythema or rash.     Neurological:      General: No focal deficit present.      Mental Status:  She is alert and oriented to person, place, and time.      Cranial Nerves: No cranial nerve deficit.      Sensory: No sensory deficit.      Motor: No abnormal muscle tone.      Coordination: Coordination normal.      Gait: Gait normal.      Deep Tendon Reflexes: Reflexes are normal and symmetric. Reflexes normal.     Psychiatric:         Mood and Affect: Mood normal.         Behavior: Behavior normal.         Thought Content: Thought content normal.         Judgment: Judgment normal.      Comments: Pt with a dull affect         Administrative Statements   I have spent a total time of 15 minutes in caring for this patient on the day of the visit/encounter including Diagnostic results, Prognosis, Risks and benefits of tx options, Instructions for management, Patient and family education, Importance of tx compliance, Risk factor reductions, and Impressions.

## 2025-05-30 NOTE — TELEPHONE ENCOUNTER
Patient has 3 days left of   Requested Prescriptions     Pending Prescriptions Disp Refills    amLODIPine (NORVASC) 2.5 mg tablet 90 tablet 5     Sig: Take 1 tablet (2.5 mg total) by mouth daily    carvedilol (COREG) 12.5 mg tablet 180 tablet 5     Sig: Take 1 tablet (12.5 mg total) by mouth 2 (two) times a day with meals     Please send to [facility] [id number]

## 2025-06-02 ENCOUNTER — TELEPHONE (OUTPATIENT)
Dept: NEUROLOGY | Facility: CLINIC | Age: 85
End: 2025-06-02
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
## 2025-06-23 ENCOUNTER — OFFICE VISIT (OUTPATIENT)
Dept: FAMILY MEDICINE CLINIC | Facility: CLINIC | Age: 85
End: 2025-06-23
Payer: COMMERCIAL

## 2025-06-23 VITALS
SYSTOLIC BLOOD PRESSURE: 132 MMHG | WEIGHT: 220.6 LBS | RESPIRATION RATE: 16 BRPM | TEMPERATURE: 97.2 F | HEART RATE: 58 BPM | HEIGHT: 63 IN | BODY MASS INDEX: 39.09 KG/M2 | DIASTOLIC BLOOD PRESSURE: 62 MMHG | OXYGEN SATURATION: 98 %

## 2025-06-23 DIAGNOSIS — F32.A ANXIETY AND DEPRESSION: Primary | ICD-10-CM

## 2025-06-23 DIAGNOSIS — I48.91 NEW ONSET ATRIAL FIBRILLATION (HCC): ICD-10-CM

## 2025-06-23 DIAGNOSIS — F41.9 ANXIETY AND DEPRESSION: Primary | ICD-10-CM

## 2025-06-23 DIAGNOSIS — D69.6 THROMBOCYTOPENIA (HCC): ICD-10-CM

## 2025-06-23 DIAGNOSIS — R56.9 SEIZURE (HCC): ICD-10-CM

## 2025-06-23 DIAGNOSIS — E78.5 HYPERLIPIDEMIA, UNSPECIFIED HYPERLIPIDEMIA TYPE: ICD-10-CM

## 2025-06-23 DIAGNOSIS — I10 PRIMARY HYPERTENSION: ICD-10-CM

## 2025-06-23 DIAGNOSIS — I63.9 CEREBROVASCULAR ACCIDENT (CVA), UNSPECIFIED MECHANISM (HCC): ICD-10-CM

## 2025-06-23 PROCEDURE — G2211 COMPLEX E/M VISIT ADD ON: HCPCS | Performed by: FAMILY MEDICINE

## 2025-06-23 PROCEDURE — 99214 OFFICE O/P EST MOD 30 MIN: CPT | Performed by: FAMILY MEDICINE

## 2025-06-23 RX ORDER — ESCITALOPRAM OXALATE 10 MG/1
10 TABLET ORAL DAILY
Qty: 30 TABLET | Refills: 5 | Status: SHIPPED | OUTPATIENT
Start: 2025-06-23 | End: 2025-07-08 | Stop reason: SDUPTHER

## 2025-06-24 ENCOUNTER — OFFICE VISIT (OUTPATIENT)
Dept: NEUROLOGY | Facility: CLINIC | Age: 85
End: 2025-06-24
Payer: COMMERCIAL

## 2025-06-24 VITALS
HEART RATE: 62 BPM | SYSTOLIC BLOOD PRESSURE: 120 MMHG | BODY MASS INDEX: 39.34 KG/M2 | HEIGHT: 63 IN | WEIGHT: 222 LBS | DIASTOLIC BLOOD PRESSURE: 80 MMHG

## 2025-06-24 DIAGNOSIS — I63.9 STROKE (CEREBRUM) (HCC): Primary | ICD-10-CM

## 2025-06-24 PROCEDURE — G2211 COMPLEX E/M VISIT ADD ON: HCPCS | Performed by: PSYCHIATRY & NEUROLOGY

## 2025-06-24 PROCEDURE — 99215 OFFICE O/P EST HI 40 MIN: CPT | Performed by: PSYCHIATRY & NEUROLOGY
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
## 2025-06-30 PROBLEM — I47.10 PSVT (PAROXYSMAL SUPRAVENTRICULAR TACHYCARDIA) (HCC): Status: RESOLVED | Noted: 2024-12-09 | Resolved: 2025-06-30

## 2025-06-30 NOTE — PROGRESS NOTES
Name: Anusha Booth      : 1940      MRN: [id number]  Encounter Provider: Silke Gaspar DO  Encounter Date: 2025   Encounter department: [facility]    Assessment & Plan  Anxiety and depression      Orders:    escitalopram (LEXAPRO) 10 mg tablet; Take 1 tablet (10 mg total) by mouth daily    Cerebrovascular accident (CVA), unspecified mechanism (HCC)         New onset atrial fibrillation (HCC)         Seizure (HCC)         Primary hypertension         Hyperlipidemia, unspecified hyperlipidemia type         Thrombocytopenia (HCC)              History of Present Illness     The patient presents for for treatment of of anxiety and depression ever since having a CVA.  She notes that she has been experiencing mood swings, anhedonia and dysphoria ever since having a CVA.  Her blood pressure stable at the present time as is her A-fib.  She has not had any recent seizures her blood pressure and cholesterol are well-controlled at this time as is her platelet count due to history of thrombocytopenia.  She denies any chest pain, shortness of breath, peripheral edema or dizziness.      Review of Systems   Constitutional:  Negative for appetite change, chills and fever.   HENT:  Negative for ear pain, facial swelling, rhinorrhea, sinus pain, sore throat and trouble swallowing.    Eyes:  Negative for discharge and redness.   Respiratory:  Negative for chest tightness, shortness of breath and wheezing.    Cardiovascular:  Negative for chest pain, palpitations and leg swelling.   Gastrointestinal:  Negative for abdominal pain, diarrhea, nausea and vomiting.   Endocrine: Negative for polyuria.   Genitourinary:  Negative for dysuria and urgency.   Musculoskeletal:  Negative for arthralgias and back pain.   Skin:  Negative for rash.   Neurological:  Negative for dizziness, weakness and headaches.   Hematological:  Negative for adenopathy.   Psychiatric/Behavioral:  Positive for  "decreased concentration and dysphoric mood. Negative for agitation, behavioral problems, confusion, hallucinations, self-injury, sleep disturbance and suicidal ideas. The patient is nervous/anxious. The patient is not hyperactive.         Positive anhedonia, positive mood swings but no suicidality   All other systems reviewed and are negative.    Past Medical History[1]  Past Surgical History[2]  Family History[3]  Social History[4]  Medications[5]  Allergies   Allergen Reactions    Adhesive  [Medical Tape]      Other reaction(s): Unknown Reaction    Celebrex [Celecoxib] Itching and GI Intolerance     Voice change    Latex      Other reaction(s): Unknown Reaction    Midazolam     Penicillins     Thiopental      Immunization History   Administered Date(s) Administered    COVID-19 PFIZER VACCINE 0.3 ML IM 01/03/2022, 01/24/2022    Influenza Split High Dose Preservative Free IM 01/08/2013, 10/21/2014, 10/07/2015, 01/09/2017, 09/21/2017    Influenza, high dose seasonal 0.7 mL 10/08/2018, 09/18/2019, 10/08/2020, 11/29/2021, 01/12/2023, 01/08/2024    Pneumococcal Conjugate 13-Valent 10/07/2015, 12/16/2015    Pneumococcal Polysaccharide PPV23 09/21/2017    Td (adult), adsorbed 12/16/2015    Tdap 08/01/2022    Zoster 04/16/2013     Objective   /62   Pulse 58   Temp (!) 97.2 °F (36.2 °C)   Resp 16   Ht 5' 3\" (1.6 m)   Wt 100 kg (220 lb 9.6 oz)   SpO2 98%   BMI 39.08 kg/m²     Physical Exam  Vitals and nursing note reviewed.   Constitutional:       General: She is not in acute distress.     Appearance: Normal appearance. She is well-developed. She is not ill-appearing or diaphoretic.   HENT:      Head: Normocephalic and atraumatic.      Right Ear: Tympanic membrane, ear canal and external ear normal.      Left Ear: Tympanic membrane, ear canal and external ear normal.      Nose: Nose normal. No congestion or rhinorrhea.      Mouth/Throat:      Mouth: Mucous membranes are moist.      Pharynx: Oropharynx is clear. " No oropharyngeal exudate or posterior oropharyngeal erythema.     Eyes:      General: No scleral icterus.        Right eye: No discharge.         Left eye: No discharge.      Extraocular Movements: Extraocular movements intact.      Conjunctiva/sclera: Conjunctivae normal.      Pupils: Pupils are equal, round, and reactive to light.     Neck:      Thyroid: No thyromegaly.      Vascular: No carotid bruit or JVD.      Trachea: No tracheal deviation.     Cardiovascular:      Rate and Rhythm: Normal rate. Rhythm irregular.      Pulses: Normal pulses.      Heart sounds: Normal heart sounds. No murmur heard.  Pulmonary:      Effort: Pulmonary effort is normal. No respiratory distress.      Breath sounds: Normal breath sounds. No stridor. No wheezing, rhonchi or rales.   Abdominal:      General: Abdomen is flat. Bowel sounds are normal. There is no distension.      Palpations: Abdomen is soft. There is no mass.      Tenderness: There is no abdominal tenderness. There is no guarding or rebound.     Musculoskeletal:         General: No swelling, tenderness or deformity. Normal range of motion.      Cervical back: Normal range of motion and neck supple. No rigidity.      Right lower leg: No edema.      Left lower leg: No edema.   Lymphadenopathy:      Cervical: No cervical adenopathy.     Skin:     General: Skin is warm and dry.      Capillary Refill: Capillary refill takes less than 2 seconds.      Coloration: Skin is not jaundiced.      Findings: No bruising, erythema or rash.     Neurological:      General: No focal deficit present.      Mental Status: She is alert and oriented to person, place, and time. Mental status is at baseline.      Cranial Nerves: No cranial nerve deficit.      Sensory: No sensory deficit.      Motor: No abnormal muscle tone.      Coordination: Coordination normal.      Gait: Gait normal.      Deep Tendon Reflexes: Reflexes are normal and symmetric. Reflexes normal.     Psychiatric:          Behavior: Behavior normal.         Thought Content: Thought content normal.         Judgment: Judgment normal.      Comments: Positive dysphoria, positive anhedonia no suicidality              [1]   Past Medical History:  Diagnosis Date    Acute venous embolism and thrombosis of deep vessels of distal lower extremity ([facility]) 01/27/2014    Blood type A+     Closed fracture of right distal radius and ulna 03/06/2019    Convulsions ([facility])     Deviated septum 05/2000    Fracture of c2     Gallstones 09/15/2019    Hiatal hernia     1976    HL (hearing loss)     Hyperlipidemia     Osteoarthritis, localized, knee     PSVT (paroxysmal supraventricular tachycardia) ([facility]) 12/09/2024    Seizure ([facility])     1990    Seizures ([facility])     Spondylosis     Tinnitus    [2]   Past Surgical History:  Procedure Laterality Date    BREAST BIOPSY Right over 10 years    benign    CATARACT EXTRACTION      COMBINED HYSTEROSCOPY DIAGNOSTIC / D&C      ESOPHAGOGASTRODUODENOSCOPY      diagnostic    HAND SURGERY      HERNIA REPAIR      HYSTERECTOMY      KNEE ARTHROPLASTY      Revision;total    KNEE ARTHROSCOPY Right     1997    NASAL SEPTUM SURGERY      Deviation repair    [place] TENDON SHEATH INCISION Right 11/21/2019    Procedure: RING TRIGGER FINGER RELEASE;  Surgeon: Victorino Ventura MD;  Location[facility];  Service: Orthopedics    REPLACEMENT TOTAL KNEE Right     2000    TONSILLECTOMY      1948    TUBAL LIGATION      TUMOR REMOVAL Left 1956    left index finger   [3]   Family History  Problem Relation Name Age of Onset    Prostate cancer Father      Other Family          back problem    Arthritis Family      Arthritis Mother      No Known Problems Sister griggs     No Known Problems Daughter elvia     No Known Problems Sister unice     No Known Problems Paternal Aunt      No Known Problems Paternal Aunt      No Known Problems Paternal Aunt      No Known Problems Paternal Aunt      Prostate cancer Brother     [4]   Social History  Tobacco Use    Smoking  status: Former     Passive exposure: Past    Smokeless tobacco: Never   Vaping Use    Vaping status: Never Used   Substance and Sexual Activity    Alcohol use: Yes     Comment: social use    Drug use: No   [5]   Current Outpatient Medications on File Prior to Visit   Medication Sig    amLODIPine (NORVASC) 2.5 mg tablet Take 1 tablet (2.5 mg total) by mouth daily    Ascorbic Acid (vitamin C) 1000 MG tablet Take 1,000 mg by mouth in the morning.    atorvastatin (LIPITOR) 40 mg tablet TAKE 1 TABLET DAILY WITH DINNER    Calcium Carb-Cholecalciferol (CALCIUM 1000 + D PO) Take 1 tablet by mouth in the morning.    carvedilol (COREG) 12.5 mg tablet Take 1 tablet (12.5 mg total) by mouth 2 (two) times a day with meals    cholecalciferol (VITAMIN D3) 1,000 units tablet Take 1,000 Units by mouth in the morning.    clopidogrel (PLAVIX) 75 mg tablet Take 1 tablet (75 mg total) by mouth daily    furosemide (LASIX) 20 mg tablet Take 1 tablet (20 mg total) by mouth daily as needed (for leg swelling. Contact your PCP for further refills)    levETIRAcetam (KEPPRA) 250 mg tablet TAKE ONE AND ONE-HALF TABLETS TWICE A DAY    lisinopril (ZESTRIL) 40 mg tablet Take 1 tablet (40 mg total) by mouth daily    Multiple Vitamin (MULTIVITAMINS PO) Take 1 capsule by mouth in the morning.    Zinc 100 MG TABS Take by mouth

## 2025-07-08 DIAGNOSIS — F32.A ANXIETY AND DEPRESSION: ICD-10-CM

## 2025-07-08 DIAGNOSIS — F41.9 ANXIETY AND DEPRESSION: ICD-10-CM

## 2025-07-08 DIAGNOSIS — I63.9 CVA (CEREBRAL VASCULAR ACCIDENT) (HCC): ICD-10-CM

## 2025-07-08 RX ORDER — CLOPIDOGREL BISULFATE 75 MG/1
75 TABLET ORAL DAILY
Qty: 90 TABLET | Refills: 0 | Status: SHIPPED | OUTPATIENT
Start: 2025-07-08

## 2025-07-08 RX ORDER — ESCITALOPRAM OXALATE 10 MG/1
10 TABLET ORAL DAILY
Qty: 30 TABLET | Refills: 5 | Status: SHIPPED | OUTPATIENT
Start: 2025-07-08 | End: 2026-01-04

## 2025-07-08 NOTE — TELEPHONE ENCOUNTER
Call from patient requesting her scripts for Lexapro and Plavix (only has 4 pills left) be sent to the [facility] on [address] as her pharmacy has closed.     Patient requesting scripts to be updated by Dr. Gaspar for 90 day fills. Thank you.

## 2025-07-14 DIAGNOSIS — G40.909 NONINTRACTABLE EPILEPSY WITHOUT STATUS EPILEPTICUS, UNSPECIFIED EPILEPSY TYPE (HCC): ICD-10-CM

## 2025-07-14 RX ORDER — LEVETIRACETAM 250 MG/1
375 TABLET ORAL 2 TIMES DAILY
Qty: 270 TABLET | Refills: 1 | Status: SHIPPED | OUTPATIENT
Start: 2025-07-14

## 2025-07-21 DIAGNOSIS — I10 ESSENTIAL HYPERTENSION: ICD-10-CM

## 2025-07-21 RX ORDER — LISINOPRIL 40 MG/1
40 TABLET ORAL DAILY
Qty: 90 TABLET | Refills: 1 | Status: SHIPPED | OUTPATIENT
Start: 2025-07-21

## 2025-07-25 ENCOUNTER — RA CDI HCC (OUTPATIENT)
Dept: OTHER | Facility: HOSPITAL | Age: 85
End: 2025-07-25

## 2025-07-31 ENCOUNTER — OFFICE VISIT (OUTPATIENT)
Dept: FAMILY MEDICINE CLINIC | Facility: CLINIC | Age: 85
End: 2025-07-31
Payer: COMMERCIAL

## 2025-08-06 ENCOUNTER — VBI (OUTPATIENT)
Dept: ADMINISTRATIVE | Facility: OTHER | Age: 85
End: 2025-08-06

## 2025-08-13 ENCOUNTER — VBI (OUTPATIENT)
Dept: ADMINISTRATIVE | Facility: OTHER | Age: 85
End: 2025-08-13

## 2025-08-14 ENCOUNTER — OFFICE VISIT (OUTPATIENT)
Dept: FAMILY MEDICINE CLINIC | Facility: CLINIC | Age: 85
End: 2025-08-14
Payer: COMMERCIAL

## 2025-08-14 VITALS
OXYGEN SATURATION: 95 % | WEIGHT: 212 LBS | SYSTOLIC BLOOD PRESSURE: 124 MMHG | TEMPERATURE: 97.2 F | DIASTOLIC BLOOD PRESSURE: 84 MMHG | HEART RATE: 54 BPM | BODY MASS INDEX: 37.56 KG/M2 | HEIGHT: 63 IN

## 2025-08-14 DIAGNOSIS — I63.9 CEREBROVASCULAR ACCIDENT (CVA), UNSPECIFIED MECHANISM (HCC): ICD-10-CM

## 2025-08-14 DIAGNOSIS — I10 PRIMARY HYPERTENSION: ICD-10-CM

## 2025-08-14 DIAGNOSIS — F32.4 MAJOR DEPRESSIVE DISORDER WITH SINGLE EPISODE, IN PARTIAL REMISSION (HCC): Primary | ICD-10-CM

## 2025-08-14 DIAGNOSIS — G40.909 NONINTRACTABLE EPILEPSY WITHOUT STATUS EPILEPTICUS, UNSPECIFIED EPILEPSY TYPE (HCC): ICD-10-CM

## 2025-08-14 PROCEDURE — 99214 OFFICE O/P EST MOD 30 MIN: CPT | Performed by: FAMILY MEDICINE

## 2025-08-14 PROCEDURE — G2211 COMPLEX E/M VISIT ADD ON: HCPCS | Performed by: FAMILY MEDICINE

## 2025-08-19 PROBLEM — F32.4 MAJOR DEPRESSIVE DISORDER WITH SINGLE EPISODE, IN PARTIAL REMISSION (HCC): Status: ACTIVE | Noted: 2025-08-19

## 2025-08-22 ENCOUNTER — CONSULT (OUTPATIENT)
Dept: PULMONOLOGY | Facility: CLINIC | Age: 85
End: 2025-08-22
Payer: COMMERCIAL

## 2025-08-22 VITALS
TEMPERATURE: 97.6 F | RESPIRATION RATE: 18 BRPM | BODY MASS INDEX: 38.62 KG/M2 | HEIGHT: 63 IN | HEART RATE: 58 BPM | SYSTOLIC BLOOD PRESSURE: 136 MMHG | OXYGEN SATURATION: 96 % | DIASTOLIC BLOOD PRESSURE: 70 MMHG | WEIGHT: 218 LBS

## 2025-08-22 DIAGNOSIS — I10 PRIMARY HYPERTENSION: ICD-10-CM

## 2025-08-22 DIAGNOSIS — R91.8 MULTIPLE LUNG NODULES: Primary | ICD-10-CM

## 2025-08-22 DIAGNOSIS — R06.83 SNORING: ICD-10-CM

## 2025-08-22 DIAGNOSIS — E66.812 CLASS 2 SEVERE OBESITY DUE TO EXCESS CALORIES WITH SERIOUS COMORBIDITY AND BODY MASS INDEX (BMI) OF 38.0 TO 38.9 IN ADULT (HCC): ICD-10-CM

## 2025-08-22 DIAGNOSIS — I48.0 PAROXYSMAL ATRIAL FIBRILLATION (HCC): ICD-10-CM

## 2025-08-22 DIAGNOSIS — E66.01 CLASS 2 SEVERE OBESITY DUE TO EXCESS CALORIES WITH SERIOUS COMORBIDITY AND BODY MASS INDEX (BMI) OF 38.0 TO 38.9 IN ADULT (HCC): ICD-10-CM

## 2025-08-22 DIAGNOSIS — G47.33 OSA (OBSTRUCTIVE SLEEP APNEA): ICD-10-CM

## 2025-08-22 PROCEDURE — 99204 OFFICE O/P NEW MOD 45 MIN: CPT | Performed by: INTERNAL MEDICINE

## 2025-08-22 PROCEDURE — G2211 COMPLEX E/M VISIT ADD ON: HCPCS | Performed by: INTERNAL MEDICINE

## (undated) DEVICE — ACE WRAP 3 IN STERILE

## (undated) DEVICE — CURITY NON-ADHERENT STRIPS: Brand: CURITY

## (undated) DEVICE — GLOVE INDICATOR PI UNDERGLOVE SZ 8.5 BLUE

## (undated) DEVICE — LIGHT HANDLE COVER SLEEVE DISP BLUE STELLAR

## (undated) DEVICE — INTENDED FOR TISSUE SEPARATION, AND OTHER PROCEDURES THAT REQUIRE A SHARP SURGICAL BLADE TO PUNCTURE OR CUT.: Brand: BARD-PARKER ® CARBON RIB-BACK BLADES

## (undated) DEVICE — CUFF TOURNIQUET 18 X 4 IN QUICK CONNECT DISP 1 BLADDER

## (undated) DEVICE — GLOVE SRG BIOGEL 8.5

## (undated) DEVICE — GAUZE SPONGES,16 PLY: Brand: CURITY

## (undated) DEVICE — STERILE BETHLEHEM PLASTIC HAND: Brand: CARDINAL HEALTH

## (undated) DEVICE — NEEDLE 25G X 1 1/2

## (undated) DEVICE — PADDING CAST 4 IN  COTTON STRL

## (undated) DEVICE — CHLORAPREP HI-LITE 26ML ORANGE

## (undated) DEVICE — SUT ETHILON 3-0 PS-1 18 IN 1663H